# Patient Record
Sex: MALE | Race: WHITE | NOT HISPANIC OR LATINO | Employment: OTHER | URBAN - NONMETROPOLITAN AREA
[De-identification: names, ages, dates, MRNs, and addresses within clinical notes are randomized per-mention and may not be internally consistent; named-entity substitution may affect disease eponyms.]

---

## 2017-05-22 ENCOUNTER — HOSPITAL ENCOUNTER (EMERGENCY)
Facility: HOSPITAL | Age: 47
Discharge: LEFT BEFORE DISCHARGE | End: 2017-05-23

## 2017-05-22 VITALS
TEMPERATURE: 98.5 F | BODY MASS INDEX: 31.39 KG/M2 | WEIGHT: 200 LBS | RESPIRATION RATE: 18 BRPM | DIASTOLIC BLOOD PRESSURE: 115 MMHG | SYSTOLIC BLOOD PRESSURE: 204 MMHG | OXYGEN SATURATION: 97 % | HEART RATE: 91 BPM | HEIGHT: 67 IN

## 2017-05-22 PROCEDURE — 99211 OFF/OP EST MAY X REQ PHY/QHP: CPT

## 2017-05-26 ENCOUNTER — OFFICE VISIT (OUTPATIENT)
Dept: FAMILY MEDICINE CLINIC | Facility: CLINIC | Age: 47
End: 2017-05-26

## 2017-05-26 VITALS
HEIGHT: 67 IN | OXYGEN SATURATION: 98 % | BODY MASS INDEX: 33.74 KG/M2 | DIASTOLIC BLOOD PRESSURE: 90 MMHG | SYSTOLIC BLOOD PRESSURE: 140 MMHG | WEIGHT: 215 LBS

## 2017-05-26 DIAGNOSIS — F19.20 POLYSUBSTANCE (EXCLUDING OPIOIDS) DEPENDENCE (HCC): ICD-10-CM

## 2017-05-26 DIAGNOSIS — G47.30 SLEEP APNEA, UNSPECIFIED TYPE: ICD-10-CM

## 2017-05-26 DIAGNOSIS — I10 ESSENTIAL HYPERTENSION: Primary | ICD-10-CM

## 2017-05-26 DIAGNOSIS — F41.9 ANXIETY: ICD-10-CM

## 2017-05-26 DIAGNOSIS — E66.09 NON MORBID OBESITY DUE TO EXCESS CALORIES: ICD-10-CM

## 2017-05-26 DIAGNOSIS — Z13.9 SCREENING: ICD-10-CM

## 2017-05-26 DIAGNOSIS — F17.200 TOBACCO DEPENDENCE: ICD-10-CM

## 2017-05-26 PROCEDURE — 99214 OFFICE O/P EST MOD 30 MIN: CPT | Performed by: FAMILY MEDICINE

## 2017-05-27 RX ORDER — LISINOPRIL 5 MG/1
5 TABLET ORAL DAILY
Qty: 30 TABLET | Refills: 3 | Status: SHIPPED | OUTPATIENT
Start: 2017-05-27 | End: 2017-06-26 | Stop reason: SDUPTHER

## 2017-05-27 RX ORDER — HYDROXYZINE PAMOATE 25 MG/1
25 CAPSULE ORAL 3 TIMES DAILY PRN
Qty: 90 CAPSULE | Refills: 3 | Status: SHIPPED | OUTPATIENT
Start: 2017-05-27 | End: 2017-10-13 | Stop reason: SDUPTHER

## 2017-05-29 PROBLEM — F17.200 TOBACCO DEPENDENCE: Status: ACTIVE | Noted: 2017-05-29

## 2017-05-29 PROBLEM — F19.20 POLYSUBSTANCE (EXCLUDING OPIOIDS) DEPENDENCE: Status: ACTIVE | Noted: 2017-05-29

## 2017-05-29 PROBLEM — F41.9 ANXIETY: Status: ACTIVE | Noted: 2017-05-29

## 2017-05-29 PROBLEM — E66.09 NON MORBID OBESITY DUE TO EXCESS CALORIES: Status: ACTIVE | Noted: 2017-05-29

## 2017-05-29 RX ORDER — ASPIRIN 81 MG/1
81 TABLET, CHEWABLE ORAL DAILY
COMMUNITY
End: 2017-10-13 | Stop reason: SDUPTHER

## 2017-06-13 ENCOUNTER — APPOINTMENT (OUTPATIENT)
Dept: LAB | Facility: HOSPITAL | Age: 47
End: 2017-06-13

## 2017-06-13 LAB
ALBUMIN SERPL-MCNC: 4.3 G/DL (ref 3.4–4.8)
ALBUMIN/GLOB SERPL: 1.2 G/DL (ref 1.1–1.8)
ALP SERPL-CCNC: 72 U/L (ref 38–126)
ALT SERPL W P-5'-P-CCNC: 30 U/L (ref 21–72)
ANION GAP SERPL CALCULATED.3IONS-SCNC: 13 MMOL/L (ref 5–15)
ARTICHOKE IGE QN: 138 MG/DL (ref 1–129)
AST SERPL-CCNC: 31 U/L (ref 17–59)
BASOPHILS # BLD AUTO: 0.09 10*3/MM3 (ref 0–0.2)
BASOPHILS NFR BLD AUTO: 1.1 % (ref 0–2)
BILIRUB SERPL-MCNC: 0.6 MG/DL (ref 0.2–1.3)
BUN BLD-MCNC: 17 MG/DL (ref 7–21)
BUN/CREAT SERPL: 22.4 (ref 7–25)
CALCIUM SPEC-SCNC: 8.9 MG/DL (ref 8.4–10.2)
CHLORIDE SERPL-SCNC: 99 MMOL/L (ref 95–110)
CHOLEST SERPL-MCNC: 196 MG/DL (ref 0–199)
CO2 SERPL-SCNC: 26 MMOL/L (ref 22–31)
CREAT BLD-MCNC: 0.76 MG/DL (ref 0.7–1.3)
DEPRECATED RDW RBC AUTO: 48.9 FL (ref 35.1–43.9)
EOSINOPHIL # BLD AUTO: 0.66 10*3/MM3 (ref 0–0.7)
EOSINOPHIL NFR BLD AUTO: 8 % (ref 0–7)
ERYTHROCYTE [DISTWIDTH] IN BLOOD BY AUTOMATED COUNT: 14.4 % (ref 11.5–14.5)
GFR SERPL CREATININE-BSD FRML MDRD: 109 ML/MIN/1.73 (ref 63–147)
GLOBULIN UR ELPH-MCNC: 3.5 GM/DL (ref 2.3–3.5)
GLUCOSE BLD-MCNC: 110 MG/DL (ref 60–100)
HBA1C MFR BLD: 5.91 % (ref 4–5.6)
HCT VFR BLD AUTO: 46 % (ref 39–49)
HDLC SERPL-MCNC: 55 MG/DL (ref 60–200)
HGB BLD-MCNC: 15.8 G/DL (ref 13.7–17.3)
IMM GRANULOCYTES # BLD: 0.02 10*3/MM3 (ref 0–0.02)
IMM GRANULOCYTES NFR BLD: 0.2 % (ref 0–0.5)
LDLC/HDLC SERPL: 2.17 {RATIO} (ref 0–3.55)
LYMPHOCYTES # BLD AUTO: 2.89 10*3/MM3 (ref 0.6–4.2)
LYMPHOCYTES NFR BLD AUTO: 35 % (ref 10–50)
MCH RBC QN AUTO: 32.2 PG (ref 26.5–34)
MCHC RBC AUTO-ENTMCNC: 34.3 G/DL (ref 31.5–36.3)
MCV RBC AUTO: 93.7 FL (ref 80–98)
MONOCYTES # BLD AUTO: 0.76 10*3/MM3 (ref 0–0.9)
MONOCYTES NFR BLD AUTO: 9.2 % (ref 0–12)
NEUTROPHILS # BLD AUTO: 3.84 10*3/MM3 (ref 2–8.6)
NEUTROPHILS NFR BLD AUTO: 46.5 % (ref 37–80)
PLATELET # BLD AUTO: 226 10*3/MM3 (ref 150–450)
PMV BLD AUTO: 11.5 FL (ref 8–12)
POTASSIUM BLD-SCNC: 3.7 MMOL/L (ref 3.5–5.1)
PROT SERPL-MCNC: 7.8 G/DL (ref 6.3–8.6)
RBC # BLD AUTO: 4.91 10*6/MM3 (ref 4.37–5.74)
SODIUM BLD-SCNC: 138 MMOL/L (ref 137–145)
TRIGL SERPL-MCNC: 108 MG/DL (ref 20–199)
WBC NRBC COR # BLD: 8.26 10*3/MM3 (ref 3.2–9.8)

## 2017-06-13 PROCEDURE — 80061 LIPID PANEL: CPT | Performed by: FAMILY MEDICINE

## 2017-06-13 PROCEDURE — 36415 COLL VENOUS BLD VENIPUNCTURE: CPT | Performed by: FAMILY MEDICINE

## 2017-06-13 PROCEDURE — 80053 COMPREHEN METABOLIC PANEL: CPT | Performed by: FAMILY MEDICINE

## 2017-06-13 PROCEDURE — 85025 COMPLETE CBC W/AUTO DIFF WBC: CPT | Performed by: FAMILY MEDICINE

## 2017-06-13 PROCEDURE — 83036 HEMOGLOBIN GLYCOSYLATED A1C: CPT | Performed by: FAMILY MEDICINE

## 2017-06-13 PROCEDURE — 83525 ASSAY OF INSULIN: CPT | Performed by: FAMILY MEDICINE

## 2017-06-15 LAB — INSULIN SERPL-ACNC: 20 UIU/ML

## 2017-06-26 ENCOUNTER — OFFICE VISIT (OUTPATIENT)
Dept: FAMILY MEDICINE CLINIC | Facility: CLINIC | Age: 47
End: 2017-06-26

## 2017-06-26 VITALS
WEIGHT: 234 LBS | HEIGHT: 67 IN | SYSTOLIC BLOOD PRESSURE: 140 MMHG | BODY MASS INDEX: 36.73 KG/M2 | DIASTOLIC BLOOD PRESSURE: 88 MMHG | HEART RATE: 106 BPM | OXYGEN SATURATION: 98 %

## 2017-06-26 DIAGNOSIS — R73.03 PRE-DIABETES: ICD-10-CM

## 2017-06-26 DIAGNOSIS — E66.09 NON MORBID OBESITY DUE TO EXCESS CALORIES: ICD-10-CM

## 2017-06-26 DIAGNOSIS — K40.91 UNILATERAL RECURRENT INGUINAL HERNIA WITHOUT OBSTRUCTION OR GANGRENE: ICD-10-CM

## 2017-06-26 DIAGNOSIS — E78.2 MIXED HYPERLIPIDEMIA: ICD-10-CM

## 2017-06-26 DIAGNOSIS — F41.9 ANXIETY: ICD-10-CM

## 2017-06-26 DIAGNOSIS — F17.200 TOBACCO DEPENDENCE: ICD-10-CM

## 2017-06-26 DIAGNOSIS — I10 ESSENTIAL HYPERTENSION: Primary | ICD-10-CM

## 2017-06-26 PROCEDURE — 99213 OFFICE O/P EST LOW 20 MIN: CPT | Performed by: FAMILY MEDICINE

## 2017-06-26 RX ORDER — LISINOPRIL 10 MG/1
10 TABLET ORAL DAILY
Qty: 30 TABLET | Refills: 3 | Status: SHIPPED | OUTPATIENT
Start: 2017-06-26 | End: 2017-10-13 | Stop reason: SDUPTHER

## 2017-06-26 RX ORDER — ROSUVASTATIN CALCIUM 10 MG/1
10 TABLET, COATED ORAL DAILY
Qty: 30 TABLET | Refills: 3 | Status: SHIPPED | OUTPATIENT
Start: 2017-06-26 | End: 2017-10-13 | Stop reason: SDUPTHER

## 2017-06-26 NOTE — PROGRESS NOTES
I discussed the case with the resident and I agree with their assessment which includes Hypertension and Hyperglycemia and plan as outlined .  Barney Oswald MD.

## 2017-06-26 NOTE — PROGRESS NOTES
"  Subjective:     Chief Complaint   Patient presents with   • Hypertension   • Follow-up     Kingsley Littlejohn is a 47 y.o. male who presents for 1 month follow-up of HTN, lab results.      New concerns:  \"Blood pressure medicine doesn't help\", lab results, inguinal hernia which is reducible.    Evaluation:   Blood pressure reading not indicated for medication reasons: No   Blood pressure reading refused by patient: No   Home blood pressure readings: Systolic 150's, diastolic 100's.  States he stopped taking lisinopril after 1 week, \"because it didn't help\"   Blood pressure often elevated in physician office: No. However 204/115 in ER 5/22.  Symptoms:   Headache: yes. Occasional tension-type headache.   Visual disturbance: no   Fatigue: no   Dyspnea: no   Orthopnea: no   Edema: no   Chest pain: no   Palpitations: no   Diaphoresis: no    Risk Factors:   Tobacco use, Obesity    Comorbid Conditions:   None    The following portions of the patient's history were reviewed and updated as appropriate: allergies, current medications, past family history, past medical history, past social history, past surgical history and problem list.    Preventative:  Over the past 2 weeks, have you felt down, depressed, or hopeless?No   Over the past 2 weeks, have you felt little interest or pleasure in doing things?No  Clinical depression screening refused by patient.No          On osteoporosis therapy?No     Past Medical Hx:  Past Medical History:   Diagnosis Date   • Abdominal pain    • Allergic rhinitis    • Diabetes mellitus     Prev HbA1c 14.0 2014, states \"I'm cured!\"   • Essential hypertension    • Fracture closed, fibula, shaft     tib-fib       Past Surgical Hx:  Past Surgical History:   Procedure Laterality Date   • CYST REMOVAL      x 3   • TIBIA FRACTURE SURGERY Bilateral 2011    tib-fib       Health Maintenance:  Health Maintenance   Topic Date Due   • MEDICARE ANNUAL WELLNESS  05/26/2017   • INFLUENZA VACCINE  08/01/2017 "   • LIPID PANEL  06/13/2018   • TDAP/TD VACCINES (2 - Td) 01/01/2021   • PNEUMOCOCCAL VACCINE (19-64 MEDIUM RISK)  Addressed       Current Meds:    Current Outpatient Prescriptions:   •  aspirin 81 MG chewable tablet, Chew 81 mg Daily., Disp: , Rfl:   •  hydrOXYzine (VISTARIL) 25 MG capsule, Take 1 capsule by mouth 3 (Three) Times a Day As Needed for Itching or Anxiety., Disp: 90 capsule, Rfl: 3  •  nicotine polacrilex (NICORETTE) 4 MG gum, Chew 1 each As Needed for Smoking Cessation., Disp: 100 each, Rfl: 3  •  lisinopril (PRINIVIL,ZESTRIL) 10 MG tablet, Take 1 tablet by mouth Daily., Disp: 30 tablet, Rfl: 3  •  rosuvastatin (CRESTOR) 10 MG tablet, Take 1 tablet by mouth Daily., Disp: 30 tablet, Rfl: 3    Allergies:  Codeine and Latex    Family Hx:  Family History   Problem Relation Age of Onset   • Diabetes Other    • Hypertension Other    • Cancer Other    • Lung disease Other    • Thyroid disease Other    • Bleeding Disorder Other         Social History:  Social History     Social History   • Marital status: Single     Spouse name: N/A   • Number of children: N/A   • Years of education: N/A     Occupational History   • Lr      Social History Main Topics   • Smoking status: Current Every Day Smoker     Packs/day: 1.00     Years: 25.00     Types: Cigarettes     Start date: 1992   • Smokeless tobacco: Not on file   • Alcohol use No      Comment: FORMER pint of whiskey daily, states quit 1 year ago   • Drug use: Yes     Special: Marijuana      Comment: Meth, other substances which he declined to discuss further   • Sexual activity: Defer     Other Topics Concern   • Not on file     Social History Narrative       Review of Systems  Review of Systems   Constitutional: Negative for activity change and appetite change.   HENT: Negative for congestion and dental problem.    Eyes: Negative for discharge and itching.   Respiratory: Negative for apnea.    Cardiovascular: Negative for chest pain and leg swelling.  "  Gastrointestinal: Negative for abdominal distention and abdominal pain.   Endocrine: Negative for cold intolerance.   Genitourinary: Negative for difficulty urinating and enuresis.   Musculoskeletal: Negative for arthralgias and back pain.   Allergic/Immunologic: Negative for environmental allergies and food allergies.   Neurological: Negative for dizziness and facial asymmetry.   Hematological: Negative for adenopathy.   Psychiatric/Behavioral: Positive for sleep disturbance. Negative for agitation and behavioral problems. The patient is nervous/anxious.          Objective:     /88  Pulse 106  Ht 67\" (170.2 cm)  Wt 234 lb (106 kg)  SpO2 98%  BMI 36.65 kg/m2    General:  alert, appears stated age and cooperative   Oropharynx: lips, mucosa, and tongue normal; teeth and gums normal    Eyes:  conjunctivae/corneas clear. PERRL, EOM's intact. Fundi benign.    Ears:  normal TM's and external ear canals both ears   Neck: no adenopathy, no carotid bruit, no JVD, supple, symmetrical, trachea midline and thyroid not enlarged, symmetric, no tenderness/mass/nodules. Increased circumference.   Thyroid:  no palpable nodule   Lung: clear to auscultation bilaterally   Heart:  regular rate and rhythm, S1, S2 normal, no murmur, click, rub or gallop   Abdomen: soft, non-tender; bowel sounds normal; no masses,  no organomegaly. No inguinal hernia appreciated.   Extremities: extremities normal, atraumatic, no cyanosis or edema   Skin: warm and dry, no hyperpigmentation, vitiligo, or suspicious lesions   Pulses: 2+ and symmetric   Neuro: normal without focal findings, mental status, speech normal, alert and oriented x3 and reflexes normal and symmetric       Lab Review  No visits with results within 1 Month(s) from this visit.  Latest known visit with results is:    Office Visit on 05/26/2017   Component Date Value Ref Range Status   • WBC 06/13/2017 8.26  3.20 - 9.80 10*3/mm3 Final   • RBC 06/13/2017 4.91  4.37 - 5.74 " 10*6/mm3 Final   • Hemoglobin 06/13/2017 15.8  13.7 - 17.3 g/dL Final   • Hematocrit 06/13/2017 46.0  39.0 - 49.0 % Final   • MCV 06/13/2017 93.7  80.0 - 98.0 fL Final   • MCH 06/13/2017 32.2  26.5 - 34.0 pg Final   • MCHC 06/13/2017 34.3  31.5 - 36.3 g/dL Final   • RDW 06/13/2017 14.4  11.5 - 14.5 % Final   • RDW-SD 06/13/2017 48.9* 35.1 - 43.9 fl Final   • MPV 06/13/2017 11.5  8.0 - 12.0 fL Final   • Platelets 06/13/2017 226  150 - 450 10*3/mm3 Final   • Neutrophil % 06/13/2017 46.5  37.0 - 80.0 % Final   • Lymphocyte % 06/13/2017 35.0  10.0 - 50.0 % Final   • Monocyte % 06/13/2017 9.2  0.0 - 12.0 % Final   • Eosinophil % 06/13/2017 8.0* 0.0 - 7.0 % Final   • Basophil % 06/13/2017 1.1  0.0 - 2.0 % Final   • Immature Grans % 06/13/2017 0.2  0.0 - 0.5 % Final   • Neutrophils, Absolute 06/13/2017 3.84  2.00 - 8.60 10*3/mm3 Final   • Lymphocytes, Absolute 06/13/2017 2.89  0.60 - 4.20 10*3/mm3 Final   • Monocytes, Absolute 06/13/2017 0.76  0.00 - 0.90 10*3/mm3 Final   • Eosinophils, Absolute 06/13/2017 0.66  0.00 - 0.70 10*3/mm3 Final   • Basophils, Absolute 06/13/2017 0.09  0.00 - 0.20 10*3/mm3 Final   • Immature Grans, Absolute 06/13/2017 0.02  0.00 - 0.02 10*3/mm3 Final   • Glucose 06/13/2017 110* 60 - 100 mg/dL Final   • BUN 06/13/2017 17  7 - 21 mg/dL Final   • Creatinine 06/13/2017 0.76  0.70 - 1.30 mg/dL Final   • Sodium 06/13/2017 138  137 - 145 mmol/L Final   • Potassium 06/13/2017 3.7  3.5 - 5.1 mmol/L Final   • Chloride 06/13/2017 99  95 - 110 mmol/L Final   • CO2 06/13/2017 26.0  22.0 - 31.0 mmol/L Final   • Calcium 06/13/2017 8.9  8.4 - 10.2 mg/dL Final   • Total Protein 06/13/2017 7.8  6.3 - 8.6 g/dL Final   • Albumin 06/13/2017 4.30  3.40 - 4.80 g/dL Final   • ALT (SGPT) 06/13/2017 30  21 - 72 U/L Final   • AST (SGOT) 06/13/2017 31  17 - 59 U/L Final   • Alkaline Phosphatase 06/13/2017 72  38 - 126 U/L Final   • Total Bilirubin 06/13/2017 0.6  0.2 - 1.3 mg/dL Final   • eGFR Non African Amer 06/13/2017  "109  63 - 147 mL/min/1.73 Final   • Globulin 06/13/2017 3.5  2.3 - 3.5 gm/dL Final   • A/G Ratio 06/13/2017 1.2  1.1 - 1.8 g/dL Final   • BUN/Creatinine Ratio 06/13/2017 22.4  7.0 - 25.0 Final   • Anion Gap 06/13/2017 13.0  5.0 - 15.0 mmol/L Final   • Total Cholesterol 06/13/2017 196  0 - 199 mg/dL Final   • Triglycerides 06/13/2017 108  20 - 199 mg/dL Final   • HDL Cholesterol 06/13/2017 55* 60 - 200 mg/dL Final   • LDL Cholesterol  06/13/2017 138* 1 - 129 mg/dL Final   • LDL/HDL Ratio 06/13/2017 2.17  0.00 - 3.55 Final   • Hemoglobin A1C 06/13/2017 5.91* 4 - 5.6 % Final   • Insulin 06/13/2017 20  uIU/mL Final    Reference Range:  Pubertal Children and Adults (fasting):  < or = 17       Assessment/Plan:     Hypertension stable.  1. Essential hypertension    2. Mixed hyperlipidemia    3. Non morbid obesity due to excess calories    4. Tobacco dependence    5. Anxiety    6. Pre-diabetes    7. Unilateral recurrent inguinal hernia without obstruction or gangrene      1. Essential hypertension  - lisinopril (PRINIVIL,ZESTRIL) 10 MG tablet; Take 1 tablet by mouth Daily.  Dispense: 30 tablet; Refill: 3    2. Mixed hyperlipidemia  - ASCVD risk score 8.6%.  Patient states his previous on the statin, but does not recall which one, [facility] review showed no statin.  States that it made him \"deathly ill\" but denied myalgias.  However he is agreeable to try statin again.  Therefore I will prescribe Crestor at moderate-intensity dose.  - rosuvastatin (CRESTOR) 10 MG tablet; Take 1 tablet by mouth Daily.  Dispense: 30 tablet; Refill: 3    3. Non morbid obesity due to excess calories  - Patient has gained 19 lb since last seen.  Discussed importance of diet modification, exercise.  Also discussed the patient's weight contributes to difficulty in controlling blood pressure.    4. Tobacco dependence  -  Tobacco dependence:  Counseled, 3-10 minutes spent, asymptomatic. Still pre-contemplative.    5. Anxiety  - Controlled. Continue " Vistaril.    6. Pre-diabetes  - Patient declined metformin.  - Discussed importance of low carb diet, weight loss.  Will re-check HbA1c in 6 months.    7. Possible inguinal hernia  - Currently reducible, will monitor.  Discussed importance of tobacco cessation, high-fiber diet.  Also discussed warning signs for which he should go to E.D.     1.  Rx changes: increase lisinopril  2.  Education:    EKG ordered: no   Presented an overview of HTN, expected course, considerations, risk factors, and exacerbation prevention.   Discussed treatment options for HTN: yes   Recommended restricted dietary Na intake: yes   Recommended increased in dietary K intake: yes   Discussed patient action plan for HTN: yes  3.  Compliance at present is estimated to be fair. Efforts to improve compliance (if necessary) will be directed at dietary modifications: DASH diet (handout given).    Return in about 2 months (around 8/26/2017) for Recheck for HTN/HLD.    GOALS:  Lose 4 lb, control bp  BARRIERS TO GOALS:  Insight    Preventative:  delayed   Recommended:Pneumovax, declined  social binge drinking  decrease soda or juice intake, increase water intake, increase physical activity and have 3 meals a day  Has appt for Wellness Exam in July    RISK SCORE: 4           This document has been electronically signed by Jaret Banks MD on June 26, 2017 10:43 AM

## 2017-07-14 ENCOUNTER — OFFICE VISIT (OUTPATIENT)
Dept: FAMILY MEDICINE CLINIC | Facility: CLINIC | Age: 47
End: 2017-07-14

## 2017-07-14 VITALS
SYSTOLIC BLOOD PRESSURE: 140 MMHG | DIASTOLIC BLOOD PRESSURE: 90 MMHG | HEIGHT: 67 IN | BODY MASS INDEX: 38.45 KG/M2 | HEART RATE: 109 BPM | OXYGEN SATURATION: 98 % | WEIGHT: 245 LBS

## 2017-07-14 DIAGNOSIS — Z00.00 MEDICARE ANNUAL WELLNESS VISIT, INITIAL: Primary | ICD-10-CM

## 2017-07-14 PROCEDURE — G0438 PPPS, INITIAL VISIT: HCPCS | Performed by: FAMILY MEDICINE

## 2017-07-14 NOTE — PROGRESS NOTES
QUICK REFERENCE INFORMATION:  The ABCs of the Annual Wellness Visit    Initial Medicare Wellness Visit    HEALTH RISK ASSESSMENT    1970    Recent Hospitalizations:  No hospitalization(s) within the last year..    Current Medical Providers:  Patient Care Team:  Jaret Banks MD as PCP - General (Family Medicine)  Carley Coffman MD as Resident (Family Medicine)  Matt Wayne MD as Resident (Family Medicine)  Ewa Sood MD as Resident (Family Medicine)  Barbara Telles MD as Resident (Family Medicine)  Wang Lara Jr., MD as Resident (Family Medicine)    Smoking Status:  History   Smoking Status   • Current Every Day Smoker   • Packs/day: 1.00   • Years: 25.00   • Types: Cigarettes   • Start date: 1992   Smokeless Tobacco   • Not on file       Alcohol Consumption:  History   Alcohol Use No     Comment: FORMER pint of whiskey daily, states quit 1 year ago       Depression Screen:   PHQ-9 Depression Screening 7/19/2017   Little interest or pleasure in doing things 0   Feeling down, depressed, or hopeless 0   Trouble falling or staying asleep, or sleeping too much 3   Feeling tired or having little energy 1   Poor appetite or overeating 1   Feeling bad about yourself - or that you are a failure or have let yourself or your family down 0   Trouble concentrating on things, such as reading the newspaper or watching television 1   Moving or speaking so slowly that other people could have noticed. Or the opposite - being so fidgety or restless that you have been moving around a lot more than usual 1   Thoughts that you would be better off dead, or of hurting yourself in some way 0   PHQ-9 Total Score 7   If you checked off any problems, how difficult have these problems made it for you to do your work, take care of things at home, or get along with other people? Somewhat difficult       Health Habits and Functional and Cognitive Screening:  Functional & Cognitive Status 7/19/2017    Do you have difficulty preparing food and eating? No   Do you have difficulty bathing yourself? No   Do you have difficulty getting dressed? No   Do you have difficulty using the toilet? No   Do you have difficulty moving around from place to place? No   In the past year have you fallen or experienced a near fall? No   Do you need help using the phone?  No   Are you deaf or do you have serious difficulty hearing?  No   Do you need help with transportation? No   Do you need help shopping? No   Do you need help preparing meals?  No   Do you need help with housework?  No   Do you need help with laundry? No   Do you need help taking your medications? No   Do you need help managing money? No        Does the patient have evidence of cognitive impairment? No    Asiprin use counseling: Start ASA 81 mg daily       Recent Lab Results:    Visual Acuity:   Visual Acuity Screening    Right eye Left eye Both eyes   Without correction: 20/70 20/70 20/50   With correction:          Age-appropriate Screening Schedule:  Refer to the list below for future screening recommendations based on patient's age, sex and/or medical conditions. Orders for these recommended tests are listed in the plan section. The patient has been provided with a written plan.    Health Maintenance   Topic Date Due   • INFLUENZA VACCINE  08/01/2017   • LIPID PANEL  06/13/2018   • TDAP/TD VACCINES (2 - Td) 01/01/2021   • PNEUMOCOCCAL VACCINE (19-64 MEDIUM RISK)  Addressed     Patient is requesting referral for colonoscopy, however he is not yet 48 years old.  Informed patient that insurance would not pay for it until he turns 48 years of age.       Subjective   History of Present Illness    Kingsley Littlejohn is a 47 y.o. male who presents for an Annual Wellness Visit.    The following portions of the patient's history were reviewed and updated as appropriate: allergies, current medications, past family history, past medical history, past social history, past  "surgical history and problem list.    Outpatient Medications Prior to Visit   Medication Sig Dispense Refill   • aspirin 81 MG chewable tablet Chew 81 mg Daily.     • hydrOXYzine (VISTARIL) 25 MG capsule Take 1 capsule by mouth 3 (Three) Times a Day As Needed for Itching or Anxiety. 90 capsule 3   • lisinopril (PRINIVIL,ZESTRIL) 10 MG tablet Take 1 tablet by mouth Daily. 30 tablet 3   • nicotine polacrilex (NICORETTE) 4 MG gum Chew 1 each As Needed for Smoking Cessation. 100 each 3   • rosuvastatin (CRESTOR) 10 MG tablet Take 1 tablet by mouth Daily. 30 tablet 3     No facility-administered medications prior to visit.        Patient Active Problem List   Diagnosis   • Essential hypertension   • Anxiety   • Tobacco dependence   • Polysubstance (excluding opioids) dependence   • Non morbid obesity due to excess calories   • Pre-diabetes       Advance Care Planning:  has NO advance directive - information provided to the patient today    Identification of Risk Factors:  Risk factors include: weight  and cardiovascular risk.    Review of Systems    Compared to one year ago, the patient feels his physical health is the same.  Compared to one year ago, the patient feels his mental health is the same.    Objective     Physical Exam    Vitals:    07/14/17 1542   BP: 140/90   Pulse: 109   SpO2: 98%   Weight: 245 lb (111 kg)   Height: 67\" (170.2 cm)       Body mass index is 38.37 kg/(m^2).  Discussed the patient's BMI with him. The BMI is above average; BMI management plan is completed.    Assessment/Plan   Patient Self-Management and Personalized Health Advice  The patient has been provided with information about: diet, exercise, weight management, prevention of cardiac or vascular disease, the relationship between weight and GERD, tobacco cessation and designing advance directives and preventive services including:   · Advance directive, Nutrition counseling provided.    Visit Diagnoses:    ICD-10-CM ICD-9-CM   1. Medicare " annual wellness visit, initial Z00.00 V70.0     No orders of the defined types were placed in this encounter.      Outpatient Encounter Prescriptions as of 7/14/2017   Medication Sig Dispense Refill   • aspirin 81 MG chewable tablet Chew 81 mg Daily.     • hydrOXYzine (VISTARIL) 25 MG capsule Take 1 capsule by mouth 3 (Three) Times a Day As Needed for Itching or Anxiety. 90 capsule 3   • lisinopril (PRINIVIL,ZESTRIL) 10 MG tablet Take 1 tablet by mouth Daily. 30 tablet 3   • nicotine polacrilex (NICORETTE) 4 MG gum Chew 1 each As Needed for Smoking Cessation. 100 each 3   • rosuvastatin (CRESTOR) 10 MG tablet Take 1 tablet by mouth Daily. 30 tablet 3     No facility-administered encounter medications on file as of 7/14/2017.        Reviewed use of high risk medication in the elderly: not applicable  Reviewed for potential of harmful drug interactions in the elderly: not applicable    Follow Up:  Return in about 3 months (around 10/14/2017) for Next scheduled follow up.     An After Visit Summary and PPPS with all of these plans were given to the patient.           This document has been electronically signed by Jaret Banks MD on July 19, 2017 6:02 PM

## 2017-07-14 NOTE — PROGRESS NOTES
I have reviewed the notes, assessments, and/or procedures performed by Jaret Banks MD , I concur with her/his documentation and assessment and plan for Kingsley Littlejohn.        This document has been electronically signed by Veronika Mayorga MD on July 14, 2017 4:21 PM

## 2017-07-17 ENCOUNTER — TELEPHONE (OUTPATIENT)
Dept: FAMILY MEDICINE CLINIC | Facility: CLINIC | Age: 47
End: 2017-07-17

## 2017-07-17 DIAGNOSIS — J30.9 ALLERGIC RHINITIS, UNSPECIFIED ALLERGIC RHINITIS TRIGGER, UNSPECIFIED RHINITIS SEASONALITY: Primary | ICD-10-CM

## 2017-07-17 DIAGNOSIS — G47.00 INSOMNIA, UNSPECIFIED TYPE: ICD-10-CM

## 2017-07-17 RX ORDER — LORATADINE 10 MG/1
10 TABLET ORAL DAILY
Qty: 30 TABLET | Refills: 6 | Status: SHIPPED | OUTPATIENT
Start: 2017-07-17 | End: 2017-10-13 | Stop reason: ALTCHOICE

## 2017-07-17 RX ORDER — TRAZODONE HYDROCHLORIDE 50 MG/1
50 TABLET ORAL NIGHTLY
Qty: 30 TABLET | Refills: 3 | Status: SHIPPED | OUTPATIENT
Start: 2017-07-17 | End: 2017-10-13 | Stop reason: SDUPTHER

## 2017-07-17 NOTE — TELEPHONE ENCOUNTER
Called patient back, he thinks he was taking 500 mg previously, but he also weighed more.  Discussed I will send lower dose trazodone to start with, and he can cancel tomorrow appointment.  He also requested Claritin prescription, will send.          This document has been electronically signed by Jaret Banks MD on July 17, 2017 3:58 PM      -----------------  PT is calling for a prescription that will help him sleep at night, TRAZODONE has worked int he past . PT states that if he cannot sleep he will have to stop driving and hallucinating.     Can something be sent to [facility], I made PT appt for 7/18 @ 6801 just in case.     Please advise.

## 2017-08-01 ENCOUNTER — CONSULT (OUTPATIENT)
Dept: SLEEP MEDICINE | Facility: HOSPITAL | Age: 47
End: 2017-08-01

## 2017-08-01 VITALS
SYSTOLIC BLOOD PRESSURE: 144 MMHG | HEART RATE: 105 BPM | WEIGHT: 254 LBS | HEIGHT: 68 IN | OXYGEN SATURATION: 97 % | DIASTOLIC BLOOD PRESSURE: 84 MMHG | BODY MASS INDEX: 38.49 KG/M2

## 2017-08-01 DIAGNOSIS — G47.33 OBSTRUCTIVE SLEEP APNEA, ADULT: Primary | ICD-10-CM

## 2017-08-01 DIAGNOSIS — F17.218 NICOTINE DEPENDENCE, CIGARETTES, WITH OTHER NICOTINE-INDUCED DISORDERS: ICD-10-CM

## 2017-08-01 DIAGNOSIS — J40 BRONCHITIS: ICD-10-CM

## 2017-08-01 PROCEDURE — 99406 BEHAV CHNG SMOKING 3-10 MIN: CPT | Performed by: INTERNAL MEDICINE

## 2017-08-01 PROCEDURE — 99203 OFFICE O/P NEW LOW 30 MIN: CPT | Performed by: INTERNAL MEDICINE

## 2017-08-01 NOTE — PROGRESS NOTES
"New Patient Sleep Medicine Consultation    Encounter Date: 8/1/2017         Patient's PCP: Jaret Banks MD  Referring provider: Jaret Banks MD  Reason for consultation: snoring, awakening gasping for breath, witnessed apneas, excessive daytime sleepiness and unrefreshing sleep    Kingsley Littlejohn is a 47 y.o. male who presents with above complaints and known MAXIME. He had PSG 12/28/2012 that showed AHI 80, on CPAP of 12 cm H2O. He is suffering from bronchitis with nocturnal cough, post nasal drainage, nasal allergies and 50 lb weight gain in the last 4 months. He was prescribed trazodone ~ 1 week ago. He has more hallucinations and sleepwalking.    Sleep history:  Bed time: 0030  Sleep latency: 10 minutes  Number of times awakens during the night: 1-2  Wake time: 0300, falls back asleep and then up to 0430  Estimated total sleep time at night: 4-5 hours  Caffeine intake: > 1 pot of coffee, 1/2 gallon of tea, rare  Alcohol intake: no, no other drugs  Nap time: no naps in the last 4 month  Driving: yes has sleepiness  He admits to abnormal dreams, sleep paralysis, hypnagogic hallucinations, but denies RLS or cataplexy/    [place] - 22    Past Medical History:   Diagnosis Date   • Abdominal pain    • Allergic rhinitis    • Diabetes mellitus     Prev HbA1c 14.0 2014, states \"I'm cured!\"   • Essential hypertension    • Fracture closed, fibula, shaft     tib-fib     Social History     Social History   • Marital status: Single     Spouse name: N/A   • Number of children: N/A   • Years of education: N/A     Occupational History   • Lr      Social History Main Topics   • Smoking status: Current Every Day Smoker     Packs/day: 1.00     Years: 25.00     Types: Cigarettes     Start date: 1992   • Smokeless tobacco: Not on file   • Alcohol use No      Comment: FORMER pint of whiskey daily, states quit 1 year ago   • Drug use: Yes     Special: Marijuana      Comment: Meth, other substances which he declined to discuss " further   • Sexual activity: Defer     Other Topics Concern   • Not on file     Social History Narrative     Family History   Problem Relation Age of Onset   • Diabetes Other    • Hypertension Other    • Cancer Other    • Lung disease Other    • Thyroid disease Other    • Bleeding Disorder Other      Smoking history: smoked 1 ppds from age 16 until current (quit about 6 times)  FH of sleep disorders: whole family with machine    Review of Systems:  Pertinent items are noted in HPI.    Vitals:    08/01/17 1521   BP: 144/84   Pulse: 105   SpO2: 97%     Body mass index is 39.19 kg/(m^2).  Weight Issues:  Weight recorded over past 5 years:   weight gain of 50 lbs in the last 4 months. He was ~ 350 prior    Gen:  No distress, appears stated age, alert, oriented to person, place, and time, very congested voice  Heent:   NC/AT, PERRLA, EOMI, anicteric sclera    External ears normal, OP clear, Mallamati 4, large tonsils with mild erythema   Tonsillectomy: No    Nose: congested  NECK:  Supple, midline trachea, no palpable goiter, neck size 18 inches  LUNGS: Clear breath sounds bilaterally, nonlabored breathing  CV:  Normal S1/S2, without murmur, no peripheral edema  ABD:  Non tender, obese, no enlarged liver or masses, Bowel sounds not appreciated  EXT:  No cyanosis or clubbing      Current Outpatient Prescriptions:   •  aspirin 81 MG chewable tablet, Chew 81 mg Daily., Disp: , Rfl:   •  hydrOXYzine (VISTARIL) 25 MG capsule, Take 1 capsule by mouth 3 (Three) Times a Day As Needed for Itching or Anxiety., Disp: 90 capsule, Rfl: 3  •  lisinopril (PRINIVIL,ZESTRIL) 10 MG tablet, Take 1 tablet by mouth Daily., Disp: 30 tablet, Rfl: 3  •  loratadine (CLARITIN) 10 MG tablet, Take 1 tablet by mouth Daily., Disp: 30 tablet, Rfl: 6  •  nicotine polacrilex (NICORETTE) 4 MG gum, Chew 1 each As Needed for Smoking Cessation., Disp: 100 each, Rfl: 3  •  rosuvastatin (CRESTOR) 10 MG tablet, Take 1 tablet by mouth Daily., Disp: 30 tablet,  "Rfl: 3  •  traZODone (DESYREL) 50 MG tablet, Take 1 tablet by mouth Every Night., Disp: 30 tablet, Rfl: 3    ASSESSMENT:  1. Obstructive sleep apnea (PSG on 12/27/2012 - AHI of 80 on CPAP 12 cm H2O.  2. Methamphetamine use prior  3. Inadequate total sleep time   4. Ingrid with bipolar disease  5. Caffeine excess - recommend reducing caffeine intake over time to no more than (3) caffeine beverages per day, all before 4pm.  6. Nicotine dependence with bronchitis complication- given [employer]\"s \"Thinking of quitting smoking\" flyer and referred patient to call 5-037-QUIT-NOW. Smoking and tobacco use cessation counseling visit was 5 minutes      PLAN:  1. I will order autocpap 5-20 with compliance report in 2-3 months   2. Will address other issues once on CPAP       This document has been electronically signed by Simon Lyle MD on August 1, 2017   Simon Lyle MD        CC: Jaret Banks MD  "

## 2017-10-13 ENCOUNTER — OFFICE VISIT (OUTPATIENT)
Dept: FAMILY MEDICINE CLINIC | Facility: CLINIC | Age: 47
End: 2017-10-13

## 2017-10-13 VITALS
HEART RATE: 90 BPM | OXYGEN SATURATION: 97 % | DIASTOLIC BLOOD PRESSURE: 80 MMHG | HEIGHT: 67 IN | SYSTOLIC BLOOD PRESSURE: 130 MMHG | WEIGHT: 250 LBS | BODY MASS INDEX: 39.24 KG/M2

## 2017-10-13 DIAGNOSIS — J30.9 CHRONIC ALLERGIC RHINITIS, UNSPECIFIED SEASONALITY, UNSPECIFIED TRIGGER: ICD-10-CM

## 2017-10-13 DIAGNOSIS — G47.00 INSOMNIA, UNSPECIFIED TYPE: ICD-10-CM

## 2017-10-13 DIAGNOSIS — F17.200 TOBACCO DEPENDENCE: ICD-10-CM

## 2017-10-13 DIAGNOSIS — E66.09 NON MORBID OBESITY DUE TO EXCESS CALORIES: ICD-10-CM

## 2017-10-13 DIAGNOSIS — I10 ESSENTIAL HYPERTENSION: Primary | ICD-10-CM

## 2017-10-13 DIAGNOSIS — E78.2 MIXED HYPERLIPIDEMIA: ICD-10-CM

## 2017-10-13 DIAGNOSIS — F41.9 ANXIETY: ICD-10-CM

## 2017-10-13 PROBLEM — E78.49 OTHER HYPERLIPIDEMIA: Status: ACTIVE | Noted: 2017-10-13

## 2017-10-13 PROCEDURE — 99213 OFFICE O/P EST LOW 20 MIN: CPT | Performed by: FAMILY MEDICINE

## 2017-10-13 RX ORDER — LISINOPRIL 10 MG/1
10 TABLET ORAL DAILY
Qty: 30 TABLET | Refills: 3 | Status: SHIPPED | OUTPATIENT
Start: 2017-10-13 | End: 2018-03-22 | Stop reason: SDUPTHER

## 2017-10-13 RX ORDER — TRAZODONE HYDROCHLORIDE 50 MG/1
50 TABLET ORAL NIGHTLY
Qty: 30 TABLET | Refills: 3 | Status: SHIPPED | OUTPATIENT
Start: 2017-10-13 | End: 2018-03-22

## 2017-10-13 RX ORDER — CETIRIZINE HYDROCHLORIDE 10 MG/1
10 TABLET ORAL DAILY
Qty: 30 TABLET | Refills: 3 | Status: SHIPPED | OUTPATIENT
Start: 2017-10-13 | End: 2018-04-13

## 2017-10-13 RX ORDER — HYDROXYZINE PAMOATE 25 MG/1
25 CAPSULE ORAL 3 TIMES DAILY PRN
Qty: 90 CAPSULE | Refills: 3 | Status: SHIPPED | OUTPATIENT
Start: 2017-10-13 | End: 2018-03-26

## 2017-10-13 RX ORDER — ASPIRIN 81 MG/1
81 TABLET, CHEWABLE ORAL DAILY
Qty: 30 TABLET | Refills: 11 | Status: SHIPPED | OUTPATIENT
Start: 2017-10-13 | End: 2018-03-26

## 2017-10-13 RX ORDER — ECHINACEA PURPUREA EXTRACT 125 MG
1 TABLET ORAL AS NEEDED
Qty: 60 ML | Refills: 12 | Status: SHIPPED | OUTPATIENT
Start: 2017-10-13 | End: 2022-09-14

## 2017-10-13 RX ORDER — ROSUVASTATIN CALCIUM 10 MG/1
10 TABLET, COATED ORAL DAILY
Qty: 30 TABLET | Refills: 3 | Status: SHIPPED | OUTPATIENT
Start: 2017-10-13 | End: 2018-04-13

## 2017-10-13 NOTE — PROGRESS NOTES
I have reviewed the notes, assessments, and/or procedures performed. I concur with her/his documentation of Kingsley Littlejohn.     Guy Kraus, DO

## 2017-10-13 NOTE — PROGRESS NOTES
"  Subjective:     Chief Complaint   Patient presents with   • Follow-up   • Hypertension   • Sore Throat     Kingsley Littlejohn is a 47 y.o. male who presents for 3 month follow-up of HTN and HLD.      His chief complaint today though, is nasal drainage and a very raw feeling in his throat.  Patient states he's had exposures to chemicals recently, and the only thing that helps to relieve the the rawness is drinking milk.  He was seen in [place] at the ER, and an had a CAT scan done.  Unfortunately, he was not informed results of the CAT scan but states it was \"to rule out cancer.\"  He was also given antibiotics, which he states were ineffective.    Since I last saw him, he saw Dr. Lyle for re-assessment of obstructive sleep apnea.  He was prescribed new CPAP, but states he's been noncompliant due to his illness.    Patient states he lost all of his medications recently, including the trazodone and Vistaril.  He has had difficulty sleeping secondary to anxiety, and has been taking leftover prescriptions of Geodon from when he was at which was at [facility].    Evaluation:   Blood pressure reading not indicated for medication reasons: No   Blood pressure reading refused by patient: No   Home blood pressure readings: states controlled   Blood pressure often elevated in physician office: No. However 204/115 in ER 5/22.  Symptoms:   Headache: yes. Occasional tension-type headache.   Visual disturbance: no   Fatigue: no   Dyspnea: no   Orthopnea: no   Edema: no   Chest pain: no   Palpitations: no   Diaphoresis: no    Risk Factors:   Tobacco use, Obesity    Comorbid Conditions:   None    The following portions of the patient's history were reviewed and updated as appropriate: allergies, current medications, past family history, past medical history, past social history, past surgical history and problem list.    Preventative:  Over the past 2 weeks, have you felt down, depressed, or hopeless?No   Over the past 2 " "weeks, have you felt little interest or pleasure in doing things?No  Clinical depression screening refused by patient.No      On osteoporosis therapy?No     Past Medical Hx:  Past Medical History:   Diagnosis Date   • Abdominal pain    • Allergic rhinitis    • Diabetes mellitus     Prev HbA1c 14.0 2014, states \"I'm cured!\"   • Essential hypertension    • Fracture closed, fibula, shaft     tib-fib       Past Surgical Hx:  Past Surgical History:   Procedure Laterality Date   • CYST REMOVAL      x 3   • TIBIA FRACTURE SURGERY Bilateral 2011    tib-fib       Health Maintenance:  Health Maintenance   Topic Date Due   • LIPID PANEL  06/13/2018   • MEDICARE ANNUAL WELLNESS  07/14/2018   • TDAP/TD VACCINES (2 - Td) 01/01/2021   • PNEUMOCOCCAL VACCINE (19-64 MEDIUM RISK)  Addressed   • INFLUENZA VACCINE  Addressed       Current Meds:    Current Outpatient Prescriptions:   •  aspirin 81 MG chewable tablet, Chew 1 tablet Daily., Disp: 30 tablet, Rfl: 11  •  hydrOXYzine (VISTARIL) 25 MG capsule, Take 1 capsule by mouth 3 (Three) Times a Day As Needed for Itching or Anxiety., Disp: 90 capsule, Rfl: 3  •  lisinopril (PRINIVIL,ZESTRIL) 10 MG tablet, Take 1 tablet by mouth Daily., Disp: 30 tablet, Rfl: 3  •  nicotine polacrilex (NICORETTE) 4 MG gum, Chew 1 each As Needed for Smoking Cessation., Disp: 100 each, Rfl: 3  •  rosuvastatin (CRESTOR) 10 MG tablet, Take 1 tablet by mouth Daily., Disp: 30 tablet, Rfl: 3  •  traZODone (DESYREL) 50 MG tablet, Take 1 tablet by mouth Every Night., Disp: 30 tablet, Rfl: 3  •  cetirizine (zyrTEC) 10 MG tablet, Take 1 tablet by mouth Daily., Disp: 30 tablet, Rfl: 3  •  sodium chloride (OCEAN NASAL SPRAY) 0.65 % nasal spray, 1 spray into each nostril As Needed for Congestion., Disp: 60 mL, Rfl: 12    Allergies:  Codeine and Latex    Family Hx:  Family History   Problem Relation Age of Onset   • Diabetes Other    • Hypertension Other    • Cancer Other    • Lung disease Other    • Thyroid disease " "Other    • Bleeding Disorder Other         Social History:  Social History     Social History   • Marital status: Single     Spouse name: N/A   • Number of children: N/A   • Years of education: N/A     Occupational History   • Lr      Social History Main Topics   • Smoking status: Current Every Day Smoker     Packs/day: 1.00     Years: 25.00     Types: Cigarettes     Start date: 1992   • Smokeless tobacco: Not on file   • Alcohol use No      Comment: FORMER pint of whiskey daily, states quit 1 year ago   • Drug use: Yes     Special: Marijuana      Comment: Meth, other substances which he declined to discuss further   • Sexual activity: Defer     Other Topics Concern   • Not on file     Social History Narrative       Review of Systems  Review of Systems   Constitutional: Negative for activity change and appetite change.   HENT: Positive for postnasal drip and sore throat. Negative for congestion and dental problem.    Eyes: Negative for discharge and itching.   Respiratory: Negative for apnea.    Cardiovascular: Negative for chest pain and leg swelling.   Gastrointestinal: Negative for abdominal distention and abdominal pain.   Endocrine: Negative for cold intolerance.   Genitourinary: Negative for difficulty urinating and enuresis.   Musculoskeletal: Negative for arthralgias and back pain.   Allergic/Immunologic: Negative for environmental allergies and food allergies.   Neurological: Negative for dizziness and facial asymmetry.   Hematological: Negative for adenopathy.   Psychiatric/Behavioral: Positive for sleep disturbance. Negative for agitation and behavioral problems. The patient is nervous/anxious.      Objective:     /80  Pulse 90  Ht 67\" (170.2 cm)  Wt 250 lb (113 kg)  SpO2 97%  BMI 39.16 kg/m2    General:  alert, appears stated age and cooperative   Oropharynx: lips, mucosa, and tongue normal; teeth and gums normal    Eyes:  conjunctivae/corneas clear. PERRL, EOM's intact. Fundi benign.    " Ears:  normal TM's and external ear canals both ears   Neck: no adenopathy, no carotid bruit, no JVD, supple, symmetrical, trachea midline and thyroid not enlarged, symmetric, no tenderness/mass/nodules. Increased circumference.   Thyroid:  no palpable nodule   Lung: clear to auscultation bilaterally   Heart:  regular rate and rhythm, S1, S2 normal, no murmur, click, rub or gallop   Abdomen: soft, non-tender; bowel sounds normal; no masses,  no organomegaly. No inguinal hernia appreciated.   Extremities: extremities normal, atraumatic, no cyanosis or edema   Skin: warm and dry, no hyperpigmentation, vitiligo, or suspicious lesions   Pulses: 2+ and symmetric   Neuro: normal without focal findings, mental status, speech normal, alert and oriented x3 and reflexes normal and symmetric       Lab Review  No visits with results within 1 Month(s) from this visit.  Latest known visit with results is:    Office Visit on 05/26/2017   Component Date Value Ref Range Status   • WBC 06/13/2017 8.26  3.20 - 9.80 10*3/mm3 Final   • RBC 06/13/2017 4.91  4.37 - 5.74 10*6/mm3 Final   • Hemoglobin 06/13/2017 15.8  13.7 - 17.3 g/dL Final   • Hematocrit 06/13/2017 46.0  39.0 - 49.0 % Final   • MCV 06/13/2017 93.7  80.0 - 98.0 fL Final   • MCH 06/13/2017 32.2  26.5 - 34.0 pg Final   • MCHC 06/13/2017 34.3  31.5 - 36.3 g/dL Final   • RDW 06/13/2017 14.4  11.5 - 14.5 % Final   • RDW-SD 06/13/2017 48.9* 35.1 - 43.9 fl Final   • MPV 06/13/2017 11.5  8.0 - 12.0 fL Final   • Platelets 06/13/2017 226  150 - 450 10*3/mm3 Final   • Neutrophil % 06/13/2017 46.5  37.0 - 80.0 % Final   • Lymphocyte % 06/13/2017 35.0  10.0 - 50.0 % Final   • Monocyte % 06/13/2017 9.2  0.0 - 12.0 % Final   • Eosinophil % 06/13/2017 8.0* 0.0 - 7.0 % Final   • Basophil % 06/13/2017 1.1  0.0 - 2.0 % Final   • Immature Grans % 06/13/2017 0.2  0.0 - 0.5 % Final   • Neutrophils, Absolute 06/13/2017 3.84  2.00 - 8.60 10*3/mm3 Final   • Lymphocytes, Absolute 06/13/2017 2.89   0.60 - 4.20 10*3/mm3 Final   • Monocytes, Absolute 06/13/2017 0.76  0.00 - 0.90 10*3/mm3 Final   • Eosinophils, Absolute 06/13/2017 0.66  0.00 - 0.70 10*3/mm3 Final   • Basophils, Absolute 06/13/2017 0.09  0.00 - 0.20 10*3/mm3 Final   • Immature Grans, Absolute 06/13/2017 0.02  0.00 - 0.02 10*3/mm3 Final   • Glucose 06/13/2017 110* 60 - 100 mg/dL Final   • BUN 06/13/2017 17  7 - 21 mg/dL Final   • Creatinine 06/13/2017 0.76  0.70 - 1.30 mg/dL Final   • Sodium 06/13/2017 138  137 - 145 mmol/L Final   • Potassium 06/13/2017 3.7  3.5 - 5.1 mmol/L Final   • Chloride 06/13/2017 99  95 - 110 mmol/L Final   • CO2 06/13/2017 26.0  22.0 - 31.0 mmol/L Final   • Calcium 06/13/2017 8.9  8.4 - 10.2 mg/dL Final   • Total Protein 06/13/2017 7.8  6.3 - 8.6 g/dL Final   • Albumin 06/13/2017 4.30  3.40 - 4.80 g/dL Final   • ALT (SGPT) 06/13/2017 30  21 - 72 U/L Final   • AST (SGOT) 06/13/2017 31  17 - 59 U/L Final   • Alkaline Phosphatase 06/13/2017 72  38 - 126 U/L Final   • Total Bilirubin 06/13/2017 0.6  0.2 - 1.3 mg/dL Final   • eGFR Non African Amer 06/13/2017 109  63 - 147 mL/min/1.73 Final   • Globulin 06/13/2017 3.5  2.3 - 3.5 gm/dL Final   • A/G Ratio 06/13/2017 1.2  1.1 - 1.8 g/dL Final   • BUN/Creatinine Ratio 06/13/2017 22.4  7.0 - 25.0 Final   • Anion Gap 06/13/2017 13.0  5.0 - 15.0 mmol/L Final   • Total Cholesterol 06/13/2017 196  0 - 199 mg/dL Final   • Triglycerides 06/13/2017 108  20 - 199 mg/dL Final   • HDL Cholesterol 06/13/2017 55* 60 - 200 mg/dL Final   • LDL Cholesterol  06/13/2017 138* 1 - 129 mg/dL Final   • LDL/HDL Ratio 06/13/2017 2.17  0.00 - 3.55 Final   • Hemoglobin A1C 06/13/2017 5.91* 4 - 5.6 % Final   • Insulin 06/13/2017 20  uIU/mL Final    Reference Range:  Pubertal Children and Adults (fasting):  < or = 17     Assessment/Plan:     Hypertension stable.  Kingsley was seen today for follow-up, hypertension and sore throat.    Diagnoses and all orders for this visit:    Essential hypertension  -      "lisinopril (PRINIVIL,ZESTRIL) 10 MG tablet; Take 1 tablet by mouth Daily.    Tobacco dependence  -     nicotine polacrilex (NICORETTE) 4 MG gum; Chew 1 each As Needed for Smoking Cessation.    Non morbid obesity due to excess calories        - He is actually gained 5 pounds since last seen.  Discussed importance of diet modification and exercise for numerous reasons including better control his blood pressure.  He is actually considering bariatric surgery.  Explained to patient that I can refer him, however it is a rigorous process leading to bariatric surgery, including numerous weight checks, and patient has freely states \"I don't like to see doctors unless I need to.\"  He says he'll think about it.    Anxiety  -     hydrOXYzine (VISTARIL) 25 MG capsule; Take 1 capsule by mouth 3 (Three) Times a Day As Needed for Itching or Anxiety.  - Refilled this and trazodone, recommended avoidance of Geodon due to possibility of interactions between his medications.    Mixed hyperlipidemia  -     rosuvastatin (CRESTOR) 10 MG tablet; Take 1 tablet by mouth Daily.    Insomnia, unspecified type  -     traZODone (DESYREL) 50 MG tablet; Take 1 tablet by mouth Every Night.    Chronic allergic rhinitis, unspecified seasonality, unspecified trigger  -     cetirizine (zyrTEC) 10 MG tablet; Take 1 tablet by mouth Daily.  -     sodium chloride (OCEAN NASAL SPRAY) 0.65 % nasal spray; 1 spray into each nostril As Needed for Congestion.    Other orders  -     aspirin 81 MG chewable tablet; Chew 1 tablet Daily.    1.  Rx changes: none for bp.  Allergy medications as above.  2.  Education:    EKG ordered: no   Presented an overview of HTN, expected course, considerations, risk factors, and exacerbation prevention.   Discussed treatment options for HTN: yes   Recommended restricted dietary Na intake: yes   Recommended increased in dietary K intake: yes   Discussed patient action plan for HTN: yes  3.  Compliance at present is estimated to be " fair. Efforts to improve compliance (if necessary) will be directed at dietary modifications: DASH diet (handout given).    Return in about 3 months (around 1/13/2018) for Recheck.    GOALS:  Lose 4 lb, control bp  BARRIERS TO GOALS:  Insight    Preventative:  delayed   Recommended:Pneumovax, influenza declined  social binge drinking  decrease soda or juice intake, increase water intake, increase physical activity and have 3 meals a day  Has appt for Wellness Exam in July    RISK SCORE: 4           This document has been electronically signed by Jaret Banks MD on October 13, 2017 10:02 AM

## 2018-01-05 ENCOUNTER — HOSPITAL ENCOUNTER (EMERGENCY)
Facility: HOSPITAL | Age: 48
Discharge: HOME | End: 2018-01-05
Attending: FAMILY MEDICINE | Admitting: FAMILY MEDICINE

## 2018-01-05 VITALS
TEMPERATURE: 97.8 F | DIASTOLIC BLOOD PRESSURE: 68 MMHG | RESPIRATION RATE: 16 BRPM | HEIGHT: 67 IN | BODY MASS INDEX: 37.67 KG/M2 | OXYGEN SATURATION: 98 % | SYSTOLIC BLOOD PRESSURE: 130 MMHG | HEART RATE: 90 BPM | WEIGHT: 240 LBS

## 2018-01-05 DIAGNOSIS — L02.91 ABSCESS: Primary | ICD-10-CM

## 2018-01-05 DIAGNOSIS — L03.111 CELLULITIS OF RIGHT AXILLA: ICD-10-CM

## 2018-01-05 PROCEDURE — 99283 EMERGENCY DEPT VISIT LOW MDM: CPT

## 2018-01-05 RX ORDER — SULFAMETHOXAZOLE AND TRIMETHOPRIM 800; 160 MG/1; MG/1
1 TABLET ORAL 2 TIMES DAILY
Qty: 20 TABLET | Refills: 0 | Status: SHIPPED | OUTPATIENT
Start: 2018-01-05 | End: 2018-03-22

## 2018-01-05 RX ORDER — HYDROCODONE BITARTRATE AND ACETAMINOPHEN 5; 325 MG/1; MG/1
1 TABLET ORAL EVERY 6 HOURS PRN
Qty: 15 TABLET | Refills: 0 | Status: SHIPPED | OUTPATIENT
Start: 2018-01-05 | End: 2018-04-13

## 2018-01-05 NOTE — ED PROVIDER NOTES
"Subjective   HPI Comments: Pt states that he shaved both axilla several months ago.    Patient is a 47 y.o. male presenting with abscess.   Abscess   Location:  Shoulder/arm  Shoulder/arm abscess location:  R axilla  Abscess quality: painful and redness    Red streaking: no    Duration:  2 days  Progression:  Worsening  Pain details:     Quality:  Aching    Severity:  Moderate  Chronicity:  New  Context: not diabetes    Relieved by:  Nothing  Worsened by:  Nothing  Ineffective treatments:  None tried  Associated symptoms: no fatigue, no fever, no headaches, no nausea and no vomiting    Risk factors: prior abscess        Review of Systems   Constitutional: Negative for appetite change, chills, diaphoresis, fatigue and fever.   HENT: Negative for congestion, ear discharge, ear pain, nosebleeds, rhinorrhea, sinus pressure, sore throat and trouble swallowing.    Eyes: Negative for discharge and redness.   Respiratory: Negative for apnea, cough, chest tightness, shortness of breath and wheezing.    Cardiovascular: Negative for chest pain.   Gastrointestinal: Negative for abdominal pain, diarrhea, nausea and vomiting.   Endocrine: Negative for polyuria.   Genitourinary: Negative for dysuria, frequency and urgency.   Musculoskeletal: Negative for myalgias and neck pain.   Skin: Negative for color change and rash.   Allergic/Immunologic: Negative for immunocompromised state.   Neurological: Negative for dizziness, seizures, syncope, weakness, light-headedness and headaches.   Hematological: Negative for adenopathy. Does not bruise/bleed easily.   Psychiatric/Behavioral: Negative for behavioral problems and confusion.   All other systems reviewed and are negative.      Past Medical History:   Diagnosis Date   • Abdominal pain    • Allergic rhinitis    • Diabetes mellitus     Prev HbA1c 14.0 2014, states \"I'm cured!\"   • Essential hypertension    • Fracture closed, fibula, shaft     tib-fib       Allergies   Allergen " Reactions   • Codeine    • Latex        Past Surgical History:   Procedure Laterality Date   • CYST REMOVAL      x 3   • TIBIA FRACTURE SURGERY Bilateral 2011    tib-fib       Family History   Problem Relation Age of Onset   • Diabetes Other    • Hypertension Other    • Cancer Other    • Lung disease Other    • Thyroid disease Other    • Bleeding Disorder Other        Social History     Social History   • Marital status: Single     Spouse name: N/A   • Number of children: N/A   • Years of education: N/A     Occupational History   • Lr      Social History Main Topics   • Smoking status: Current Every Day Smoker     Packs/day: 1.00     Years: 25.00     Types: Cigarettes     Start date: 1992   • Smokeless tobacco: Never Used   • Alcohol use No      Comment: FORMER pint of whiskey daily, states quit 1 year ago   • Drug use: Yes     Special: Marijuana      Comment: Meth, other substances which he declined to discuss further   • Sexual activity: Defer     Other Topics Concern   • None     Social History Narrative   • None           Objective   Physical Exam   Constitutional: He is oriented to person, place, and time. He appears well-developed and well-nourished.   HENT:   Head: Normocephalic and atraumatic.   Nose: Nose normal.   Mouth/Throat: Oropharynx is clear and moist.   Eyes: Conjunctivae and EOM are normal. Pupils are equal, round, and reactive to light. Right eye exhibits no discharge. Left eye exhibits no discharge. No scleral icterus.   Neck: Normal range of motion. Neck supple. No tracheal deviation present.   Cardiovascular: Normal rate, regular rhythm and normal heart sounds.    No murmur heard.  Pulmonary/Chest: Effort normal and breath sounds normal. No stridor. No respiratory distress. He has no wheezes. He has no rales.   Abdominal: Soft. Bowel sounds are normal. He exhibits no distension and no mass. There is no tenderness. There is no rebound and no guarding.   Musculoskeletal: He exhibits no edema.    Neurological: He is alert and oriented to person, place, and time. Coordination normal.   Skin: Skin is warm and dry. No rash noted. No erythema.   2 cm and 1 cm abscess right axilla with mild erytha (cellulitis)   Psychiatric: He has a normal mood and affect. His behavior is normal. Thought content normal.   Nursing note and vitals reviewed.      Procedures         ED Course  ED Course        Labs Reviewed - No data to display    No orders to display                   MDM    Final diagnoses:   Abscess   Cellulitis of right axilla            Andrea Solano MD  01/08/18 0122

## 2018-01-05 NOTE — ED NOTES
Patient presents to the ED with complaints of 3-4 abscessed areas under his right arm. patient states they appeared around a week ago and have been spreading since then. One will go away and 2 more will appear. Patient denies any fever. Patient states he is starting to not fell well all over.       Gabriella Park RN  01/05/18 1192

## 2018-01-08 ENCOUNTER — TELEPHONE (OUTPATIENT)
Dept: FAMILY MEDICINE CLINIC | Facility: CLINIC | Age: 48
End: 2018-01-08

## 2018-03-22 ENCOUNTER — OFFICE VISIT (OUTPATIENT)
Dept: FAMILY MEDICINE CLINIC | Facility: CLINIC | Age: 48
End: 2018-03-22

## 2018-03-22 VITALS
WEIGHT: 254.06 LBS | TEMPERATURE: 97.8 F | OXYGEN SATURATION: 97 % | SYSTOLIC BLOOD PRESSURE: 160 MMHG | DIASTOLIC BLOOD PRESSURE: 90 MMHG | HEIGHT: 67 IN | HEART RATE: 120 BPM | BODY MASS INDEX: 39.88 KG/M2

## 2018-03-22 DIAGNOSIS — R68.89 FLU-LIKE SYMPTOMS: Primary | ICD-10-CM

## 2018-03-22 DIAGNOSIS — I10 ESSENTIAL HYPERTENSION: ICD-10-CM

## 2018-03-22 DIAGNOSIS — J06.9 ACUTE URI: ICD-10-CM

## 2018-03-22 LAB
EXPIRATION DATE: NORMAL
FLUAV AG NPH QL: NEGATIVE
FLUBV AG NPH QL: NEGATIVE
INTERNAL CONTROL: NORMAL
Lab: NORMAL

## 2018-03-22 PROCEDURE — 99213 OFFICE O/P EST LOW 20 MIN: CPT | Performed by: FAMILY MEDICINE

## 2018-03-22 PROCEDURE — 87804 INFLUENZA ASSAY W/OPTIC: CPT | Performed by: FAMILY MEDICINE

## 2018-03-22 PROCEDURE — 96372 THER/PROPH/DIAG INJ SC/IM: CPT | Performed by: FAMILY MEDICINE

## 2018-03-22 RX ORDER — TRAZODONE HYDROCHLORIDE 150 MG/1
TABLET ORAL
Refills: 0 | COMMUNITY
Start: 2018-03-20 | End: 2018-03-26

## 2018-03-22 RX ORDER — LISINOPRIL 10 MG/1
10 TABLET ORAL DAILY
Qty: 30 TABLET | Refills: 3 | Status: SHIPPED | OUTPATIENT
Start: 2018-03-22 | End: 2018-03-30 | Stop reason: SDUPTHER

## 2018-03-22 RX ORDER — TRIAMCINOLONE ACETONIDE 40 MG/ML
40 INJECTION, SUSPENSION INTRA-ARTICULAR; INTRAMUSCULAR ONCE
Status: COMPLETED | OUTPATIENT
Start: 2018-03-22 | End: 2018-03-22

## 2018-03-22 RX ADMIN — TRIAMCINOLONE ACETONIDE 40 MG: 40 INJECTION, SUSPENSION INTRA-ARTICULAR; INTRAMUSCULAR at 10:13

## 2018-03-23 NOTE — PROGRESS NOTES
"Subjective   Kingsley Littlejohn is a 48 y.o. male.     History of Present Illness  Patient is a 48-year-old male who is presenting today with a constellation of symptoms which include nasal congestion and dry cough.  Patient states this is been going on for the past couple of days and it started with just some slight nasal congestion.  Patient states that the nasal congestion has been getting worse and because of his concurrent medical history of obstructive sleep apnea and has made his sleeping very difficult.  Patient states that he has been taking pseudoephedrine over-the-counter with no relief.  Patient denies any fevers, chills, any sick contacts, and headaches.  The following portions of the patient's history were reviewed and updated as appropriate: allergies, current medications, past family history, past medical history, past social history, past surgical history and problem list.    Review of Systems      Constitutional: no weight loss, fever, chills, weakness  HEENT: positive for nasal drainage   Skin: no rash, no discoloration   CVS: no chest pain, palpitations  Chest: no shortness of air, cough, dyspnea  GI: no abdominal pain, blood in stool, no nausea, vomiting, diarrhea  : no burning in urination, change in odor, no change in frequency  Neuro: no headache, dizziness, syncope, paralysis, ataxia, numbness  Musculoskeletal: no muscle, back pain, joint pain, stiffness  Lymphatics: no enlarged nodes  Endo: no reports of sweating, cold or heat intolerance, no polyuria      Objective   Physical Exam  /90 (BP Location: Left arm, Patient Position: Sitting, Cuff Size: Adult)   Pulse 120   Temp 97.8 °F (36.6 °C) (Tympanic)   Ht 170.2 cm (67\")   Wt 115 kg (254 lb 1 oz)   SpO2 97%   BMI 39.79 kg/m²       GENERAL APPEARANCE: Well developed, well nourished, in no acute distress.  SKIN: Inspection of the skin reveals no rashes, ulcerations or petechiae.  HEENT: The sclerae were anicteric and " conjunctivae were pink and moist. Extraocular movements were intact and pupils were equal, round, and reactive to light with normal accommodation. External inspection of the ears and nose showed no scars, lesions, or masses. Lips, teeth, and gums showed normal mucosa.  LUNGS: Auscultation of the lungs revealed normal breath sounds without any other adventitious sounds or rubs.  CARDIOVASCULAR: There was a regular rate and rhythm without any murmurs, gallops, rubs.   EXTREMITIES: No cyanosis, clubbing or edema.  NEUROLOGIC: Alert and oriented x 3. Normal affect. Gait was normal . Sensation to touch was normal.    Assessment/Plan   Kingsley was seen today for flu symptoms.    Diagnoses and all orders for this visit:    Flu-like symptoms  -     POCT Influenza A/B    Acute URI  -     triamcinolone acetonide (KENALOG-40) injection 40 mg; Inject 1 mL into the shoulder, thigh, or buttocks 1 (One) Time.    Essential hypertension  -     lisinopril (PRINIVIL,ZESTRIL) 10 MG tablet; Take 1 tablet by mouth Daily.             This document has been electronically signed by Christine Clinton MD on March 23, 2018 11:05 AM

## 2018-03-26 ENCOUNTER — OFFICE VISIT (OUTPATIENT)
Dept: FAMILY MEDICINE CLINIC | Facility: CLINIC | Age: 48
End: 2018-03-26

## 2018-03-26 VITALS
WEIGHT: 248.38 LBS | BODY MASS INDEX: 38.98 KG/M2 | HEIGHT: 67 IN | OXYGEN SATURATION: 97 % | SYSTOLIC BLOOD PRESSURE: 160 MMHG | DIASTOLIC BLOOD PRESSURE: 90 MMHG | HEART RATE: 120 BPM

## 2018-03-26 DIAGNOSIS — Z71.6 ENCOUNTER FOR SMOKING CESSATION COUNSELING: ICD-10-CM

## 2018-03-26 DIAGNOSIS — J06.9 VIRAL URI WITH COUGH: Primary | ICD-10-CM

## 2018-03-26 PROCEDURE — 99213 OFFICE O/P EST LOW 20 MIN: CPT | Performed by: FAMILY MEDICINE

## 2018-03-26 RX ORDER — BENZONATATE 200 MG/1
200 CAPSULE ORAL 3 TIMES DAILY PRN
Qty: 30 CAPSULE | Refills: 0 | Status: SHIPPED | OUTPATIENT
Start: 2018-03-26 | End: 2018-04-05

## 2018-03-26 RX ORDER — GUAIFENESIN 600 MG/1
1200 TABLET, EXTENDED RELEASE ORAL EVERY 12 HOURS SCHEDULED
Qty: 60 TABLET | Refills: 0 | Status: SHIPPED | OUTPATIENT
Start: 2018-03-26 | End: 2018-04-13

## 2018-03-26 NOTE — PROGRESS NOTES
I have reviewed the notes, assessments, and/or procedures performed by Dr. Clinton, I concur with her/his documentation and assessment and plan for Kingsley Littlejohn.       This document has been electronically signed by Popeye Martinez MD on March 26, 2018 9:50 AM

## 2018-03-26 NOTE — PROGRESS NOTES
"  Subjective:     Kingsley Littlejohn is a 48 y.o. male who presents for initial evaluation for 2 week history of cough, congestion, dyspnea and postnasal drip. Pt denies fevers, chills, diarrhea, vomiting. Pt reports that he has started to feel slightly better over the last 2 days. Pt has been taking nyquil cold and flu which helps with his symptoms. Pt was previously seen on 3/22 and was tested negative for Flu. Pt was given a kenalog shot at that time. Pt counseled that this is likely a viral URI and that we treat supportively. Pt agreeable to starting mucinex and tessalon pearls. Smoking cessation counseling provided 3 minutes.    Preventative:  Over the past 2 weeks, have you felt down, depressed, or hopeless?No   Over the past 2 weeks, have you felt little interest or pleasure in doing things?No  Clinical depression screening refused by patient.No     Past Medical Hx:  Past Medical History:   Diagnosis Date   • Abdominal pain    • Allergic rhinitis    • Diabetes mellitus     Prev HbA1c 14.0 2014, states \"I'm cured!\"   • Essential hypertension    • Fracture closed, fibula, shaft     tib-fib       Past Surgical Hx:  Past Surgical History:   Procedure Laterality Date   • CYST REMOVAL      x 3   • TIBIA FRACTURE SURGERY Bilateral 2011    tib-fib       Health Maintenance:  Health Maintenance   Topic Date Due   • COLONOSCOPY  03/22/2018   • LIPID PANEL  06/13/2018   • MEDICARE ANNUAL WELLNESS  07/14/2018   • TDAP/TD VACCINES (2 - Td) 01/01/2021   • PNEUMOCOCCAL VACCINE (19-64 MEDIUM RISK)  Addressed   • INFLUENZA VACCINE  Addressed       Current Meds:    Current Outpatient Prescriptions:   •  benzonatate (TESSALON) 200 MG capsule, Take 1 capsule by mouth 3 (Three) Times a Day As Needed for Cough for up to 10 days., Disp: 30 capsule, Rfl: 0  •  cetirizine (zyrTEC) 10 MG tablet, Take 1 tablet by mouth Daily., Disp: 30 tablet, Rfl: 3  •  guaiFENesin (MUCINEX) 600 MG 12 hr tablet, Take 2 tablets by mouth Every 12 " (Twelve) Hours., Disp: 60 tablet, Rfl: 0  •  HYDROcodone-acetaminophen (NORCO) 5-325 MG per tablet, Take 1 tablet by mouth Every 6 (Six) Hours As Needed (pain)., Disp: 15 tablet, Rfl: 0  •  lisinopril (PRINIVIL,ZESTRIL) 10 MG tablet, Take 1 tablet by mouth Daily., Disp: 30 tablet, Rfl: 3  •  nicotine polacrilex (NICORETTE) 4 MG gum, Chew 1 each As Needed for Smoking Cessation., Disp: 100 each, Rfl: 3  •  rosuvastatin (CRESTOR) 10 MG tablet, Take 1 tablet by mouth Daily., Disp: 30 tablet, Rfl: 3  •  sodium chloride (OCEAN NASAL SPRAY) 0.65 % nasal spray, 1 spray into each nostril As Needed for Congestion., Disp: 60 mL, Rfl: 12    Allergies:  Codeine and Latex    Family Hx:  Family History   Problem Relation Age of Onset   • Diabetes Other    • Hypertension Other    • Cancer Other    • Lung disease Other    • Thyroid disease Other    • Bleeding Disorder Other         Social History:  Social History     Social History   • Marital status: Single     Spouse name: N/A   • Number of children: N/A   • Years of education: N/A     Occupational History   • Lr      Social History Main Topics   • Smoking status: Current Every Day Smoker     Packs/day: 1.00     Years: 25.00     Types: Cigarettes     Start date: 1992   • Smokeless tobacco: Never Used   • Alcohol use No      Comment: FORMER pint of whiskey daily, states quit 1 year ago   • Drug use:      Types: Marijuana      Comment: Meth, other substances which he declined to discuss further   • Sexual activity: Defer     Other Topics Concern   • Not on file     Social History Narrative   • No narrative on file       Review of Systems  Review of Systems   Constitutional: Negative for chills and fever.   HENT: Positive for congestion and postnasal drip. Negative for ear discharge, ear pain, rhinorrhea, sinus pain, sinus pressure and sore throat.    Respiratory: Positive for cough and shortness of breath. Negative for wheezing.    Cardiovascular: Negative for chest pain and  "palpitations.   Gastrointestinal: Negative for abdominal pain, diarrhea, nausea and vomiting.   Genitourinary: Negative for dysuria and hematuria.   Musculoskeletal: Negative for neck pain and neck stiffness.   Skin: Negative for rash and wound.   Neurological: Negative for seizures and syncope.   Psychiatric/Behavioral: Negative for agitation and confusion.         Objective:     /90 (BP Location: Left arm, Patient Position: Sitting, Cuff Size: Adult)   Pulse 120   Ht 170.2 cm (67\")   Wt 113 kg (248 lb 6 oz)   SpO2 97%   BMI 38.90 kg/m²   Physical Exam   Constitutional: He is oriented to person, place, and time. He appears well-developed and well-nourished. No distress.   HENT:   Head: Normocephalic and atraumatic.   Right Ear: External ear normal.   Left Ear: External ear normal.   Nose: Nose normal.   Eyes: Conjunctivae are normal. Pupils are equal, round, and reactive to light. Right eye exhibits no discharge. Left eye exhibits no discharge. No scleral icterus.   Neck: Normal range of motion. Neck supple.   Cardiovascular: Normal rate, regular rhythm and normal heart sounds.    Pulmonary/Chest: Effort normal and breath sounds normal. No respiratory distress. He has no wheezes. He has no rales.   Abdominal: Soft. Bowel sounds are normal. There is no tenderness.   Musculoskeletal: Normal range of motion. He exhibits no edema.   Lymphadenopathy:     He has no cervical adenopathy.   Neurological: He is alert and oriented to person, place, and time.   Skin: Skin is warm and dry. Capillary refill takes less than 2 seconds. He is not diaphoretic.   Psychiatric: He has a normal mood and affect. His behavior is normal. Judgment and thought content normal.   Nursing note and vitals reviewed.                                                                     Assessment/Plan:     Diagnoses and all orders for this visit:    Viral URI with cough  -     guaiFENesin (MUCINEX) 600 MG 12 hr tablet; Take 2 tablets by " mouth Every 12 (Twelve) Hours.  -     benzonatate (TESSALON) 200 MG capsule; Take 1 capsule by mouth 3 (Three) Times a Day As Needed for Cough for up to 10 days.         Follow-up:     Return if symptoms worsen or fail to improve.        GOALS:  Maintain medication compliance    Preventative:  Male Preventative: Cholesterol screening up to date  Vaccines:   Tetanus vaccine: up to date  Annual influenza vaccine: not up to date - declined     Smoking cessation counseling was provided.  does not drink  eat more fruits and vegetables, decrease soda or juice intake, increase water intake and increase physical activity    RISK SCORE: 3    Yang Guerrero MD PGY2  [place] [address]  Office: [phone]      This document has been electronically signed by Yang Guerrero MD on March 26, 2018 2:12 PM

## 2018-03-26 NOTE — PROGRESS NOTES
I have reviewed the notes, assessments, and/or procedures performed by Dr. Guerrero, I concur with her/his documentation and assessment and plan for Kingsley Littlejohn.       This document has been electronically signed by Popeye Martinez MD on March 26, 2018 4:50 PM

## 2018-03-30 ENCOUNTER — OFFICE VISIT (OUTPATIENT)
Dept: FAMILY MEDICINE CLINIC | Facility: CLINIC | Age: 48
End: 2018-03-30

## 2018-03-30 VITALS
OXYGEN SATURATION: 99 % | SYSTOLIC BLOOD PRESSURE: 150 MMHG | WEIGHT: 248 LBS | HEIGHT: 67 IN | DIASTOLIC BLOOD PRESSURE: 90 MMHG | BODY MASS INDEX: 38.92 KG/M2 | HEART RATE: 100 BPM

## 2018-03-30 DIAGNOSIS — F17.200 TOBACCO DEPENDENCE: ICD-10-CM

## 2018-03-30 DIAGNOSIS — E66.09 NON MORBID OBESITY DUE TO EXCESS CALORIES: ICD-10-CM

## 2018-03-30 DIAGNOSIS — Z12.11 SCREENING FOR COLON CANCER: ICD-10-CM

## 2018-03-30 DIAGNOSIS — F39 PSYCHOTIC MOOD DISORDER (HCC): ICD-10-CM

## 2018-03-30 DIAGNOSIS — I10 ESSENTIAL HYPERTENSION: Primary | ICD-10-CM

## 2018-03-30 PROCEDURE — 99213 OFFICE O/P EST LOW 20 MIN: CPT | Performed by: FAMILY MEDICINE

## 2018-03-30 PROCEDURE — 99406 BEHAV CHNG SMOKING 3-10 MIN: CPT | Performed by: FAMILY MEDICINE

## 2018-03-30 RX ORDER — LISINOPRIL 10 MG/1
40 TABLET ORAL DAILY
Qty: 30 TABLET | Refills: 1 | Status: SHIPPED | OUTPATIENT
Start: 2018-03-30 | End: 2018-03-30 | Stop reason: SDUPTHER

## 2018-03-30 RX ORDER — LISINOPRIL 40 MG/1
40 TABLET ORAL DAILY
Qty: 30 TABLET | Refills: 1 | Status: SHIPPED | OUTPATIENT
Start: 2018-03-30 | End: 2018-07-05 | Stop reason: SDUPTHER

## 2018-03-30 NOTE — PROGRESS NOTES
I have reviewed the notes, assessments, and/or procedures performed by Jaret Banks MD , I concur with her/his documentation and assessment and plan for Kingsley Littlejohn.        This document has been electronically signed by Veronika Mayorga MD on March 30, 2018 2:11 PM

## 2018-03-31 PROBLEM — J06.9 ACUTE URI: Status: RESOLVED | Noted: 2018-03-22 | Resolved: 2018-03-31

## 2018-03-31 PROBLEM — F39: Status: ACTIVE | Noted: 2018-03-02

## 2018-03-31 PROBLEM — F41.9 ANXIETY DISORDER: Status: ACTIVE | Noted: 2018-03-03

## 2018-03-31 PROBLEM — R68.89 FLU-LIKE SYMPTOMS: Status: RESOLVED | Noted: 2018-03-22 | Resolved: 2018-03-31

## 2018-03-31 NOTE — PROGRESS NOTES
"  Subjective:     Chief Complaint   Patient presents with   • Hypertension     Kingsley Littlejohn is a 48 y.o. male who presents for follow-up of HTN and HLD.      I have not seen him since October 2017, however he was seen twice here in the last week for URI-type symptoms.  His flu screen was negative, he was started on Mucinex and Tessalon perles, and states his symptoms are improving.  His chief complaint today is his blood pressure.  He states it has been high for about 3 weeks.  He was admitted to [facility] (inpatient psych) for approximately 3 weeks for \"psychotic mood disorder\".  While there, his blood pressure was consistently high (197/120 e.g.), they increased his lisinopril to 20 mg, and he was on as needed clonidine while admitted.  He was \"very impressed\" with the facilities and level of care there.  He is apparently now on Invega monthly injections, but does not have Psych follow-up anywhere.  He wants to go back on trazodone.      Evaluation:   Blood pressure reading not indicated for medication reasons: No   Blood pressure reading refused by patient: No   Home blood pressure readings: states controlled   Blood pressure often elevated in physician office: Yes, 204/115 in ER 5/22.  Symptoms:   Headache: yes. Occasional tension-type headache.   Visual disturbance: no   Fatigue: no   Dyspnea: no   Orthopnea: no   Edema: no   Chest pain: no   Palpitations: no   Diaphoresis: no    Risk Factors:   Tobacco use, Obesity    Comorbid Conditions:   None    The following portions of the patient's history were reviewed and updated as appropriate: allergies, current medications, past family history, past medical history, past social history, past surgical history and problem list.    Preventative:  Over the past 2 weeks, have you felt down, depressed, or hopeless?No   Over the past 2 weeks, have you felt little interest or pleasure in doing things?No  Clinical depression screening refused by patient.No " "     On osteoporosis therapy?No     Past Medical Hx:  Past Medical History:   Diagnosis Date   • Abdominal pain    • Allergic rhinitis    • Diabetes mellitus     Prev HbA1c 14.0 2014, states \"I'm cured!\"   • Essential hypertension    • Fracture closed, fibula, shaft     tib-fib       Past Surgical Hx:  Past Surgical History:   Procedure Laterality Date   • CYST REMOVAL      x 3   • TIBIA FRACTURE SURGERY Bilateral 2011    tib-fib       Health Maintenance:  Health Maintenance   Topic Date Due   • COLONOSCOPY  03/22/2018   • LIPID PANEL  06/13/2018   • MEDICARE ANNUAL WELLNESS  07/14/2018   • TDAP/TD VACCINES (2 - Td) 01/01/2021   • PNEUMOCOCCAL VACCINE (19-64 MEDIUM RISK)  Addressed   • INFLUENZA VACCINE  Addressed       Current Meds:    Current Outpatient Prescriptions:   •  benzonatate (TESSALON) 200 MG capsule, Take 1 capsule by mouth 3 (Three) Times a Day As Needed for Cough for up to 10 days., Disp: 30 capsule, Rfl: 0  •  cetirizine (zyrTEC) 10 MG tablet, Take 1 tablet by mouth Daily., Disp: 30 tablet, Rfl: 3  •  guaiFENesin (MUCINEX) 600 MG 12 hr tablet, Take 2 tablets by mouth Every 12 (Twelve) Hours., Disp: 60 tablet, Rfl: 0  •  HYDROcodone-acetaminophen (NORCO) 5-325 MG per tablet, Take 1 tablet by mouth Every 6 (Six) Hours As Needed (pain)., Disp: 15 tablet, Rfl: 0  •  lisinopril (PRINIVIL,ZESTRIL) 40 MG tablet, Take 1 tablet by mouth Daily., Disp: 30 tablet, Rfl: 1  •  nicotine polacrilex (NICORETTE) 4 MG gum, Chew 1 each As Needed for Smoking Cessation., Disp: 100 each, Rfl: 3  •  [START ON 4/16/2018] paliperidone palmitate (INVEGA SUSTENNA) 117 MG/0.75ML suspension IM injection, Inject 117 mg into the shoulder, thigh, or buttocks Every 30 (Thirty) Days., Disp: , Rfl:   •  rosuvastatin (CRESTOR) 10 MG tablet, Take 1 tablet by mouth Daily., Disp: 30 tablet, Rfl: 3  •  sodium chloride (OCEAN NASAL SPRAY) 0.65 % nasal spray, 1 spray into each nostril As Needed for Congestion., Disp: 60 mL, Rfl: " "12    Allergies:  Codeine and Latex    Family Hx:  Family History   Problem Relation Age of Onset   • Diabetes Other    • Hypertension Other    • Cancer Other    • Lung disease Other    • Thyroid disease Other    • Bleeding Disorder Other         Social History:  Social History     Social History   • Marital status: Single     Spouse name: N/A   • Number of children: N/A   • Years of education: N/A     Occupational History   • Lr      Social History Main Topics   • Smoking status: Current Every Day Smoker     Packs/day: 1.00     Years: 25.00     Types: Cigarettes     Start date: 1992   • Smokeless tobacco: Never Used   • Alcohol use No      Comment: FORMER pint of whiskey daily, states quit 1 year ago   • Drug use:      Types: Marijuana      Comment: Meth, other substances which he declined to discuss further   • Sexual activity: Defer     Other Topics Concern   • Not on file     Social History Narrative   • No narrative on file       Review of Systems  Review of Systems   Constitutional: Negative for activity change and appetite change.   HENT: Positive for postnasal drip and sore throat. Negative for congestion and dental problem.    Eyes: Negative for discharge and itching.   Respiratory: Negative for apnea.    Cardiovascular: Negative for chest pain and leg swelling.   Gastrointestinal: Negative for abdominal distention and abdominal pain.   Endocrine: Negative for cold intolerance.   Genitourinary: Negative for difficulty urinating and enuresis.   Musculoskeletal: Negative for arthralgias and back pain.   Allergic/Immunologic: Negative for environmental allergies and food allergies.   Neurological: Negative for dizziness and facial asymmetry.   Hematological: Negative for adenopathy.   Psychiatric/Behavioral: Positive for sleep disturbance. Negative for agitation and behavioral problems. The patient is nervous/anxious.      Objective:     /90   Pulse 100   Ht 170.2 cm (67\")   Wt 112 kg (248 lb)   " SpO2 99%   BMI 38.84 kg/m²     General:  alert, appears stated age and cooperative   Oropharynx: lips, mucosa, and tongue normal; teeth and gums normal    Eyes:  conjunctivae/corneas clear. PERRL, EOM's intact. Fundi benign.    Ears:  normal TM's and external ear canals both ears   Neck: no adenopathy, no carotid bruit, no JVD, supple, symmetrical, trachea midline and thyroid not enlarged, symmetric, no tenderness/mass/nodules. Increased circumference.   Thyroid:  no palpable nodule   Lung: clear to auscultation bilaterally   Heart:  regular rate and rhythm, S1, S2 normal, no murmur, click, rub or gallop   Abdomen: soft, non-tender; bowel sounds normal; no masses,  no organomegaly. No inguinal hernia appreciated.   Extremities: extremities normal, atraumatic, no cyanosis or edema   Skin: warm and dry, no hyperpigmentation, vitiligo, or suspicious lesions   Pulses: 2+ and symmetric   Neuro: normal without focal findings, mental status, speech normal, alert and oriented x3 and reflexes normal and symmetric       Lab Review  Office Visit on 03/22/2018   Component Date Value Ref Range Status   • Rapid Influenza A Ag 03/22/2018 negative   Final   • Rapid Influenza B Ag 03/22/2018 negative   Final   • Internal Control 03/22/2018 Passed  Passed Final   • Lot Number 03/22/2018 [id number]   Final   • Expiration Date 03/22/2018 2/2020   Final     Assessment/Plan:     Kingsley was seen today for hypertension.    Diagnoses and all orders for this visit:    Essential hypertension  -     Discontinue: lisinopril (PRINIVIL,ZESTRIL) 10 MG tablet; Take 4 tablets by mouth Daily.  -     lisinopril (PRINIVIL,ZESTRIL) 40 MG tablet; Take 1 tablet by mouth Daily.    Non morbid obesity due to excess calories        - Recommended diet modification and exercise.    Psychotic mood disorder        - I am not able to review records from [facility], only the labs from the ER prior to his admission.  Advised to sign records release form.   Will not re-start trazodone at this time, until I review records.  He needs to make an appointment at [facility], next Invega injection is due 4/16 per chart.    Tobacco dependence       - Tobacco dependence:  Counseled, 3-10 minutes spent, asymptomatic.       1.  Rx changes: explained that decongestants can cause hypertension, but it was high prior to this week as well.  Increase lisinopril to 40 mg.  Bring bp log to follow-up.  2.  Education:    EKG ordered: no   Presented an overview of HTN, expected course, considerations, risk factors, and exacerbation prevention.   Discussed treatment options for HTN: yes   Recommended restricted dietary Na intake: yes   Recommended increased in dietary K intake: yes   Discussed patient action plan for HTN: yes  3.  Compliance at present is estimated to be fair. Efforts to improve compliance (if necessary) will be directed at dietary modifications: DASH diet (handout given).    Return in about 2 weeks (around 4/13/2018) for Recheck for HTN.    Goals     • Blood Pressure < 140/90            Barriers:  Sporadic follow-up            Preventative:  Needs C-scope, refer to Dr Lacy, patient request  social binge drinking  decrease soda or juice intake, increase water intake, increase physical activity and have 3 meals a day    RISK SCORE: 4           This document has been electronically signed by Jaret Banks MD on March 31, 2018 3:56 PM

## 2018-04-10 ENCOUNTER — OFFICE VISIT (OUTPATIENT)
Dept: FAMILY MEDICINE CLINIC | Facility: CLINIC | Age: 48
End: 2018-04-10

## 2018-04-10 VITALS
BODY MASS INDEX: 40.65 KG/M2 | SYSTOLIC BLOOD PRESSURE: 130 MMHG | HEIGHT: 67 IN | OXYGEN SATURATION: 97 % | DIASTOLIC BLOOD PRESSURE: 88 MMHG | HEART RATE: 104 BPM | WEIGHT: 259 LBS

## 2018-04-10 DIAGNOSIS — F41.9 ANXIETY DISORDER, UNSPECIFIED TYPE: ICD-10-CM

## 2018-04-10 DIAGNOSIS — F17.200 TOBACCO DEPENDENCE: ICD-10-CM

## 2018-04-10 DIAGNOSIS — I10 ESSENTIAL HYPERTENSION: Primary | ICD-10-CM

## 2018-04-10 PROCEDURE — 99406 BEHAV CHNG SMOKING 3-10 MIN: CPT | Performed by: FAMILY MEDICINE

## 2018-04-10 PROCEDURE — 99213 OFFICE O/P EST LOW 20 MIN: CPT | Performed by: FAMILY MEDICINE

## 2018-04-10 RX ORDER — HYDROXYZINE PAMOATE 25 MG/1
25 CAPSULE ORAL EVERY 6 HOURS PRN
Qty: 120 CAPSULE | Refills: 2 | Status: SHIPPED | OUTPATIENT
Start: 2018-04-10 | End: 2018-11-16 | Stop reason: SDUPTHER

## 2018-04-10 RX ORDER — NICOTINE 21 MG/24HR
1 PATCH, TRANSDERMAL 24 HOURS TRANSDERMAL EVERY 24 HOURS
Qty: 42 PATCH | Refills: 0 | Status: SHIPPED | OUTPATIENT
Start: 2018-04-10 | End: 2018-04-13

## 2018-04-10 NOTE — PROGRESS NOTES
"  Subjective:     Chief Complaint   Patient presents with   • Hypertension     Kingsley Littlejohn is a 48 y.o. male who presents for follow-up of HTN     I saw him recently and increased his lisinopril to 40 mg.  Since then, he has been checking his blood pressures regularly at home.  He suspects his cuff is getting falsely high values, because the cuff is too tight.  He denies any urgency symptoms other than occasional headache.    Evaluation:   Blood pressure reading not indicated for medication reasons: No   Blood pressure reading refused by patient: No   Home blood pressure readings: see above   Blood pressure often elevated in physician office: Yes, 204/115 in ER 5/22/17  Symptoms:   Headache: yes. Occasional tension-type headache.   Visual disturbance: no   Fatigue: no   Dyspnea: no   Orthopnea: no   Edema: no   Chest pain: no   Palpitations: no   Diaphoresis: no    Risk Factors:   Tobacco use, Obesity    Comorbid Conditions:   None    The following portions of the patient's history were reviewed and updated as appropriate: allergies, current medications, past family history, past medical history, past social history, past surgical history and problem list.    Preventative:  Over the past 2 weeks, have you felt down, depressed, or hopeless?No   Over the past 2 weeks, have you felt little interest or pleasure in doing things?No  Clinical depression screening refused by patient.No      On osteoporosis therapy?No     Past Medical Hx:  Past Medical History:   Diagnosis Date   • Abdominal pain    • Allergic rhinitis    • Diabetes mellitus     Prev HbA1c 14.0 2014, states \"I'm cured!\"   • Essential hypertension    • Fracture closed, fibula, shaft     tib-fib       Past Surgical Hx:  Past Surgical History:   Procedure Laterality Date   • ANKLE SURGERY      right and left   • CYST REMOVAL      x 3   • ENDOSCOPY      20 years ago   • TIBIA FRACTURE SURGERY Bilateral 2011    tib-fib       Health Maintenance:  Health " Maintenance   Topic Date Due   • COLONOSCOPY  03/22/2018   • LIPID PANEL  06/13/2018   • MEDICARE ANNUAL WELLNESS  07/14/2018   • INFLUENZA VACCINE  08/01/2018   • TDAP/TD VACCINES (2 - Td) 01/01/2021   • PNEUMOCOCCAL VACCINE (19-64 MEDIUM RISK)  Addressed       Current Meds:    Current Outpatient Prescriptions:   •  lisinopril (PRINIVIL,ZESTRIL) 40 MG tablet, Take 1 tablet by mouth Daily., Disp: 30 tablet, Rfl: 1  •  [START ON 4/16/2018] paliperidone palmitate (INVEGA SUSTENNA) 117 MG/0.75ML suspension IM injection, Inject 117 mg into the shoulder, thigh, or buttocks Every 30 (Thirty) Days., Disp: , Rfl:   •  sodium chloride (OCEAN NASAL SPRAY) 0.65 % nasal spray, 1 spray into each nostril As Needed for Congestion., Disp: 60 mL, Rfl: 12  •  aspirin 325 MG tablet, Take 325 mg by mouth Daily., Disp: , Rfl:   •  hydrOXYzine (VISTARIL) 25 MG capsule, Take 1 capsule by mouth Every 6 (Six) Hours As Needed for Itching, Allergies or Anxiety., Disp: 120 capsule, Rfl: 2  •  Oxymetazoline HCl (NASAL SPRAY NA), into each nostril., Disp: , Rfl:   •  polyethylene glycol-electrolytes (NULYTELY) 420 g solution, Take 4,000 mL by mouth 1 (One) Time for 1 dose., Disp: 4000 mL, Rfl: 0    Allergies:  Codeine and Latex    Family Hx:  Family History   Problem Relation Age of Onset   • Diabetes Other    • Hypertension Other    • Cancer Other    • Lung disease Other    • Thyroid disease Other    • Bleeding Disorder Other    • Cancer Paternal Grandmother    • Cancer Paternal Grandfather         Social History:  Social History     Social History   • Marital status: Single     Spouse name: N/A   • Number of children: N/A   • Years of education: N/A     Occupational History   • Lr      Social History Main Topics   • Smoking status: Current Every Day Smoker     Packs/day: 2.00     Years: 25.00     Types: Cigarettes     Start date: 1992   • Smokeless tobacco: Never Used   • Alcohol use No      Comment: FORMER pint of whiskey daily, states  "quit 1 year ago   • Drug use: No      Comment: Meth, other substances which he declined to discuss further   • Sexual activity: Defer     Other Topics Concern   • Not on file     Social History Narrative   • No narrative on file       Review of Systems  Review of Systems   Constitutional: Negative for activity change and appetite change.   HENT: Negative for congestion and dental problem.    Eyes: Negative for discharge and itching.   Respiratory: Negative for apnea.    Cardiovascular: Negative for chest pain and leg swelling.   Gastrointestinal: Negative for abdominal distention and abdominal pain.   Endocrine: Negative for cold intolerance.   Genitourinary: Negative for difficulty urinating and enuresis.   Musculoskeletal: Negative for arthralgias and back pain.   Allergic/Immunologic: Negative for environmental allergies and food allergies.   Neurological: Negative for dizziness and facial asymmetry.   Hematological: Negative for adenopathy.   Psychiatric/Behavioral: Positive for sleep disturbance. Negative for agitation and behavioral problems. The patient is nervous/anxious.      Objective:     /88   Pulse 104   Ht 170.2 cm (67\")   Wt 117 kg (259 lb)   SpO2 97%   BMI 40.57 kg/m²     General:  alert, appears stated age and cooperative   Oropharynx: lips, mucosa, and tongue normal; teeth and gums normal    Eyes:  conjunctivae/corneas clear. PERRL, EOM's intact. Fundi benign.    Ears:  normal TM's and external ear canals both ears   Neck: no adenopathy, no carotid bruit, no JVD, supple, symmetrical, trachea midline and thyroid not enlarged, symmetric, no tenderness/mass/nodules. Increased circumference.   Thyroid:  no palpable nodule   Lung: clear to auscultation bilaterally   Heart:  regular rate and rhythm, S1, S2 normal, no murmur, click, rub or gallop   Abdomen: soft, non-tender; bowel sounds normal; no masses,  no organomegaly. No inguinal hernia appreciated.   Extremities: extremities normal, " atraumatic, no cyanosis or edema   Skin: warm and dry, no hyperpigmentation, vitiligo, or suspicious lesions   Pulses: 2+ and symmetric   Neuro: normal without focal findings, mental status, speech normal, alert and oriented x3 and reflexes normal and symmetric       Lab Review  Office Visit on 03/22/2018   Component Date Value Ref Range Status   • Rapid Influenza A Ag 03/22/2018 negative   Final   • Rapid Influenza B Ag 03/22/2018 negative   Final   • Internal Control 03/22/2018 Passed  Passed Final   • Lot Number 03/22/2018 [id number]   Final   • Expiration Date 03/22/2018 2/2020   Final     Assessment/Plan:     Kingsley was seen today for hypertension.    Diagnoses and all orders for this visit:    Essential hypertension    Tobacco dependence  -     nicotine (NICODERM CQ) 21 MG/24HR patch; Place 1 patch on the skin Daily.        - Tobacco dependence:  Counseled, 3-10 minutes spent, asymptomatic.     Anxiety disorder, unspecified type  -     hydrOXYzine (VISTARIL) 25 MG capsule; Take 1 capsule by mouth Every 6 (Six) Hours As Needed for Itching, Allergies or Anxiety.        1.  Rx changes: Normotensive here.  Advised he can bring his blood pressure cuff next time, and compare to ours.  No medication changes at this time.  2.  Education:     EKG ordered: no   Presented an overview of HTN, expected course, considerations, risk factors, and exacerbation prevention.   Discussed treatment options for HTN: yes   Recommended restricted dietary Na intake: yes   Recommended increased in dietary K intake: yes   Discussed patient action plan for HTN: yes  3.  Compliance at present is estimated to be fair. Efforts to improve compliance (if necessary) will be directed at dietary modifications: DASH diet (handout given).    Return in about 1 month (around 5/10/2018) for Recheck for HTN.    Goals     • Blood Pressure < 140/90            Barriers:  Sporadic follow-up            Preventative:  Needs C-scope, referred to Dr Lacy, has  appointment upcoming with Irene Whitfield  social binge drinking  decrease soda or juice intake, increase water intake, increase physical activity and have 3 meals a day    RISK SCORE: 4           This document has been electronically signed by Jaret Banks MD on April 13, 2018 10:54 PM

## 2018-04-11 ENCOUNTER — TELEPHONE (OUTPATIENT)
Dept: FAMILY MEDICINE CLINIC | Facility: CLINIC | Age: 48
End: 2018-04-11

## 2018-04-13 ENCOUNTER — OFFICE VISIT (OUTPATIENT)
Dept: GASTROENTEROLOGY | Facility: CLINIC | Age: 48
End: 2018-04-13

## 2018-04-13 VITALS
HEART RATE: 99 BPM | BODY MASS INDEX: 40.78 KG/M2 | DIASTOLIC BLOOD PRESSURE: 90 MMHG | WEIGHT: 259.8 LBS | HEIGHT: 67 IN | SYSTOLIC BLOOD PRESSURE: 148 MMHG

## 2018-04-13 DIAGNOSIS — Z12.11 ENCOUNTER FOR SCREENING COLONOSCOPY: Primary | ICD-10-CM

## 2018-04-13 PROCEDURE — S0260 H&P FOR SURGERY: HCPCS | Performed by: NURSE PRACTITIONER

## 2018-04-13 RX ORDER — ASPIRIN 325 MG
325 TABLET ORAL DAILY
COMMUNITY
End: 2018-05-01

## 2018-04-13 RX ORDER — DEXTROSE AND SODIUM CHLORIDE 5; .45 G/100ML; G/100ML
30 INJECTION, SOLUTION INTRAVENOUS CONTINUOUS PRN
Status: CANCELLED | OUTPATIENT
Start: 2018-06-04

## 2018-04-13 RX ORDER — POLYETHYLENE GLYCOL 3350, SODIUM CHLORIDE, SODIUM BICARBONATE, POTASSIUM CHLORIDE 420; 11.2; 5.72; 1.48 G/4L; G/4L; G/4L; G/4L
4000 POWDER, FOR SOLUTION ORAL ONCE
Qty: 4000 ML | Refills: 0 | Status: SHIPPED | OUTPATIENT
Start: 2018-04-13 | End: 2018-04-13

## 2018-04-13 NOTE — PATIENT INSTRUCTIONS
MyPlate from [employer]  The general, healthful diet is based on the 2010 Dietary Guidelines for Americans. The amount of food you need to eat from each food group depends on your age, sex, and level of physical activity and can be individualized by a dietitian. Go to ChooseMyPlate.gov for more information.  What do I need to know about the MyPlate plan?  · Enjoy your food, but eat less.  · Avoid oversized portions.  ¨ ½ of your plate should include fruits and vegetables.  ¨ ¼ of your plate should be grains.  ¨ ¼ of your plate should be protein.  Grains   · Make at least half of your grains whole grains.  · For a 2,000 calorie daily food plan, eat 6 oz every day.  · 1 oz is about 1 slice bread, 1 cup cereal, or ½ cup cooked rice, cereal, or pasta.  Vegetables   · Make half your plate fruits and vegetables.  · For a 2,000 calorie daily food plan, eat 2½ cups every day.  · 1 cup is about 1 cup raw or cooked vegetables or vegetable juice or 2 cups raw leafy greens.  Fruits   · Make half your plate fruits and vegetables.  · For a 2,000 calorie daily food plan, eat 2 cups every day.  · 1 cup is about 1 cup fruit or 100% fruit juice or ½ cup dried fruit.  Protein   · For a 2,000 calorie daily food plan, eat 5½ oz every day.  · 1 oz is about 1 oz meat, poultry, or fish, ¼ cup cooked beans, 1 egg, 1 Tbsp peanut butter, or ½ oz nuts or seeds.  Dairy   · Switch to fat-free or low-fat (1%) milk.  · For a 2,000 calorie daily food plan, eat 3 cups every day.  · 1 cup is about 1 cup milk or yogurt or soy milk (soy beverage), 1½ oz natural cheese, or 2 oz processed cheese.  Fats, Oils, and Empty Calories   · Only small amounts of oils are recommended.  · Empty calories are calories from solid fats or added sugars.  · Compare sodium in foods like soup, bread, and frozen meals. Choose the foods with lower numbers.  · Drink water instead of sugary drinks.  What foods can I eat?  Grains   Whole grains such as whole wheat, quinoa, millet,  and bulgur. Bread, rolls, and pasta made from whole grains. Brown or wild rice. Hot or cold cereals made from whole grains and without added sugar.  Vegetables   All fresh vegetables, especially fresh red, dark green, or orange vegetables. Peas and beans. Low-sodium frozen or canned vegetables prepared without added salt. Low-sodium vegetable juices.  Fruits   All fresh, frozen, and dried fruits. Canned fruit packed in water or fruit juice without added sugar. Fruit juices without added sugar.  Meats and Other Protein Sources   Boiled, baked, or grilled lean meat trimmed of fat. Skinless poultry. Fresh seafood and shellfish. Canned seafood packed in water. Unsalted nuts and unsalted nut butters. Tofu. Dried beans and pea. Eggs.  Dairy   Low-fat or fat-free milk, yogurt, and cheeses.  Sweets and Desserts   Frozen desserts made from low-fat milk.  Fats and Oils   Olive, peanut, and canola oils and margarine. Salad dressing and mayonnaise made from these oils.  Other   Soups and casseroles made from allowed ingredients and without added fat or salt.  The items listed above may not be a complete list of recommended foods or beverages. Contact your dietitian for more options.   What foods are not recommended?  Grains   Sweetened, low-fiber cereals. Packaged baked goods. Snack crackers and chips. Cheese crackers, butter crackers, and biscuits. Frozen waffles, sweet breads, doughnuts, pastries, packaged baking mixes, pancakes, cakes, and cookies.  Vegetables   Regular canned or frozen vegetables or vegetables prepared with salt. Canned tomatoes. Canned tomato sauce. Fried vegetables. Vegetables in cream sauce or cheese sauce.  Fruits   Fruits packed in syrup or made with added sugar.  Meats and Other Protein Sources   Marbled or fatty meats such as ribs. Poultry with skin. Fried meats, poultry, eggs, or fish. Sausages, hot dogs, and deli meats such as pastrami, bologna, or salami.  Dairy   Whole milk, cream, cheeses made  from whole milk, sour cream. Ice cream or yogurt made from whole milk or with added sugar.  Beverages   For adults, no more than one alcoholic drink per day. Regular soft drinks or other sugary beverages. Juice drinks.  Sweets and Desserts   Sugary or fatty desserts, candy, and other sweets.  Fats and Oils   Solid shortening or partially hydrogenated oils. Solid margarine. Margarine that contains trans fats. Butter.  The items listed above may not be a complete list of foods and beverages to avoid. Contact your dietitian for more information.   This information is not intended to replace advice given to you by your health care provider. Make sure you discuss any questions you have with your health care provider.  Document Released: 01/06/2009 Document Revised: 05/25/2017 Document Reviewed: 11/26/2014  [employer] Interactive Patient Education © 2017 [employer]  BMI for Adults  Body mass index (BMI) is a number that is calculated from a person's weight and height. In most adults, the number is used to find how much of an adult's weight is made up of fat. BMI is not as accurate as a direct measure of body fat.  How is BMI calculated?  BMI is calculated by dividing weight in kilograms by height in meters squared. It can also be calculated by dividing weight in pounds by height in inches squared, then multiplying the resulting number by 703. Charts are available to help you find your BMI quickly and easily without doing this calculation.  How is BMI interpreted?  Health care professionals use BMI charts to identify whether an adult is underweight, at a normal weight, or overweight based on the following guidelines:  · Underweight: BMI less than 18.5.  · Normal weight: BMI between 18.5 and 24.9.  · Overweight: BMI between 25 and 29.9.  · Obese: BMI of 30 and above.  BMI is usually interpreted the same for males and females.  Weight includes both fat and muscle, so someone with a muscular build, such as an athlete, may  have a BMI that is higher than 24.9. In cases like these, BMI may not accurately depict body fat. To determine if excess body fat is the cause of a BMI of 25 or higher, further assessments may need to be done by a health care provider.  Why is BMI a useful tool?  BMI is used to identify a possible weight problem that may be related to a medical problem or may increase the risk for medical problems. BMI can also be used to promote changes to reach a healthy weight.  This information is not intended to replace advice given to you by your health care provider. Make sure you discuss any questions you have with your health care   Colonoscopy, Adult  A colonoscopy is an exam to look at the large intestine. It is done to check for problems, such as:  · Lumps (tumors).  · Growths (polyps).  · Swelling (inflammation).  · Bleeding.  What happens before the procedure?  Eating and drinking   Follow instructions from your doctor about eating and drinking. These instructions may include:  · A few days before the procedure - follow a low-fiber diet.  ¨ Avoid nuts.  ¨ Avoid seeds.  ¨ Avoid dried fruit.  ¨ Avoid raw fruits.  ¨ Avoid vegetables.  · 1-3 days before the procedure - follow a clear liquid diet. Avoid liquids that have red or purple dye. Drink only clear liquids, such as:  ¨ Clear broth or bouillon.  ¨ Black coffee or tea.  ¨ Clear juice.  ¨ Clear soft drinks or sports drinks.  ¨ Gelatin dessert.  ¨ Popsicles.  · On the day of the procedure - do not eat or drink anything during the 2 hours before the procedure.  Bowel prep   If you were prescribed an oral bowel prep:  · Take it as told by your doctor. Starting the day before your procedure, you will need to drink a lot of liquid. The liquid will cause you to poop (have bowel movements) until your poop is almost clear or light green.  · If your skin or butt gets irritated from diarrhea, you may:  ¨ Wipe the area with wipes that have medicine in them, such as adult wet  wipes with aloe and vitamin E.  ¨ Put something on your skin that soothes the area, such as petroleum jelly.  · If you throw up (vomit) while drinking the bowel prep, take a break for up to 60 minutes. Then begin the bowel prep again. If you keep throwing up and you cannot take the bowel prep without throwing up, call your doctor.  General instructions   · Ask your doctor about changing or stopping your normal medicines. This is important if you take diabetes medicines or blood thinners.  · Plan to have someone take you home from the hospital or clinic.  What happens during the procedure?  · An IV tube may be put into one of your veins.  · You will be given medicine to help you relax (sedative).  · To reduce your risk of infection:  ¨ Your doctors will wash their hands.  ¨ Your anal area will be washed with soap.  · You will be asked to lie on your side with your knees bent.  · Your doctor will get a long, thin, flexible tube ready. The tube will have a camera and a light on the end.  · The tube will be put into your anus.  · The tube will be gently put into your large intestine.  · Air will be delivered into your large intestine to keep it open. You may feel some pressure or cramping.  · The camera will be used to take photos.  · A small tissue sample may be removed from your body to be looked at under a microscope (biopsy). If any possible problems are found, the tissue will be sent to a lab for testing.  · If small growths are found, your doctor may remove them and have them checked for cancer.  · The tube that was put into your anus will be slowly removed.  The procedure may vary among doctors and hospitals.  What happens after the procedure?  · Your doctor will check on you often until the medicines you were given have worn off.  · Do not drive for 24 hours after the procedure.  · You may have a small amount of blood in your poop.  · You may pass gas.  · You may have mild cramps or bloating in your belly  (abdomen).  · It is up to you to get the results of your procedure. Ask your doctor, or the department performing the procedure, when your results will be ready.  This information is not intended to replace advice given to you by your health care provider. Make sure you discuss any questions you have with your health care provider.  Document Released: 01/20/2012 Document Revised: 10/18/2017 Document Reviewed: 02/28/2017  [employer] Interactive Patient Education © 2017 [employer]  provider.  Document Released: 08/29/2005 Document Revised: 04/27/2017 Document Reviewed: 05/15/2015  [employer] Interactive Patient Education © 2017 [employer]

## 2018-04-13 NOTE — PROGRESS NOTES
"Chief Complaint   Patient presents with   • Colonoscopy     screening       Subjective    Kingsley Littlejohn is a 48 y.o. male. he is being seen for consultation today at the request of Jaret Banks MD    History of Present Illness  48-year-old male presents to discuss screening colonoscopy.  He denies any abdominal pain, nausea, vomiting or changes in his bowel habits.  Ports bowel movements are normal about 2 times per day with no melena or hematochezia.  He reports his grandmother has history of colon cancer however not in a first-degree relative.  Reports mother has Crohn's disease.  He has history of drug abuse states he has been clean for the last 10 weeks.  States prior to that he was clean for 5 months however lost his car and begin these drugs again.  States he inserted methamphetamine rectally.I have reiterated with patient importance of abstinence from drugs.  He reports he has a good support system with his family and Faith.  We have discussed smoking cessation however he is going to focus on continual abstinence from methamphetamine at this time and then attempt to decrease smoking more in the future.  Plan; schedule patient for initial screening colonoscopy.       The following portions of the patient's history were reviewed and updated as appropriate:   Past Medical History:   Diagnosis Date   • Abdominal pain    • Allergic rhinitis    • Diabetes mellitus     Prev HbA1c 14.0 2014, states \"I'm cured!\"   • Essential hypertension    • Fracture closed, fibula, shaft     tib-fib     Past Surgical History:   Procedure Laterality Date   • ANKLE SURGERY      right and left   • CYST REMOVAL      x 3   • ENDOSCOPY      20 years ago   • TIBIA FRACTURE SURGERY Bilateral 2011    tib-fib     Family History   Problem Relation Age of Onset   • Diabetes Other    • Hypertension Other    • Cancer Other    • Lung disease Other    • Thyroid disease Other    • Bleeding Disorder Other    • Cancer Paternal Grandmother  "   • Cancer Paternal Grandfather        Current Outpatient Prescriptions   Medication Sig Dispense Refill   • aspirin 325 MG tablet Take 325 mg by mouth Daily.     • hydrOXYzine (VISTARIL) 25 MG capsule Take 1 capsule by mouth Every 6 (Six) Hours As Needed for Itching, Allergies or Anxiety. 120 capsule 2   • lisinopril (PRINIVIL,ZESTRIL) 40 MG tablet Take 1 tablet by mouth Daily. 30 tablet 1   • Oxymetazoline HCl (NASAL SPRAY NA) into each nostril.     • [START ON 4/16/2018] paliperidone palmitate (INVEGA SUSTENNA) 117 MG/0.75ML suspension IM injection Inject 117 mg into the shoulder, thigh, or buttocks Every 30 (Thirty) Days.     • sodium chloride (OCEAN NASAL SPRAY) 0.65 % nasal spray 1 spray into each nostril As Needed for Congestion. 60 mL 12   • polyethylene glycol-electrolytes (NULYTELY) 420 g solution Take 4,000 mL by mouth 1 (One) Time for 1 dose. 4000 mL 0     No current facility-administered medications for this visit.      Allergies   Allergen Reactions   • Codeine Nausea Only   • Latex      Social History     Social History   • Marital status: Single     Occupational History   • Lr      Social History Main Topics   • Smoking status: Current Every Day Smoker     Packs/day: 2.00     Years: 25.00     Types: Cigarettes     Start date: 1992   • Smokeless tobacco: Never Used   • Alcohol use No      Comment: FORMER pint of whiskey daily, states quit 1 year ago   • Drug use: No      Comment: Meth, other substances which he declined to discuss further   • Sexual activity: Defer     Other Topics Concern   • Not on file       Review of Systems  Review of Systems   Constitutional: Negative for activity change, appetite change, chills, diaphoresis, fatigue, fever and unexpected weight change.   HENT: Negative for sore throat and trouble swallowing.    Respiratory: Negative for shortness of breath.    Gastrointestinal: Negative for abdominal distention, abdominal pain, anal bleeding, blood in stool, constipation,  "diarrhea, nausea, rectal pain and vomiting.   Musculoskeletal: Negative for arthralgias.   Skin: Negative for pallor.   Neurological: Negative for light-headedness.        /90   Pulse 99   Ht 170.2 cm (67.01\")   Wt 118 kg (259 lb 12.8 oz)   BMI 40.68 kg/m²     Objective    Physical Exam   Constitutional: He is oriented to person, place, and time. He appears well-developed and well-nourished. He is cooperative. No distress.   HENT:   Head: Normocephalic and atraumatic.   Eyes:   Pin point pupils    Neck: Normal range of motion. Neck supple. No thyromegaly present.   Cardiovascular: Normal rate, regular rhythm and normal heart sounds.    Pulmonary/Chest: Effort normal and breath sounds normal. He has no wheezes. He has no rhonchi. He has no rales.   Abdominal: Soft. Normal appearance and bowel sounds are normal. He exhibits no distension. There is no hepatosplenomegaly. There is no tenderness. There is no rigidity and no guarding. No hernia.   Lymphadenopathy:     He has no cervical adenopathy.   Neurological: He is alert and oriented to person, place, and time.   Skin: Skin is warm, dry and intact. No rash noted. No pallor.   Psychiatric: He has a normal mood and affect. His speech is rapid and/or pressured.     Office Visit on 03/22/2018   Component Date Value Ref Range Status   • Rapid Influenza A Ag 03/22/2018 negative   Final   • Rapid Influenza B Ag 03/22/2018 negative   Final   • Internal Control 03/22/2018 Passed  Passed Final   • Lot Number 03/22/2018 [id number]   Final   • Expiration Date 03/22/2018 2/2020   Final     Assessment/Plan      1. Encounter for screening colonoscopy    .       Orders placed during this encounter include:      COLONOSCOPY (N/A)    Review and/or summary of lab tests, radiology, procedures, medications. Review and summary of old records and obtaining of history. The risks and benefits of my recommendations, as well as other treatment options were discussed with the patient " today. Questions were answered.    New Medications Ordered This Visit   Medications   • polyethylene glycol-electrolytes (NULYTELY) 420 g solution     Sig: Take 4,000 mL by mouth 1 (One) Time for 1 dose.     Dispense:  4000 mL     Refill:  0       Follow-up: Return in about 4 weeks (around 5/11/2018).          This document has been electronically signed by RAJAT Brannon on April 13, 2018 9:37 AM             Results for orders placed or performed in visit on 03/22/18   POCT Influenza A/B   Result Value Ref Range    Rapid Influenza A Ag negative     Rapid Influenza B Ag negative     Internal Control Passed Passed    Lot Number [id number]     Expiration Date 2/2,020    Results for orders placed or performed in visit on 05/26/17   CBC Auto Differential   Result Value Ref Range    WBC 8.26 3.20 - 9.80 10*3/mm3    RBC 4.91 4.37 - 5.74 10*6/mm3    Hemoglobin 15.8 13.7 - 17.3 g/dL    Hematocrit 46.0 39.0 - 49.0 %    MCV 93.7 80.0 - 98.0 fL    MCH 32.2 26.5 - 34.0 pg    MCHC 34.3 31.5 - 36.3 g/dL    RDW 14.4 11.5 - 14.5 %    RDW-SD 48.9 (H) 35.1 - 43.9 fl    MPV 11.5 8.0 - 12.0 fL    Platelets 226 150 - 450 10*3/mm3    Neutrophil % 46.5 37.0 - 80.0 %    Lymphocyte % 35.0 10.0 - 50.0 %    Monocyte % 9.2 0.0 - 12.0 %    Eosinophil % 8.0 (H) 0.0 - 7.0 %    Basophil % 1.1 0.0 - 2.0 %    Immature Grans % 0.2 0.0 - 0.5 %    Neutrophils, Absolute 3.84 2.00 - 8.60 10*3/mm3    Lymphocytes, Absolute 2.89 0.60 - 4.20 10*3/mm3    Monocytes, Absolute 0.76 0.00 - 0.90 10*3/mm3    Eosinophils, Absolute 0.66 0.00 - 0.70 10*3/mm3    Basophils, Absolute 0.09 0.00 - 0.20 10*3/mm3    Immature Grans, Absolute 0.02 0.00 - 0.02 10*3/mm3   Insulin, Random   Result Value Ref Range    Insulin 20 uIU/mL   Hemoglobin A1c   Result Value Ref Range    Hemoglobin A1C 5.91 (H) 4 - 5.6 %   Lipid Panel   Result Value Ref Range    Total Cholesterol 196 0 - 199 mg/dL    Triglycerides 108 20 - 199 mg/dL    HDL Cholesterol 55 (L) 60 - 200 mg/dL    LDL  Cholesterol  138 (H) 1 - 129 mg/dL    LDL/HDL Ratio 2.17 0.00 - 3.55   Comprehensive Metabolic Panel   Result Value Ref Range    Glucose 110 (H) 60 - 100 mg/dL    BUN 17 7 - 21 mg/dL    Creatinine 0.76 0.70 - 1.30 mg/dL    Sodium 138 137 - 145 mmol/L    Potassium 3.7 3.5 - 5.1 mmol/L    Chloride 99 95 - 110 mmol/L    CO2 26.0 22.0 - 31.0 mmol/L    Calcium 8.9 8.4 - 10.2 mg/dL    Total Protein 7.8 6.3 - 8.6 g/dL    Albumin 4.30 3.40 - 4.80 g/dL    ALT (SGPT) 30 21 - 72 U/L    AST (SGOT) 31 17 - 59 U/L    Alkaline Phosphatase 72 38 - 126 U/L    Total Bilirubin 0.6 0.2 - 1.3 mg/dL    eGFR Non  Amer 109 63 - 147 mL/min/1.73    Globulin 3.5 2.3 - 3.5 gm/dL    A/G Ratio 1.2 1.1 - 1.8 g/dL    BUN/Creatinine Ratio 22.4 7.0 - 25.0    Anion Gap 13.0 5.0 - 15.0 mmol/L   Results for orders placed or performed during the hospital encounter of 08/11/14   Urinalysis Without Microscopic   Result Value Ref Range    Color, UA Yellow     Appearance Slightly Cloudy     Specific Gravity, UA 1.010 1.003 - 1.030    pH, UA 5.5 pH Units    Leukocytes, UA NEGATIVE NEGATIVE    Nitrite, UA NEGATIVE NEGATIVE    Protein, UA NEGATIVE NEGATIVE    Glucose, Urine >=1000 (A) NEGATIVE mg/dl    Ketones, UA 3+ (A) NEGATIVE    Urobilinogen, UA 0.2 0.2 EU/dl    Blood, UA NEGATIVE NEGATIVE   CBC and Differential   Result Value Ref Range    WBC 7.5 3.2 - 9.8 x1000/uL    RBC 4.91 4.37 - 5.74 kendra/mm3    Hemoglobin 15.8 13.7 - 17.3 gm/dl    Hematocrit 43.4 39.0 - 49.0 %    MCV 88.4 80.0 - 98.0 fl    MCH 32.2 26.0 - 34.0 pg    MCHC 36.4 (H) 31.5 - 36.3 gm/dl    RDW 12.9 11.5 - 14.5 %    Platelets 174 150 - 450 x1000/mm3    MPV 11.9 8.0 - 12.0 fl    Neutrophil Rel % 61.9 37.0 - 80.0 %    Lymphocyte Rel % 27.1 10.0 - 50.0 %    Monocyte Rel % 8.3 0.0 - 12.0 %    Eosinophil Rel % 1.6 0.0 - 7.0 %    Basophil Rel % 0.7 0.0 - 2.0 %    Immature Granulocyte Rel % 0.40 0.00 - 0.50 %    Neutrophils Absolute 4.64 2.00 - 8.60 x1000/uL    Lymphocytes Absolute 2.03  0.60 - 4.20 x1000/uL    Monocytes Absolute 0.62 0.00 - 0.90 x1000/uL    Eosinophils Absolute 0.12 0.00 - 0.70 x1000/uL    Basophils Absolute 0.05 0.00 - 0.20 x1000/uL    Immature Granulocytes Absolute 0.030 (H) 0.005 - 0.022 x1000/uL    nRBC 0.0 0.0 - 0.2 %    nRBC 0.000 x1000/uL   Hemoglobin A1c   Result Value Ref Range    Hemoglobin A1C >14.0 4.0 - 5.6 %TotHgb   Comprehensive metabolic panel   Result Value Ref Range    Sodium 133 (L) 137 - 145 mmol/L    Potassium 3.6 3.5 - 5.1 mmol/L    Chloride 97 95 - 110 mmol/L    CO2 21 (L) 22 - 31 mmol/L    Anion Gap 15.0 5.0 - 15.0 mmol/L    Glucose 359 (H) 60 - 100 mg/dl    BUN 16 7 - 21 mg/dl    Creatinine 0.7 0.7 - 1.3 mg/dl    GFR MDRD Non  121 63 - 147 mL/min/1.73 sq.M    GFR MDRD  147 63 - 147 mL/min/1.73 sq.M    Calcium 8.6 8.4 - 10.2 mg/dl    Total Protein 7.2 6.3 - 8.6 gm/dl    Albumin 4.1 3.4 - 4.8 gm/dl    Total Bilirubin 0.9 0.2 - 1.3 mg/dl    Alkaline Phosphatase 109 38 - 126 U/L    AST (SGOT) 19 17 - 59 U/L    ALT (SGPT) 31 21 - 72 U/L     *Note: Due to a large number of results and/or encounters for the requested time period, some results have not been displayed. A complete set of results can be found in Results Review.

## 2018-04-17 ENCOUNTER — OFFICE VISIT (OUTPATIENT)
Dept: FAMILY MEDICINE CLINIC | Facility: CLINIC | Age: 48
End: 2018-04-17

## 2018-04-17 VITALS
HEIGHT: 67 IN | BODY MASS INDEX: 41.21 KG/M2 | WEIGHT: 262.56 LBS | TEMPERATURE: 97 F | HEART RATE: 106 BPM | DIASTOLIC BLOOD PRESSURE: 108 MMHG | SYSTOLIC BLOOD PRESSURE: 180 MMHG | OXYGEN SATURATION: 96 %

## 2018-04-17 DIAGNOSIS — I10 ESSENTIAL HYPERTENSION: Primary | ICD-10-CM

## 2018-04-17 DIAGNOSIS — E66.09 NON MORBID OBESITY DUE TO EXCESS CALORIES: ICD-10-CM

## 2018-04-17 DIAGNOSIS — L73.9 FOLLICULITIS: ICD-10-CM

## 2018-04-17 DIAGNOSIS — F17.200 TOBACCO DEPENDENCE: ICD-10-CM

## 2018-04-17 PROCEDURE — 99213 OFFICE O/P EST LOW 20 MIN: CPT | Performed by: FAMILY MEDICINE

## 2018-04-17 RX ORDER — LEVOFLOXACIN 500 MG/1
500 TABLET, FILM COATED ORAL DAILY
Qty: 10 TABLET | Refills: 0 | Status: SHIPPED | OUTPATIENT
Start: 2018-04-17 | End: 2018-05-01

## 2018-04-17 RX ORDER — METRONIDAZOLE 500 MG/1
500 TABLET ORAL 3 TIMES DAILY
Qty: 30 TABLET | Refills: 0 | Status: SHIPPED | OUTPATIENT
Start: 2018-04-17 | End: 2018-05-01

## 2018-04-19 NOTE — PROGRESS NOTES
I have reviewed the notes, assessments, and/or procedures performed by Dr Banks, I concur with her/his documentation of Kingsley Littlejohn.

## 2018-04-23 ENCOUNTER — APPOINTMENT (OUTPATIENT)
Dept: GENERAL RADIOLOGY | Facility: HOSPITAL | Age: 48
End: 2018-04-23

## 2018-04-23 ENCOUNTER — HOSPITAL ENCOUNTER (OUTPATIENT)
Facility: HOSPITAL | Age: 48
Setting detail: OBSERVATION
Discharge: HOME | End: 2018-04-24
Attending: EMERGENCY MEDICINE | Admitting: FAMILY MEDICINE

## 2018-04-23 DIAGNOSIS — R07.9 CHEST PAIN, UNSPECIFIED TYPE: Primary | ICD-10-CM

## 2018-04-23 PROBLEM — L72.0 CYST OF SKIN AND SUBCUTANEOUS TISSUE: Status: ACTIVE | Noted: 2018-04-23

## 2018-04-23 PROBLEM — G47.33 OBSTRUCTIVE SLEEP APNEA SYNDROME: Status: ACTIVE | Noted: 2018-04-23

## 2018-04-23 PROBLEM — E78.00 PURE HYPERCHOLESTEROLEMIA: Status: ACTIVE | Noted: 2018-04-23

## 2018-04-23 LAB
ALBUMIN SERPL-MCNC: 4.1 G/DL (ref 3.4–4.8)
ALBUMIN/GLOB SERPL: 1.2 G/DL (ref 1.1–1.8)
ALP SERPL-CCNC: 58 U/L (ref 38–126)
ALT SERPL W P-5'-P-CCNC: 33 U/L (ref 21–72)
ANION GAP SERPL CALCULATED.3IONS-SCNC: 12 MMOL/L (ref 5–15)
AST SERPL-CCNC: 16 U/L (ref 17–59)
BASOPHILS # BLD AUTO: 0.06 10*3/MM3 (ref 0–0.2)
BASOPHILS NFR BLD AUTO: 0.8 % (ref 0–2)
BILIRUB SERPL-MCNC: 0.3 MG/DL (ref 0.2–1.3)
BUN BLD-MCNC: 16 MG/DL (ref 7–21)
BUN/CREAT SERPL: 24.2 (ref 7–25)
CALCIUM SPEC-SCNC: 8.7 MG/DL (ref 8.4–10.2)
CHLORIDE SERPL-SCNC: 101 MMOL/L (ref 95–110)
CO2 SERPL-SCNC: 23 MMOL/L (ref 22–31)
CREAT BLD-MCNC: 0.66 MG/DL (ref 0.7–1.3)
DEPRECATED RDW RBC AUTO: 45.2 FL (ref 35.1–43.9)
EOSINOPHIL # BLD AUTO: 0.22 10*3/MM3 (ref 0–0.7)
EOSINOPHIL NFR BLD AUTO: 2.8 % (ref 0–7)
ERYTHROCYTE [DISTWIDTH] IN BLOOD BY AUTOMATED COUNT: 13.5 % (ref 11.5–14.5)
GFR SERPL CREATININE-BSD FRML MDRD: 128 ML/MIN/1.73 (ref 56–130)
GLOBULIN UR ELPH-MCNC: 3.4 GM/DL (ref 2.3–3.5)
GLUCOSE BLD-MCNC: 163 MG/DL (ref 60–100)
GLUCOSE BLDC GLUCOMTR-MCNC: 100 MG/DL (ref 70–130)
HCT VFR BLD AUTO: 44.6 % (ref 39–49)
HGB BLD-MCNC: 15.6 G/DL (ref 13.7–17.3)
HOLD SPECIMEN: NORMAL
HOLD SPECIMEN: NORMAL
IMM GRANULOCYTES # BLD: 0.03 10*3/MM3 (ref 0–0.02)
IMM GRANULOCYTES NFR BLD: 0.4 % (ref 0–0.5)
LYMPHOCYTES # BLD AUTO: 2.38 10*3/MM3 (ref 0.6–4.2)
LYMPHOCYTES NFR BLD AUTO: 30.4 % (ref 10–50)
MCH RBC QN AUTO: 32.2 PG (ref 26.5–34)
MCHC RBC AUTO-ENTMCNC: 35 G/DL (ref 31.5–36.3)
MCV RBC AUTO: 92 FL (ref 80–98)
MONOCYTES # BLD AUTO: 0.86 10*3/MM3 (ref 0–0.9)
MONOCYTES NFR BLD AUTO: 11 % (ref 0–12)
NEUTROPHILS # BLD AUTO: 4.28 10*3/MM3 (ref 2–8.6)
NEUTROPHILS NFR BLD AUTO: 54.6 % (ref 37–80)
NT-PROBNP SERPL-MCNC: 79.8 PG/ML (ref 0–900)
PLATELET # BLD AUTO: 190 10*3/MM3 (ref 150–450)
PMV BLD AUTO: 10.4 FL (ref 8–12)
POTASSIUM BLD-SCNC: 4.1 MMOL/L (ref 3.5–5.1)
PROT SERPL-MCNC: 7.5 G/DL (ref 6.3–8.6)
RBC # BLD AUTO: 4.85 10*6/MM3 (ref 4.37–5.74)
SODIUM BLD-SCNC: 136 MMOL/L (ref 137–145)
TROPONIN I SERPL-MCNC: <0.012 NG/ML
TROPONIN I SERPL-MCNC: <0.012 NG/ML
WBC NRBC COR # BLD: 7.83 10*3/MM3 (ref 3.2–9.8)
WHOLE BLOOD HOLD SPECIMEN: NORMAL
WHOLE BLOOD HOLD SPECIMEN: NORMAL

## 2018-04-23 PROCEDURE — 94760 N-INVAS EAR/PLS OXIMETRY 1: CPT

## 2018-04-23 PROCEDURE — G0378 HOSPITAL OBSERVATION PER HR: HCPCS

## 2018-04-23 PROCEDURE — 93010 ELECTROCARDIOGRAM REPORT: CPT | Performed by: INTERNAL MEDICINE

## 2018-04-23 PROCEDURE — 83880 ASSAY OF NATRIURETIC PEPTIDE: CPT | Performed by: EMERGENCY MEDICINE

## 2018-04-23 PROCEDURE — 93005 ELECTROCARDIOGRAM TRACING: CPT

## 2018-04-23 PROCEDURE — 84484 ASSAY OF TROPONIN QUANT: CPT | Performed by: EMERGENCY MEDICINE

## 2018-04-23 PROCEDURE — 85025 COMPLETE CBC W/AUTO DIFF WBC: CPT | Performed by: EMERGENCY MEDICINE

## 2018-04-23 PROCEDURE — 80053 COMPREHEN METABOLIC PANEL: CPT | Performed by: EMERGENCY MEDICINE

## 2018-04-23 PROCEDURE — 93005 ELECTROCARDIOGRAM TRACING: CPT | Performed by: EMERGENCY MEDICINE

## 2018-04-23 PROCEDURE — 82962 GLUCOSE BLOOD TEST: CPT

## 2018-04-23 PROCEDURE — 94799 UNLISTED PULMONARY SVC/PX: CPT

## 2018-04-23 PROCEDURE — 71045 X-RAY EXAM CHEST 1 VIEW: CPT

## 2018-04-23 PROCEDURE — 99285 EMERGENCY DEPT VISIT HI MDM: CPT

## 2018-04-23 RX ORDER — LISINOPRIL 40 MG/1
40 TABLET ORAL DAILY
Status: DISCONTINUED | OUTPATIENT
Start: 2018-04-24 | End: 2018-04-24 | Stop reason: HOSPADM

## 2018-04-23 RX ORDER — ASPIRIN 325 MG
325 TABLET ORAL ONCE
Status: COMPLETED | OUTPATIENT
Start: 2018-04-23 | End: 2018-04-23

## 2018-04-23 RX ORDER — SODIUM CHLORIDE 0.9 % (FLUSH) 0.9 %
1-10 SYRINGE (ML) INJECTION AS NEEDED
Status: DISCONTINUED | OUTPATIENT
Start: 2018-04-23 | End: 2018-04-24 | Stop reason: HOSPADM

## 2018-04-23 RX ORDER — HYDRALAZINE HYDROCHLORIDE 20 MG/ML
10 INJECTION INTRAMUSCULAR; INTRAVENOUS EVERY 6 HOURS PRN
Status: DISCONTINUED | OUTPATIENT
Start: 2018-04-23 | End: 2018-04-24 | Stop reason: HOSPADM

## 2018-04-23 RX ORDER — OXYMETAZOLINE HYDROCHLORIDE 0.05 G/100ML
2 SPRAY NASAL 2 TIMES DAILY
COMMUNITY
End: 2018-10-15

## 2018-04-23 RX ORDER — NICOTINE POLACRILEX 4 MG
15 LOZENGE BUCCAL
Status: DISCONTINUED | OUTPATIENT
Start: 2018-04-23 | End: 2018-04-24 | Stop reason: HOSPADM

## 2018-04-23 RX ORDER — HYDROXYZINE PAMOATE 25 MG/1
25 CAPSULE ORAL EVERY 6 HOURS PRN
Status: DISCONTINUED | OUTPATIENT
Start: 2018-04-23 | End: 2018-04-24 | Stop reason: HOSPADM

## 2018-04-23 RX ORDER — ACETAMINOPHEN 325 MG/1
650 TABLET ORAL EVERY 4 HOURS PRN
Status: DISCONTINUED | OUTPATIENT
Start: 2018-04-23 | End: 2018-04-24 | Stop reason: HOSPADM

## 2018-04-23 RX ORDER — DEXTROSE MONOHYDRATE 25 G/50ML
25 INJECTION, SOLUTION INTRAVENOUS
Status: DISCONTINUED | OUTPATIENT
Start: 2018-04-23 | End: 2018-04-24 | Stop reason: HOSPADM

## 2018-04-23 RX ORDER — NICOTINE 21 MG/24HR
1 PATCH, TRANSDERMAL 24 HOURS TRANSDERMAL EVERY 24 HOURS
Status: DISCONTINUED | OUTPATIENT
Start: 2018-04-23 | End: 2018-04-24 | Stop reason: HOSPADM

## 2018-04-23 RX ORDER — DOCUSATE SODIUM 100 MG/1
100 CAPSULE, LIQUID FILLED ORAL 2 TIMES DAILY PRN
Status: DISCONTINUED | OUTPATIENT
Start: 2018-04-23 | End: 2018-04-24 | Stop reason: HOSPADM

## 2018-04-23 RX ORDER — ASPIRIN 325 MG
325 TABLET ORAL DAILY
Status: DISCONTINUED | OUTPATIENT
Start: 2018-04-24 | End: 2018-04-24 | Stop reason: HOSPADM

## 2018-04-23 RX ORDER — METRONIDAZOLE 500 MG/1
500 TABLET ORAL EVERY 8 HOURS SCHEDULED
Status: DISCONTINUED | OUTPATIENT
Start: 2018-04-23 | End: 2018-04-24 | Stop reason: HOSPADM

## 2018-04-23 RX ORDER — LEVOFLOXACIN 500 MG/1
500 TABLET, FILM COATED ORAL EVERY 24 HOURS
Status: DISCONTINUED | OUTPATIENT
Start: 2018-04-24 | End: 2018-04-24 | Stop reason: HOSPADM

## 2018-04-23 RX ORDER — ECHINACEA PURPUREA EXTRACT 125 MG
1 TABLET ORAL AS NEEDED
Status: DISCONTINUED | OUTPATIENT
Start: 2018-04-23 | End: 2018-04-24 | Stop reason: HOSPADM

## 2018-04-23 RX ORDER — SODIUM CHLORIDE 0.9 % (FLUSH) 0.9 %
10 SYRINGE (ML) INJECTION AS NEEDED
Status: DISCONTINUED | OUTPATIENT
Start: 2018-04-23 | End: 2018-04-24 | Stop reason: HOSPADM

## 2018-04-23 RX ADMIN — HYDROXYZINE PAMOATE 25 MG: 25 CAPSULE ORAL at 22:10

## 2018-04-23 RX ADMIN — ASPIRIN 325 MG: 325 TABLET ORAL at 18:42

## 2018-04-23 RX ADMIN — METRONIDAZOLE 500 MG: 500 TABLET ORAL at 22:02

## 2018-04-23 RX ADMIN — NICOTINE 1 PATCH: 21 PATCH TRANSDERMAL at 22:02

## 2018-04-23 NOTE — ED PROVIDER NOTES
Subjective   Patient presents to emergency department for chest pain starting at lunch today.  States pain was tightness and mid chest.  Endorses associated left arm pain, nausea, and dizziness.  States he has had a previous MI.  No mention of this in his records.   States he is very concerned about his blood pressure.  Endorses smoking.  Cardiac risk factors: DM, HTN, tobacco smoking, hyperlipidemia, obesity.          History provided by:  Patient   used: No    Chest Pain   Pain location:  Substernal area  Pain quality: aching and pressure    Pain radiates to:  L arm  Pain severity:  Moderate  Duration:  5 hours  Timing:  Intermittent  Progression:  Waxing and waning  Chronicity:  New  Context: at rest    Associated symptoms: anxiety and nausea    Associated symptoms: no abdominal pain, no anorexia, no back pain, no claudication, no cough, no diaphoresis, no dizziness, no dysphagia, no fatigue, no fever, no headache, no heartburn, no lower extremity edema, no near-syncope, no numbness, no orthopnea, no palpitations, no PND, no shortness of breath, no syncope, no vomiting and no weakness    Risk factors: diabetes mellitus, high cholesterol, hypertension, male sex, obesity and smoking        Review of Systems   Constitutional: Negative for diaphoresis, fatigue and fever.   HENT: Negative for trouble swallowing.    Eyes: Negative for visual disturbance.   Respiratory: Negative for cough and shortness of breath.    Cardiovascular: Positive for chest pain. Negative for palpitations, orthopnea, claudication, syncope, PND and near-syncope.   Gastrointestinal: Positive for nausea. Negative for abdominal pain, anorexia, heartburn and vomiting.   Genitourinary: Negative for dysuria and flank pain.   Musculoskeletal: Negative for back pain.   Skin: Negative for color change.   Allergic/Immunologic: Negative for immunocompromised state.   Neurological: Negative for dizziness, weakness, numbness and  "headaches.   Psychiatric/Behavioral: Negative for confusion.       Past Medical History:   Diagnosis Date   • Abdominal pain    • Allergic rhinitis    • Diabetes mellitus     Prev HbA1c 14.0 2014, states \"I'm cured!\"   • Essential hypertension    • Fracture closed, fibula, shaft     tib-fib       Allergies   Allergen Reactions   • Codeine Nausea Only   • Latex        Past Surgical History:   Procedure Laterality Date   • ANKLE SURGERY      right and left   • CYST REMOVAL      x 3   • ENDOSCOPY      20 years ago   • TIBIA FRACTURE SURGERY Bilateral 2011    tib-fib       Family History   Problem Relation Age of Onset   • Diabetes Other    • Hypertension Other    • Cancer Other    • Lung disease Other    • Thyroid disease Other    • Bleeding Disorder Other    • Cancer Paternal Grandmother    • Cancer Paternal Grandfather        Social History     Social History   • Marital status: Single     Occupational History   • Lr      Social History Main Topics   • Smoking status: Current Every Day Smoker     Packs/day: 2.00     Years: 25.00     Types: Cigarettes     Start date: 1992   • Smokeless tobacco: Never Used   • Alcohol use No      Comment: FORMER pint of whiskey daily, states quit 1 year ago   • Drug use:      Types: Marijuana      Comment: Meth, other substances which he declined to discuss further   • Sexual activity: Defer     Other Topics Concern   • Not on file           Objective      BP (!) 168/104 (BP Location: Left arm, Patient Position: Sitting)   Pulse 104   Temp 99 °F (37.2 °C) (Oral)   Resp 18   Ht 167.6 cm (66\")   Wt 120 kg (264 lb)   SpO2 97%   BMI 42.61 kg/m²     Physical Exam   Constitutional: He is oriented to person, place, and time. He appears well-developed and well-nourished. No distress.   Morbidly obese     HENT:   Head: Normocephalic and atraumatic.   Eyes: EOM are normal. Pupils are equal, round, and reactive to light.   Cardiovascular: Normal rate, regular rhythm, normal heart " "sounds and intact distal pulses.    Pulmonary/Chest: Effort normal and breath sounds normal. No respiratory distress.   Abdominal: Soft. Bowel sounds are normal. He exhibits no distension. There is no tenderness.   Neurological: He is alert and oriented to person, place, and time.   Skin: Skin is warm. Capillary refill takes less than 2 seconds.   Psychiatric: He has a normal mood and affect. His behavior is normal. Thought content normal.   Racing speech pattern.  Patient will frequently and  randomly says \"blah, blah, blah\"  during sentences.     Nursing note and vitals reviewed.      ECG 12 Lead    Date/Time: 4/23/2018 7:15 PM  Performed by: SUDHAKAR MEDELLIN  Authorized by: DONNIE MCCOLLUM   Interpreted by physician  Comparison: not compared with previous ECG   Rhythm: sinus tachycardia  Rate: tachycardic  BPM: 102  ST Segments: ST segments normal  T Waves: T waves normal  Clinical impression: abnormal ECG  Comments: Nonspecific T wave abnormality               ED Course  ED Course   Comment By Time   Paged FP resident. Sudhakar Medellin PA-C 04/23 1916      Results for orders placed or performed during the hospital encounter of 04/23/18   Troponin   Result Value Ref Range    Troponin I <0.012 <=0.034 ng/mL   Comprehensive Metabolic Panel   Result Value Ref Range    Glucose 163 (H) 60 - 100 mg/dL    BUN 16 7 - 21 mg/dL    Creatinine 0.66 (L) 0.70 - 1.30 mg/dL    Sodium 136 (L) 137 - 145 mmol/L    Potassium 4.1 3.5 - 5.1 mmol/L    Chloride 101 95 - 110 mmol/L    CO2 23.0 22.0 - 31.0 mmol/L    Calcium 8.7 8.4 - 10.2 mg/dL    Total Protein 7.5 6.3 - 8.6 g/dL    Albumin 4.10 3.40 - 4.80 g/dL    ALT (SGPT) 33 21 - 72 U/L    AST (SGOT) 16 (L) 17 - 59 U/L    Alkaline Phosphatase 58 38 - 126 U/L    Total Bilirubin 0.3 0.2 - 1.3 mg/dL    eGFR Non  Amer 128 56 - 130 mL/min/1.73    Globulin 3.4 2.3 - 3.5 gm/dL    A/G Ratio 1.2 1.1 - 1.8 g/dL    BUN/Creatinine Ratio 24.2 7.0 - 25.0    Anion Gap 12.0 5.0 - 15.0 " mmol/L   BNP   Result Value Ref Range    proBNP 79.8 0.0 - 900.0 pg/mL   CBC Auto Differential   Result Value Ref Range    WBC 7.83 3.20 - 9.80 10*3/mm3    RBC 4.85 4.37 - 5.74 10*6/mm3    Hemoglobin 15.6 13.7 - 17.3 g/dL    Hematocrit 44.6 39.0 - 49.0 %    MCV 92.0 80.0 - 98.0 fL    MCH 32.2 26.5 - 34.0 pg    MCHC 35.0 31.5 - 36.3 g/dL    RDW 13.5 11.5 - 14.5 %    RDW-SD 45.2 (H) 35.1 - 43.9 fl    MPV 10.4 8.0 - 12.0 fL    Platelets 190 150 - 450 10*3/mm3    Neutrophil % 54.6 37.0 - 80.0 %    Lymphocyte % 30.4 10.0 - 50.0 %    Monocyte % 11.0 0.0 - 12.0 %    Eosinophil % 2.8 0.0 - 7.0 %    Basophil % 0.8 0.0 - 2.0 %    Immature Grans % 0.4 0.0 - 0.5 %    Neutrophils, Absolute 4.28 2.00 - 8.60 10*3/mm3    Lymphocytes, Absolute 2.38 0.60 - 4.20 10*3/mm3    Monocytes, Absolute 0.86 0.00 - 0.90 10*3/mm3    Eosinophils, Absolute 0.22 0.00 - 0.70 10*3/mm3    Basophils, Absolute 0.06 0.00 - 0.20 10*3/mm3    Immature Grans, Absolute 0.03 (H) 0.00 - 0.02 10*3/mm3   Light Blue Top   Result Value Ref Range    Extra Tube hold for add-on    Green Top (Gel)   Result Value Ref Range    Extra Tube Hold for add-ons.    Lavender Top   Result Value Ref Range    Extra Tube hold for add-on    Gold Top - SST   Result Value Ref Range    Extra Tube Hold for add-ons.      Xr Chest 1 View    Result Date: 4/23/2018  Narrative: PORTABLE CHEST HISTORY: Chest pain Portable AP upright film of the chest was obtained at 5:27 PM. COMPARISON: August 21, 2013 EKG leads. The lungs are clear of an acute process. The heart is not enlarged. The pulmonary vasculature is not increased. No pleural effusion. No pneumothorax. No acute osseous abnormality. Degenerative changes are present in the thoracic spine.     Impression: CONCLUSION: No Acute Disease 25842 Electronically signed by:  Mohsen Doan MD  4/23/2018 5:52 PM CDT Workstation: [id number]              HEART Score (for prediction of 6-week risk of major adverse cardiac event) reviewed and/or  performed as part of the patient evaluation and treatment planning process.  The result associated with this review/performance is: 4       MDM    Final diagnoses:   Chest pain, unspecified type            Albert Nice PA-C  04/23/18 1958

## 2018-04-24 ENCOUNTER — CLINICAL SUPPORT (OUTPATIENT)
Dept: FAMILY MEDICINE CLINIC | Facility: CLINIC | Age: 48
End: 2018-04-24

## 2018-04-24 VITALS
HEIGHT: 66 IN | DIASTOLIC BLOOD PRESSURE: 90 MMHG | BODY MASS INDEX: 42.27 KG/M2 | TEMPERATURE: 96 F | RESPIRATION RATE: 18 BRPM | WEIGHT: 263 LBS | HEART RATE: 78 BPM | SYSTOLIC BLOOD PRESSURE: 150 MMHG | OXYGEN SATURATION: 94 %

## 2018-04-24 DIAGNOSIS — F39 PSYCHOTIC MOOD DISORDER (HCC): ICD-10-CM

## 2018-04-24 PROBLEM — R07.9 CHEST PAIN: Status: RESOLVED | Noted: 2018-04-23 | Resolved: 2018-04-24

## 2018-04-24 PROBLEM — E11.9 DM (DIABETES MELLITUS), TYPE 2: Status: ACTIVE | Noted: 2017-06-26

## 2018-04-24 LAB
ANION GAP SERPL CALCULATED.3IONS-SCNC: 12 MMOL/L (ref 5–15)
BASOPHILS # BLD AUTO: 0.05 10*3/MM3 (ref 0–0.2)
BASOPHILS NFR BLD AUTO: 0.6 % (ref 0–2)
BUN BLD-MCNC: 20 MG/DL (ref 7–21)
BUN/CREAT SERPL: 29 (ref 7–25)
CALCIUM SPEC-SCNC: 8.9 MG/DL (ref 8.4–10.2)
CHLORIDE SERPL-SCNC: 102 MMOL/L (ref 95–110)
CO2 SERPL-SCNC: 23 MMOL/L (ref 22–31)
CREAT BLD-MCNC: 0.69 MG/DL (ref 0.7–1.3)
DEPRECATED RDW RBC AUTO: 46.3 FL (ref 35.1–43.9)
EOSINOPHIL # BLD AUTO: 0.26 10*3/MM3 (ref 0–0.7)
EOSINOPHIL NFR BLD AUTO: 3.2 % (ref 0–7)
ERYTHROCYTE [DISTWIDTH] IN BLOOD BY AUTOMATED COUNT: 13.7 % (ref 11.5–14.5)
GFR SERPL CREATININE-BSD FRML MDRD: 121 ML/MIN/1.73 (ref 56–130)
GLUCOSE BLD-MCNC: 166 MG/DL (ref 60–100)
GLUCOSE BLDC GLUCOMTR-MCNC: 149 MG/DL (ref 70–130)
GLUCOSE BLDC GLUCOMTR-MCNC: 171 MG/DL (ref 70–130)
HBA1C MFR BLD: 7.5 % (ref 4–5.6)
HCT VFR BLD AUTO: 43.5 % (ref 39–49)
HGB BLD-MCNC: 15 G/DL (ref 13.7–17.3)
IMM GRANULOCYTES # BLD: 0.02 10*3/MM3 (ref 0–0.02)
IMM GRANULOCYTES NFR BLD: 0.2 % (ref 0–0.5)
LYMPHOCYTES # BLD AUTO: 2.62 10*3/MM3 (ref 0.6–4.2)
LYMPHOCYTES NFR BLD AUTO: 31.9 % (ref 10–50)
MCH RBC QN AUTO: 31.8 PG (ref 26.5–34)
MCHC RBC AUTO-ENTMCNC: 34.5 G/DL (ref 31.5–36.3)
MCV RBC AUTO: 92.4 FL (ref 80–98)
MONOCYTES # BLD AUTO: 0.88 10*3/MM3 (ref 0–0.9)
MONOCYTES NFR BLD AUTO: 10.7 % (ref 0–12)
NEUTROPHILS # BLD AUTO: 4.38 10*3/MM3 (ref 2–8.6)
NEUTROPHILS NFR BLD AUTO: 53.4 % (ref 37–80)
PLATELET # BLD AUTO: 197 10*3/MM3 (ref 150–450)
PMV BLD AUTO: 10.4 FL (ref 8–12)
POTASSIUM BLD-SCNC: 4.1 MMOL/L (ref 3.5–5.1)
RBC # BLD AUTO: 4.71 10*6/MM3 (ref 4.37–5.74)
SODIUM BLD-SCNC: 137 MMOL/L (ref 137–145)
TROPONIN I SERPL-MCNC: <0.012 NG/ML
WBC NRBC COR # BLD: 8.21 10*3/MM3 (ref 3.2–9.8)

## 2018-04-24 PROCEDURE — 94760 N-INVAS EAR/PLS OXIMETRY 1: CPT

## 2018-04-24 PROCEDURE — 83036 HEMOGLOBIN GLYCOSYLATED A1C: CPT | Performed by: FAMILY MEDICINE

## 2018-04-24 PROCEDURE — 85025 COMPLETE CBC W/AUTO DIFF WBC: CPT | Performed by: FAMILY MEDICINE

## 2018-04-24 PROCEDURE — 80048 BASIC METABOLIC PNL TOTAL CA: CPT | Performed by: FAMILY MEDICINE

## 2018-04-24 PROCEDURE — G0378 HOSPITAL OBSERVATION PER HR: HCPCS

## 2018-04-24 PROCEDURE — 99220 PR INITIAL OBSERVATION CARE/DAY 70 MINUTES: CPT | Performed by: FAMILY MEDICINE

## 2018-04-24 PROCEDURE — 96372 THER/PROPH/DIAG INJ SC/IM: CPT | Performed by: FAMILY MEDICINE

## 2018-04-24 PROCEDURE — 25010000002 HYDRALAZINE PER 20 MG: Performed by: FAMILY MEDICINE

## 2018-04-24 PROCEDURE — 84484 ASSAY OF TROPONIN QUANT: CPT | Performed by: FAMILY MEDICINE

## 2018-04-24 PROCEDURE — 82962 GLUCOSE BLOOD TEST: CPT

## 2018-04-24 PROCEDURE — 94799 UNLISTED PULMONARY SVC/PX: CPT

## 2018-04-24 PROCEDURE — 96374 THER/PROPH/DIAG INJ IV PUSH: CPT

## 2018-04-24 RX ADMIN — METRONIDAZOLE 500 MG: 500 TABLET ORAL at 06:12

## 2018-04-24 RX ADMIN — LEVOFLOXACIN 500 MG: 500 TABLET, FILM COATED ORAL at 08:45

## 2018-04-24 RX ADMIN — ACETAMINOPHEN 650 MG: 325 TABLET ORAL at 08:48

## 2018-04-24 RX ADMIN — ACETAMINOPHEN 650 MG: 325 TABLET ORAL at 03:18

## 2018-04-24 RX ADMIN — LISINOPRIL 40 MG: 40 TABLET ORAL at 08:45

## 2018-04-24 RX ADMIN — ASPIRIN 325 MG: 325 TABLET ORAL at 08:45

## 2018-04-24 RX ADMIN — Medication 10 MG: at 00:27

## 2018-04-24 NOTE — PLAN OF CARE
Problem: Patient Care Overview  Goal: Plan of Care Review  Outcome: Ongoing (interventions implemented as appropriate)   04/24/18 0512   Coping/Psychosocial   Plan of Care Reviewed With patient   Coping/Psychosocial   Patient Agreement with Plan of Care agrees   Plan of Care Review   Progress improving   OTHER   Outcome Summary initial assessment     Goal: Individualization and Mutuality  Outcome: Ongoing (interventions implemented as appropriate)    Goal: Discharge Needs Assessment  Outcome: Ongoing (interventions implemented as appropriate)      Problem: Cardiac: ACS (Acute Coronary Syndrome) (Adult)  Goal: Signs and Symptoms of Listed Potential Problems Will be Absent, Minimized or Managed (Cardiac: ACS)  Outcome: Ongoing (interventions implemented as appropriate)

## 2018-04-24 NOTE — PROGRESS NOTES
FAMILY MEDICINE DAILY PROGRESS NOTE    NAME: Kingsley Littlejohn  : 1970  MRN: [id number]      LOS: 0 days     PROVIDER OF SERVICE: Jaret Banks MD    Chief Complaint: Chest pain    Subjective:     Interval History:  History taken from: patient chart RN  He did well overnight.  Chest pain resolved, no calls from Tele.    Review of Systems:   Review of Systems   Constitutional: Negative for activity change and appetite change.   HENT: Negative for congestion and dental problem.    Eyes: Negative for discharge and itching.   Respiratory: Negative for apnea.    Cardiovascular: Negative for chest pain and leg swelling.   Gastrointestinal: Negative for abdominal distention and abdominal pain.   Endocrine: Negative for cold intolerance.   Genitourinary: Negative for difficulty urinating and enuresis.   Musculoskeletal: Negative for arthralgias and back pain.   Allergic/Immunologic: Negative for environmental allergies and food allergies.   Neurological: Negative for dizziness and facial asymmetry.   Hematological: Negative for adenopathy.   Psychiatric/Behavioral: Negative for agitation and behavioral problems.         Objective:     Vital Signs  Temp:  [96.9 °F (36.1 °C)-99 °F (37.2 °C)] 97.8 °F (36.6 °C)  Heart Rate:  [] 92  Resp:  [18-20] 18  BP: (155-183)/() 155/97  Body mass index is 42.45 kg/m².    Physical Exam  Physical Exam   Constitutional: He is oriented to person, place, and time. He appears well-developed and well-nourished.   HENT:   Head: Normocephalic and atraumatic.   Eyes: Conjunctivae and EOM are normal. Pupils are equal, round, and reactive to light.   Neck: Normal range of motion. Neck supple. No thyromegaly present.   Cardiovascular: Normal rate, regular rhythm, normal heart sounds and intact distal pulses.  Exam reveals no gallop and no friction rub.    No murmur heard.  Pulmonary/Chest: Effort normal and breath sounds normal. No respiratory distress. He has no wheezes. He  has no rales. He exhibits no tenderness.   Abdominal: Soft. He exhibits no distension. There is no guarding.   Musculoskeletal: Normal range of motion. He exhibits no edema.   Neurological: He is alert and oriented to person, place, and time. No cranial nerve deficit.   Skin: Skin is warm and dry.   Psychiatric: He has a normal mood and affect. His behavior is normal. Judgment and thought content normal.   Vitals reviewed.        Medication Review    Current Facility-Administered Medications:   •  acetaminophen (TYLENOL) tablet 650 mg, 650 mg, Oral, Q4H PRN, Matt Wayne MD, 650 mg at 04/24/18 0318  •  aspirin tablet 325 mg, 325 mg, Oral, Daily, Matt Wayne MD  •  dextrose (D50W) solution 25 g, 25 g, Intravenous, Q15 Min PRN, Matt Wayne MD  •  dextrose (GLUTOSE) oral gel 15 g, 15 g, Oral, Q15 Min PRN, Matt Wayne MD  •  docusate sodium (COLACE) capsule 100 mg, 100 mg, Oral, BID PRN, Matt Wayne MD  •  glucagon (human recombinant) (GLUCAGEN DIAGNOSTIC) injection 1 mg, 1 mg, Subcutaneous, PRN, Matt Wayne MD  •  hydrALAZINE (APRESOLINE) injection 10 mg, 10 mg, Intravenous, Q6H PRN, Matt Wayne MD, 10 mg at 04/24/18 0027  •  hydrOXYzine (VISTARIL) capsule 25 mg, 25 mg, Oral, Q6H PRN, Matt Wayne MD, 25 mg at 04/23/18 2210  •  insulin aspart (novoLOG) injection 0-7 Units, 0-7 Units, Subcutaneous, 4x Daily AC & at Bedtime, Matt Wayne MD  •  levoFLOXacin (LEVAQUIN) tablet 500 mg, 500 mg, Oral, Q24H, Matt Wayne MD  •  lisinopril (PRINIVIL,ZESTRIL) tablet 40 mg, 40 mg, Oral, Daily, Matt Wayne MD  •  metroNIDAZOLE (FLAGYL) tablet 500 mg, 500 mg, Oral, Q8H, Matt Wayne MD, 500 mg at 04/24/18 0612  •  nicotine (NICODERM CQ) 21 MG/24HR patch 1 patch, 1 patch, Transdermal, Q24H, Matt Wayne MD, 1 patch at 04/23/18 2202  •  sodium chloride (OCEAN) nasal spray 1 spray, 1 spray, Nasal, PRN, Matt  Sal Wayne MD  •  sodium chloride 0.9 % flush 1-10 mL, 1-10 mL, Intravenous, PRN, Matt Wayne MD  •  sodium chloride 0.9 % flush 10 mL, 10 mL, Intravenous, PRN, Mohsen Francois MD     Diagnostic Data    Lab Results (last 24 hours)     Procedure Component Value Units Date/Time    POC Glucose Once [[id number]]  (Abnormal) Collected:  04/24/18 0616    Specimen:  Blood Updated:  04/24/18 0645     Glucose 171 (H) mg/dL      Comment: RN NotifiedMeter: [id number]: [id number] IVAN ROTHMAN       Hemoglobin A1c [[id number]]  (Abnormal) Collected:  04/24/18 0456    Specimen:  Blood Updated:  04/24/18 0607     Hemoglobin A1C 7.5 (H) %     Troponin [[id number]]  (Normal) Collected:  04/24/18 0456    Specimen:  Blood Updated:  04/24/18 0538     Troponin I <0.012 ng/mL     Basic Metabolic Panel [[id number]]  (Abnormal) Collected:  04/24/18 0456    Specimen:  Blood Updated:  04/24/18 0527     Glucose 166 (H) mg/dL      BUN 20 mg/dL      Creatinine 0.69 (L) mg/dL      Sodium 137 mmol/L      Potassium 4.1 mmol/L      Chloride 102 mmol/L      CO2 23.0 mmol/L      Calcium 8.9 mg/dL      eGFR Non African Amer 121 mL/min/1.73      BUN/Creatinine Ratio 29.0 (H)     Anion Gap 12.0 mmol/L     CBC & Differential [[id number]] Collected:  04/24/18 0456    Specimen:  Blood Updated:  04/24/18 0513    Narrative:       The following orders were created for panel order CBC & Differential.  Procedure                               Abnormality         Status                     ---------                               -----------         ------                     CBC Auto Differential[[id number]]        Abnormal            Final result                 Please view results for these tests on the individual orders.    CBC Auto Differential [[id number]]  (Abnormal) Collected:  04/24/18 0456    Specimen:  Blood Updated:  04/24/18 0513     WBC 8.21 10*3/mm3      RBC 4.71 10*6/mm3      Hemoglobin 15.0 g/dL      Hematocrit 43.5 %      MCV  92.4 fL      MCH 31.8 pg      MCHC 34.5 g/dL      RDW 13.7 %      RDW-SD 46.3 (H) fl      MPV 10.4 fL      Platelets 197 10*3/mm3      Neutrophil % 53.4 %      Lymphocyte % 31.9 %      Monocyte % 10.7 %      Eosinophil % 3.2 %      Basophil % 0.6 %      Immature Grans % 0.2 %      Neutrophils, Absolute 4.38 10*3/mm3      Lymphocytes, Absolute 2.62 10*3/mm3      Monocytes, Absolute 0.88 10*3/mm3      Eosinophils, Absolute 0.26 10*3/mm3      Basophils, Absolute 0.05 10*3/mm3      Immature Grans, Absolute 0.02 10*3/mm3     POC Glucose Once [[id number]]  (Normal) Collected:  04/23/18 2119    Specimen:  Blood Updated:  04/23/18 2225     Glucose 100 mg/dL      Comment: Meter: [id number]: [id number] LIEN JANICE       Troponin [[id number]]  (Normal) Collected:  04/23/18 2043    Specimen:  Blood from Arm, Left Updated:  04/23/18 2147     Troponin I <0.012 ng/mL     [place] Draw [[id number]] Collected:  04/23/18 1729    Specimen:  Blood Updated:  04/23/18 1830    Narrative:       The following orders were created for panel order [place] Draw.  Procedure                               Abnormality         Status                     ---------                               -----------         ------                     Light Blue Top[[id number]]                                   Final result               Green Top (Gel)[[id number]]                                  Final result               Lavender Top[[id number]]                                     Final result               Gold Top - SST[[id number]]                                   Final result                 Please view results for these tests on the individual orders.    Light Blue Top [[id number]] Collected:  04/23/18 1729    Specimen:  Blood Updated:  04/23/18 1830     Extra Tube hold for add-on     Comment: Auto resulted       Green Top (Gel) [[id number]] Collected:  04/23/18 1729    Specimen:  Blood Updated:  04/23/18 1830     Extra Tube Hold for add-ons.      Comment: Auto resulted.       Lavender Top [[id number]] Collected:  04/23/18 1729    Specimen:  Blood Updated:  04/23/18 1830     Extra Tube hold for add-on     Comment: Auto resulted       Gold Top - SST [[id number]] Collected:  04/23/18 1729    Specimen:  Blood Updated:  04/23/18 1830     Extra Tube Hold for add-ons.     Comment: Auto resulted.       Troponin [[id number]]  (Normal) Collected:  04/23/18 1729    Specimen:  Blood Updated:  04/23/18 1805     Troponin I <0.012 ng/mL     BNP [[id number]]  (Normal) Collected:  04/23/18 1729    Specimen:  Blood Updated:  04/23/18 1805     proBNP 79.8 pg/mL     Comprehensive Metabolic Panel [[id number]]  (Abnormal) Collected:  04/23/18 1729    Specimen:  Blood Updated:  04/23/18 1754     Glucose 163 (H) mg/dL      BUN 16 mg/dL      Creatinine 0.66 (L) mg/dL      Sodium 136 (L) mmol/L      Potassium 4.1 mmol/L      Chloride 101 mmol/L      CO2 23.0 mmol/L      Calcium 8.7 mg/dL      Total Protein 7.5 g/dL      Albumin 4.10 g/dL      ALT (SGPT) 33 U/L      AST (SGOT) 16 (L) U/L      Alkaline Phosphatase 58 U/L      Total Bilirubin 0.3 mg/dL      eGFR Non African Amer 128 mL/min/1.73      Globulin 3.4 gm/dL      A/G Ratio 1.2 g/dL      BUN/Creatinine Ratio 24.2     Anion Gap 12.0 mmol/L     CBC & Differential [[id number]] Collected:  04/23/18 1729    Specimen:  Blood Updated:  04/23/18 1739    Narrative:       The following orders were created for panel order CBC & Differential.  Procedure                               Abnormality         Status                     ---------                               -----------         ------                     CBC Auto Differential[[id number]]        Abnormal            Final result                 Please view results for these tests on the individual orders.    CBC Auto Differential [[id number]]  (Abnormal) Collected:  04/23/18 1729    Specimen:  Blood Updated:  04/23/18 1739     WBC 7.83 10*3/mm3      RBC 4.85 10*6/mm3      Hemoglobin 15.6  g/dL      Hematocrit 44.6 %      MCV 92.0 fL      MCH 32.2 pg      MCHC 35.0 g/dL      RDW 13.5 %      RDW-SD 45.2 (H) fl      MPV 10.4 fL      Platelets 190 10*3/mm3      Neutrophil % 54.6 %      Lymphocyte % 30.4 %      Monocyte % 11.0 %      Eosinophil % 2.8 %      Basophil % 0.8 %      Immature Grans % 0.4 %      Neutrophils, Absolute 4.28 10*3/mm3      Lymphocytes, Absolute 2.38 10*3/mm3      Monocytes, Absolute 0.86 10*3/mm3      Eosinophils, Absolute 0.22 10*3/mm3      Basophils, Absolute 0.06 10*3/mm3      Immature Grans, Absolute 0.03 (H) 10*3/mm3             I reviewed the patient's new clinical results.    Assessment/Plan:     Active Hospital Problems (** Indicates Principal Problem)    Diagnosis Date Noted   • **Chest pain [R07.9] 04/23/2018     No EKG changes.Heart score 3.   -Troponin neg x1. I will trend Troponin q3h x2.  - Continue Telemetry.  - Continue NEO therapy prn.      • Pure hypercholesterolemia [E78.00] 04/23/2018     Patient has hyperlipidemia on lipid panel on 6/13/17. He is not currently on a statin. He has a 10 yr ASCVD risk of 24.6%. Patient will be started on Lipitor at discharge.      • Obstructive sleep apnea syndrome [G47.33] 04/23/2018     He uses CPAP at home. I will order this.      • Cyst of skin and subcutaneous tissue [L72.0] 04/23/2018     Patient was treated outpatient for a cyst of skin. He was started on a 10 day course of Levaquin and Flagyl 7 days ago.   - I will continue his Levaquin and Flagyl.      • Anxiety disorder [F41.9] 03/03/2018     I will continue Vistaril TID prn for anxiety.      • Psychotic mood disorder [F39] 03/02/2018     Patient currently on Invega month injections.      • Pre-diabetes [R73.03] 06/26/2017     Patient last HbA1c was 5.9 on 6/13/17, however patient reports he has gained 60 pounds in the last 12 weeks.   - I will recheck his HbA1c n the morning.   - I will order SSI with accuchecks.      • Tobacco dependence [F17.200] 05/29/2017     I  will provide Nicotine patch.      • Essential hypertension [I10] 05/26/2017     /104 in the E.D, now 150/95.   - I will continue home dose of Lisinopril.   - I will order Hydralazine 10 mg IV q6h prn for SBP >160.         Resolved Hospital Problems    Diagnosis Date Noted Date Resolved   No resolved problems to display.       DVT prophylaxis: SCD's/YEIMI's  Code status is Full Code    Plan for disposition: Will have ambulate in [place].  If no chest pain, home later today.          This document has been electronically signed by Jaret Banks MD on April 24, 2018 8:11 AM

## 2018-04-24 NOTE — H&P
"    HISTORY AND PHYSICAL  NAME: Kingsley Littlejohn  : 1970  MRN: [id number]    DATE OF ADMISSION: 18    DATE & TIME SEEN: 18 9:21 PM    PCP: Jaret Banks MD    CODE STATUS: Full code    CHIEF COMPLAINT Chest pain    HPI:  Kingsley Littlejohn is a 48 y.o. 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 This started around 1200 today. He was lying down to take a nap. He began having an aching pain in the center of his chest that radiated to his right chest and right arm. This was rated a 2/10, and lasted 15 minutes. This was accompanied by nausea and dizziness. Upon arrival to the E.D he had a return of his chest pain. This lasted 20 minutes. Nothing made this better. Movent seems to make this slightly worse. He reports that he had an MI 10 years ago while using Marijuana. He was not treated at a hospital for this when this occurred.     CONCURRENT MEDICAL HISTORY:  Past Medical History:   Diagnosis Date   • Abdominal pain    • Allergic rhinitis    • Diabetes mellitus     Prev HbA1c 14.0 , states \"I'm cured!\"   • Essential hypertension    • Fracture closed, fibula, shaft     tib-fib       PAST SURGICAL HISTORY:  Past Surgical History:   Procedure Laterality Date   • ANKLE SURGERY      right and left   • CYST REMOVAL      x 3   • ENDOSCOPY      20 years ago   • TIBIA FRACTURE SURGERY Bilateral     tib-fib       FAMILY HISTORY:  Family History   Problem Relation Age of Onset   • Diabetes Other    • Hypertension Other    • Cancer Other    • Lung disease Other    • Thyroid disease Other    • Bleeding Disorder Other    • Cancer Paternal Grandmother    • Cancer Paternal Grandfather         SOCIAL HISTORY:  Social History     Social History   • Marital status: Single     Spouse name: N/A   • Number of children: N/A   • Years of education: N/A     Occupational History   • Lr      Social History Main Topics   • Smoking status: Current " Every Day Smoker     Packs/day: 2.00     Years: 25.00     Types: Cigarettes     Start date: 1992   • Smokeless tobacco: Never Used   • Alcohol use No      Comment: FORMER pint of whiskey daily, states quit 1 year ago   • Drug use:      Types: Marijuana      Comment: Meth, other substances which he declined to discuss further   • Sexual activity: Defer     Other Topics Concern   • Not on file     Social History Narrative   • No narrative on file       HOME MEDICATIONS:    Current Facility-Administered Medications:   •  acetaminophen (TYLENOL) tablet 650 mg, 650 mg, Oral, Q4H PRN, Matt Wayne MD  •  [START ON 4/24/2018] aspirin tablet 325 mg, 325 mg, Oral, Daily, Matt Wayne MD  •  dextrose (D50W) solution 25 g, 25 g, Intravenous, Q15 Min PRN, Matt Wayne MD  •  dextrose (GLUTOSE) oral gel 15 g, 15 g, Oral, Q15 Min PRN, Matt Wayne MD  •  docusate sodium (COLACE) capsule 100 mg, 100 mg, Oral, BID PRN, Matt Wayne MD  •  glucagon (human recombinant) (GLUCAGEN DIAGNOSTIC) injection 1 mg, 1 mg, Subcutaneous, PRN, Matt Wayne MD  •  hydrALAZINE (APRESOLINE) injection 10 mg, 10 mg, Intravenous, Q6H PRN, Matt Wayne MD  •  hydrOXYzine (VISTARIL) capsule 25 mg, 25 mg, Oral, Q6H PRN, Matt Wayne MD, 25 mg at 04/23/18 2210  •  insulin aspart (novoLOG) injection 0-7 Units, 0-7 Units, Subcutaneous, 4x Daily AC & at Bedtime, Matt Wayne MD  •  [START ON 4/24/2018] levoFLOXacin (LEVAQUIN) tablet 500 mg, 500 mg, Oral, Q24H, Matt Wayne MD  •  [START ON 4/24/2018] lisinopril (PRINIVIL,ZESTRIL) tablet 40 mg, 40 mg, Oral, Daily, Matt Wayne MD  •  metroNIDAZOLE (FLAGYL) tablet 500 mg, 500 mg, Oral, Q8H, Matt Wayne MD, 500 mg at 04/23/18 2202  •  nicotine (NICODERM CQ) 21 MG/24HR patch 1 patch, 1 patch, Transdermal, Q24H, Matt Wayne MD, 1 patch at 04/23/18 2202  •  sodium chloride (OCEAN) nasal spray 1  spray, 1 spray, Nasal, PRN, Matt Wayne MD  •  sodium chloride 0.9 % flush 1-10 mL, 1-10 mL, Intravenous, PRN, Matt Wayne MD  •  sodium chloride 0.9 % flush 10 mL, 10 mL, Intravenous, PRN, Mohsen Francois MD    ALLERGIES:  Codeine and Latex    REVIEW OF SYSTEMS  Review of Systems   Constitutional: Negative for appetite change, chills, fatigue and fever.   HENT: Positive for congestion. Negative for ear pain, hearing loss, rhinorrhea, sore throat and tinnitus.    Eyes: Negative for pain and visual disturbance.   Respiratory: Negative for cough, shortness of breath and wheezing.    Cardiovascular: Positive for chest pain. Negative for palpitations and leg swelling.   Gastrointestinal: Positive for nausea. Negative for abdominal pain, constipation, diarrhea and vomiting.   Genitourinary: Negative for dysuria and hematuria.   Musculoskeletal: Negative for back pain and neck pain.   Skin: Negative for rash and wound.   Neurological: Positive for dizziness. Negative for syncope and weakness.   Psychiatric/Behavioral: Negative for dysphoric mood. The patient is not nervous/anxious.        PHYSICAL EXAM:  Temp:  [98.7 °F (37.1 °C)-99 °F (37.2 °C)] 98.7 °F (37.1 °C)  Heart Rate:  [] 109  Resp:  [18-20] 20  BP: (157-183)/() 157/99  Body mass index is 42.68 kg/m².     Physical Exam   Constitutional: He is oriented to person, place, and time. He appears well-developed and well-nourished.   HENT:   Head: Normocephalic and atraumatic.   Right Ear: External ear normal.   Left Ear: External ear normal.   Mouth/Throat: No oropharyngeal exudate.   Eyes: EOM are normal. Pupils are equal, round, and reactive to light. No scleral icterus.   Neck: Normal range of motion. Neck supple. No tracheal deviation present. No thyromegaly present.   Cardiovascular: Normal rate, regular rhythm and normal heart sounds.  Exam reveals no gallop and no friction rub.    No murmur heard.  Pulmonary/Chest: Effort normal  and breath sounds normal. No respiratory distress. He has no wheezes. He has no rales.   Abdominal: Soft. Bowel sounds are normal. He exhibits no distension. There is no tenderness.   Musculoskeletal: Normal range of motion. He exhibits edema (Trace pitting edema of bilateral lower extremeties. ). He exhibits no tenderness.   Lymphadenopathy:     He has no cervical adenopathy.   Neurological: He is alert and oriented to person, place, and time.   Skin: Skin is warm and dry. No rash noted.   Psychiatric: He has a normal mood and affect. His behavior is normal. Thought content normal.   Vitals reviewed.      DIAGNOSTIC DATA:   Lab Results (last 24 hours)     Procedure Component Value Units Date/Time    POC Glucose Once [[id number]]  (Normal) Collected:  04/23/18 2119    Specimen:  Blood Updated:  04/23/18 2225     Glucose 100 mg/dL      Comment: Meter: [id number]: [id number] LIEN LEANNAH       Troponin [[id number]]  (Normal) Collected:  04/23/18 2043    Specimen:  Blood from Arm, Left Updated:  04/23/18 2147     Troponin I <0.012 ng/mL     [facility] [[id number]] Collected:  04/23/18 1729    Specimen:  Blood Updated:  04/23/18 1830    Narrative:       The following orders were created for panel order [facility].  Procedure                               Abnormality         Status                     ---------                               -----------         ------                     Light Blue Top[[id number]]                                   Final result               Green Top (Gel)[[id number]]                                  Final result               Lavender Top[[id number]]                                     Final result               Gold Top - SST[[id number]]                                   Final result                 Please view results for these tests on the individual orders.    Light Blue Top [[id number]] Collected:  04/23/18 1729    Specimen:  Blood Updated:  04/23/18 1830     Extra Tube  hold for add-on     Comment: Auto resulted       Green Top (Gel) [[id number]] Collected:  04/23/18 1729    Specimen:  Blood Updated:  04/23/18 1830     Extra Tube Hold for add-ons.     Comment: Auto resulted.       Lavender Top [[id number]] Collected:  04/23/18 1729    Specimen:  Blood Updated:  04/23/18 1830     Extra Tube hold for add-on     Comment: Auto resulted       Gold Top - SST [[id number]] Collected:  04/23/18 1729    Specimen:  Blood Updated:  04/23/18 1830     Extra Tube Hold for add-ons.     Comment: Auto resulted.       Troponin [[id number]]  (Normal) Collected:  04/23/18 1729    Specimen:  Blood Updated:  04/23/18 1805     Troponin I <0.012 ng/mL     BNP [[id number]]  (Normal) Collected:  04/23/18 1729    Specimen:  Blood Updated:  04/23/18 1805     proBNP 79.8 pg/mL     Comprehensive Metabolic Panel [[id number]]  (Abnormal) Collected:  04/23/18 1729    Specimen:  Blood Updated:  04/23/18 1754     Glucose 163 (H) mg/dL      BUN 16 mg/dL      Creatinine 0.66 (L) mg/dL      Sodium 136 (L) mmol/L      Potassium 4.1 mmol/L      Chloride 101 mmol/L      CO2 23.0 mmol/L      Calcium 8.7 mg/dL      Total Protein 7.5 g/dL      Albumin 4.10 g/dL      ALT (SGPT) 33 U/L      AST (SGOT) 16 (L) U/L      Alkaline Phosphatase 58 U/L      Total Bilirubin 0.3 mg/dL      eGFR Non African Amer 128 mL/min/1.73      Globulin 3.4 gm/dL      A/G Ratio 1.2 g/dL      BUN/Creatinine Ratio 24.2     Anion Gap 12.0 mmol/L     CBC & Differential [[id number]] Collected:  04/23/18 1729    Specimen:  Blood Updated:  04/23/18 1739    Narrative:       The following orders were created for panel order CBC & Differential.  Procedure                               Abnormality         Status                     ---------                               -----------         ------                     CBC Auto Differential[[id number]]        Abnormal            Final result                 Please view results for these tests on the individual orders.     CBC Auto Differential [[id number]]  (Abnormal) Collected:  04/23/18 1729    Specimen:  Blood Updated:  04/23/18 1739     WBC 7.83 10*3/mm3      RBC 4.85 10*6/mm3      Hemoglobin 15.6 g/dL      Hematocrit 44.6 %      MCV 92.0 fL      MCH 32.2 pg      MCHC 35.0 g/dL      RDW 13.5 %      RDW-SD 45.2 (H) fl      MPV 10.4 fL      Platelets 190 10*3/mm3      Neutrophil % 54.6 %      Lymphocyte % 30.4 %      Monocyte % 11.0 %      Eosinophil % 2.8 %      Basophil % 0.8 %      Immature Grans % 0.4 %      Neutrophils, Absolute 4.28 10*3/mm3      Lymphocytes, Absolute 2.38 10*3/mm3      Monocytes, Absolute 0.86 10*3/mm3      Eosinophils, Absolute 0.22 10*3/mm3      Basophils, Absolute 0.06 10*3/mm3      Immature Grans, Absolute 0.03 (H) 10*3/mm3            Imaging Results (last 24 hours)     Procedure Component Value Units Date/Time    XR Chest 1 View [[id number]] Collected:  04/23/18 1731     Updated:  04/23/18 1753    Narrative:         PORTABLE CHEST    HISTORY: Chest pain    Portable AP upright film of the chest was obtained at 5:27 PM.  COMPARISON: August 21, 2013    EKG leads.  The lungs are clear of an acute process.  The heart is not enlarged.  The pulmonary vasculature is not increased.  No pleural effusion.  No pneumothorax.  No acute osseous abnormality.  Degenerative changes are present in the thoracic spine.      Impression:       CONCLUSION:  No Acute Disease    [id number]    Electronically signed by:  Mohsen Doan MD  4/23/2018 5:52 PM CDT  Workstation: [id number]            I reviewed the patient's new clinical results.    ASSESSMENT AND PLAN: This is a 48 y.o. male with:    Active Hospital Problems (** Indicates Principal Problem)    Diagnosis Date Noted   • **Chest pain [R07.9] 04/23/2018     No EKG changes.Heart score 3.   -Troponin neg x1. I will trend Troponin q3h x2.  - Continue Telemetry.  - Continue NEO therapy prn.      • Pure hypercholesterolemia [E78.00] 04/23/2018     Patient has hyperlipidemia on  lipid panel on 6/13/17. He is not currently on a statin. He has a 10 yr ASCVD risk of 24.6%. Patient will be started on Lipitor at discharge.      • Obstructive sleep apnea syndrome [G47.33] 04/23/2018     He uses CPAP at home. I will order this.      • Cyst of skin and subcutaneous tissue [L72.0] 04/23/2018     Patient was treated outpatient for a cyst of skin. He was started on a 10 day course of Levaquin and Flagyl 7 days ago.   - I will continue his Levaquin and Flagyl.      • Anxiety disorder [F41.9] 03/03/2018     I will continue Vistaril TID prn for anxiety.      • Psychotic mood disorder [F39] 03/02/2018     Patient currently on Invega month injections.      • Pre-diabetes [R73.03] 06/26/2017     Patient last HbA1c was 5.9 on 6/13/17, however patient reports he has gained 60 pounds in the last 12 weeks.   - I will recheck his HbA1c n the morning.   - I will order SSI with accuchecks.      • Tobacco dependence [F17.200] 05/29/2017     I will provide Nicotine patch.      • Essential hypertension [I10] 05/26/2017     /104 in the E.D, now 150/95.   - I will continue home dose of Lisinopril.   - I will order Hydralazine 10 mg IV q6h prn for SBP >160.         Resolved Hospital Problems    Diagnosis Date Noted Date Resolved   No resolved problems to display.       DVT prophylaxis: SCDs/TEDs     [facility] # [id number], reviewed and consistent with patient reported medications.    Expected Length of Stay: Where: home and When:  tomorrow    I discussed the patients findings and my recommendations with patient.     Dr. Reardon is the attending on record at time of admission, She is aware of the patient's status and agrees with the above history and physical.          This document has been electronically signed by Matt Wayne MD on April 23, 2018 11:08 PM

## 2018-04-25 NOTE — H&P
"Chest pain  Subjective:     Kingsley Littlejohn is a 48 y.o. male who presents for chest pain.  He has hypertension, sleep apnea, hyperlipidemia, possible diabetes mellitus 2, and other medical problems.  He states the chest pain was substernal and radiated to his right chest and right arm.  It lasted about 20 minutes.  No shortness of breath or diaphoresis.  He states he had a heart attack about 7 years ago but didn't seek treatment.  He quit using meth about 2 years ago.  He has gained 60 pounds since last summer.  His chest pain is gone now.  He was also congested and a little dizzy.      The following portions of the patient's history were reviewed and updated as appropriate: allergies, current medications, past family history, past medical history, past social history, past surgical history and problem list.    Concurrent Medical Hx:  Past Medical History:   Diagnosis Date   • Abdominal pain    • Allergic rhinitis    • Diabetes mellitus     Prev HbA1c 14.0 2014, states \"I'm cured!\"   • Essential hypertension    • Fracture closed, fibula, shaft     tib-fib       Past Surgical Hx:  Past Surgical History:   Procedure Laterality Date   • ANKLE SURGERY      right and left   • CYST REMOVAL      x 3, from hand, thigh, and buttocks   • ENDOSCOPY      20 years ago   • TIBIA FRACTURE SURGERY Bilateral 2011    tib-fib     Family Hx:  Family History   Problem Relation Age of Onset   • Diabetes Other    • Hypertension Other    • Cancer Other    • Lung disease Other    • Thyroid disease Other    • Bleeding Disorder Other    • Colon cancer Paternal Grandmother    • Cancer Paternal Grandfather    • Crohn's disease Mother    • Diabetes Mother    • Hypertension Father    • Lung disease Father    • Hypertension Brother    • Hypertension Brother    • Hypertension Brother       Social History:  Social History     Social History   • Marital status: Single     Spouse name: N/A   • Number of children: N/A   • Years of education: N/A "     Occupational History   • Lr      Social History Main Topics   • Smoking status: Current Every Day Smoker     Packs/day: 2.00     Years: 25.00     Types: Cigarettes     Start date: 1992   • Smokeless tobacco: Never Used   • Alcohol use No      Comment: FORMER pint of whiskey daily, states quit 1 year ago   • Drug use:      Types: Marijuana      Comment: Meth, other substances which he declined to discuss further   • Sexual activity: Defer     Other Topics Concern   • Not on file     Social History Narrative    Single, lives alone in [place].  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         Home Medications:  No current facility-administered medications on file prior to encounter.      Current Outpatient Prescriptions on File Prior to Encounter   Medication Sig Dispense Refill   • hydrOXYzine (VISTARIL) 25 MG capsule Take 1 capsule by mouth Every 6 (Six) Hours As Needed for Itching, Allergies or Anxiety. (Patient taking differently: Take 25 mg by mouth every night at bedtime.) 120 capsule 2   • levoFLOXacin (LEVAQUIN) 500 MG tablet Take 1 tablet by mouth Daily. 10 tablet 0   • lisinopril (PRINIVIL,ZESTRIL) 40 MG tablet Take 1 tablet by mouth Daily. 30 tablet 1   • metroNIDAZOLE (FLAGYL) 500 MG tablet Take 1 tablet by mouth 3 (Three) Times a Day. 30 tablet 0   • sodium chloride (OCEAN NASAL SPRAY) 0.65 % nasal spray 1 spray into each nostril As Needed for Congestion. 60 mL 12   • aspirin 325 MG tablet Take 325 mg by mouth Daily.         Allergies:  Codeine and Latex  I reviewed the patient's new clinical results.  Review of Systems  Review of Systems   Constitutional: Positive for unexpected weight change. Negative for activity change, appetite change, chills, diaphoresis, fatigue and fever.   HENT: Positive for congestion. Negative for ear pain, hearing loss, rhinorrhea, sore throat and tinnitus.    Eyes: Negative for pain and visual disturbance.   Respiratory: Negative for cough, shortness of  "breath and wheezing.    Cardiovascular: Positive for chest pain. Negative for palpitations and leg swelling.   Gastrointestinal: Positive for nausea. Negative for abdominal pain, constipation, diarrhea and vomiting.   Genitourinary: Negative for dysuria and hematuria.   Musculoskeletal: Negative for back pain and neck pain.   Skin: Negative for rash and wound.   Neurological: Positive for dizziness. Negative for syncope and weakness.   Psychiatric/Behavioral: Negative for dysphoric mood, hallucinations, self-injury, sleep disturbance and suicidal ideas. The patient is not nervous/anxious.        Objective:     /90 (BP Location: Left arm, Patient Position: Sitting)   Pulse 78   Temp 96 °F (35.6 °C) (Temporal Artery )   Resp 18   Ht 167.6 cm (66\")   Wt 119 kg (263 lb)   SpO2 94%   BMI 42.45 kg/m²   Physical Exam   Constitutional: He is oriented to person, place, and time. He appears well-developed and well-nourished. No distress.   HENT:   Head: Normocephalic and atraumatic.   Right Ear: External ear normal.   Left Ear: External ear normal.   Nose: Nose normal.   Mouth/Throat: Oropharynx is clear and moist. No oropharyngeal exudate.   Eyes: Conjunctivae and EOM are normal. Pupils are equal, round, and reactive to light. Right eye exhibits no discharge. Left eye exhibits no discharge. No scleral icterus.   Neck: No JVD present. No tracheal deviation present.   Cardiovascular: Normal rate, regular rhythm, normal heart sounds and intact distal pulses.  Exam reveals no gallop and no friction rub.    No murmur heard.  Pulmonary/Chest: Effort normal and breath sounds normal. No respiratory distress. He has no wheezes. He has no rales. He exhibits no tenderness.   Abdominal: Soft. Bowel sounds are normal. He exhibits no distension. There is no tenderness. There is no rebound and no guarding.   Musculoskeletal: He exhibits no edema.   Lymphadenopathy:     He has no cervical adenopathy.   Neurological: He is alert " and oriented to person, place, and time. He has normal reflexes. No cranial nerve deficit.   No dysmetria noted   Skin: Skin is warm and dry. He is not diaphoretic.   Psychiatric: He has a normal mood and affect. His behavior is normal. Judgment and thought content normal.   Nursing note and vitals reviewed.    I reviewed the patient's new clinical results.  Assessment/Plan:     Active Hospital Problems (** Indicates Principal Problem)    Diagnosis Date Noted   • Pure hypercholesterolemia [E78.00] 04/23/2018     Patient has hyperlipidemia on lipid panel on 6/13/17. He is not currently on a statin. He has a 10 yr ASCVD risk of 24.6%. Patient will be started on Lipitor at discharge.      • Obstructive sleep apnea syndrome [G47.33] 04/23/2018     He uses CPAP at home. I will order this.      • Cyst of skin and subcutaneous tissue [L72.0] 04/23/2018     Patient was treated outpatient for a cyst of skin. He was started on a 10 day course of Levaquin and Flagyl 7 days ago.   - I will continue his Levaquin and Flagyl.      • Anxiety disorder [F41.9] 03/03/2018     I will continue Vistaril TID prn for anxiety.      • Psychotic mood disorder [F39] 03/02/2018     Patient currently on Invega month injections.      • DM (diabetes mellitus), type 2 [E11.9] 06/26/2017     Patient last HbA1c was 5.9 on 6/13/17, however patient reports he has gained 60 pounds in the last 12 weeks.   - I will recheck his HbA1c n the morning.   - I will order SSI with accuchecks.      • Tobacco dependence [F17.200] 05/29/2017     I will provide Nicotine patch.      • Essential hypertension [I10] 05/26/2017     /104 in the E.D, now 150/95.   - I will continue home dose of Lisinopril.   - I will order Hydralazine 10 mg IV q6h prn for SBP >160.         Resolved Hospital Problems    Diagnosis Date Noted Date Resolved   • **Chest pain [R07.9] 04/23/2018 04/24/2018     No EKG changes.Heart score 3.   -Troponin neg x3  - Continue Telemetry.  -  Continue NEO therapy prn.          I have seen and examined the patient.  I have reviewed the notes, assessments, and/or procedures performed by Dr. Wayne, I concur with her/his documentation and assessment and plan for Kingsleywesley Littlejohn.          This document has been electronically signed by Camryn Reardon MD on April 25, 2018 10:45 AM

## 2018-04-25 NOTE — DISCHARGE SUMMARY
[address] [address]  T - [phone]     DISCHARGE SUMMARY         PATIENT  DEMOGRAPHICS   PATIENT NAME: Kingsley Littlejohn                      PHYSICIAN: Jaret Banks MD  : 1970  MRN: [id number]    ADMISSION/DISCHARGE INFO   ADMISSION DATE: 2018   DISCHARGE DATE: 18    ADMISSION DIAGNOSES: Chest pain, unspecified type [R07.9]  Active Problems:    Essential hypertension    Tobacco dependence    Pre-diabetes    Psychotic mood disorder    Anxiety disorder    Pure hypercholesterolemia    Obstructive sleep apnea syndrome    Cyst of skin and subcutaneous tissue    DISCHARGE DIAGNOSES:     Active Problems:    Essential hypertension    Tobacco dependence    DM (diabetes mellitus), type 2    Psychotic mood disorder    Anxiety disorder    Pure hypercholesterolemia    Obstructive sleep apnea syndrome    Cyst of skin and subcutaneous tissue      SERVICE: Family Medicine  ATTENDING PROVIDER: Dr. Reardon  RESIDENT: Jaret Banks MD     CONSULTS   Consult Orders (all)     Start     Ordered    18  Family Practice - Resident (on-call MD unless specified)  Once,   Status:  Canceled     Specialty:  Family Medicine  Provider:  Matt Wayne MD    18 1939          PROCEDURES     Imaging Results (last 24 hours)     Procedure Component Value Units Date/Time    XR Chest 1 View [[id number]] Collected:  18 1731     Updated:  18 1753    Narrative:         PORTABLE CHEST    HISTORY: Chest pain    Portable AP upright film of the chest was obtained at 5:27 PM.  COMPARISON: 2013    EKG leads.  The lungs are clear of an acute process.  The heart is not enlarged.  The pulmonary vasculature is not increased.  No pleural effusion.  No pneumothorax.  No acute osseous abnormality.  Degenerative changes are present in the thoracic spine.      Impression:       CONCLUSION:  No Acute Disease    [id number]    Electronically signed by:   Mohsen Doan MD  4/23/2018 5:52 PM CDT  Workstation: [employer]          Xr Chest 1 View    Result Date: 4/23/2018  Narrative: PORTABLE CHEST HISTORY: Chest pain Portable AP upright film of the chest was obtained at 5:27 PM. COMPARISON: August 21, 2013 EKG leads. The lungs are clear of an acute process. The heart is not enlarged. The pulmonary vasculature is not increased. No pleural effusion. No pneumothorax. No acute osseous abnormality. Degenerative changes are present in the thoracic spine.     Impression: CONCLUSION: No Acute Disease [id number] Electronically signed by:  Mohsen Doan MD  4/23/2018 5:52 PM CDT Workstation: [email or web address]      HISTORY OF PRESENT ILLNESS (copied from Dr Wayne 's H and P)   Kingsley Littlejohn is a 48 y.o. 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 This started around 1200 today. He was lying down to take a nap. He began having an aching pain in the center of his chest that radiated to his right chest and right arm. This was rated a 2/10, and lasted 15 minutes. This was accompanied by nausea and dizziness. Upon arrival to the E.D he had a return of his chest pain. This lasted 20 minutes. Nothing made this better. Movent seems to make this slightly worse. He reports that he had an MI 10 years ago while using Marijuana. He was not treated at a hospital for this when this occurred.     DIAGNOSTIC DATA     Lab Results (last 24 hours)     Procedure Component Value Units Date/Time    POC Glucose Once [[id number]]  (Abnormal) Collected:  04/24/18 1018    Specimen:  Blood Updated:  04/24/18 1048     Glucose 149 (H) mg/dL      Comment: RN NotifiedMeter: [id number]: [id number] HUSK BETI       POC Glucose Once [[id number]]  (Abnormal) Collected:  04/24/18 0616    Specimen:  Blood Updated:  04/24/18 0645     Glucose 171 (H) mg/dL      Comment: RN NotifiedMeter: [id number]: [id number] IVAN ROTHMAN        Hemoglobin A1c [[id number]]  (Abnormal) Collected:  04/24/18 0456    Specimen:  Blood Updated:  04/24/18 0607     Hemoglobin A1C 7.5 (H) %     Troponin [[id number]]  (Normal) Collected:  04/24/18 0456    Specimen:  Blood Updated:  04/24/18 0538     Troponin I <0.012 ng/mL     Basic Metabolic Panel [[id number]]  (Abnormal) Collected:  04/24/18 0456    Specimen:  Blood Updated:  04/24/18 0527     Glucose 166 (H) mg/dL      BUN 20 mg/dL      Creatinine 0.69 (L) mg/dL      Sodium 137 mmol/L      Potassium 4.1 mmol/L      Chloride 102 mmol/L      CO2 23.0 mmol/L      Calcium 8.9 mg/dL      eGFR Non African Amer 121 mL/min/1.73      BUN/Creatinine Ratio 29.0 (H)     Anion Gap 12.0 mmol/L     CBC & Differential [[id number]] Collected:  04/24/18 0456    Specimen:  Blood Updated:  04/24/18 0513    Narrative:       The following orders were created for panel order CBC & Differential.  Procedure                               Abnormality         Status                     ---------                               -----------         ------                     CBC Auto Differential[[id number]]        Abnormal            Final result                 Please view results for these tests on the individual orders.    CBC Auto Differential [[id number]]  (Abnormal) Collected:  04/24/18 0456    Specimen:  Blood Updated:  04/24/18 0513     WBC 8.21 10*3/mm3      RBC 4.71 10*6/mm3      Hemoglobin 15.0 g/dL      Hematocrit 43.5 %      MCV 92.4 fL      MCH 31.8 pg      MCHC 34.5 g/dL      RDW 13.7 %      RDW-SD 46.3 (H) fl      MPV 10.4 fL      Platelets 197 10*3/mm3      Neutrophil % 53.4 %      Lymphocyte % 31.9 %      Monocyte % 10.7 %      Eosinophil % 3.2 %      Basophil % 0.6 %      Immature Grans % 0.2 %      Neutrophils, Absolute 4.38 10*3/mm3      Lymphocytes, Absolute 2.62 10*3/mm3      Monocytes, Absolute 0.88 10*3/mm3      Eosinophils, Absolute 0.26 10*3/mm3      Basophils, Absolute 0.05 10*3/mm3      Immature Grans, Absolute 0.02  10*3/mm3     POC Glucose Once [[id number]]  (Normal) Collected:  04/23/18 2119    Specimen:  Blood Updated:  04/23/18 2225     Glucose 100 mg/dL      Comment: Meter: [id number]: [id number] LIEN CAMACHO       Troponin [[id number]]  (Normal) Collected:  04/23/18 2043    Specimen:  Blood from Arm, Left Updated:  04/23/18 2147     Troponin I <0.012 ng/mL     [place] Draw [[id number]] Collected:  04/23/18 1729    Specimen:  Blood Updated:  04/23/18 1830    Narrative:       The following orders were created for panel order [place] Draw.  Procedure                               Abnormality         Status                     ---------                               -----------         ------                     Light Blue Top[[id number]]                                   Final result               Green Top (Gel)[[id number]]                                  Final result               Lavender Top[[id number]]                                     Final result               Gold Top - SST[[id number]]                                   Final result                 Please view results for these tests on the individual orders.    Light Blue Top [[id number]] Collected:  04/23/18 1729    Specimen:  Blood Updated:  04/23/18 1830     Extra Tube hold for add-on     Comment: Auto resulted       Green Top (Gel) [[id number]] Collected:  04/23/18 1729    Specimen:  Blood Updated:  04/23/18 1830     Extra Tube Hold for add-ons.     Comment: Auto resulted.       Lavender Top [[id number]] Collected:  04/23/18 1729    Specimen:  Blood Updated:  04/23/18 1830     Extra Tube hold for add-on     Comment: Auto resulted       Gold Top - SST [[id number]] Collected:  04/23/18 1729    Specimen:  Blood Updated:  04/23/18 1830     Extra Tube Hold for add-ons.     Comment: Auto resulted.       Troponin [[id number]]  (Normal) Collected:  04/23/18 1729    Specimen:  Blood Updated:  04/23/18 1805     Troponin I <0.012 ng/mL     BNP [[id number]]  (Normal)  Collected:  04/23/18 1729    Specimen:  Blood Updated:  04/23/18 1805     proBNP 79.8 pg/mL     Comprehensive Metabolic Panel [[id number]]  (Abnormal) Collected:  04/23/18 1729    Specimen:  Blood Updated:  04/23/18 1754     Glucose 163 (H) mg/dL      BUN 16 mg/dL      Creatinine 0.66 (L) mg/dL      Sodium 136 (L) mmol/L      Potassium 4.1 mmol/L      Chloride 101 mmol/L      CO2 23.0 mmol/L      Calcium 8.7 mg/dL      Total Protein 7.5 g/dL      Albumin 4.10 g/dL      ALT (SGPT) 33 U/L      AST (SGOT) 16 (L) U/L      Alkaline Phosphatase 58 U/L      Total Bilirubin 0.3 mg/dL      eGFR Non African Amer 128 mL/min/1.73      Globulin 3.4 gm/dL      A/G Ratio 1.2 g/dL      BUN/Creatinine Ratio 24.2     Anion Gap 12.0 mmol/L     CBC & Differential [[id number]] Collected:  04/23/18 1729    Specimen:  Blood Updated:  04/23/18 1739    Narrative:       The following orders were created for panel order CBC & Differential.  Procedure                               Abnormality         Status                     ---------                               -----------         ------                     CBC Auto Differential[[id number]]        Abnormal            Final result                 Please view results for these tests on the individual orders.    CBC Auto Differential [[id number]]  (Abnormal) Collected:  04/23/18 1729    Specimen:  Blood Updated:  04/23/18 1739     WBC 7.83 10*3/mm3      RBC 4.85 10*6/mm3      Hemoglobin 15.6 g/dL      Hematocrit 44.6 %      MCV 92.0 fL      MCH 32.2 pg      MCHC 35.0 g/dL      RDW 13.5 %      RDW-SD 45.2 (H) fl      MPV 10.4 fL      Platelets 190 10*3/mm3      Neutrophil % 54.6 %      Lymphocyte % 30.4 %      Monocyte % 11.0 %      Eosinophil % 2.8 %      Basophil % 0.8 %      Immature Grans % 0.4 %      Neutrophils, Absolute 4.28 10*3/mm3      Lymphocytes, Absolute 2.38 10*3/mm3      Monocytes, Absolute 0.86 10*3/mm3      Eosinophils, Absolute 0.22 10*3/mm3      Basophils, Absolute 0.06  10*3/mm3      Immature Grans, Absolute 0.03 (H) 10*3/mm3           HOSPITAL COURSE   Patient was admitted to [facility].  His troponin's were negative times 3.  By the morning, his chest pain had resolved, and did not recur when he ambulated in the hallway.  His HbA1c resulted as 7.5.  Additionally, his 10 year ASCVD risk score was calculated to be 24.6%; patient has previously refused to take statins.  Both of these will be discussed further at his hospital follow-up in clinic.  He desires to go home at this time.       DISCHARGE CONDITION   Stable    DISPOSITION   To Home      DISCHARGE MEDICATIONS        Your medication list      CHANGE how you take these medications      Instructions Last Dose Given Next Dose Due   hydrOXYzine 25 MG capsule  Commonly known as:  VISTARIL  What changed:  when to take this      Take 1 capsule by mouth Every 6 (Six) Hours As Needed for Itching, Allergies or Anxiety.          CONTINUE taking these medications      Instructions Last Dose Given Next Dose Due   aspirin 325 MG tablet      Take 325 mg by mouth Daily.       INVEGA SUSTENNA 117 MG/0.75ML suspension IM injection  Generic drug:  paliperidone palmitate  Notes to patient:  Previously taken. Take as directed      Inject 117 mg into the shoulder, thigh, or buttocks Every 30 (Thirty) Days.       levoFLOXacin 500 MG tablet  Commonly known as:  LEVAQUIN      Take 1 tablet by mouth Daily.       lisinopril 40 MG tablet  Commonly known as:  PRINIVIL,ZESTRIL      Take 1 tablet by mouth Daily.       metroNIDAZOLE 500 MG tablet  Commonly known as:  FLAGYL      Take 1 tablet by mouth 3 (Three) Times a Day.       oxymetazoline 0.05 % nasal spray  Commonly known as:  AFRIN      2 sprays into each nostril 2 (Two) Times a Day.       sodium chloride 0.65 % nasal spray  Commonly known as:  OCEAN NASAL SPRAY      1 spray into each nostril As Needed for Congestion.              INSTRUCTIONS   Activity:   Activity Instructions     Activity as Tolerated              Diet:   Diet Instructions     Diet: Consistent Carbohydrate, Cardiac       Discharge Diet:   Consistent Carbohydrate  Cardiac             Special Instructions: Patient instructed to call M.D. or return to ED with worsening shortness of breath, chest pain, fever greater than 100.4°F or any other medical concerns.    FOLLOW UP   Follow-up Information     Jaret Banks MD. Go on 5/1/2018.    Specialty:  Family Medicine  Why:  TUESDAY MAY 1ST 9:30 HOSPITAL FOLLOW UP  Contact information:  [address] [place] [address]  [phone]                  PENDING TEST RESULTS AT DISCHARGE      TIME   Time: 30 minutes were spent planning this discharge.          Dr. Reardon  is the attending at time of discharge, She is aware of the patient's status and agrees with the above discharge summary.           This document has been electronically signed by Jaret Banks MD on April 24, 2018 9:36 PM

## 2018-04-28 NOTE — PROGRESS NOTES
Patient was discharged from the hospital earlier this afternoon.  He is here only for injection of Invega.          This document has been electronically signed by Jaret Banks MD on April 27, 2018 9:57 PM

## 2018-05-01 ENCOUNTER — OFFICE VISIT (OUTPATIENT)
Dept: FAMILY MEDICINE CLINIC | Facility: CLINIC | Age: 48
End: 2018-05-01

## 2018-05-01 ENCOUNTER — APPOINTMENT (OUTPATIENT)
Dept: LAB | Facility: HOSPITAL | Age: 48
End: 2018-05-01

## 2018-05-01 VITALS
DIASTOLIC BLOOD PRESSURE: 82 MMHG | WEIGHT: 266.19 LBS | OXYGEN SATURATION: 98 % | SYSTOLIC BLOOD PRESSURE: 130 MMHG | HEIGHT: 66 IN | HEART RATE: 96 BPM | BODY MASS INDEX: 42.78 KG/M2

## 2018-05-01 DIAGNOSIS — G47.33 OBSTRUCTIVE SLEEP APNEA SYNDROME: ICD-10-CM

## 2018-05-01 DIAGNOSIS — E11.8 TYPE 2 DIABETES MELLITUS WITH COMPLICATION, WITHOUT LONG-TERM CURRENT USE OF INSULIN (HCC): Primary | ICD-10-CM

## 2018-05-01 DIAGNOSIS — F17.200 TOBACCO DEPENDENCE: ICD-10-CM

## 2018-05-01 DIAGNOSIS — E78.49 OTHER HYPERLIPIDEMIA: ICD-10-CM

## 2018-05-01 DIAGNOSIS — R07.89 OTHER CHEST PAIN: ICD-10-CM

## 2018-05-01 DIAGNOSIS — F39 PSYCHOTIC MOOD DISORDER (HCC): ICD-10-CM

## 2018-05-01 DIAGNOSIS — E66.09 NON MORBID OBESITY DUE TO EXCESS CALORIES: ICD-10-CM

## 2018-05-01 DIAGNOSIS — I10 ESSENTIAL HYPERTENSION: ICD-10-CM

## 2018-05-01 DIAGNOSIS — F41.9 ANXIETY DISORDER, UNSPECIFIED TYPE: ICD-10-CM

## 2018-05-01 LAB
ALBUMIN UR-MCNC: 0.6 MG/L
CREAT UR-MCNC: 29.4 MG/DL
MICROALBUMIN/CREAT UR: 20.4 MG/G (ref 0–30)

## 2018-05-01 PROCEDURE — 82043 UR ALBUMIN QUANTITATIVE: CPT | Performed by: FAMILY MEDICINE

## 2018-05-01 PROCEDURE — 82570 ASSAY OF URINE CREATININE: CPT | Performed by: FAMILY MEDICINE

## 2018-05-01 PROCEDURE — 99213 OFFICE O/P EST LOW 20 MIN: CPT | Performed by: FAMILY MEDICINE

## 2018-05-01 RX ORDER — NITROGLYCERIN 0.4 MG/1
0.4 TABLET SUBLINGUAL
Qty: 30 TABLET | Refills: 12 | Status: SHIPPED | OUTPATIENT
Start: 2018-05-01 | End: 2020-01-13 | Stop reason: SDUPTHER

## 2018-05-01 RX ORDER — ATORVASTATIN CALCIUM 40 MG/1
40 TABLET, FILM COATED ORAL DAILY
Qty: 30 TABLET | Refills: 3 | Status: SHIPPED | OUTPATIENT
Start: 2018-05-01 | End: 2018-07-23 | Stop reason: SDUPTHER

## 2018-05-01 NOTE — PROGRESS NOTES
"  Subjective:     Chief Complaint   Patient presents with   • Chest Pain     Kingsley Littlejohn is a 48 y.o. male who presents for hospital follow-up for chest pain.  He was ruled out, and has not had any chest pain since being discharged.  However, his HbA1c in the hospital was 7.5.  He had a HbA1c of greater than 14 in 2014, but \"cured himself\" through weight loss and diet modification, and HbA1c was 5.9 in 2017.    The patient was initially diagnosed with Type 2 diabetes mellitus based on the following criteria:  HbA1c > 6.5    Known diabetic complications: none  Cardiovascular risk factors: diabetes mellitus, dyslipidemia, hypertension, male gender, obesity (BMI >= 30 kg/m2), sedentary lifestyle and smoking/ tobacco exposure  Current diabetic medications include none.     Eye exam current (within one year): no, says he will make appointment with Dr Lemos  Weight trend: increasing steadily  Prior visit with dietician: no  Current diet: in general, an \"unhealthy\" diet  Current exercise: housecleaning    Current monitoring regimen: none  Home blood sugar records: N/A  Any episodes of hypoglycemia? no     ACE inhibitor or angiotensin II receptor blocker?     Yes     lisinopril (generic)   Statin?     Yes    ASA?     No     Hemoglobin A1C   Date Value   04/24/2018 7.5 % (H)   06/13/2017 5.91 % (H)   08/11/2014 >14.0 %TotHgb       ASSOCIATED SYMPTOMS:     FatigueNo   MalaiseNo    DiaphoresisNo    Weight lossNo    Weight gainNo    Visual impairmentNo   Chest painNo    PalpitationsNo    TachycardiaNo   ClaudicationNo    PolyphagiaNo    PolydipsiaNo    PolyuriaNo    NumbnessNo    Foot painNo    Skin lesionsNo    Memory lossNo     COMORBID CONDITIONS:     ObesityYes    HypothyroidismNo    HyperlipidemiaYes    CADNo    Cerebrovascular diseaseNo   PVDNo    HTNYes    Sexual dysfunctionNo    DepressionYes      The following portions of the patient's history were reviewed and updated as appropriate: allergies, current " "medications, past family history, past medical history, past social history, past surgical history and problem list.    Preventative:      Past Medical Hx:  Past Medical History:   Diagnosis Date   • Abdominal pain    • Allergic rhinitis    • Diabetes mellitus     Prev HbA1c 14.0 2014, states \"I'm cured!\"   • Essential hypertension    • Fracture closed, fibula, shaft     tib-fib       Past Surgical Hx:  Past Surgical History:   Procedure Laterality Date   • ANKLE SURGERY      right and left   • CYST REMOVAL      x 3, from hand, thigh, and buttocks   • ENDOSCOPY      20 years ago   • TIBIA FRACTURE SURGERY Bilateral 2011    tib-fib       Health Maintenance:  Health Maintenance   Topic Date Due   • DIABETIC FOOT EXAM  1970   • DIABETIC EYE EXAM  1970   • COLONOSCOPY  03/22/2018   • LIPID PANEL  06/13/2018   • MEDICARE ANNUAL WELLNESS  07/14/2018   • INFLUENZA VACCINE  08/01/2018   • HEMOGLOBIN A1C  10/24/2018   • URINE MICROALBUMIN  05/01/2019   • TDAP/TD VACCINES (2 - Td) 01/01/2021   • PNEUMOCOCCAL VACCINE (19-64 MEDIUM RISK)  Addressed       Current Meds:    Current Outpatient Prescriptions:   •  hydrOXYzine (VISTARIL) 25 MG capsule, Take 1 capsule by mouth Every 6 (Six) Hours As Needed for Itching, Allergies or Anxiety. (Patient taking differently: Take 25 mg by mouth every night at bedtime.), Disp: 120 capsule, Rfl: 2  •  lisinopril (PRINIVIL,ZESTRIL) 40 MG tablet, Take 1 tablet by mouth Daily., Disp: 30 tablet, Rfl: 1  •  oxymetazoline (AFRIN) 0.05 % nasal spray, 2 sprays into each nostril 2 (Two) Times a Day., Disp: , Rfl:   •  paliperidone palmitate (INVEGA SUSTENNA) 117 MG/0.75ML suspension IM injection, Inject 117 mg into the shoulder, thigh, or buttocks Every 30 (Thirty) Days., Disp: , Rfl:   •  sodium chloride (OCEAN NASAL SPRAY) 0.65 % nasal spray, 1 spray into each nostril As Needed for Congestion., Disp: 60 mL, Rfl: 12  •  atorvastatin (LIPITOR) 40 MG tablet, Take 1 tablet by mouth Daily., " Disp: 30 tablet, Rfl: 3  •  nitroglycerin (NITROSTAT) 0.4 MG SL tablet, Place 1 tablet under the tongue Every 5 (Five) Minutes As Needed for Chest Pain. Take no more than 3 doses in 15 minutes., Disp: 30 tablet, Rfl: 12    Current outpatient and discharge medications have been reconciled for the patient.  Reviewed by: Jaret Banks MD    Allergies:  Codeine and Latex    Family Hx:  Family History   Problem Relation Age of Onset   • Diabetes Other    • Hypertension Other    • Cancer Other    • Lung disease Other    • Thyroid disease Other    • Bleeding Disorder Other    • Colon cancer Paternal Grandmother    • Cancer Paternal Grandfather    • Crohn's disease Mother    • Diabetes Mother    • Hypertension Father    • Lung disease Father    • Hypertension Brother    • Hypertension Brother    • Hypertension Brother         Social History:  Social History     Social History   • Marital status: Single     Spouse name: N/A   • Number of children: N/A   • Years of education: N/A     Occupational History   • Lr      Social History Main Topics   • Smoking status: Current Every Day Smoker     Packs/day: 2.00     Years: 25.00     Types: Cigarettes     Start date: 1992   • Smokeless tobacco: Never Used   • Alcohol use No      Comment: FORMER pint of whiskey daily, states quit 1 year ago   • Drug use:      Types: Marijuana      Comment: Meth, other substances which he declined to discuss further   • Sexual activity: Defer     Other Topics Concern   • Not on file     Social History Narrative    Single, lives alone in [place].  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         Review of Systems  Review of Systems   Constitutional: Negative for activity change and appetite change.   HENT: Negative for congestion and dental problem.    Eyes: Negative for discharge and itching.   Respiratory: Negative for apnea.    Cardiovascular: Negative for chest pain and leg swelling.   Gastrointestinal: Negative for abdominal  "distention and abdominal pain.   Endocrine: Negative for cold intolerance.   Genitourinary: Negative for difficulty urinating and enuresis.   Musculoskeletal: Negative for arthralgias and back pain.   Allergic/Immunologic: Negative for environmental allergies and food allergies.   Neurological: Negative for dizziness and facial asymmetry.   Hematological: Negative for adenopathy.   Psychiatric/Behavioral: Negative for agitation and behavioral problems.         Objective:     /82 (BP Location: Left arm, Patient Position: Sitting, Cuff Size: Large Adult)   Pulse 96   Ht 167.6 cm (66\")   Wt 121 kg (266 lb 3 oz)   SpO2 98%   BMI 42.96 kg/m²     General:  alert, appears stated age and cooperative   Oropharynx: lips, mucosa, and tongue normal; teeth and gums normal    Eyes:  conjunctivae/corneas clear. PERRL, EOM's intact. Fundi benign.    Ears:  normal TM's and external ear canals both ears   Neck: no adenopathy, no carotid bruit, no JVD, supple, symmetrical, trachea midline and thyroid not enlarged, symmetric, no tenderness/mass/nodules   Thyroid:  no palpable nodule   Lung: clear to auscultation bilaterally   Heart:  regular rate and rhythm, S1, S2 normal, no murmur, click, rub or gallop   Abdomen: soft, non-tender; bowel sounds normal; no masses,  no organomegaly   Extremities: extremities normal, atraumatic, no cyanosis or edema   Skin: warm and dry, no hyperpigmentation, vitiligo, or suspicious lesions   Pulses: 2+ and symmetric   Neuro: normal without focal findings, mental status, speech normal, alert and oriented x3 and reflexes normal and symmetric       Diabetic foot exam:   Left: Filament test present              Chataignier test within normal limits   Pulses Dorsalis Pedis:  present  Posterior Tibial:  present   Reflexes 2+    Vibratory sensation normal   Proprioception normal   Sharp/dull discrimination normal        Right: Filament test present              Chataignier test normal   Pulses Dorsalis " "Pedis:  present  Posterior Tibial:  present   Reflexes 2+    Vibratory sensation normal   Proprioception normal   Sharp/dull discrimination normal    Feet - warm, good capillary refill       Lab Review  Glucose (mg/dL)   Date Value   04/24/2018 166 (H)   04/23/2018 163 (H)   06/13/2017 110 (H)     Sodium (mmol/L)   Date Value   04/24/2018 137   04/23/2018 136 (L)   06/13/2017 138     Potassium (mmol/L)   Date Value   04/24/2018 4.1   04/23/2018 4.1   06/13/2017 3.7     Chloride (mmol/L)   Date Value   04/24/2018 102   04/23/2018 101   06/13/2017 99     CO2 (mmol/L)   Date Value   04/24/2018 23.0   04/23/2018 23.0   06/13/2017 26.0     BUN (mg/dL)   Date Value   04/24/2018 20   04/23/2018 16   06/13/2017 17     Creatinine (mg/dL)   Date Value   04/24/2018 0.69 (L)   04/23/2018 0.66 (L)   06/13/2017 0.76     Hemoglobin A1C   Date Value   04/24/2018 7.5 % (H)   06/13/2017 5.91 % (H)   08/11/2014 >14.0 %TotHgb     Triglycerides (mg/dL)   Date Value   06/13/2017 108     LDL Cholesterol  (mg/dL)   Date Value   06/13/2017 138 (H)        Assessment:     Diabetes Mellitus type II, under fair control.  1. Type 2 diabetes mellitus with complication, without long-term current use of insulin    2. Other chest pain    3. Essential hypertension    4. Other hyperlipidemia    5. Obstructive sleep apnea syndrome    6. Non morbid obesity due to excess calories    7. Tobacco dependence    8. Psychotic mood disorder    9. Anxiety disorder, unspecified type         Plan:     1.  Rx changes: Had lengthy conversation with patient that his HbA1c is not at goal, however he is refusing all diabetes medications including metformin.  He states he had \"poor blood flow\" when he was on metformin previously.  Extensively discussed with patient the need for compliance with diet and medications to prevent diabetes-related complications such as heart attack, stroke, end-stage renal disease, blindness, diabetic foot ulcers.  He understands, but elects " for diet control at this time.  2.  Education: Reviewed ‘ABCs’ of diabetes management (respective goals in parentheses):  A1C (<7), preprandial glucose (70 mg/dl - 130 mg/dl), postprandial glucose (< 180 mg/dl), blood pressure (<130/80), and cholesterol (LDL <100 mg/dl; HDL > 50 mg/dl; Trig < 150 mg/dl).  3.  Issues reviewed with Kingsley Littlejohn: foot care discussed and Podiatry visits discussed.  4.  Compliance at present is estimated to be fair. Efforts to improve compliance (if necessary) will be directed at increased exercise.  5. Follow up: Return in about 1 month (around 6/1/2018) for Recheck for T2DM.    Goals     • Blood Pressure < 140/90            Barriers:  Sporadic follow-up      • Lower Blood Sugar            Barriers:  Questionable insight            Preventative:  Patient sees Dr Lemos, advised he needs diabetic eye exam  Colonoscopy scheduled for June    RISK SCORE: 4           This document has been electronically signed by Jaret Banks MD on May 13, 2018 3:22 PM

## 2018-05-13 PROBLEM — E78.00 PURE HYPERCHOLESTEROLEMIA: Status: RESOLVED | Noted: 2018-04-23 | Resolved: 2018-05-13

## 2018-05-25 NOTE — PROGRESS NOTES
I have reviewed the notes, assessments, and/or procedures performed by Jaret Banks MD , I concur with her/his documentation of Kingsley Littlejohn.

## 2018-06-01 RX ORDER — FLUTICASONE PROPIONATE 50 MCG
2 SPRAY, SUSPENSION (ML) NASAL DAILY
COMMUNITY
End: 2019-02-16

## 2018-06-04 ENCOUNTER — ANESTHESIA EVENT (OUTPATIENT)
Dept: GASTROENTEROLOGY | Facility: HOSPITAL | Age: 48
End: 2018-06-04

## 2018-06-04 ENCOUNTER — HOSPITAL ENCOUNTER (OUTPATIENT)
Facility: HOSPITAL | Age: 48
Setting detail: HOSPITAL OUTPATIENT SURGERY
Discharge: HOME | End: 2018-06-04
Attending: SURGERY | Admitting: SURGERY

## 2018-06-04 ENCOUNTER — ANESTHESIA (OUTPATIENT)
Dept: GASTROENTEROLOGY | Facility: HOSPITAL | Age: 48
End: 2018-06-04

## 2018-06-04 VITALS
HEIGHT: 67 IN | DIASTOLIC BLOOD PRESSURE: 100 MMHG | HEART RATE: 83 BPM | SYSTOLIC BLOOD PRESSURE: 190 MMHG | BODY MASS INDEX: 41.83 KG/M2 | WEIGHT: 266.54 LBS | OXYGEN SATURATION: 95 % | TEMPERATURE: 97.5 F | RESPIRATION RATE: 20 BRPM

## 2018-06-04 DIAGNOSIS — Z12.11 ENCOUNTER FOR SCREENING COLONOSCOPY: ICD-10-CM

## 2018-06-04 PROCEDURE — 88305 TISSUE EXAM BY PATHOLOGIST: CPT | Performed by: PATHOLOGY

## 2018-06-04 PROCEDURE — 25010000002 PROPOFOL 10 MG/ML EMULSION: Performed by: NURSE ANESTHETIST, CERTIFIED REGISTERED

## 2018-06-04 PROCEDURE — 45385 COLONOSCOPY W/LESION REMOVAL: CPT | Performed by: SURGERY

## 2018-06-04 PROCEDURE — 88305 TISSUE EXAM BY PATHOLOGIST: CPT | Performed by: SURGERY

## 2018-06-04 PROCEDURE — 25010000002 FENTANYL CITRATE (PF) 100 MCG/2ML SOLUTION: Performed by: NURSE ANESTHETIST, CERTIFIED REGISTERED

## 2018-06-04 RX ORDER — DEXTROSE AND SODIUM CHLORIDE 5; .45 G/100ML; G/100ML
30 INJECTION, SOLUTION INTRAVENOUS CONTINUOUS PRN
Status: DISCONTINUED | OUTPATIENT
Start: 2018-06-04 | End: 2018-06-04 | Stop reason: HOSPADM

## 2018-06-04 RX ORDER — FENTANYL CITRATE 50 UG/ML
INJECTION, SOLUTION INTRAMUSCULAR; INTRAVENOUS AS NEEDED
Status: DISCONTINUED | OUTPATIENT
Start: 2018-06-04 | End: 2018-06-04 | Stop reason: SURG

## 2018-06-04 RX ORDER — PROPOFOL 10 MG/ML
VIAL (ML) INTRAVENOUS AS NEEDED
Status: DISCONTINUED | OUTPATIENT
Start: 2018-06-04 | End: 2018-06-04 | Stop reason: SURG

## 2018-06-04 RX ADMIN — PROPOFOL 70 MG: 10 INJECTION, EMULSION INTRAVENOUS at 10:00

## 2018-06-04 RX ADMIN — DEXTROSE AND SODIUM CHLORIDE: 5; 450 INJECTION, SOLUTION INTRAVENOUS at 09:57

## 2018-06-04 RX ADMIN — DEXTROSE AND SODIUM CHLORIDE 30 ML/HR: 5; 450 INJECTION, SOLUTION INTRAVENOUS at 09:20

## 2018-06-04 RX ADMIN — PROPOFOL 40 MG: 10 INJECTION, EMULSION INTRAVENOUS at 10:15

## 2018-06-04 RX ADMIN — FENTANYL CITRATE 50 MCG: 50 INJECTION, SOLUTION INTRAMUSCULAR; INTRAVENOUS at 09:59

## 2018-06-04 RX ADMIN — PROPOFOL 30 MG: 10 INJECTION, EMULSION INTRAVENOUS at 10:05

## 2018-06-04 RX ADMIN — PROPOFOL 30 MG: 10 INJECTION, EMULSION INTRAVENOUS at 10:10

## 2018-06-04 NOTE — ANESTHESIA POSTPROCEDURE EVALUATION
Patient: Kingsley Littlejohn    Procedure Summary     Date:  06/04/18 Room / Location:  [facility] ENDOSCOPY 2 / [facility] ENDOSCOPY    Anesthesia Start:  0959 Anesthesia Stop:  1026    Procedure:  COLONOSCOPY (N/A ) Diagnosis:       Encounter for screening colonoscopy      (Encounter for screening colonoscopy [Z12.11])    Surgeon:  Jaylon Castro MD Provider:      Anesthesia Type:  MAC ASA Status:  3          Anesthesia Type: MAC  Last vitals  BP   173/100 (06/04/18 0904)   Temp   97 °F (36.1 °C) (06/04/18 0904)   Pulse   79 (06/04/18 0904)   Resp   18 (06/04/18 0904)     SpO2   96 % (06/04/18 0904)     Post Anesthesia Care and Evaluation    Patient location during evaluation: PACU  Patient participation: complete - patient participated  Level of consciousness: awake and alert  Pain management: adequate  Airway patency: patent  Anesthetic complications: No anesthetic complications    Cardiovascular status: acceptable  Respiratory status: acceptable  Hydration status: acceptable

## 2018-06-04 NOTE — ANESTHESIA PREPROCEDURE EVALUATION
Anesthesia Evaluation                  Airway   Mallampati: III  TM distance: >3 FB  Possible difficult intubation  Dental - normal exam     Pulmonary - normal exam   (+) sleep apnea,   Cardiovascular - normal exam    (+) hypertension, hyperlipidemia,       Neuro/Psych  GI/Hepatic/Renal/Endo    (+) obesity, morbid obesity,  diabetes mellitus,     Musculoskeletal     Abdominal  - normal exam   Substance History      OB/GYN          Other                        Anesthesia Plan    ASA 3     MAC     intravenous induction

## 2018-06-04 NOTE — H&P
"No chief complaint on file.      Kingsley Littlejohn is a 48 y.o. male referred today for evaluation for colonoscopy.  He notes no change in bowel habits, no blood in the stool.     Prior Colonoscopy:no  Prior Polyps:no  Family History of Colon Cancer:no  Mother has crohns disease.  On anticoagulation:no    Past Surgical History:   Procedure Laterality Date   • ANKLE SURGERY      right and left   • CYST REMOVAL      x 3, from hand, thigh, and buttocks   • ENDOSCOPY      20 years ago   • TIBIA FRACTURE SURGERY Bilateral 2011    tib-fib     Past Medical History:   Diagnosis Date   • Abdominal pain    • Allergic rhinitis    • Diabetes mellitus     Prev HbA1c 14.0 2014, states \"I'm cured!\"   • Essential hypertension    • Fracture closed, fibula, shaft     tib-fib     Social History     Social History   • Marital status: Single     Spouse name: N/A   • Number of children: N/A   • Years of education: N/A     Occupational History   • Lr      Social History Main Topics   • Smoking status: Current Every Day Smoker     Packs/day: 2.00     Years: 25.00     Types: Cigarettes     Start date: 1992   • Smokeless tobacco: Never Used   • Alcohol use No      Comment: FORMER pint of whiskey daily, states quit 1 year ago   • Drug use: Yes     Types: Marijuana      Comment: Meth, other substances which he declined to discuss further   • Sexual activity: Defer     Other Topics Concern   • Not on file     Social History Narrative    Single, lives alone in [place].  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       Allergies   Allergen Reactions   • Latex Irritability   • Codeine Nausea Only       Home Medications:  Prior to Admission medications    Medication Sig Start Date End Date Taking? Authorizing Provider   atorvastatin (LIPITOR) 40 MG tablet Take 1 tablet by mouth Daily. 5/1/18  Yes Jaret Banks MD   fluticasone (FLONASE) 50 MCG/ACT nasal spray 2 sprays into each nostril Daily.   Yes Historical Provider, MD "   hydrOXYzine (VISTARIL) 25 MG capsule Take 1 capsule by mouth Every 6 (Six) Hours As Needed for Itching, Allergies or Anxiety.  Patient taking differently: Take 25 mg by mouth every night at bedtime. 4/10/18  Yes Jaret Banks MD   lisinopril (PRINIVIL,ZESTRIL) 40 MG tablet Take 1 tablet by mouth Daily. 3/30/18  Yes Jaret Banks MD   oxymetazoline (AFRIN) 0.05 % nasal spray 2 sprays into each nostril 2 (Two) Times a Day.   Yes Historical Provider, MD   sodium chloride (OCEAN NASAL SPRAY) 0.65 % nasal spray 1 spray into each nostril As Needed for Congestion. 10/13/17  Yes Jaret Banks MD   nitroglycerin (NITROSTAT) 0.4 MG SL tablet Place 1 tablet under the tongue Every 5 (Five) Minutes As Needed for Chest Pain. Take no more than 3 doses in 15 minutes. 5/1/18   Jaret Banks MD   paliperidone palmitate (INVEGA SUSTENNA) 117 MG/0.75ML suspension IM injection Inject 117 mg into the shoulder, thigh, or buttocks Every 30 (Thirty) Days.    Historical Provider, MD       Review of Systems   Constitutional: Negative.    HENT: Negative for hearing loss, nosebleeds and trouble swallowing.    Respiratory: Negative for apnea, chest tightness and shortness of breath.    Cardiovascular: Negative for chest pain and palpitations.   Gastrointestinal: Negative for abdominal distention, abdominal pain, blood in stool, constipation, diarrhea, nausea and vomiting.   Genitourinary: Negative for difficulty urinating, dysuria, frequency and urgency.   Musculoskeletal: Negative for back pain, joint swelling and neck pain.   Skin: Negative for rash.   Neurological: Negative for dizziness, seizures, weakness, light-headedness, numbness and headaches.   Hematological: Negative for adenopathy.   Psychiatric/Behavioral: Negative for agitation. The patient is not nervous/anxious.        Vitals:    06/04/18 0904   BP: 173/100   Pulse: 79   Resp: 18   Temp: 97 °F (36.1 °C)   SpO2: 96%       Physical Exam   Constitutional: He is oriented to  person, place, and time. He appears well-developed and well-nourished. No distress.   HENT:   Head: Normocephalic and atraumatic.   Nose: Nose normal.   Eyes: Conjunctivae are normal.   Neck: Normal range of motion. No tracheal deviation present. No thyromegaly present.   Cardiovascular: Normal rate, regular rhythm and normal heart sounds.    No murmur heard.  Pulmonary/Chest: Effort normal and breath sounds normal. No respiratory distress. He has no wheezes. He has no rales. He exhibits no tenderness.   Abdominal: Soft. He exhibits no distension. There is no tenderness. There is no rebound and no guarding. No hernia.   Musculoskeletal: He exhibits no tenderness or deformity.   Neurological: He is alert and oriented to person, place, and time.   Skin: Skin is warm and dry. No rash noted.   Psychiatric: He has a normal mood and affect. His behavior is normal. Judgment and thought content normal.   Vitals reviewed.      Assessment     In need of screening colonoscopy.    Plan     Risks, benefits, rationale and prep for colonoscopy have been discussed with the patient.  The patient indicates understanding of these issues and agrees with the plan.

## 2018-06-05 LAB
LAB AP CASE REPORT: NORMAL
Lab: NORMAL
PATH REPORT.FINAL DX SPEC: NORMAL
PATH REPORT.GROSS SPEC: NORMAL

## 2018-06-08 ENCOUNTER — APPOINTMENT (OUTPATIENT)
Dept: LAB | Facility: HOSPITAL | Age: 48
End: 2018-06-08

## 2018-06-08 ENCOUNTER — OFFICE VISIT (OUTPATIENT)
Dept: FAMILY MEDICINE CLINIC | Facility: CLINIC | Age: 48
End: 2018-06-08

## 2018-06-08 VITALS
OXYGEN SATURATION: 95 % | SYSTOLIC BLOOD PRESSURE: 128 MMHG | WEIGHT: 268.2 LBS | HEIGHT: 67 IN | DIASTOLIC BLOOD PRESSURE: 84 MMHG | BODY MASS INDEX: 42.09 KG/M2 | HEART RATE: 85 BPM

## 2018-06-08 DIAGNOSIS — E11.8 TYPE 2 DIABETES MELLITUS WITH COMPLICATION, WITHOUT LONG-TERM CURRENT USE OF INSULIN (HCC): Primary | ICD-10-CM

## 2018-06-08 DIAGNOSIS — Z23 IMMUNIZATION DUE: ICD-10-CM

## 2018-06-08 LAB
ANION GAP SERPL CALCULATED.3IONS-SCNC: 11 MMOL/L (ref 5–15)
ARTICHOKE IGE QN: 81 MG/DL (ref 1–129)
BUN BLD-MCNC: 14 MG/DL (ref 7–21)
BUN/CREAT SERPL: 18.7 (ref 7–25)
CALCIUM SPEC-SCNC: 9.2 MG/DL (ref 8.4–10.2)
CHLORIDE SERPL-SCNC: 102 MMOL/L (ref 95–110)
CHOLEST SERPL-MCNC: 144 MG/DL (ref 0–199)
CO2 SERPL-SCNC: 25 MMOL/L (ref 22–31)
CREAT BLD-MCNC: 0.75 MG/DL (ref 0.7–1.3)
GFR SERPL CREATININE-BSD FRML MDRD: 110 ML/MIN/1.73 (ref 56–130)
GLUCOSE BLD-MCNC: 172 MG/DL (ref 60–100)
HDLC SERPL-MCNC: 35 MG/DL (ref 60–200)
LDLC/HDLC SERPL: 2.13 {RATIO} (ref 0–3.55)
POTASSIUM BLD-SCNC: 4.1 MMOL/L (ref 3.5–5.1)
SODIUM BLD-SCNC: 138 MMOL/L (ref 137–145)
TRIGL SERPL-MCNC: 172 MG/DL (ref 20–199)

## 2018-06-08 PROCEDURE — 99213 OFFICE O/P EST LOW 20 MIN: CPT | Performed by: FAMILY MEDICINE

## 2018-06-08 PROCEDURE — 80048 BASIC METABOLIC PNL TOTAL CA: CPT | Performed by: FAMILY MEDICINE

## 2018-06-08 PROCEDURE — 36415 COLL VENOUS BLD VENIPUNCTURE: CPT | Performed by: FAMILY MEDICINE

## 2018-06-08 PROCEDURE — 80061 LIPID PANEL: CPT | Performed by: FAMILY MEDICINE

## 2018-06-08 RX ORDER — METOPROLOL TARTRATE 50 MG/1
50 TABLET, FILM COATED ORAL EVERY 12 HOURS SCHEDULED
Qty: 60 TABLET | Refills: 1 | Status: SHIPPED | OUTPATIENT
Start: 2018-06-08 | End: 2018-08-06 | Stop reason: SDUPTHER

## 2018-06-08 NOTE — PROGRESS NOTES
"  Subjective:     Chief Complaint   Patient presents with   • Diabetes     type 2   • Hypertension     Kingsley Littlejohn is a 48 y.o. male who presents for follow-up for T2DM and hypertension.    He has been having persistently elevated blood pressure despite medication adjustments.  While visiting his brother in [facility]] on 5/18, he was concerned his blood pressure was high so he asked a nurse there to check it.  It was 180/120, he was sent to their ER for further evaluation.  Upon arrival to the ER, it was 183/127, with heart rate of 105.  Asymptomatic, so he was started on Lopressor 25 mg bid and discharged.  Since then, his bp has been somewhat better (mostly 150's/100's) but it was 210/110 earlier this week at home, and 190/100 when he had his colonoscopy last week.  No urgency symptoms today.    The patient was initially diagnosed with Type 2 diabetes mellitus based on the following criteria:  HbA1c > 6.5    Known diabetic complications: none  Cardiovascular risk factors: diabetes mellitus, dyslipidemia, hypertension, male gender, obesity (BMI >= 30 kg/m2), sedentary lifestyle and smoking/ tobacco exposure  Current diabetic medications include none.     Eye exam current (within one year): no, says he will make appointment with Dr Lemos  Weight trend: increasing steadily  Prior visit with dietician: no  Current diet: in general, an \"unhealthy\" diet  Current exercise: housecleaning    Current monitoring regimen: home blood tests - irregular  Home blood sugar records: checks seldomly, he checked it 2 weeks ago after breakfast and it was 120  Any episodes of hypoglycemia? no     ACE inhibitor or angiotensin II receptor blocker?     Yes     lisinopril (generic)   Statin?     Yes    ASA?     No     Hemoglobin A1C   Date Value   04/24/2018 7.5 % (H)   06/13/2017 5.91 % (H)   08/11/2014 >14.0 %TotHgb       ASSOCIATED SYMPTOMS:     FatigueNo   MalaiseNo    DiaphoresisNo    Weight lossNo    Weight gainNo    Visual " "impairmentNo   Chest painNo    PalpitationsNo    TachycardiaNo   ClaudicationNo    PolyphagiaNo    PolydipsiaNo    PolyuriaNo    NumbnessNo    Foot painNo    Skin lesionsNo    Memory lossNo     COMORBID CONDITIONS:     ObesityYes    HypothyroidismNo    HyperlipidemiaYes    CADNo    Cerebrovascular diseaseNo   PVDNo    HTNYes    Sexual dysfunctionNo    DepressionYes      The following portions of the patient's history were reviewed and updated as appropriate: allergies, current medications, past family history, past medical history, past social history, past surgical history and problem list.    Preventative:      Past Medical Hx:  Past Medical History:   Diagnosis Date   • Abdominal pain    • Allergic rhinitis    • Diabetes mellitus     Prev HbA1c 14.0 2014, states \"I'm cured!\"   • Essential hypertension    • Fracture closed, fibula, shaft     tib-fib       Past Surgical Hx:  Past Surgical History:   Procedure Laterality Date   • ANKLE SURGERY      right and left   • COLONOSCOPY N/A 6/4/2018    Procedure: COLONOSCOPY;  Surgeon: Jaylon Castro MD;  Location: [facility] ENDOSCOPY;  Service: Gastroenterology   • CYST REMOVAL      x 3, from hand, thigh, and buttocks   • ENDOSCOPY      20 years ago   • TIBIA FRACTURE SURGERY Bilateral 2011    tib-fib       Health Maintenance:  Health Maintenance   Topic Date Due   • DIABETIC EYE EXAM  05/26/2017   • ZOSTER VACCINE (1 of 2) 02/20/2018   • MEDICARE ANNUAL WELLNESS  07/14/2018   • INFLUENZA VACCINE  08/01/2018   • HEMOGLOBIN A1C  10/24/2018   • DIABETIC FOOT EXAM  05/01/2019   • URINE MICROALBUMIN  05/01/2019   • LIPID PANEL  06/08/2019   • TDAP/TD VACCINES (2 - Td) 01/01/2021   • COLONOSCOPY  06/04/2028   • PNEUMOCOCCAL VACCINE (19-64 MEDIUM RISK)  Addressed       Current Meds:    Current Outpatient Prescriptions:   •  atorvastatin (LIPITOR) 40 MG tablet, Take 1 tablet by mouth Daily., Disp: 30 tablet, Rfl: 3  •  fluticasone (FLONASE) 50 MCG/ACT nasal spray, 2 sprays into " each nostril Daily., Disp: , Rfl:   •  hydrOXYzine (VISTARIL) 25 MG capsule, Take 1 capsule by mouth Every 6 (Six) Hours As Needed for Itching, Allergies or Anxiety. (Patient taking differently: Take 25 mg by mouth every night at bedtime.), Disp: 120 capsule, Rfl: 2  •  lisinopril (PRINIVIL,ZESTRIL) 40 MG tablet, Take 1 tablet by mouth Daily., Disp: 30 tablet, Rfl: 1  •  nitroglycerin (NITROSTAT) 0.4 MG SL tablet, Place 1 tablet under the tongue Every 5 (Five) Minutes As Needed for Chest Pain. Take no more than 3 doses in 15 minutes., Disp: 30 tablet, Rfl: 12  •  oxymetazoline (AFRIN) 0.05 % nasal spray, 2 sprays into each nostril 2 (Two) Times a Day., Disp: , Rfl:   •  paliperidone palmitate (INVEGA SUSTENNA) 117 MG/0.75ML suspension IM injection, Inject 117 mg into the shoulder, thigh, or buttocks Every 30 (Thirty) Days., Disp: , Rfl:   •  sodium chloride (OCEAN NASAL SPRAY) 0.65 % nasal spray, 1 spray into each nostril As Needed for Congestion., Disp: 60 mL, Rfl: 12  •  metoprolol tartrate (LOPRESSOR) 50 MG tablet, Take 1 tablet by mouth Every 12 (Twelve) Hours., Disp: 60 tablet, Rfl: 1    Current outpatient and discharge medications have been reconciled for the patient.  Reviewed by: Jaret Banks MD    Allergies:  Latex and Codeine    Family Hx:  Family History   Problem Relation Age of Onset   • Diabetes Other    • Hypertension Other    • Cancer Other    • Lung disease Other    • Thyroid disease Other    • Bleeding Disorder Other    • Colon cancer Paternal Grandmother    • Cancer Paternal Grandfather    • Crohn's disease Mother    • Diabetes Mother    • Hypertension Father    • Lung disease Father    • Hypertension Brother    • Hypertension Brother    • Hypertension Brother         Social History:  Social History     Social History   • Marital status: Single     Spouse name: N/A   • Number of children: N/A   • Years of education: N/A     Occupational History   • Lr      Social History Main Topics   •  "Smoking status: Current Every Day Smoker     Packs/day: 2.00     Years: 25.00     Types: Cigarettes     Start date: 1992   • Smokeless tobacco: Never Used   • Alcohol use No      Comment: FORMER pint of whiskey daily, states quit 1 year ago   • Drug use: Yes     Types: Marijuana      Comment: Meth, other substances which he declined to discuss further   • Sexual activity: Defer     Other Topics Concern   • Not on file     Social History Narrative    Single, lives alone in [place].  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         Review of Systems  Review of Systems   Constitutional: Negative for activity change and appetite change.   HENT: Negative for congestion and dental problem.    Eyes: Negative for discharge and itching.   Respiratory: Negative for apnea.    Cardiovascular: Negative for chest pain and leg swelling.   Gastrointestinal: Negative for abdominal distention and abdominal pain.   Endocrine: Negative for cold intolerance.   Genitourinary: Negative for difficulty urinating and enuresis.   Musculoskeletal: Negative for arthralgias and back pain.   Allergic/Immunologic: Negative for environmental allergies and food allergies.   Neurological: Negative for dizziness and facial asymmetry.   Hematological: Negative for adenopathy.   Psychiatric/Behavioral: Negative for agitation and behavioral problems.         Objective:     /84 (BP Location: Left arm, Patient Position: Sitting, Cuff Size: Adult)   Pulse 85   Ht 170.2 cm (67\")   Wt 122 kg (268 lb 3.2 oz)   SpO2 95%   BMI 42.01 kg/m²     General:  alert, appears stated age and cooperative   Oropharynx: lips, mucosa, and tongue normal; teeth and gums normal    Eyes:  conjunctivae/corneas clear. PERRL, EOM's intact. Fundi benign.    Ears:  normal TM's and external ear canals both ears   Neck: no adenopathy, no carotid bruit, no JVD, supple, symmetrical, trachea midline and thyroid not enlarged, symmetric, no tenderness/mass/nodules " "  Thyroid:  no palpable nodule   Lung: clear to auscultation bilaterally   Heart:  regular rate and rhythm, S1, S2 normal, no murmur, click, rub or gallop   Abdomen: soft, non-tender; bowel sounds normal; no masses,  no organomegaly   Extremities: extremities normal, atraumatic, no cyanosis or edema   Skin: warm and dry, no hyperpigmentation, vitiligo, or suspicious lesions   Pulses: 2+ and symmetric   Neuro: normal without focal findings, mental status, speech normal, alert and oriented x3 and reflexes normal and symmetric       Diabetic foot exam:   Deferred, performed 5/1/18     Lab Review  Glucose (mg/dL)   Date Value   06/08/2018 172 (H)   04/24/2018 166 (H)   04/23/2018 163 (H)     Sodium (mmol/L)   Date Value   06/08/2018 138   04/24/2018 137   04/23/2018 136 (L)     Potassium (mmol/L)   Date Value   06/08/2018 4.1   04/24/2018 4.1   04/23/2018 4.1     Chloride (mmol/L)   Date Value   06/08/2018 102   04/24/2018 102   04/23/2018 101     CO2 (mmol/L)   Date Value   06/08/2018 25.0   04/24/2018 23.0   04/23/2018 23.0     BUN (mg/dL)   Date Value   06/08/2018 14   04/24/2018 20   04/23/2018 16     Creatinine (mg/dL)   Date Value   06/08/2018 0.75   04/24/2018 0.69 (L)   04/23/2018 0.66 (L)     Hemoglobin A1C   Date Value   04/24/2018 7.5 % (H)   06/13/2017 5.91 % (H)   08/11/2014 >14.0 %TotHgb     Triglycerides (mg/dL)   Date Value   06/08/2018 172   06/13/2017 108     LDL Cholesterol  (mg/dL)   Date Value   06/08/2018 81   06/13/2017 138 (H)        Assessment:     Diabetes Mellitus type II, under fair control.  1. Type 2 diabetes mellitus with complication, without long-term current use of insulin    2. Immunization due         Plan:     1.  Rx changes: Had lengthy conversation with patient once again that his HbA1c is not at goal, however he is refusing all diabetes medications including metformin.  He states he had \"poor blood flow\" when he was on metformin previously.  Extensively discussed with patient the " need for compliance with diet and medications to prevent diabetes-related complications such as heart attack, stroke, end-stage renal disease, blindness, diabetic foot ulcers.  He understands, but elects for diet control at this time.  Increase Lopressor to 50 mg bid.  2.  Education: Reviewed ‘ABCs’ of diabetes management (respective goals in parentheses):  A1C (<7), preprandial glucose (70 mg/dl - 130 mg/dl), postprandial glucose (< 180 mg/dl), blood pressure (<130/80), and cholesterol (LDL <100 mg/dl; HDL > 50 mg/dl; Trig < 150 mg/dl).  3.  Issues reviewed with Kingsley Littlejohn: foot care discussed and Podiatry visits discussed.  4.  Compliance at present is estimated to be fair. Efforts to improve compliance (if necessary) will be directed at increased exercise.  5. Follow up: Return in about 2 weeks (around 6/22/2018) for Recheck for HTN.    Goals     • Blood Pressure < 140/90            Barriers:  Sporadic follow-up      • Lower Blood Sugar            Barriers:  Questionable insight            Preventative:  Patient sees Dr Lemos, advised he needs diabetic eye exam  Colonoscopy up to date (6/4/18), has upcoming follow-up with Dr Gloria Rodriguez Rx sent    RISK SCORE: 4           This document has been electronically signed by Jaret Banks MD on June 9, 2018 9:13 PM

## 2018-06-11 ENCOUNTER — OFFICE VISIT (OUTPATIENT)
Dept: SURGERY | Facility: CLINIC | Age: 48
End: 2018-06-11

## 2018-06-11 VITALS
WEIGHT: 275 LBS | BODY MASS INDEX: 43.16 KG/M2 | DIASTOLIC BLOOD PRESSURE: 90 MMHG | SYSTOLIC BLOOD PRESSURE: 138 MMHG | HEIGHT: 67 IN

## 2018-06-11 DIAGNOSIS — Z86.010 HX OF ADENOMATOUS COLONIC POLYPS: Primary | ICD-10-CM

## 2018-06-11 PROCEDURE — 99212 OFFICE O/P EST SF 10 MIN: CPT | Performed by: SURGERY

## 2018-06-11 NOTE — PROGRESS NOTES
48-year-old male here to follow-up after his first screening colonoscopy.  His prep was adequate to identify polyps.  He has 6 mm adenomatous polyp removed from his descending colon.  Rest of the study was unremarkable.  He did well with the procedure.  Would recommend we repeat the colonoscopy in 3 years or sooner if he has any concerns or questions.

## 2018-06-13 ENCOUNTER — OFFICE VISIT (OUTPATIENT)
Dept: SLEEP MEDICINE | Facility: HOSPITAL | Age: 48
End: 2018-06-13

## 2018-06-13 VITALS
HEART RATE: 82 BPM | SYSTOLIC BLOOD PRESSURE: 180 MMHG | WEIGHT: 273 LBS | HEIGHT: 67 IN | BODY MASS INDEX: 42.85 KG/M2 | DIASTOLIC BLOOD PRESSURE: 100 MMHG

## 2018-06-13 DIAGNOSIS — G47.33 OBSTRUCTIVE SLEEP APNEA, ADULT: Primary | ICD-10-CM

## 2018-06-13 PROCEDURE — 99406 BEHAV CHNG SMOKING 3-10 MIN: CPT | Performed by: INTERNAL MEDICINE

## 2018-06-13 PROCEDURE — 99213 OFFICE O/P EST LOW 20 MIN: CPT | Performed by: INTERNAL MEDICINE

## 2018-06-13 NOTE — PROGRESS NOTES
"Sleep Clinic Follow Up    Date: 2018  Primary Care Physician: Jaret Banks MD      Interim History (1/3):  Since the last visit on 2017, patient has:      1)  MAXIME - He lost his non-compliance. He got sick from Afrin use (had allergy unknown) and was unable to tolerate CPAP. It stayed in his closet for months until his upper airway disease was treated and controlled. He has weaned use, but still has intermittent Afrin use.He found his machine in thecloset and started using it again. Since resuming therapy he feels wonderful. He has received a bill for $500.     Bed time: 1900  Sleep latency: 60-90 minutes  Number of times awakens during the night: 3-4  Wake time: 0830  Estimated total sleep time at night: 10 hours  Caffeine intake: 1 pot of coffee  Alcohol intake: 0  Nap time: none  Sleepiness with Driving: none    2) Patient denies RLS symptoms.     PMHx, FH, SH reviewed and pertinent changes are: unchanged from last office visit on 2017      REVIEW OF SYSTEMS:   Negative for chest pain, fever, chills, SOA, abdominal pain. Smokin ppd      Exam ():    Vitals:    18 1455   BP: 180/100   Pulse: 82           Body mass index is 42.76 kg/m². Patient's Body mass index is 42.76 kg/m². BMI is above normal parameters. Recommendations include: referral to primary care.      Gen:  No distress, conversant, pleasant, appears stated age, alert, oriented  Eyes:   Anicteric sclera, moist conjunctiva, no lid lag     PERRLA, EOMI   Heent:   NC/AT    Oropharynx clear, Mallampati 4    normal hearing  Lungs:  Normal effort, non-labored breathing    Clear to auscultation    CV:  Normal S1/S2, no murmur    no lower extremity edema  ABD:  Soft, normal bowel sounds    Psych:  Appropriate affect  Neuro:  CN 2-12 intact    Past Medical History:   Diagnosis Date   • Abdominal pain    • Allergic rhinitis    • Diabetes mellitus     Prev HbA1c 14.0 , states \"I'm cured!\"   • Essential hypertension    • " "Fracture closed, fibula, shaft     tib-fib       Current Outpatient Prescriptions:   •  atorvastatin (LIPITOR) 40 MG tablet, Take 1 tablet by mouth Daily., Disp: 30 tablet, Rfl: 3  •  fluticasone (FLONASE) 50 MCG/ACT nasal spray, 2 sprays into each nostril Daily., Disp: , Rfl:   •  hydrOXYzine (VISTARIL) 25 MG capsule, Take 1 capsule by mouth Every 6 (Six) Hours As Needed for Itching, Allergies or Anxiety. (Patient taking differently: Take 25 mg by mouth every night at bedtime.), Disp: 120 capsule, Rfl: 2  •  lisinopril (PRINIVIL,ZESTRIL) 40 MG tablet, Take 1 tablet by mouth Daily., Disp: 30 tablet, Rfl: 1  •  metoprolol tartrate (LOPRESSOR) 50 MG tablet, Take 1 tablet by mouth Every 12 (Twelve) Hours., Disp: 60 tablet, Rfl: 1  •  nitroglycerin (NITROSTAT) 0.4 MG SL tablet, Place 1 tablet under the tongue Every 5 (Five) Minutes As Needed for Chest Pain. Take no more than 3 doses in 15 minutes., Disp: 30 tablet, Rfl: 12  •  oxymetazoline (AFRIN) 0.05 % nasal spray, 2 sprays into each nostril 2 (Two) Times a Day., Disp: , Rfl:   •  paliperidone palmitate (INVEGA SUSTENNA) 117 MG/0.75ML suspension IM injection, Inject 117 mg into the shoulder, thigh, or buttocks Every 30 (Thirty) Days., Disp: , Rfl:   •  sodium chloride (OCEAN NASAL SPRAY) 0.65 % nasal spray, 1 spray into each nostril As Needed for Congestion., Disp: 60 mL, Rfl: 12      ASSESSMENT / PLAN:     1. Obstructive sleep apnea  1. PSG on 12/28/2012, AHI of 80  2. Currently on 12 cm H2O  3. Lost secondary to non-compliance  4. Repeat HST with APAP  5. RTC in 2 months  2. Caffeine abuse - counseled  3. Nicotine dependence without complication- given [employer]\"s \"Thinking of quitting smoking\" flyer and referred patient to call 0-800-QUIT-NOW. Smoking and tobacco use cessation counseling visit was 5 minutes.      Total time 15 min, more than half spent in face to face counseling and coordination of care.    RTC in 3 months     This document has been electronically " signed by Simon Lyle MD on June 13, 2018         CC: Jaret Banks MD          No ref. provider found

## 2018-06-18 ENCOUNTER — HOSPITAL ENCOUNTER (OUTPATIENT)
Dept: SLEEP MEDICINE | Facility: HOSPITAL | Age: 48
Discharge: HOME | End: 2018-06-18
Admitting: INTERNAL MEDICINE

## 2018-06-18 DIAGNOSIS — G47.33 OBSTRUCTIVE SLEEP APNEA, ADULT: ICD-10-CM

## 2018-06-18 PROCEDURE — 95806 SLEEP STUDY UNATT&RESP EFFT: CPT | Performed by: INTERNAL MEDICINE

## 2018-06-18 PROCEDURE — 95806 SLEEP STUDY UNATT&RESP EFFT: CPT

## 2018-06-26 DIAGNOSIS — G47.33 OSA (OBSTRUCTIVE SLEEP APNEA): Primary | ICD-10-CM

## 2018-07-05 ENCOUNTER — TELEPHONE (OUTPATIENT)
Dept: FAMILY MEDICINE CLINIC | Facility: CLINIC | Age: 48
End: 2018-07-05

## 2018-07-05 DIAGNOSIS — I10 ESSENTIAL HYPERTENSION: ICD-10-CM

## 2018-07-05 RX ORDER — LISINOPRIL 40 MG/1
40 TABLET ORAL DAILY
Qty: 30 TABLET | Refills: 0 | Status: SHIPPED | OUTPATIENT
Start: 2018-07-05 | End: 2018-07-09 | Stop reason: SDUPTHER

## 2018-07-05 NOTE — TELEPHONE ENCOUNTER
Pt called in regards to needing a refill on his lisinopril   40 mg.    Pt uses [facility] pharmacy on [facility]

## 2018-07-09 RX ORDER — LISINOPRIL 40 MG/1
40 TABLET ORAL DAILY
Qty: 90 TABLET | Refills: 3 | Status: SHIPPED | OUTPATIENT
Start: 2018-07-09 | End: 2020-10-29

## 2018-07-16 ENCOUNTER — HOSPITAL ENCOUNTER (EMERGENCY)
Facility: HOSPITAL | Age: 48
Discharge: HOME | End: 2018-07-16
Attending: EMERGENCY MEDICINE | Admitting: EMERGENCY MEDICINE

## 2018-07-16 VITALS
HEIGHT: 67 IN | SYSTOLIC BLOOD PRESSURE: 168 MMHG | HEART RATE: 83 BPM | OXYGEN SATURATION: 95 % | RESPIRATION RATE: 18 BRPM | BODY MASS INDEX: 43.16 KG/M2 | TEMPERATURE: 97.9 F | WEIGHT: 275 LBS | DIASTOLIC BLOOD PRESSURE: 104 MMHG

## 2018-07-16 DIAGNOSIS — L02.91 ABSCESS: Primary | ICD-10-CM

## 2018-07-16 PROCEDURE — 99283 EMERGENCY DEPT VISIT LOW MDM: CPT

## 2018-07-16 RX ORDER — CLINDAMYCIN HYDROCHLORIDE 150 MG/1
450 CAPSULE ORAL 3 TIMES DAILY
Qty: 90 CAPSULE | Refills: 0 | Status: SHIPPED | OUTPATIENT
Start: 2018-07-16 | End: 2018-10-15

## 2018-07-16 RX ORDER — LIDOCAINE HYDROCHLORIDE AND EPINEPHRINE 10; 10 MG/ML; UG/ML
10 INJECTION, SOLUTION INFILTRATION; PERINEURAL ONCE
Status: COMPLETED | OUTPATIENT
Start: 2018-07-16 | End: 2018-07-16

## 2018-07-16 RX ORDER — IBUPROFEN 600 MG/1
600 TABLET ORAL EVERY 6 HOURS PRN
Qty: 15 TABLET | Refills: 0 | Status: SHIPPED | OUTPATIENT
Start: 2018-07-16 | End: 2022-09-21

## 2018-07-16 RX ADMIN — LIDOCAINE HYDROCHLORIDE,EPINEPHRINE BITARTRATE 10 ML: 10; .01 INJECTION, SOLUTION INFILTRATION; PERINEURAL at 11:01

## 2018-07-18 ENCOUNTER — OFFICE VISIT (OUTPATIENT)
Dept: SURGERY | Facility: CLINIC | Age: 48
End: 2018-07-18

## 2018-07-18 VITALS
DIASTOLIC BLOOD PRESSURE: 72 MMHG | HEIGHT: 67 IN | WEIGHT: 278 LBS | BODY MASS INDEX: 43.63 KG/M2 | SYSTOLIC BLOOD PRESSURE: 126 MMHG

## 2018-07-18 DIAGNOSIS — K61.0 PERIANAL ABSCESS: Primary | ICD-10-CM

## 2018-07-18 DIAGNOSIS — N49.2 SCROTAL ABSCESS: ICD-10-CM

## 2018-07-18 PROCEDURE — 99213 OFFICE O/P EST LOW 20 MIN: CPT | Performed by: SURGERY

## 2018-07-18 RX ORDER — ASPIRIN 325 MG
325 TABLET ORAL DAILY
COMMUNITY

## 2018-07-18 NOTE — PATIENT INSTRUCTIONS

## 2018-07-18 NOTE — PROGRESS NOTES
48-year-old male who we've seen in the past for colonoscopy a few months ago was recent seen in the emergency room 2 days ago for an abscess on his left anterior buttock and also on his left scrotum.  Patient's had abscesses required surgery on the other side of his buttock 9-10 years ago and also had an abscess on his hip required surgery.  These 2 sites of both new.  Patient was seen in the emergency room and had the 1 around his anus indeed the emergency room physician and it feels much better currently.  It does continue to drain.  One on his scrotum was not I&D but it spontaneously opened up and started draining yesterday and it feels much better as well.  Patient was started on clindamycin.  He had a drain in the buttock wound that is, out and he continues to drain.  Allergies:   Allergies   Allergen Reactions   • Latex Irritability   • Codeine Nausea Only         Home Medications:  Prior to Admission medications    Medication Sig Start Date End Date Taking? Authorizing Provider   atorvastatin (LIPITOR) 40 MG tablet Take 1 tablet by mouth Daily. 5/1/18  Yes Jaret Banks MD   clindamycin (CLEOCIN) 150 MG capsule Take 3 capsules by mouth 3 (Three) Times a Day. 7/16/18  Yes Ottoniel Gonsales MD   fluticasone (FLONASE) 50 MCG/ACT nasal spray 2 sprays into each nostril Daily.   Yes Historical Provider, MD   hydrOXYzine (VISTARIL) 25 MG capsule Take 1 capsule by mouth Every 6 (Six) Hours As Needed for Itching, Allergies or Anxiety.  Patient taking differently: Take 25 mg by mouth every night at bedtime. 4/10/18  Yes Jaret Banks MD   ibuprofen (ADVIL,MOTRIN) 600 MG tablet Take 1 tablet by mouth Every 6 (Six) Hours As Needed for Mild Pain . 7/16/18  Yes Ottoniel Gonsales MD   lisinopril (PRINIVIL,ZESTRIL) 40 MG tablet Take 1 tablet by mouth Daily for 90 days. 7/9/18 10/7/18 Yes Veronika Mayorga MD   metoprolol tartrate (LOPRESSOR) 50 MG tablet Take 1 tablet by mouth Every 12 (Twelve) Hours. 6/8/18  Yes Jaret  "MD Jocelyn   paliperidone palmitate (INVEGA SUSTENNA) 117 MG/0.75ML suspension IM injection Inject 117 mg into the shoulder, thigh, or buttocks Every 30 (Thirty) Days.   Yes Historical Provider, MD   aspirin 325 MG tablet Take 325 mg by mouth Daily.    Historical Provider, MD   nitroglycerin (NITROSTAT) 0.4 MG SL tablet Place 1 tablet under the tongue Every 5 (Five) Minutes As Needed for Chest Pain. Take no more than 3 doses in 15 minutes. 5/1/18   Jaret Banks MD   oxymetazoline (AFRIN) 0.05 % nasal spray 2 sprays into each nostril 2 (Two) Times a Day.    Historical Provider, MD   sodium chloride (OCEAN NASAL SPRAY) 0.65 % nasal spray 1 spray into each nostril As Needed for Congestion. 10/13/17   Jaret Banks MD       Social History     Social History   • Marital status: Single     Spouse name: N/A   • Number of children: N/A   • Years of education: N/A     Occupational History   • Lr      Social History Main Topics   • Smoking status: Current Every Day Smoker     Packs/day: 2.00     Years: 25.00     Types: Cigarettes     Start date: 1992   • Smokeless tobacco: Never Used   • Alcohol use No      Comment: FORMER pint of whiskey daily, states quit 1 year ago   • Drug use: Yes     Types: Marijuana      Comment: Meth, other substances which he declined to discuss further   • Sexual activity: Defer     Other Topics Concern   • Not on file     Social History Narrative    Single, lives alone in [place].  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         Past Medical History:   Diagnosis Date   • Abdominal pain    • Allergic rhinitis    • Diabetes mellitus (CMS/HCC)     Prev HbA1c 14.0 2014, states \"I'm cured!\"   • Essential hypertension    • Fracture closed, fibula, shaft     tib-fib       Family History   Problem Relation Age of Onset   • Diabetes Other    • Hypertension Other    • Cancer Other    • Lung disease Other    • Thyroid disease Other    • Bleeding Disorder Other    • Colon cancer Paternal " Grandmother    • Cancer Paternal Grandfather    • Crohn's disease Mother    • Diabetes Mother    • Hypertension Father    • Lung disease Father    • Hypertension Brother    • Hypertension Brother    • Hypertension Brother        Past Surgical History:   Procedure Laterality Date   • ANKLE SURGERY      right and left   • COLONOSCOPY N/A 6/4/2018    Procedure: COLONOSCOPY;  Surgeon: Jaylon Castro MD;  [facility]: [facility] ENDOSCOPY;  Service: Gastroenterology   • CYST REMOVAL      x 3, from hand, thigh, and buttocks   • ENDOSCOPY      20 years ago   • TIBIA FRACTURE SURGERY Bilateral 2011    tib-fib       Alert nontoxic  Abdomen soft  Perianal area has a proximal centimeter incision left anterior area.  Small amount of clear drainage but no fluctuance erythema or induration appreciated.  Base of his scrotum upon the scrotal sac he has a similar type of wound again no erythema and a questionable area of abscess deep to this but minimal tenderness and patient states that his gotten much smaller and feels much better.    Assessment and plan  Perianal abscess.  Appears to be adequately drained at this point.  Would not recommend further surgery at this point unless this recurs or does not resolve and we discussed that.  Abscess on his scrotum appears to be adequately drained at this point but may need to have further surgery if it does not continue to improve as well.  Fully discussed that with the patient as well.  Would recommend going finish the clindamycin is on follow up with us in 2-3 weeks or sooner if he has a further concerns or questions.  Went over his colonoscopy with him and with a redundant the past and he is aware he needs to have another colonoscopy 3 years from the last one due to the fact that he had an adenomatous polyp removed

## 2018-07-19 ENCOUNTER — EPISODE CHANGES (OUTPATIENT)
Dept: CASE MANAGEMENT | Facility: OTHER | Age: 48
End: 2018-07-19

## 2018-07-23 DIAGNOSIS — E11.8 TYPE 2 DIABETES MELLITUS WITH COMPLICATION, WITHOUT LONG-TERM CURRENT USE OF INSULIN (HCC): ICD-10-CM

## 2018-07-25 RX ORDER — ATORVASTATIN CALCIUM 40 MG/1
40 TABLET, FILM COATED ORAL DAILY
Qty: 90 TABLET | Refills: 3 | Status: SHIPPED | OUTPATIENT
Start: 2018-07-25 | End: 2018-09-19 | Stop reason: SDUPTHER

## 2018-08-01 ENCOUNTER — OFFICE VISIT (OUTPATIENT)
Dept: SURGERY | Facility: CLINIC | Age: 48
End: 2018-08-01

## 2018-08-01 VITALS
BODY MASS INDEX: 43.32 KG/M2 | DIASTOLIC BLOOD PRESSURE: 88 MMHG | SYSTOLIC BLOOD PRESSURE: 136 MMHG | HEIGHT: 67 IN | WEIGHT: 276 LBS

## 2018-08-01 DIAGNOSIS — Z12.11 ENCOUNTER FOR SCREENING COLONOSCOPY: Primary | ICD-10-CM

## 2018-08-01 DIAGNOSIS — N49.2 SCROTAL ABSCESS: ICD-10-CM

## 2018-08-01 DIAGNOSIS — K61.0 PERIANAL ABSCESS: ICD-10-CM

## 2018-08-01 PROCEDURE — 99212 OFFICE O/P EST SF 10 MIN: CPT | Performed by: SURGERY

## 2018-08-01 NOTE — PATIENT INSTRUCTIONS

## 2018-08-01 NOTE — PROGRESS NOTES
Patient to follow-up after recent ER visit and an I&D of abscess on his left buttock.  He also had an abscess on his left hemiscrotum which spontaneously drained as well.  Patient has completed his antibiotic in both sites are clean appear to be healing appropriately.  There is no evidence of any further infection or abscess present at either site.  Patient's had other similar abscesses a different sites in the past and will follow up with us if he has any further recurrence.  The one around his anus could be from his anus and may not be primarily from the skin.  Patient will follow up with us she has any further issues.    He did have a small adenomatous polyp removed on colonoscopy any nausea needs to have follow-up colonoscopy in 3 years from his last colonoscopy.

## 2018-08-06 ENCOUNTER — HOSPITAL ENCOUNTER (EMERGENCY)
Facility: HOSPITAL | Age: 48
Discharge: HOME | End: 2018-08-07
Attending: FAMILY MEDICINE | Admitting: FAMILY MEDICINE

## 2018-08-06 VITALS
SYSTOLIC BLOOD PRESSURE: 173 MMHG | HEIGHT: 67 IN | WEIGHT: 276 LBS | DIASTOLIC BLOOD PRESSURE: 99 MMHG | OXYGEN SATURATION: 90 % | TEMPERATURE: 98.8 F | BODY MASS INDEX: 43.32 KG/M2 | RESPIRATION RATE: 20 BRPM | HEART RATE: 74 BPM

## 2018-08-06 DIAGNOSIS — R73.9 HYPERGLYCEMIA: Primary | ICD-10-CM

## 2018-08-06 DIAGNOSIS — E11.8 TYPE 2 DIABETES MELLITUS WITH COMPLICATION, WITHOUT LONG-TERM CURRENT USE OF INSULIN (HCC): ICD-10-CM

## 2018-08-06 LAB
ALBUMIN SERPL-MCNC: 4 G/DL (ref 3.4–4.8)
ALBUMIN/GLOB SERPL: 1.3 G/DL (ref 1.1–1.8)
ALP SERPL-CCNC: 92 U/L (ref 38–126)
ALT SERPL W P-5'-P-CCNC: 38 U/L (ref 21–72)
ANION GAP SERPL CALCULATED.3IONS-SCNC: 10 MMOL/L (ref 5–15)
ARTERIAL PATENCY WRIST A: POSITIVE
AST SERPL-CCNC: 21 U/L (ref 17–59)
ATMOSPHERIC PRESS: 749 MMHG
BASE EXCESS BLDA CALC-SCNC: 0.2 MMOL/L (ref 0–2)
BASOPHILS # BLD AUTO: 0.09 10*3/MM3 (ref 0–0.2)
BASOPHILS NFR BLD AUTO: 1.2 % (ref 0–2)
BDY SITE: ABNORMAL
BILIRUB SERPL-MCNC: 0.7 MG/DL (ref 0.2–1.3)
BUN BLD-MCNC: 14 MG/DL (ref 7–21)
BUN/CREAT SERPL: 19.2 (ref 7–25)
CALCIUM SPEC-SCNC: 8.7 MG/DL (ref 8.4–10.2)
CHLORIDE SERPL-SCNC: 98 MMOL/L (ref 95–110)
CO2 SERPL-SCNC: 25 MMOL/L (ref 22–31)
CREAT BLD-MCNC: 0.73 MG/DL (ref 0.7–1.3)
DEPRECATED RDW RBC AUTO: 43 FL (ref 35.1–43.9)
EOSINOPHIL # BLD AUTO: 0.25 10*3/MM3 (ref 0–0.7)
EOSINOPHIL NFR BLD AUTO: 3.4 % (ref 0–7)
ERYTHROCYTE [DISTWIDTH] IN BLOOD BY AUTOMATED COUNT: 13.1 % (ref 11.5–14.5)
GFR SERPL CREATININE-BSD FRML MDRD: 114 ML/MIN/1.73 (ref 56–130)
GLOBULIN UR ELPH-MCNC: 3 GM/DL (ref 2.3–3.5)
GLUCOSE BLD-MCNC: 520 MG/DL (ref 60–100)
GLUCOSE BLDC GLUCOMTR-MCNC: 287 MG/DL (ref 70–130)
GLUCOSE BLDC GLUCOMTR-MCNC: 378 MG/DL (ref 70–130)
HCO3 BLDA-SCNC: 25.6 MMOL/L (ref 20–26)
HCT VFR BLD AUTO: 45.5 % (ref 39–49)
HGB BLD-MCNC: 16.8 G/DL (ref 13.7–17.3)
HOLD SPECIMEN: NORMAL
HOLD SPECIMEN: NORMAL
IMM GRANULOCYTES # BLD: 0.01 10*3/MM3 (ref 0–0.02)
IMM GRANULOCYTES NFR BLD: 0.1 % (ref 0–0.5)
LYMPHOCYTES # BLD AUTO: 2.4 10*3/MM3 (ref 0.6–4.2)
LYMPHOCYTES NFR BLD AUTO: 32.5 % (ref 10–50)
Lab: ABNORMAL
MCH RBC QN AUTO: 32.8 PG (ref 26.5–34)
MCHC RBC AUTO-ENTMCNC: 36.9 G/DL (ref 31.5–36.3)
MCV RBC AUTO: 88.9 FL (ref 80–98)
MODALITY: ABNORMAL
MONOCYTES # BLD AUTO: 0.69 10*3/MM3 (ref 0–0.9)
MONOCYTES NFR BLD AUTO: 9.3 % (ref 0–12)
NEUTROPHILS # BLD AUTO: 3.95 10*3/MM3 (ref 2–8.6)
NEUTROPHILS NFR BLD AUTO: 53.5 % (ref 37–80)
PCO2 BLDA: 42.7 MM HG (ref 35–45)
PH BLDA: 7.39 PH UNITS (ref 7.35–7.45)
PLATELET # BLD AUTO: 196 10*3/MM3 (ref 150–450)
PMV BLD AUTO: 10.9 FL (ref 8–12)
PO2 BLDA: 78.3 MM HG (ref 83–108)
POTASSIUM BLD-SCNC: 3.9 MMOL/L (ref 3.5–5.1)
PROT SERPL-MCNC: 7 G/DL (ref 6.3–8.6)
RBC # BLD AUTO: 5.12 10*6/MM3 (ref 4.37–5.74)
SAO2 % BLDCOA: 96.1 % (ref 94–99)
SODIUM BLD-SCNC: 133 MMOL/L (ref 137–145)
VENTILATOR MODE: ABNORMAL
WBC NRBC COR # BLD: 7.39 10*3/MM3 (ref 3.2–9.8)
WHOLE BLOOD HOLD SPECIMEN: NORMAL
WHOLE BLOOD HOLD SPECIMEN: NORMAL

## 2018-08-06 PROCEDURE — 96361 HYDRATE IV INFUSION ADD-ON: CPT

## 2018-08-06 PROCEDURE — 80053 COMPREHEN METABOLIC PANEL: CPT | Performed by: FAMILY MEDICINE

## 2018-08-06 PROCEDURE — 99283 EMERGENCY DEPT VISIT LOW MDM: CPT

## 2018-08-06 PROCEDURE — 82962 GLUCOSE BLOOD TEST: CPT

## 2018-08-06 PROCEDURE — 85025 COMPLETE CBC W/AUTO DIFF WBC: CPT | Performed by: FAMILY MEDICINE

## 2018-08-06 PROCEDURE — 63710000001 INSULIN REGULAR HUMAN PER 5 UNITS: Performed by: FAMILY MEDICINE

## 2018-08-06 PROCEDURE — 36600 WITHDRAWAL OF ARTERIAL BLOOD: CPT

## 2018-08-06 PROCEDURE — 82803 BLOOD GASES ANY COMBINATION: CPT

## 2018-08-06 PROCEDURE — 96360 HYDRATION IV INFUSION INIT: CPT

## 2018-08-06 RX ADMIN — HUMAN INSULIN 6 UNITS: 100 INJECTION, SOLUTION SUBCUTANEOUS at 20:24

## 2018-08-06 RX ADMIN — SODIUM CHLORIDE 1000 ML: 900 INJECTION, SOLUTION INTRAVENOUS at 20:24

## 2018-08-06 RX ADMIN — HUMAN INSULIN 4 UNITS: 100 INJECTION, SOLUTION SUBCUTANEOUS at 22:45

## 2018-08-06 RX ADMIN — SODIUM CHLORIDE 1000 ML: 900 INJECTION, SOLUTION INTRAVENOUS at 22:30

## 2018-08-07 ENCOUNTER — TELEPHONE (OUTPATIENT)
Dept: FAMILY MEDICINE CLINIC | Facility: CLINIC | Age: 48
End: 2018-08-07

## 2018-08-07 NOTE — ED PROVIDER NOTES
Subjective   Patient states that 3 years ago he had a diagnosis of diabetes made, but then lost a significant amount of weight, roughly 100 pounds, and did not require any medications for diabetes anymore.  He then in the past year, gained 75 more pounds, and has noted his blood glucose to be in the 200-500 range at home for the past few months.  Patient states that he has had episodes of fatigue, over the last few months that have worsened the last 4 days.        Hyperglycemia   Severity:  Moderate  Onset quality:  Gradual  Duration:  8 months  Timing:  Intermittent  Progression:  Waxing and waning  Chronicity:  Chronic  Diabetes status:  Controlled with oral medications  Current diabetic therapy:  None  Time since last antidiabetic medication:  8 months  Context: noncompliance    Relieved by:  Nothing  Ineffective treatments:  None tried  Associated symptoms: malaise and weight change    Associated symptoms: no abdominal pain, no chest pain, no confusion, no diaphoresis, no dizziness, no dysuria, no fatigue, no fever, no nausea, no polyuria, no shortness of breath, no vomiting and no weakness    Risk factors: obesity        Review of Systems   Constitutional: Negative for appetite change, chills, diaphoresis, fatigue and fever.   HENT: Negative for congestion, ear discharge, ear pain, nosebleeds, rhinorrhea, sinus pressure, sore throat and trouble swallowing.    Eyes: Negative for discharge and redness.   Respiratory: Negative for apnea, cough, chest tightness, shortness of breath and wheezing.    Cardiovascular: Negative for chest pain.   Gastrointestinal: Negative for abdominal pain, diarrhea, nausea and vomiting.   Endocrine: Negative for polyuria.   Genitourinary: Negative for dysuria, frequency and urgency.   Musculoskeletal: Negative for myalgias and neck pain.   Skin: Negative for color change and rash.   Allergic/Immunologic: Negative for immunocompromised state.   Neurological: Negative for dizziness,  "seizures, syncope, weakness, light-headedness and headaches.   Hematological: Negative for adenopathy. Does not bruise/bleed easily.   Psychiatric/Behavioral: Negative for behavioral problems and confusion.   All other systems reviewed and are negative.      Past Medical History:   Diagnosis Date   • Abdominal pain    • Allergic rhinitis    • Diabetes mellitus (CMS/[facility])     Prev HbA1c 14.0 2014, states \"I'm cured!\"   • Essential hypertension    • Fracture closed, fibula, shaft     tib-fib       Allergies   Allergen Reactions   • Latex Irritability   • Codeine Nausea Only       Past Surgical History:   Procedure Laterality Date   • ANKLE SURGERY      right and left   • COLONOSCOPY N/A 6/4/2018    Procedure: COLONOSCOPY;  Surgeon: Jaylon Castro MD;  Location: [facility] ENDOSCOPY;  Service: Gastroenterology   • CYST REMOVAL      x 3, from hand, thigh, and buttocks   • ENDOSCOPY      20 years ago   • TIBIA FRACTURE SURGERY Bilateral 2011    tib-fib       Family History   Problem Relation Age of Onset   • Diabetes Other    • Hypertension Other    • Cancer Other    • Lung disease Other    • Thyroid disease Other    • Bleeding Disorder Other    • Colon cancer Paternal Grandmother    • Cancer Paternal Grandfather    • Crohn's disease Mother    • Diabetes Mother    • Hypertension Father    • Lung disease Father    • Hypertension Brother    • Hypertension Brother    • Hypertension Brother        Social History     Social History   • Marital status: Single     Occupational History   • Lr      Social History Main Topics   • Smoking status: Former Smoker     Packs/day: 2.00     Years: 25.00     Types: Cigarettes     Start date: 1992   • Smokeless tobacco: Never Used   • Alcohol use No      Comment: FORMER pint of whiskey daily, states quit 1 year ago   • Drug use: No      Comment: patient reports prior drug abuse, but sober for 8 months now.   • Sexual activity: Defer     Other Topics Concern   • Not on file     Social " History Narrative    Single, lives alone in [place].  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             Objective   Physical Exam   Constitutional: He is oriented to person, place, and time. He appears well-developed and well-nourished.   HENT:   Head: Normocephalic and atraumatic.   Nose: Nose normal.   Mouth/Throat: Oropharynx is clear and moist.   Eyes: Pupils are equal, round, and reactive to light. Conjunctivae and EOM are normal. Right eye exhibits no discharge. Left eye exhibits no discharge. No scleral icterus.   Neck: Normal range of motion. Neck supple. No tracheal deviation present.   Cardiovascular: Normal rate, regular rhythm and normal heart sounds.    No murmur heard.  Pulmonary/Chest: Effort normal and breath sounds normal. No stridor. No respiratory distress. He has no wheezes. He has no rales.   Abdominal: Soft. Bowel sounds are normal. He exhibits no distension and no mass. There is no tenderness. There is no rebound and no guarding.   Musculoskeletal: He exhibits no edema.   Neurological: He is alert and oriented to person, place, and time. Coordination normal.   Skin: Skin is warm and dry. No rash noted. No erythema.   Psychiatric: He has a normal mood and affect. His behavior is normal. Thought content normal.   Nursing note and vitals reviewed.      Procedures           ED Course          Labs Reviewed   COMPREHENSIVE METABOLIC PANEL - Abnormal; Notable for the following:        Result Value    Glucose 520 (*)     Sodium 133 (*)     All other components within normal limits   CBC WITH AUTO DIFFERENTIAL - Abnormal; Notable for the following:     MCHC 36.9 (*)     All other components within normal limits   BLOOD GAS, ARTERIAL - Abnormal; Notable for the following:     pO2, Arterial 78.3 (*)     All other components within normal limits   POCT GLUCOSE FINGERSTICK - Abnormal; Notable for the following:     Glucose 378 (*)     All other components within normal limits   POCT GLUCOSE  FINGERSTICK - Abnormal; Notable for the following:     Glucose 287 (*)     All other components within normal limits   RAINBOW DRAW    Narrative:     The following orders were created for panel order [facility] Draw.  Procedure                               Abnormality         Status                     ---------                               -----------         ------                     Light Blue Top[[id number]]                                   Final result               Green Top (Gel)[[id number]]                                  Final result               Lavender Top[[id number]]                                     Final result               Gold Top - SST[[id number]]                                   Final result                 Please view results for these tests on the individual orders.   BLOOD GAS, ARTERIAL   LIGHT BLUE TOP   GREEN TOP   LAVENDER TOP   GOLD TOP - SST   CBC AND DIFFERENTIAL    Narrative:     The following orders were created for panel order CBC & Differential.  Procedure                               Abnormality         Status                     ---------                               -----------         ------                     CBC Auto Differential[[id number]]        Abnormal            Final result                 Please view results for these tests on the individual orders.       No orders to display                 MDM      Final diagnoses:   Hyperglycemia            Andrea Solano MD  08/07/18 0000

## 2018-08-08 LAB
GLUCOSE BLDC GLUCOMTR-MCNC: 404 MG/DL (ref 70–130)
GLUCOSE BLDC GLUCOMTR-MCNC: 480 MG/DL (ref 70–130)
GLUCOSE BLDC GLUCOMTR-MCNC: 533 MG/DL (ref 70–130)

## 2018-08-08 RX ORDER — METOPROLOL TARTRATE 50 MG/1
TABLET, FILM COATED ORAL
Qty: 180 TABLET | Refills: 1 | Status: SHIPPED | OUTPATIENT
Start: 2018-08-08 | End: 2018-09-14 | Stop reason: SDUPTHER

## 2018-08-10 ENCOUNTER — OFFICE VISIT (OUTPATIENT)
Dept: FAMILY MEDICINE CLINIC | Facility: CLINIC | Age: 48
End: 2018-08-10

## 2018-08-10 ENCOUNTER — APPOINTMENT (OUTPATIENT)
Dept: LAB | Facility: HOSPITAL | Age: 48
End: 2018-08-10

## 2018-08-10 VITALS
BODY MASS INDEX: 41.91 KG/M2 | DIASTOLIC BLOOD PRESSURE: 82 MMHG | HEART RATE: 84 BPM | HEIGHT: 67 IN | SYSTOLIC BLOOD PRESSURE: 128 MMHG | OXYGEN SATURATION: 96 % | WEIGHT: 267 LBS

## 2018-08-10 DIAGNOSIS — L84 CALLUS: ICD-10-CM

## 2018-08-10 DIAGNOSIS — Z71.6 ENCOUNTER FOR TOBACCO USE CESSATION COUNSELING: ICD-10-CM

## 2018-08-10 DIAGNOSIS — E11.8 TYPE 2 DIABETES MELLITUS WITH COMPLICATION, WITHOUT LONG-TERM CURRENT USE OF INSULIN (HCC): Primary | ICD-10-CM

## 2018-08-10 LAB
ANION GAP SERPL CALCULATED.3IONS-SCNC: 11 MMOL/L (ref 5–15)
BUN BLD-MCNC: 14 MG/DL (ref 7–21)
BUN/CREAT SERPL: 19.4 (ref 7–25)
CALCIUM SPEC-SCNC: 9.2 MG/DL (ref 8.4–10.2)
CHLORIDE SERPL-SCNC: 99 MMOL/L (ref 95–110)
CO2 SERPL-SCNC: 24 MMOL/L (ref 22–31)
CREAT BLD-MCNC: 0.72 MG/DL (ref 0.7–1.3)
GFR SERPL CREATININE-BSD FRML MDRD: 116 ML/MIN/1.73 (ref 56–130)
GLUCOSE BLD-MCNC: 320 MG/DL (ref 60–100)
HBA1C MFR BLD: 12.8 % (ref 4–5.6)
POTASSIUM BLD-SCNC: 4.2 MMOL/L (ref 3.5–5.1)
SODIUM BLD-SCNC: 134 MMOL/L (ref 137–145)

## 2018-08-10 PROCEDURE — 83036 HEMOGLOBIN GLYCOSYLATED A1C: CPT | Performed by: STUDENT IN AN ORGANIZED HEALTH CARE EDUCATION/TRAINING PROGRAM

## 2018-08-10 PROCEDURE — 36415 COLL VENOUS BLD VENIPUNCTURE: CPT | Performed by: STUDENT IN AN ORGANIZED HEALTH CARE EDUCATION/TRAINING PROGRAM

## 2018-08-10 PROCEDURE — 80048 BASIC METABOLIC PNL TOTAL CA: CPT | Performed by: STUDENT IN AN ORGANIZED HEALTH CARE EDUCATION/TRAINING PROGRAM

## 2018-08-10 PROCEDURE — 99213 OFFICE O/P EST LOW 20 MIN: CPT | Performed by: STUDENT IN AN ORGANIZED HEALTH CARE EDUCATION/TRAINING PROGRAM

## 2018-08-10 PROCEDURE — 99406 BEHAV CHNG SMOKING 3-10 MIN: CPT | Performed by: STUDENT IN AN ORGANIZED HEALTH CARE EDUCATION/TRAINING PROGRAM

## 2018-08-10 RX ORDER — BLOOD-GLUCOSE METER
1 KIT MISCELLANEOUS AS NEEDED
Qty: 1 EACH | Refills: 0 | Status: SHIPPED | OUTPATIENT
Start: 2018-08-10 | End: 2019-10-08 | Stop reason: SDUPTHER

## 2018-08-10 NOTE — PATIENT INSTRUCTIONS
For more information:    Quit Now [place]  1-800-QUIT-NOW  https://[place].quitlogix.org/en-[place]/  Steps to Quit Smoking  Smoking tobacco can be harmful to your health and can affect almost every organ in your body. Smoking puts you, and those around you, at risk for developing many serious chronic diseases. Quitting smoking is difficult, but it is one of the best things that you can do for your health. It is never too late to quit.  What are the benefits of quitting smoking?  When you quit smoking, you lower your risk of developing serious diseases and conditions, such as:  · Lung cancer or lung disease, such as COPD.  · Heart disease.  · Stroke.  · Heart attack.  · Infertility.  · Osteoporosis and bone fractures.  Additionally, symptoms such as coughing, wheezing, and shortness of breath may get better when you quit. You may also find that you get sick less often because your body is stronger at fighting off colds and infections. If you are pregnant, quitting smoking can help to reduce your chances of having a baby of low birth weight.  How do I get ready to quit?  When you decide to quit smoking, create a plan to make sure that you are successful. Before you quit:  · Pick a date to quit. Set a date within the next two weeks to give you time to prepare.  · Write down the reasons why you are quitting. Keep this list in places where you will see it often, such as on your bathroom mirror or in your car or wallet.  · Identify the people, places, things, and activities that make you want to smoke (triggers) and avoid them. Make sure to take these actions:  ¨ Throw away all cigarettes at home, at work, and in your car.  ¨ Throw away smoking accessories, such as ashtrays and lighters.  ¨ Clean your car and make sure to empty the ashtray.  ¨ Clean your home, including curtains and carpets.  · Tell your family, friends, and coworkers that you are quitting. Support from your loved ones can make quitting easier.  · Talk with  your health care provider about your options for quitting smoking.  · Find out what treatment options are covered by your health insurance.  What strategies can I use to quit smoking?  Talk with your healthcare provider about different strategies to quit smoking. Some strategies include:  · Quitting smoking altogether instead of gradually lessening how much you smoke over a period of time. Research shows that quitting “cold turkey” is more successful than gradually quitting.  · Attending in-person counseling to help you build problem-solving skills. You are more likely to have success in quitting if you attend several counseling sessions. Even short sessions of 10 minutes can be effective.  · Finding resources and support systems that can help you to quit smoking and remain smoke-free after you quit. These resources are most helpful when you use them often. They can include:  ¨ Online chats with a counselor.  ¨ Telephone quitlines.  ¨ Printed self-help materials.  ¨ Support groups or group counseling.  ¨ Text messaging programs.  ¨ Mobile phone applications.  · Taking medicines to help you quit smoking. (If you are pregnant or breastfeeding, talk with your health care provider first.) Some medicines contain nicotine and some do not. Both types of medicines help with cravings, but the medicines that include nicotine help to relieve withdrawal symptoms. Your health care provider may recommend:  ¨ Nicotine patches, gum, or lozenges.  ¨ Nicotine inhalers or sprays.  ¨ Non-nicotine medicine that is taken by mouth.  Talk with your health care provider about combining strategies, such as taking medicines while you are also receiving in-person counseling. Using these two strategies together makes you more likely to succeed in quitting than if you used either strategy on its own.  If you are pregnant or breastfeeding, talk with your health care provider about finding counseling or other support strategies to quit smoking. Do  not take medicine to help you quit smoking unless told to do so by your health care provider.  What things can I do to make it easier to quit?  Quitting smoking might feel overwhelming at first, but there is a lot that you can do to make it easier. Take these important actions:  · Reach out to your family and friends and ask that they support and encourage you during this time. Call telephone quitlines, reach out to support groups, or work with a counselor for support.  · Ask people who smoke to avoid smoking around you.  · Avoid places that trigger you to smoke, such as bars, parties, or smoke-break areas at work.  · Spend time around people who do not smoke.  · Lessen stress in your life, because stress can be a smoking trigger for some people. To lessen stress, try:  ¨ Exercising regularly.  ¨ Deep-breathing exercises.  ¨ Yoga.  ¨ Meditating.  ¨ Performing a body scan. This involves closing your eyes, scanning your body from head to toe, and noticing which parts of your body are particularly tense. Purposefully relax the muscles in those areas.  · Download or purchase mobile phone or tablet apps (applications) that can help you stick to your quit plan by providing reminders, tips, and encouragement. There are many free apps, such as QuitGuide from the CDC (Centers for Disease Control and Prevention). You can find other support for quitting smoking (smoking cessation) through smokefree.gov and other websites.  How will I feel when I quit smoking?  Within the first 24 hours of quitting smoking, you may start to feel some withdrawal symptoms. These symptoms are usually most noticeable 2-3 days after quitting, but they usually do not last beyond 2-3 weeks. Changes or symptoms that you might experience include:  · Mood swings.  · Restlessness, anxiety, or irritation.  · Difficulty concentrating.  · Dizziness.  · Strong cravings for sugary foods in addition to nicotine.  · Mild weight  gain.  · Constipation.  · Nausea.  · Coughing or a sore throat.  · Changes in how your medicines work in your body.  · A depressed mood.  · Difficulty sleeping (insomnia).  After the first 2-3 weeks of quitting, you may start to notice more positive results, such as:  · Improved sense of smell and taste.  · Decreased coughing and sore throat.  · Slower heart rate.  · Lower blood pressure.  · Clearer skin.  · The ability to breathe more easily.  · Fewer sick days.  Quitting smoking is very challenging for most people. Do not get discouraged if you are not successful the first time. Some people need to make many attempts to quit before they achieve long-term success. Do your best to stick to your quit plan, and talk with your health care provider if you have any questions or concerns.  This information is not intended to replace advice given to you by your health care provider. Make sure you discuss any questions you have with your health care provider.  Document Released: 12/12/2002 Document Revised: 08/15/2017 Document Reviewed: 05/03/2016  Elsevier Interactive Patient Education © 2017 [employer]

## 2018-08-10 NOTE — PROGRESS NOTES
ID: Kingsley Littlejohn    CC:   Chief Complaint   Patient presents with   • Diabetes       Subjective:     Kingsley Littlejohn is a 48 y.o. male who presents for diabetes ER f/u.    Diabetes   He presents for his follow-up diabetic visit. He has type 2 diabetes mellitus. No MedicAlert identification noted. His disease course has been worsening. There are no hypoglycemic associated symptoms. Pertinent negatives for hypoglycemia include no confusion, dizziness, headaches, hunger, mood changes, nervousness/anxiousness, pallor, seizures, sleepiness, speech difficulty or sweats. Associated symptoms include weight loss. Pertinent negatives for diabetes include no blurred vision, no chest pain, no fatigue, no foot paresthesias, no foot ulcerations, no polydipsia, no polyphagia, no polyuria, no visual change and no weakness. There are no hypoglycemic complications. Pertinent negatives for hypoglycemia complications include no blackouts, no hospitalization, no nocturnal hypoglycemia, no required assistance and no required glucagon injection. Symptoms are stable. Pertinent negatives for diabetic complications include no CVA, heart disease, nephropathy or peripheral neuropathy. Risk factors for coronary artery disease include diabetes mellitus, dyslipidemia, family history, male sex, obesity, hypertension, stress, sedentary lifestyle and tobacco exposure. Current diabetic treatment includes oral agent (monotherapy) and diet. He is compliant with treatment all of the time. His weight is decreasing steadily. He is following a diabetic, generally healthy and low fat/cholesterol diet. When asked about meal planning, he reported none. He has not had a previous visit with a dietitian. He rarely participates in exercise. Home blood sugar record trend: unknown. An ACE inhibitor/angiotensin II receptor blocker is being taken. He does not see a podiatrist.Eye exam is not current.        Preventative:  Over the past 2 weeks, have you felt  "down, depressed, or hopeless?No   Over the past 2 weeks, have you felt little interest or pleasure in doing things?No  Clinical depression screening refused by patient.No     On osteoporosis therapy?No     PHQ-9 Depression Screening  Little interest or pleasure in doing things? 0   Feeling down, depressed, or hopeless? 0   Trouble falling or staying asleep, or sleeping too much? 0   Feeling tired or having little energy? 1   Poor appetite or overeating? 0   Feeling bad about yourself - or that you are a failure or have let yourself or your family down? 0   Trouble concentrating on things, such as reading the newspaper or watching television? 0   Moving or speaking so slowly that other people could have noticed? Or the opposite - being so fidgety or restless that you have been moving around a lot more than usual? 0   Thoughts that you would be better off dead, or of hurting yourself in some way? 0   PHQ-9 Total Score 1   If you checked off any problems, how difficult have these problems made it for you to do your work, take care of things at home, or get along with other people? Not difficult at all         Past Medical Hx:  Past Medical History:   Diagnosis Date   • Abdominal pain    • Allergic rhinitis    • Diabetes mellitus (CMS/[facility])     Prev HbA1c 14.0 2014, states \"I'm cured!\"   • Essential hypertension    • Fracture closed, fibula, shaft     tib-fib       Past Surgical Hx:  Past Surgical History:   Procedure Laterality Date   • ANKLE SURGERY      right and left   • COLONOSCOPY N/A 6/4/2018    Procedure: COLONOSCOPY;  Surgeon: Jaylon Castro MD;  Location: [facility] ENDOSCOPY;  Service: Gastroenterology   • CYST REMOVAL      x 3, from hand, thigh, and buttocks   • ENDOSCOPY      20 years ago   • TIBIA FRACTURE SURGERY Bilateral 2011    tib-fib       Health Maintenance:  Health Maintenance   Topic Date Due   • DIABETIC EYE EXAM  05/26/2017   • ZOSTER VACCINE (1 of 2) 02/20/2018   • MEDICARE ANNUAL WELLNESS  " 07/14/2018   • INFLUENZA VACCINE  08/01/2018   • HEMOGLOBIN A1C  10/24/2018   • DIABETIC FOOT EXAM  05/01/2019   • URINE MICROALBUMIN  05/01/2019   • LIPID PANEL  06/08/2019   • TDAP/TD VACCINES (2 - Td) 01/01/2021   • COLONOSCOPY  06/04/2028   • PNEUMOCOCCAL VACCINE (19-64 MEDIUM RISK)  Addressed       Current Meds:    Current Outpatient Prescriptions:   •  aspirin 325 MG tablet, Take 325 mg by mouth Daily., Disp: , Rfl:   •  atorvastatin (LIPITOR) 40 MG tablet, TAKE 1 TABLET BY MOUTH DAILY, Disp: 90 tablet, Rfl: 3  •  clindamycin (CLEOCIN) 150 MG capsule, Take 3 capsules by mouth 3 (Three) Times a Day., Disp: 90 capsule, Rfl: 0  •  fluticasone (FLONASE) 50 MCG/ACT nasal spray, 2 sprays into each nostril Daily., Disp: , Rfl:   •  hydrOXYzine (VISTARIL) 25 MG capsule, Take 1 capsule by mouth Every 6 (Six) Hours As Needed for Itching, Allergies or Anxiety. (Patient taking differently: Take 25 mg by mouth every night at bedtime.), Disp: 120 capsule, Rfl: 2  •  ibuprofen (ADVIL,MOTRIN) 600 MG tablet, Take 1 tablet by mouth Every 6 (Six) Hours As Needed for Mild Pain ., Disp: 15 tablet, Rfl: 0  •  lisinopril (PRINIVIL,ZESTRIL) 40 MG tablet, Take 1 tablet by mouth Daily for 90 days., Disp: 90 tablet, Rfl: 3  •  metFORMIN (GLUCOPHAGE) 500 MG tablet, Take 1 tablet by mouth 2 (Two) Times a Day With Meals., Disp: 60 tablet, Rfl: 0  •  metoprolol tartrate (LOPRESSOR) 50 MG tablet, TAKE 1 TABLET BY MOUTH EVERY 12 HOURS, Disp: 180 tablet, Rfl: 1  •  NICOTINE STEP 1 TD, Place  on the skin as directed by provider., Disp: , Rfl:   •  nitroglycerin (NITROSTAT) 0.4 MG SL tablet, Place 1 tablet under the tongue Every 5 (Five) Minutes As Needed for Chest Pain. Take no more than 3 doses in 15 minutes., Disp: 30 tablet, Rfl: 12  •  oxymetazoline (AFRIN) 0.05 % nasal spray, 2 sprays into each nostril 2 (Two) Times a Day., Disp: , Rfl:   •  paliperidone palmitate (INVEGA SUSTENNA) 117 MG/0.75ML suspension IM injection, Inject 117 mg into  the shoulder, thigh, or buttocks Every 30 (Thirty) Days., Disp: , Rfl:   •  sodium chloride (OCEAN NASAL SPRAY) 0.65 % nasal spray, 1 spray into each nostril As Needed for Congestion., Disp: 60 mL, Rfl: 12  •  glucose monitor monitoring kit, 1 each As Needed (Diabetes)., Disp: 1 each, Rfl: 0    Allergies:  Latex and Codeine    Family Hx:  Family History   Problem Relation Age of Onset   • Diabetes Other    • Hypertension Other    • Cancer Other    • Lung disease Other    • Thyroid disease Other    • Bleeding Disorder Other    • Colon cancer Paternal Grandmother    • Cancer Paternal Grandfather    • Crohn's disease Mother    • Diabetes Mother    • Hypertension Father    • Lung disease Father    • Hypertension Brother    • Hypertension Brother    • Hypertension Brother         Social History:  Social History     Social History   • Marital status: Single     Spouse name: N/A   • Number of children: N/A   • Years of education: N/A     Occupational History   • Lr      Social History Main Topics   • Smoking status: Former Smoker     Packs/day: 2.00     Years: 25.00     Types: Cigarettes     Start date: 1992   • Smokeless tobacco: Never Used   • Alcohol use No      Comment: FORMER pint of whiskey daily, states quit 1 year ago   • Drug use: No      Comment: patient reports prior drug abuse, but sober for 8 months now.   • Sexual activity: Defer     Other Topics Concern   • Not on file     Social History Narrative    Single, lives alone in [place].  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         Review of Systems   Constitutional: Positive for weight loss. Negative for activity change, appetite change, chills, diaphoresis, fatigue and fever.   HENT: Negative for congestion, ear pain, rhinorrhea, sneezing and sore throat.    Eyes: Negative for blurred vision, pain, redness, itching and visual disturbance.   Respiratory: Negative for cough, choking, chest tightness, shortness of breath, wheezing and stridor.   "  Cardiovascular: Negative for chest pain, palpitations and leg swelling.   Gastrointestinal: Negative for abdominal distention, abdominal pain, blood in stool, constipation, diarrhea, nausea, rectal pain and vomiting.   Endocrine: Negative for polydipsia, polyphagia and polyuria.   Genitourinary: Negative for difficulty urinating, dysuria, flank pain and frequency.   Skin: Negative for color change, pallor and rash.   Neurological: Negative for dizziness, seizures, syncope, speech difficulty, weakness, light-headedness, numbness and headaches.   Psychiatric/Behavioral: Negative for agitation, confusion, decreased concentration and sleep disturbance. The patient is not nervous/anxious.    All other systems reviewed and are negative.          Objective:     /82 (BP Location: Left arm, Patient Position: Sitting, Cuff Size: Large Adult)   Pulse 84   Ht 170.2 cm (67\")   Wt 121 kg (267 lb)   SpO2 96%   BMI 41.82 kg/m²     Physical Exam   Constitutional: He is oriented to person, place, and time. He appears well-developed and well-nourished.  Non-toxic appearance. He does not have a sickly appearance. He does not appear ill. No distress.   HENT:   Head: Normocephalic and atraumatic.   Right Ear: External ear normal. No lacerations. No swelling or tenderness.   Left Ear: External ear normal. No lacerations. No swelling or tenderness.   Nose: Nose normal.   Mouth/Throat: Uvula is midline, oropharynx is clear and moist and mucous membranes are normal.   Eyes: Pupils are equal, round, and reactive to light. Conjunctivae, EOM and lids are normal. No scleral icterus.   Neck: No tracheal deviation present. No thyromegaly present.   Cardiovascular: Normal heart sounds and intact distal pulses.  Exam reveals no gallop, no distant heart sounds, no friction rub and no decreased pulses.    No murmur heard.  Pulmonary/Chest: Effort normal and breath sounds normal. No accessory muscle usage or stridor. No respiratory " distress. He has no decreased breath sounds. He has no wheezes. He has no rhonchi. He has no rales. He exhibits no tenderness.   Abdominal: Soft. Bowel sounds are normal. There is no tenderness. There is no rigidity, no rebound and no guarding.   Musculoskeletal:        Right lower leg: He exhibits no swelling.        Left lower leg: He exhibits no swelling.        Right foot: There is no swelling.        Left foot: There is no swelling.     Vascular Status -  His right foot exhibits no edema. His left foot exhibits no edema.  Lymphadenopathy:     He has no cervical adenopathy.   Neurological: He is alert and oriented to person, place, and time. He has normal strength. No cranial nerve deficit or sensory deficit. He exhibits normal muscle tone. GCS eye subscore is 4. GCS verbal subscore is 5. GCS motor subscore is 6.   Skin: Skin is warm and dry. Capillary refill takes less than 2 seconds. No rash noted. He is not diaphoretic. No erythema. No pallor.   Psychiatric: He has a normal mood and affect. His speech is normal.   Nursing note and vitals reviewed.        Assessment/Plan:     Kingsley was seen today for diabetes.    Diagnoses and all orders for this visit:    Type 2 diabetes mellitus with complication, without long-term current use of insulin (CMS/[facility])  -     Basic metabolic panel  -     Hemoglobin A1c  -     Ambulatory Referral for Diabetic Eye Exam-Ophthalmology  -     glucose monitor monitoring kit; 1 each As Needed (Diabetes).    Callus  -     Ambulatory Referral to Podiatry    Encounter for tobacco use cessation counseling          Follow-up:     Return in about 1 week (around 8/17/2018) for Diabetes recheck.      Goals: improve diabetes  Barriers to goals: pt compliance    Health Maintenance   Topic Date Due   • DIABETIC EYE EXAM  05/26/2017   • ZOSTER VACCINE (1 of 2) 02/20/2018   • MEDICARE ANNUAL WELLNESS  07/14/2018   • INFLUENZA VACCINE  08/01/2018   • HEMOGLOBIN A1C  10/24/2018   • DIABETIC FOOT EXAM   05/01/2019   • URINE MICROALBUMIN  05/01/2019   • LIPID PANEL  06/08/2019   • TDAP/TD VACCINES (2 - Td) 01/01/2021   • COLONOSCOPY  06/04/2028   • PNEUMOCOCCAL VACCINE (19-64 MEDIUM RISK)  Addressed     I advised the patient of the risks in continuing to use tobacco, and I provided this patient with smoking cessation educational materials. During this visit, I spent 3-10 minutes counseling the patient regarding smoking cessation    Tobacco: Smoking cessation counseling was provided.  Alcohol: does not drink  Lifestyle: Body mass index is 41.82 kg/m². eat more fruits and vegetables, decrease soda or juice intake, increase water intake, increase physical activity, reduce screen time, reduce portion size, cut out extra servings, reduce fast food intake, family to eat at dinner table more often, keep TV off during meals, plan meals, eat breakfast and have 3 meals a day    RISK SCORE: 4        This document has been electronically signed by Yang Veloz MD on August 10, 2018 1:47 PM

## 2018-08-22 ENCOUNTER — OFFICE VISIT (OUTPATIENT)
Dept: FAMILY MEDICINE CLINIC | Facility: CLINIC | Age: 48
End: 2018-08-22

## 2018-08-22 VITALS
OXYGEN SATURATION: 95 % | BODY MASS INDEX: 41.37 KG/M2 | DIASTOLIC BLOOD PRESSURE: 80 MMHG | WEIGHT: 263.6 LBS | SYSTOLIC BLOOD PRESSURE: 126 MMHG | HEIGHT: 67 IN | HEART RATE: 92 BPM

## 2018-08-22 DIAGNOSIS — Z71.6 ENCOUNTER FOR TOBACCO USE CESSATION COUNSELING: ICD-10-CM

## 2018-08-22 DIAGNOSIS — E11.9 TYPE 2 DIABETES MELLITUS WITHOUT COMPLICATION, WITHOUT LONG-TERM CURRENT USE OF INSULIN (HCC): Primary | ICD-10-CM

## 2018-08-22 PROCEDURE — 99407 BEHAV CHNG SMOKING > 10 MIN: CPT | Performed by: STUDENT IN AN ORGANIZED HEALTH CARE EDUCATION/TRAINING PROGRAM

## 2018-08-22 PROCEDURE — 99213 OFFICE O/P EST LOW 20 MIN: CPT | Performed by: STUDENT IN AN ORGANIZED HEALTH CARE EDUCATION/TRAINING PROGRAM

## 2018-08-22 RX ORDER — LANCETS
1 EACH MISCELLANEOUS
Status: CANCELLED | OUTPATIENT
Start: 2018-08-22

## 2018-08-22 RX ORDER — BLOOD-GLUCOSE METER
EACH MISCELLANEOUS
Refills: 0 | COMMUNITY
Start: 2018-08-10 | End: 2019-09-06 | Stop reason: SDUPTHER

## 2018-08-22 NOTE — PROGRESS NOTES
ID: Kingsley Littlejohn    CC:   Chief Complaint   Patient presents with   • Diabetes       Subjective:     Kingsley Littlejohn is a 48 y.o. male who presents for diabetes ER f/u.    Patient's BMP showed a blood glucose of 380.  Patient's hemoglobin A1c was 12.8.      Diabetes   He presents for his follow-up diabetic visit. He has type 2 diabetes mellitus. No MedicAlert identification noted. His disease course has been worsening. There are no hypoglycemic associated symptoms. Pertinent negatives for hypoglycemia include no confusion, dizziness, headaches, hunger, mood changes, nervousness/anxiousness, pallor, seizures, sleepiness, speech difficulty or sweats. Associated symptoms include weight loss. Pertinent negatives for diabetes include no blurred vision, no chest pain, no fatigue, no foot paresthesias, no foot ulcerations, no polydipsia, no polyphagia, no polyuria, no visual change and no weakness. There are no hypoglycemic complications. Pertinent negatives for hypoglycemia complications include no blackouts, no hospitalization, no nocturnal hypoglycemia, no required assistance and no required glucagon injection. Symptoms are stable. Pertinent negatives for diabetic complications include no CVA, heart disease, nephropathy or peripheral neuropathy. Risk factors for coronary artery disease include diabetes mellitus, dyslipidemia, family history, male sex, obesity, hypertension, stress, sedentary lifestyle and tobacco exposure. Current diabetic treatment includes diet and oral agent (dual therapy). He is compliant with treatment all of the time. His weight is decreasing steadily. He is following a diabetic, generally healthy and low fat/cholesterol diet. When asked about meal planning, he reported none. He has not had a previous visit with a dietitian. He rarely participates in exercise. His home blood glucose trend is decreasing steadily (unknown). An ACE inhibitor/angiotensin II receptor blocker is being taken. He  "does not see a podiatrist.Eye exam is not current.        Preventative:  Over the past 2 weeks, have you felt down, depressed, or hopeless?No   Over the past 2 weeks, have you felt little interest or pleasure in doing things?No  Clinical depression screening refused by patient.No     On osteoporosis therapy?No     Past Medical Hx:  Past Medical History:   Diagnosis Date   • Abdominal pain    • Allergic rhinitis    • Diabetes mellitus (CMS/[facility])     Prev HbA1c 14.0 2014, states \"I'm cured!\"   • Essential hypertension    • Fracture closed, fibula, shaft     tib-fib       Past Surgical Hx:  Past Surgical History:   Procedure Laterality Date   • ANKLE SURGERY      right and left   • COLONOSCOPY N/A 6/4/2018    Procedure: COLONOSCOPY;  Surgeon: Jaylon Castro MD;  Location: [facility] ENDOSCOPY;  Service: Gastroenterology   • CYST REMOVAL      x 3, from hand, thigh, and buttocks   • ENDOSCOPY      20 years ago   • TIBIA FRACTURE SURGERY Bilateral 2011    tib-fib       Health Maintenance:  Health Maintenance   Topic Date Due   • DIABETIC EYE EXAM  05/26/2017   • ZOSTER VACCINE (1 of 2) 02/20/2018   • MEDICARE ANNUAL WELLNESS  07/14/2018   • INFLUENZA VACCINE  08/01/2018   • HEMOGLOBIN A1C  02/10/2019   • DIABETIC FOOT EXAM  05/01/2019   • URINE MICROALBUMIN  05/01/2019   • LIPID PANEL  06/08/2019   • TDAP/TD VACCINES (2 - Td) 01/01/2021   • COLONOSCOPY  06/04/2028   • PNEUMOCOCCAL VACCINE (19-64 MEDIUM RISK)  Addressed       Current Meds:    Current Outpatient Prescriptions:   •  aspirin 325 MG tablet, Take 325 mg by mouth Daily., Disp: , Rfl:   •  atorvastatin (LIPITOR) 40 MG tablet, TAKE 1 TABLET BY MOUTH DAILY, Disp: 90 tablet, Rfl: 3  •  Blood Glucose Monitoring Suppl (ONE TOUCH ULTRA 2) w/Device kit, U UTD, Disp: , Rfl: 0  •  clindamycin (CLEOCIN) 150 MG capsule, Take 3 capsules by mouth 3 (Three) Times a Day., Disp: 90 capsule, Rfl: 0  •  fluticasone (FLONASE) 50 MCG/ACT nasal spray, 2 sprays into each nostril Daily., " Disp: , Rfl:   •  glucose monitor monitoring kit, 1 each As Needed (Diabetes)., Disp: 1 each, Rfl: 0  •  hydrOXYzine (VISTARIL) 25 MG capsule, Take 1 capsule by mouth Every 6 (Six) Hours As Needed for Itching, Allergies or Anxiety. (Patient taking differently: Take 25 mg by mouth every night at bedtime.), Disp: 120 capsule, Rfl: 2  •  ibuprofen (ADVIL,MOTRIN) 600 MG tablet, Take 1 tablet by mouth Every 6 (Six) Hours As Needed for Mild Pain ., Disp: 15 tablet, Rfl: 0  •  lisinopril (PRINIVIL,ZESTRIL) 40 MG tablet, Take 1 tablet by mouth Daily for 90 days., Disp: 90 tablet, Rfl: 3  •  metFORMIN (GLUCOPHAGE) 500 MG tablet, Take 2 tablets by mouth 2 (Two) Times a Day With Meals., Disp: 60 tablet, Rfl: 0  •  metoprolol tartrate (LOPRESSOR) 50 MG tablet, TAKE 1 TABLET BY MOUTH EVERY 12 HOURS, Disp: 180 tablet, Rfl: 1  •  NICOTINE STEP 1 TD, Place  on the skin as directed by provider., Disp: , Rfl:   •  nitroglycerin (NITROSTAT) 0.4 MG SL tablet, Place 1 tablet under the tongue Every 5 (Five) Minutes As Needed for Chest Pain. Take no more than 3 doses in 15 minutes., Disp: 30 tablet, Rfl: 12  •  oxymetazoline (AFRIN) 0.05 % nasal spray, 2 sprays into each nostril 2 (Two) Times a Day., Disp: , Rfl:   •  paliperidone palmitate (INVEGA SUSTENNA) 117 MG/0.75ML suspension IM injection, Inject 117 mg into the shoulder, thigh, or buttocks Every 30 (Thirty) Days., Disp: , Rfl:   •  sodium chloride (OCEAN NASAL SPRAY) 0.65 % nasal spray, 1 spray into each nostril As Needed for Congestion., Disp: 60 mL, Rfl: 12  •  glucose blood test strip, 1 each by Other route 2 (Two) Times a Day for 180 days. Use as instructed, Disp: 180 each, Rfl: 1  •  ONE TOUCH LANCETS misc, 1 each 2 (Two) Times a Day for 90 days., Disp: 200 each, Rfl: 5  •  SITagliptin (JANUVIA) 100 MG tablet, Take 1 tablet by mouth Daily for 30 days., Disp: 30 tablet, Rfl: 0    Allergies:  Latex and Codeine    Family Hx:  Family History   Problem Relation Age of Onset   •  Diabetes Other    • Hypertension Other    • Cancer Other    • Lung disease Other    • Thyroid disease Other    • Bleeding Disorder Other    • Colon cancer Paternal Grandmother    • Cancer Paternal Grandfather    • Crohn's disease Mother    • Diabetes Mother    • Hypertension Father    • Lung disease Father    • Hypertension Brother    • Hypertension Brother    • Hypertension Brother         Social History:  Social History     Social History   • Marital status: Single     Spouse name: N/A   • Number of children: N/A   • Years of education: N/A     Occupational History   • Lr      Social History Main Topics   • Smoking status: Former Smoker     Packs/day: 2.00     Years: 25.00     Types: Cigarettes     Start date: 1992   • Smokeless tobacco: Never Used   • Alcohol use No      Comment: FORMER pint of whiskey daily, states quit 1 year ago   • Drug use: No      Comment: patient reports prior drug abuse, but sober for 8 months now.   • Sexual activity: Defer     Other Topics Concern   • Not on file     Social History Narrative    Single, lives alone in [place].  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         Review of Systems   Constitutional: Positive for weight loss. Negative for activity change, appetite change, chills, diaphoresis, fatigue and fever.   HENT: Negative for congestion, ear pain, rhinorrhea, sneezing and sore throat.    Eyes: Negative for blurred vision, pain, redness, itching and visual disturbance.   Respiratory: Negative for cough, choking, chest tightness, shortness of breath, wheezing and stridor.    Cardiovascular: Negative for chest pain, palpitations and leg swelling.   Gastrointestinal: Negative for abdominal distention, abdominal pain, blood in stool, constipation, diarrhea, nausea, rectal pain and vomiting.   Endocrine: Negative for polydipsia, polyphagia and polyuria.   Genitourinary: Negative for difficulty urinating, dysuria, flank pain and frequency.   Skin: Negative for  "color change, pallor and rash.   Neurological: Negative for dizziness, seizures, syncope, speech difficulty, weakness, light-headedness, numbness and headaches.   Psychiatric/Behavioral: Negative for agitation, confusion, decreased concentration and sleep disturbance. The patient is not nervous/anxious.    All other systems reviewed and are negative.          Objective:     /80 (BP Location: Left arm, Patient Position: Sitting, Cuff Size: Large Adult)   Pulse 92   Ht 170.2 cm (67\")   Wt 120 kg (263 lb 9.6 oz)   SpO2 95%   BMI 41.29 kg/m²     Physical Exam   Constitutional: He is oriented to person, place, and time. He appears well-developed and well-nourished.  Non-toxic appearance. He does not have a sickly appearance. He does not appear ill. No distress.   HENT:   Head: Normocephalic and atraumatic.   Right Ear: External ear normal. No lacerations. No swelling or tenderness.   Left Ear: External ear normal. No lacerations. No swelling or tenderness.   Nose: Nose normal.   Mouth/Throat: Uvula is midline, oropharynx is clear and moist and mucous membranes are normal.   Eyes: Pupils are equal, round, and reactive to light. Conjunctivae, EOM and lids are normal. No scleral icterus.   Neck: No tracheal deviation present. No thyromegaly present.   Cardiovascular: Normal heart sounds and intact distal pulses.  Exam reveals no gallop, no distant heart sounds, no friction rub and no decreased pulses.    No murmur heard.  Pulmonary/Chest: Effort normal and breath sounds normal. No accessory muscle usage or stridor. No respiratory distress. He has no decreased breath sounds. He has no wheezes. He has no rhonchi. He has no rales. He exhibits no tenderness.   Abdominal: Soft. Bowel sounds are normal. There is no tenderness. There is no rigidity, no rebound and no guarding.   Musculoskeletal:        Right lower leg: He exhibits no swelling.        Left lower leg: He exhibits no swelling.        Right foot: There is " no swelling.        Left foot: There is no swelling.     Vascular Status -  His right foot exhibits no edema. His left foot exhibits no edema.  Lymphadenopathy:     He has no cervical adenopathy.   Neurological: He is alert and oriented to person, place, and time. He has normal strength. No cranial nerve deficit or sensory deficit. He exhibits normal muscle tone. GCS eye subscore is 4. GCS verbal subscore is 5. GCS motor subscore is 6.   Skin: Skin is warm and dry. Capillary refill takes less than 2 seconds. No rash noted. He is not diaphoretic. No erythema. No pallor.   Psychiatric: He has a normal mood and affect. His speech is normal.   Nursing note and vitals reviewed.        Assessment/Plan:     Kingsley was seen today for diabetes.    Diagnoses and all orders for this visit:    Type 2 diabetes mellitus without complication, without long-term current use of insulin (CMS/[facility])  -     metFORMIN (GLUCOPHAGE) 500 MG tablet; Take 2 tablets by mouth 2 (Two) Times a Day With Meals.  -     SITagliptin (JANUVIA) 100 MG tablet; Take 1 tablet by mouth Daily for 30 days.  -     Lipid Panel  -     Microalbumin / Creatinine Urine Ratio - Urine, Clean Catch  -     ONE TOUCH LANCETS misc; 1 each 2 (Two) Times a Day for 90 days.  -     glucose blood test strip; 1 each by Other route 2 (Two) Times a Day for 180 days. Use as instructed    Encounter for tobacco use cessation counseling    Other orders  -     Cancel: ONE TOUCH CLUB LANCETS misc; 1 BAU.      Follow-up:     Return in about 4 weeks (around 9/19/2018) for Recheck diabetes.      Goals: improve diabetes  Barriers to goals: pt compliance    Health Maintenance   Topic Date Due   • DIABETIC EYE EXAM  05/26/2017   • ZOSTER VACCINE (1 of 2) 02/20/2018   • MEDICARE ANNUAL WELLNESS  07/14/2018   • INFLUENZA VACCINE  08/01/2018   • HEMOGLOBIN A1C  02/10/2019   • DIABETIC FOOT EXAM  05/01/2019   • URINE MICROALBUMIN  05/01/2019   • LIPID PANEL  06/08/2019   • TDAP/TD VACCINES (2 -  Td) 01/01/2021   • COLONOSCOPY  06/04/2028   • PNEUMOCOCCAL VACCINE (19-64 MEDIUM RISK)  Addressed     I advised the patient of the risks in continuing to use tobacco, and I provided this patient with smoking cessation educational materials. During this visit, I spent >10 minutes counseling the patient regarding smoking cessation    Tobacco: Smoking cessation counseling was provided.  Alcohol: does not drink  Lifestyle: Body mass index is 41.29 kg/m². eat more fruits and vegetables, decrease soda or juice intake, increase water intake, increase physical activity, reduce screen time, reduce portion size, cut out extra servings, reduce fast food intake, family to eat at dinner table more often, keep TV off during meals, plan meals, eat breakfast and have 3 meals a day    RISK SCORE: 4        This document has been electronically signed by Yang Veloz MD on August 22, 2018 4:45 PM

## 2018-09-04 ENCOUNTER — APPOINTMENT (OUTPATIENT)
Dept: LAB | Facility: HOSPITAL | Age: 48
End: 2018-09-04

## 2018-09-04 ENCOUNTER — OFFICE VISIT (OUTPATIENT)
Dept: FAMILY MEDICINE CLINIC | Facility: CLINIC | Age: 48
End: 2018-09-04

## 2018-09-04 VITALS
HEIGHT: 67 IN | OXYGEN SATURATION: 97 % | DIASTOLIC BLOOD PRESSURE: 78 MMHG | WEIGHT: 261.38 LBS | HEART RATE: 88 BPM | BODY MASS INDEX: 41.02 KG/M2 | SYSTOLIC BLOOD PRESSURE: 118 MMHG

## 2018-09-04 DIAGNOSIS — L03.116 CELLULITIS OF LEFT LOWER EXTREMITY: Primary | ICD-10-CM

## 2018-09-04 DIAGNOSIS — E11.9 TYPE 2 DIABETES MELLITUS WITHOUT COMPLICATION, WITHOUT LONG-TERM CURRENT USE OF INSULIN (HCC): ICD-10-CM

## 2018-09-04 LAB
ALBUMIN UR-MCNC: 0.9 MG/L
ARTICHOKE IGE QN: 58 MG/DL (ref 1–129)
CHOLEST SERPL-MCNC: 119 MG/DL (ref 0–199)
CREAT UR-MCNC: 74 MG/DL
HDLC SERPL-MCNC: 30 MG/DL (ref 60–200)
LDLC/HDLC SERPL: 1.09 {RATIO} (ref 0–3.55)
MICROALBUMIN/CREAT UR: 12.2 MG/G (ref 0–30)
TRIGL SERPL-MCNC: 281 MG/DL (ref 20–199)

## 2018-09-04 PROCEDURE — 99213 OFFICE O/P EST LOW 20 MIN: CPT | Performed by: FAMILY MEDICINE

## 2018-09-04 PROCEDURE — 36415 COLL VENOUS BLD VENIPUNCTURE: CPT | Performed by: STUDENT IN AN ORGANIZED HEALTH CARE EDUCATION/TRAINING PROGRAM

## 2018-09-04 PROCEDURE — 82043 UR ALBUMIN QUANTITATIVE: CPT | Performed by: STUDENT IN AN ORGANIZED HEALTH CARE EDUCATION/TRAINING PROGRAM

## 2018-09-04 PROCEDURE — 80061 LIPID PANEL: CPT | Performed by: STUDENT IN AN ORGANIZED HEALTH CARE EDUCATION/TRAINING PROGRAM

## 2018-09-04 PROCEDURE — 82570 ASSAY OF URINE CREATININE: CPT | Performed by: STUDENT IN AN ORGANIZED HEALTH CARE EDUCATION/TRAINING PROGRAM

## 2018-09-04 RX ORDER — CEPHALEXIN 500 MG/1
500 CAPSULE ORAL 2 TIMES DAILY
Qty: 20 CAPSULE | Refills: 0 | Status: SHIPPED | OUTPATIENT
Start: 2018-09-04 | End: 2018-09-14

## 2018-09-04 RX ORDER — HYDROXYZINE HYDROCHLORIDE 25 MG/1
TABLET, FILM COATED ORAL
Refills: 1 | COMMUNITY
Start: 2018-08-22 | End: 2018-12-17 | Stop reason: SDUPTHER

## 2018-09-04 NOTE — PROGRESS NOTES
"  Subjective:     Kingsley Littlejohn is a 48 y.o. male who presents for initial evaluation for 2 day history of swelling, erythema and tenderness of LLE. Pt denies chest pain, dyspnea, nausea, vomiting, diarrhea, fever or chills. Pt has not taken anything for this. Pt is agreeable to starting a course of keflex and obtaining a LLE US.    Preventative:  Over the past 2 weeks, have you felt down, depressed, or hopeless?No   Over the past 2 weeks, have you felt little interest or pleasure in doing things?No  Clinical depression screening refused by patient.No     Past Medical Hx:  Past Medical History:   Diagnosis Date   • Abdominal pain    • Allergic rhinitis    • Diabetes mellitus (CMS/[facility])     Prev HbA1c 14.0 2014, states \"I'm cured!\"   • Essential hypertension    • Fracture closed, fibula, shaft     tib-fib       Past Surgical Hx:  Past Surgical History:   Procedure Laterality Date   • ANKLE SURGERY      right and left   • COLONOSCOPY N/A 6/4/2018    Procedure: COLONOSCOPY;  Surgeon: Jaylon Castro MD;  Location: [facility] ENDOSCOPY;  Service: Gastroenterology   • CYST REMOVAL      x 3, from hand, thigh, and buttocks   • ENDOSCOPY      20 years ago   • TIBIA FRACTURE SURGERY Bilateral 2011    tib-fib       Health Maintenance:  Health Maintenance   Topic Date Due   • DIABETIC EYE EXAM  05/26/2017   • ZOSTER VACCINE (1 of 2) 02/20/2018   • MEDICARE ANNUAL WELLNESS  07/14/2018   • INFLUENZA VACCINE  08/01/2018   • HEMOGLOBIN A1C  02/10/2019   • LIPID PANEL  09/04/2019   • URINE MICROALBUMIN  09/04/2019   • DIABETIC FOOT EXAM  09/05/2019   • TDAP/TD VACCINES (2 - Td) 01/01/2021   • COLONOSCOPY  06/04/2028   • PNEUMOCOCCAL VACCINE (19-64 MEDIUM RISK)  Addressed       Current Meds:    Current Outpatient Prescriptions:   •  aspirin 325 MG tablet, Take 325 mg by mouth Daily., Disp: , Rfl:   •  atorvastatin (LIPITOR) 40 MG tablet, TAKE 1 TABLET BY MOUTH DAILY, Disp: 90 tablet, Rfl: 3  •  Blood Glucose Monitoring Suppl (ONE " TOUCH ULTRA 2) w/Device kit, U UTD, Disp: , Rfl: 0  •  cephalexin (KEFLEX) 500 MG capsule, Take 1 capsule by mouth 2 (Two) Times a Day for 10 days., Disp: 20 capsule, Rfl: 0  •  clindamycin (CLEOCIN) 150 MG capsule, Take 3 capsules by mouth 3 (Three) Times a Day., Disp: 90 capsule, Rfl: 0  •  fluticasone (FLONASE) 50 MCG/ACT nasal spray, 2 sprays into each nostril Daily., Disp: , Rfl:   •  glucose blood test strip, 1 each by Other route 2 (Two) Times a Day for 180 days. Use as instructed, Disp: 180 each, Rfl: 1  •  glucose monitor monitoring kit, 1 each As Needed (Diabetes)., Disp: 1 each, Rfl: 0  •  hydrOXYzine (ATARAX) 25 MG tablet, TK 1 T PO TID PRA, Disp: , Rfl: 1  •  hydrOXYzine (VISTARIL) 25 MG capsule, Take 1 capsule by mouth Every 6 (Six) Hours As Needed for Itching, Allergies or Anxiety. (Patient taking differently: Take 25 mg by mouth every night at bedtime.), Disp: 120 capsule, Rfl: 2  •  ibuprofen (ADVIL,MOTRIN) 600 MG tablet, Take 1 tablet by mouth Every 6 (Six) Hours As Needed for Mild Pain ., Disp: 15 tablet, Rfl: 0  •  lisinopril (PRINIVIL,ZESTRIL) 40 MG tablet, Take 1 tablet by mouth Daily for 90 days., Disp: 90 tablet, Rfl: 3  •  metFORMIN (GLUCOPHAGE) 500 MG tablet, Take 2 tablets by mouth 2 (Two) Times a Day With Meals., Disp: 60 tablet, Rfl: 0  •  metoprolol tartrate (LOPRESSOR) 50 MG tablet, TAKE 1 TABLET BY MOUTH EVERY 12 HOURS, Disp: 180 tablet, Rfl: 1  •  NICOTINE STEP 1 TD, Place  on the skin as directed by provider., Disp: , Rfl:   •  nitroglycerin (NITROSTAT) 0.4 MG SL tablet, Place 1 tablet under the tongue Every 5 (Five) Minutes As Needed for Chest Pain. Take no more than 3 doses in 15 minutes., Disp: 30 tablet, Rfl: 12  •  ONE TOUCH LANCETS misc, 1 each 2 (Two) Times a Day for 90 days., Disp: 200 each, Rfl: 5  •  oxymetazoline (AFRIN) 0.05 % nasal spray, 2 sprays into each nostril 2 (Two) Times a Day., Disp: , Rfl:   •  paliperidone palmitate (INVEGA SUSTENNA) 117 MG/0.75ML suspension  IM injection, Inject 117 mg into the shoulder, thigh, or buttocks Every 30 (Thirty) Days., Disp: , Rfl:   •  SITagliptin (JANUVIA) 100 MG tablet, Take 1 tablet by mouth Daily for 30 days., Disp: 30 tablet, Rfl: 0  •  sodium chloride (OCEAN NASAL SPRAY) 0.65 % nasal spray, 1 spray into each nostril As Needed for Congestion., Disp: 60 mL, Rfl: 12    Allergies:  Latex and Codeine    Family Hx:  Family History   Problem Relation Age of Onset   • Diabetes Other    • Hypertension Other    • Cancer Other    • Lung disease Other    • Thyroid disease Other    • Bleeding Disorder Other    • Colon cancer Paternal Grandmother    • Cancer Paternal Grandfather    • Crohn's disease Mother    • Diabetes Mother    • Hypertension Father    • Lung disease Father    • Hypertension Brother    • Hypertension Brother    • Hypertension Brother         Social History:  Social History     Social History   • Marital status: Single     Spouse name: N/A   • Number of children: N/A   • Years of education: N/A     Occupational History   • Lr      Social History Main Topics   • Smoking status: Former Smoker     Packs/day: 2.00     Years: 25.00     Types: Cigarettes     Start date: 1992   • Smokeless tobacco: Never Used   • Alcohol use No      Comment: FORMER pint of whiskey daily, states quit 1 year ago   • Drug use: No      Comment: patient reports prior drug abuse, but sober for 8 months now.   • Sexual activity: Defer     Other Topics Concern   • Not on file     Social History Narrative    Single, lives alone in [place].  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         Review of Systems  Review of Systems   Constitutional: Negative for chills and fever.   HENT: Negative for congestion and sore throat.    Eyes: Negative for photophobia and visual disturbance.   Respiratory: Negative for cough, shortness of breath and wheezing.    Cardiovascular: Positive for leg swelling. Negative for chest pain and palpitations.  "  Gastrointestinal: Negative for abdominal pain, blood in stool, diarrhea, nausea and vomiting.   Genitourinary: Negative for dysuria and hematuria.   Musculoskeletal: Negative for neck pain and neck stiffness.   Skin: Positive for rash. Negative for wound.   Neurological: Negative for seizures and syncope.   Psychiatric/Behavioral: Negative for agitation and confusion.         Objective:     /78   Pulse 88   Ht 170.2 cm (67\")   Wt 119 kg (261 lb 6 oz)   SpO2 97%   BMI 40.94 kg/m²   Physical Exam   Constitutional: He is oriented to person, place, and time. He appears well-developed and well-nourished. No distress.   HENT:   Head: Normocephalic and atraumatic.   Right Ear: External ear normal.   Left Ear: External ear normal.   Nose: Nose normal.   Eyes: Conjunctivae are normal. Right eye exhibits no discharge. Left eye exhibits no discharge. No scleral icterus.   Neck: Normal range of motion. Neck supple.   Cardiovascular: Normal rate, regular rhythm and normal heart sounds.    Pulmonary/Chest: Effort normal and breath sounds normal. No respiratory distress. He has no wheezes. He has no rales.   Abdominal: Soft. Bowel sounds are normal. He exhibits no distension. There is no tenderness.   Musculoskeletal: Normal range of motion. He exhibits edema and tenderness.   Neurological: He is alert and oriented to person, place, and time.   Skin: Skin is warm and dry. Capillary refill takes less than 2 seconds. Rash noted. He is not diaphoretic.   Small area of rash on pts LLE   Psychiatric: He has a normal mood and affect. His behavior is normal. Judgment and thought content normal.   Nursing note and vitals reviewed.                                                                     Assessment/Plan:     Diagnoses and all orders for this visit:    Cellulitis of left lower extremity  -     cephalexin (KEFLEX) 500 MG capsule; Take 1 capsule by mouth 2 (Two) Times a Day for 10 days.  -     US venous doppler lower " extremity left (duplex); Future    Type 2 diabetes mellitus without complication, without long-term current use of insulin (CMS/HCC)  -     metFORMIN (GLUCOPHAGE) 500 MG tablet; Take 2 tablets by mouth 2 (Two) Times a Day With Meals.    Other orders  -     hydrOXYzine (ATARAX) 25 MG tablet; TK 1 T PO TID PRA         Follow-up:     Return in about 2 weeks (around 9/18/2018) for Next scheduled follow up.        GOALS:  Maintain medication compliance    Preventative:  Male Preventative: Exercises regularly  Vaccines:   Tetanus vaccine: up to date  Annual influenza vaccine: not up to date - declined   Pneumococcal vaccine: not up to date - declined    former smoker  does not drink  eat more fruits and vegetables, decrease soda or juice intake, increase water intake and increase physical activity    RISK SCORE: 3    Yang Guerrero MD PGY2  [place] [address]  Office: [phone]      This document has been electronically signed by Yang Guerrero MD on September 8, 2018 9:21 AM

## 2018-09-05 ENCOUNTER — OFFICE VISIT (OUTPATIENT)
Dept: PODIATRY | Facility: CLINIC | Age: 48
End: 2018-09-05

## 2018-09-05 VITALS
OXYGEN SATURATION: 97 % | SYSTOLIC BLOOD PRESSURE: 149 MMHG | HEART RATE: 80 BPM | DIASTOLIC BLOOD PRESSURE: 99 MMHG | BODY MASS INDEX: 41.91 KG/M2 | HEIGHT: 67 IN | WEIGHT: 267 LBS

## 2018-09-05 DIAGNOSIS — B35.1 ONYCHOMYCOSIS: ICD-10-CM

## 2018-09-05 DIAGNOSIS — L84 CALLUS OF FOOT: ICD-10-CM

## 2018-09-05 DIAGNOSIS — G89.29 CHRONIC PAIN OF TOE OF LEFT FOOT: ICD-10-CM

## 2018-09-05 DIAGNOSIS — M79.675 CHRONIC PAIN OF TOE OF LEFT FOOT: ICD-10-CM

## 2018-09-05 DIAGNOSIS — IMO0002 UNCONTROLLED TYPE 2 DIABETES MELLITUS WITH PERIPHERAL NEUROPATHY: Primary | ICD-10-CM

## 2018-09-05 DIAGNOSIS — M79.674 CHRONIC PAIN OF TOE OF RIGHT FOOT: ICD-10-CM

## 2018-09-05 DIAGNOSIS — G89.29 CHRONIC PAIN OF TOE OF RIGHT FOOT: ICD-10-CM

## 2018-09-05 PROCEDURE — 11721 DEBRIDE NAIL 6 OR MORE: CPT | Performed by: PODIATRIST

## 2018-09-05 PROCEDURE — 99203 OFFICE O/P NEW LOW 30 MIN: CPT | Performed by: PODIATRIST

## 2018-09-05 NOTE — PROGRESS NOTES
"Kingsley Littlejohn  1970  48 y.o. male    [id number]    [id number] per patient    09/05/2018    Chief Complaint   Patient presents with   • Left Foot - diabetic nail care   • Right Foot - daibetic nail care       History of Present Illness    Kingsley Littlejohn is a 48 y.o.male who presents to clinic for diabetic foot exam and footcare.  Patient states that he is a recently diagnosed diabetic patient.  His blood sugars are not well controlled.  His most recent blood sugar was 409 mg/dL.  He admits to burning sensation to both his feet.  He also complains of long painful toenails.  He describes the pain as achy and worse with close toed shoes.  He is unable to trim them himself due to the thickness and the fact that he has gained 75 pounds and cannot reach them.  Pain in the toes is relieved with debridement.  He has no other pedal complaints at this time.      Past Medical History:   Diagnosis Date   • Abdominal pain    • Allergic rhinitis    • Diabetes mellitus (CMS/[facility])     Prev HbA1c 14.0 2014, states \"I'm cured!\"   • Essential hypertension    • Fracture closed, fibula, shaft     tib-fib         Past Surgical History:   Procedure Laterality Date   • ANKLE SURGERY      right and left   • COLONOSCOPY N/A 6/4/2018    Procedure: COLONOSCOPY;  Surgeon: Jaylon Castro MD;  Location: [facility] ENDOSCOPY;  Service: Gastroenterology   • CYST REMOVAL      x 3, from hand, thigh, and buttocks   • ENDOSCOPY      20 years ago   • TIBIA FRACTURE SURGERY Bilateral 2011    tib-fib         Family History   Problem Relation Age of Onset   • Diabetes Other    • Hypertension Other    • Cancer Other    • Lung disease Other    • Thyroid disease Other    • Bleeding Disorder Other    • Colon cancer Paternal Grandmother    • Cancer Paternal Grandfather    • Crohn's disease Mother    • Diabetes Mother    • Hypertension Father    • Lung disease Father    • Hypertension Brother    • Hypertension Brother    • Hypertension Brother  "       Allergies   Allergen Reactions   • Latex Irritability   • Codeine Nausea Only       Social History     Social History   • Marital status: Single     Spouse name: N/A   • Number of children: N/A   • Years of education: N/A     Occupational History   • Lr      Social History Main Topics   • Smoking status: Former Smoker     Packs/day: 2.00     Years: 25.00     Types: Cigarettes     Start date: 1992   • Smokeless tobacco: Never Used   • Alcohol use No      Comment: FORMER pint of whiskey daily, states quit 1 year ago   • Drug use: No      Comment: patient reports prior drug abuse, but sober for 8 months now.   • Sexual activity: Defer     Other Topics Concern   • Not on file     Social History Narrative    Single, lives alone in [place].  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           Current Outpatient Prescriptions   Medication Sig Dispense Refill   • aspirin 325 MG tablet Take 325 mg by mouth Daily.     • atorvastatin (LIPITOR) 40 MG tablet TAKE 1 TABLET BY MOUTH DAILY 90 tablet 3   • Blood Glucose Monitoring Suppl (ONE TOUCH ULTRA 2) w/Device kit U UTD  0   • cephalexin (KEFLEX) 500 MG capsule Take 1 capsule by mouth 2 (Two) Times a Day for 10 days. 20 capsule 0   • clindamycin (CLEOCIN) 150 MG capsule Take 3 capsules by mouth 3 (Three) Times a Day. 90 capsule 0   • fluticasone (FLONASE) 50 MCG/ACT nasal spray 2 sprays into each nostril Daily.     • glucose blood test strip 1 each by Other route 2 (Two) Times a Day for 180 days. Use as instructed 180 each 1   • glucose monitor monitoring kit 1 each As Needed (Diabetes). 1 each 0   • hydrOXYzine (ATARAX) 25 MG tablet TK 1 T PO TID PRA  1   • hydrOXYzine (VISTARIL) 25 MG capsule Take 1 capsule by mouth Every 6 (Six) Hours As Needed for Itching, Allergies or Anxiety. (Patient taking differently: Take 25 mg by mouth every night at bedtime.) 120 capsule 2   • ibuprofen (ADVIL,MOTRIN) 600 MG tablet Take 1 tablet by mouth Every 6 (Six) Hours As  "Needed for Mild Pain . 15 tablet 0   • lisinopril (PRINIVIL,ZESTRIL) 40 MG tablet Take 1 tablet by mouth Daily for 90 days. 90 tablet 3   • metFORMIN (GLUCOPHAGE) 500 MG tablet Take 2 tablets by mouth 2 (Two) Times a Day With Meals. 60 tablet 0   • metoprolol tartrate (LOPRESSOR) 50 MG tablet TAKE 1 TABLET BY MOUTH EVERY 12 HOURS 180 tablet 1   • NICOTINE STEP 1 TD Place  on the skin as directed by provider.     • nitroglycerin (NITROSTAT) 0.4 MG SL tablet Place 1 tablet under the tongue Every 5 (Five) Minutes As Needed for Chest Pain. Take no more than 3 doses in 15 minutes. 30 tablet 12   • ONE TOUCH LANCETS misc 1 each 2 (Two) Times a Day for 90 days. 200 each 5   • oxymetazoline (AFRIN) 0.05 % nasal spray 2 sprays into each nostril 2 (Two) Times a Day.     • paliperidone palmitate (INVEGA SUSTENNA) 117 MG/0.75ML suspension IM injection Inject 117 mg into the shoulder, thigh, or buttocks Every 30 (Thirty) Days.     • SITagliptin (JANUVIA) 100 MG tablet Take 1 tablet by mouth Daily for 30 days. 30 tablet 0   • sodium chloride (OCEAN NASAL SPRAY) 0.65 % nasal spray 1 spray into each nostril As Needed for Congestion. 60 mL 12     No current facility-administered medications for this visit.        Review of Systems   Constitutional: Negative.    HENT: Negative.    Eyes: Negative.    Respiratory: Negative.    Cardiovascular: Positive for leg swelling.   Gastrointestinal: Negative.    Genitourinary: Positive for frequency.   Musculoskeletal:        Toe pain    Skin:        cellulitis   Allergic/Immunologic: Negative.    Neurological: Negative.    Hematological: Negative.    Psychiatric/Behavioral: Negative.          OBJECTIVE    /99   Pulse 80   Ht 170.2 cm (67\")   Wt 121 kg (267 lb)   SpO2 97%   BMI 41.82 kg/m²       Physical Exam   Constitutional: He is oriented to person, place, and time. He appears well-developed and well-nourished.   HENT:   Head: Normocephalic and atraumatic.   Nose: Nose normal. "   Eyes: Pupils are equal, round, and reactive to light. Conjunctivae and EOM are normal.   Pulmonary/Chest: Effort normal. No respiratory distress. He has no wheezes.    Kingsley had a diabetic foot exam performed today.  Neurological: He is alert and oriented to person, place, and time. He displays normal reflexes.   Skin: Skin is warm and dry. Capillary refill takes less than 2 seconds. He is not diaphoretic.   Psychiatric: He has a normal mood and affect. His behavior is normal.   Vitals reviewed.      Gait: Normal     Assistive Device: None    Lower Extremity    Cardiovascular:    DP/PT pulses palpable bilateral  CFT brisk  to all digits  Skin temp is warm to warm from proximal tibia to distal digits bilateral  Pedal hair growth present.   No erythema or edema noted     Musculoskeletal:  Muscle strength is 5/5 for all muscle groups tested   ROM of the 1st MTP is full without pain or crepitus bilateral  ROM of the MTJ is full without pain or crepitus  bilateral  ROM of the STJ is full without pain or crepitus bilateral   ROM of the ankle joint is full without pain or crepitus  bilateral     Dermatological:   Nails 1-5 bilateral are thickened, discolored, elongated.  Pain on palpation to the nail plates.  Skin is warm, dry and intact bilateral  Webspaces 1-4 bilateral are clean, dry and intact.   No subcutaneous nodules or masses noted    No open wounds noted   Hyperkeratotic tissue noted to the medial IPJ of the hallux bilateral    Neurological:   Protective sensation intact   Sensation intact to light touch        Procedures        ASSESSMENT AND PLAN    Kingsley was seen today for diabetic nail care and daibetic nail care.    Diagnoses and all orders for this visit:    Uncontrolled type 2 diabetes mellitus with peripheral neuropathy (CMS/HCC)    Onychomycosis    Chronic pain of toe of left foot    Chronic pain of toe of right foot    Callus of foot      - A diabetic foot screening exam was performed and the patient  was educated on the foot complications related to diabetes,  preventative foot care and what signs and symptoms to watch for.  Instructed to contact our office if any foot problems develop before next visit.  - Nails 1-5 bilateral were debrided in length and thickness with nail nipper and electric  to decrease fungal load and risk of infection.   - Recommended amlactin bid and gentle pumice stone to calluses after bathing  - All his questions were answered  - RTC in 3 months as needed            This document has been electronically signed by Abhijeet Thomason DPM on September 5, 2018 1:03 PM     9/5/2018  1:03 PM

## 2018-09-07 ENCOUNTER — HOSPITAL ENCOUNTER (OUTPATIENT)
Dept: ULTRASOUND IMAGING | Facility: HOSPITAL | Age: 48
Discharge: HOME | End: 2018-09-07
Attending: FAMILY MEDICINE | Admitting: FAMILY MEDICINE

## 2018-09-07 DIAGNOSIS — L03.116 CELLULITIS OF LEFT LOWER EXTREMITY: ICD-10-CM

## 2018-09-07 PROCEDURE — 93971 EXTREMITY STUDY: CPT

## 2018-09-11 NOTE — PROGRESS NOTES
I have reviewed the notes, assessments, and/or procedures performed by Dr. Guerrero, I concur with her/his documentation of Kingsley Littlejohn.

## 2018-09-13 ENCOUNTER — OFFICE VISIT (OUTPATIENT)
Dept: SLEEP MEDICINE | Facility: HOSPITAL | Age: 48
End: 2018-09-13

## 2018-09-13 VITALS
BODY MASS INDEX: 41.91 KG/M2 | HEART RATE: 82 BPM | SYSTOLIC BLOOD PRESSURE: 155 MMHG | HEIGHT: 67 IN | WEIGHT: 267 LBS | DIASTOLIC BLOOD PRESSURE: 92 MMHG

## 2018-09-13 DIAGNOSIS — G47.33 OBSTRUCTIVE SLEEP APNEA, ADULT: Primary | ICD-10-CM

## 2018-09-13 PROCEDURE — 99214 OFFICE O/P EST MOD 30 MIN: CPT | Performed by: INTERNAL MEDICINE

## 2018-09-13 NOTE — PROGRESS NOTES
CHIEF COMPLAINT: follow up home sleep study    HPI:    The patient is a 48 y.o. male.  He returns to sleep clinic to discuss the results of the recent home sleep study performed on 06/18/2018.  The AHI/LORI was 38.    INTERVAL MEDICAL HISTORY: started on autocpap 5-20 cm H2O. his sx of MAXIME are improved with less excessive daytime sleepiness, reduced naps, and better sleep at night. Diagnosed with diabetes.  PAP Data:    Time frame: 08/14/2018 - 09/12/2018   Compliance 86.7 %  Average use on days used: 5hrs 10 min  Percent of days with usage greater than or equal to 4 hours: 66.7%  PAP range : 5-20 cm H2O  Average 90% pressure: 5.7 cmH2O  Leak: < 1 minutes  Average AHI 1.4 events/hr  Mask type: nasal  DME: [facility]    Bed time: 2100  Sleep latency: 10 minutes  Number of times awakens during the night: 3  Wake time: 0715  Estimated total sleep time at night: 10 hours  Caffeine intake: 1 pot of coffee, 0oz of tea, and 24oz of soda  Alcohol intake: 0 drinks per week  Nap time: none  Sleepiness with Driving: none      MEDICATIONS:   Current Outpatient Prescriptions:   •  aspirin 325 MG tablet, Take 325 mg by mouth Daily., Disp: , Rfl:   •  atorvastatin (LIPITOR) 40 MG tablet, TAKE 1 TABLET BY MOUTH DAILY, Disp: 90 tablet, Rfl: 3  •  Blood Glucose Monitoring Suppl (ONE TOUCH ULTRA 2) w/Device kit, U UTD, Disp: , Rfl: 0  •  cephalexin (KEFLEX) 500 MG capsule, Take 1 capsule by mouth 2 (Two) Times a Day for 10 days., Disp: 20 capsule, Rfl: 0  •  clindamycin (CLEOCIN) 150 MG capsule, Take 3 capsules by mouth 3 (Three) Times a Day., Disp: 90 capsule, Rfl: 0  •  fluticasone (FLONASE) 50 MCG/ACT nasal spray, 2 sprays into each nostril Daily., Disp: , Rfl:   •  glucose blood test strip, 1 each by Other route 2 (Two) Times a Day for 180 days. Use as instructed, Disp: 180 each, Rfl: 1  •  glucose monitor monitoring kit, 1 each As Needed (Diabetes)., Disp: 1 each, Rfl: 0  •  hydrOXYzine (ATARAX) 25 MG tablet, TK 1 T PO TID PRA, Disp: ,  Rfl: 1  •  hydrOXYzine (VISTARIL) 25 MG capsule, Take 1 capsule by mouth Every 6 (Six) Hours As Needed for Itching, Allergies or Anxiety. (Patient taking differently: Take 25 mg by mouth every night at bedtime.), Disp: 120 capsule, Rfl: 2  •  ibuprofen (ADVIL,MOTRIN) 600 MG tablet, Take 1 tablet by mouth Every 6 (Six) Hours As Needed for Mild Pain ., Disp: 15 tablet, Rfl: 0  •  lisinopril (PRINIVIL,ZESTRIL) 40 MG tablet, Take 1 tablet by mouth Daily for 90 days., Disp: 90 tablet, Rfl: 3  •  metFORMIN (GLUCOPHAGE) 500 MG tablet, Take 2 tablets by mouth 2 (Two) Times a Day With Meals., Disp: 60 tablet, Rfl: 0  •  metoprolol tartrate (LOPRESSOR) 50 MG tablet, TAKE 1 TABLET BY MOUTH EVERY 12 HOURS, Disp: 180 tablet, Rfl: 1  •  NICOTINE STEP 1 TD, Place  on the skin as directed by provider., Disp: , Rfl:   •  nitroglycerin (NITROSTAT) 0.4 MG SL tablet, Place 1 tablet under the tongue Every 5 (Five) Minutes As Needed for Chest Pain. Take no more than 3 doses in 15 minutes., Disp: 30 tablet, Rfl: 12  •  ONE TOUCH LANCETS misc, 1 each 2 (Two) Times a Day for 90 days., Disp: 200 each, Rfl: 5  •  oxymetazoline (AFRIN) 0.05 % nasal spray, 2 sprays into each nostril 2 (Two) Times a Day., Disp: , Rfl:   •  paliperidone palmitate (INVEGA SUSTENNA) 117 MG/0.75ML suspension IM injection, Inject 117 mg into the shoulder, thigh, or buttocks Every 30 (Thirty) Days., Disp: , Rfl:   •  SITagliptin (JANUVIA) 100 MG tablet, Take 1 tablet by mouth Daily for 30 days., Disp: 30 tablet, Rfl: 0  •  sodium chloride (OCEAN NASAL SPRAY) 0.65 % nasal spray, 1 spray into each nostril As Needed for Congestion., Disp: 60 mL, Rfl: 12    PHYSICAL EXAM  Vital Signs (last 24 hours)       09/12 0700  -  09/13 0659 09/13 0700  -  09/13 1514   Most Recent    Heart Rate     82     82    BP     155/92     155/92          Gen:  No distress, appears stated age, alert, oriented to person, place, and time  Heent:   NC/HILLARY, CHAVO VERDUGO, joseline  sclera    External ears/nose normal, OP clear, Mallamati 4  LUNGS: Clear breath sounds bilaterally, nonlabored breathing  CV:  Normal S1/S2, without murmur, no peripheral edema  ABD:  Non tender, non distended, bowel sounds no appreciated  EXT:  No cyanosis or clubbing      Sleep Testin. HST on 2018, AHI of 38   2. Currently on 5-20 cm H2O    ASSESSMENT / PLAN:     1. Obstructive sleep apnea  1. Continue PAP as prescribed.   2. Script for PAP supplies  3. Return to clinic in 1 year with compliance check unless sx change in the interim period.  2. DM  3. Obesity  4. Caffeine excess - discussed      All of the patient's questions were answered. He states understanding and agreement with my assessment and plan as above.  Total time 15 min, more than half spent in face to face counseling and coordination of care.    RTC in 12 months    He agrees to return sooner on a prn basis.             This document has been electronically signed by Simon Lyle MD on 2018           CC: Yang Veloz MD          No ref. provider found

## 2018-09-14 ENCOUNTER — OFFICE VISIT (OUTPATIENT)
Dept: FAMILY MEDICINE CLINIC | Facility: CLINIC | Age: 48
End: 2018-09-14

## 2018-09-14 VITALS
DIASTOLIC BLOOD PRESSURE: 88 MMHG | SYSTOLIC BLOOD PRESSURE: 132 MMHG | WEIGHT: 266.2 LBS | OXYGEN SATURATION: 96 % | HEART RATE: 103 BPM | BODY MASS INDEX: 41.78 KG/M2 | HEIGHT: 67 IN

## 2018-09-14 DIAGNOSIS — E11.9 TYPE 2 DIABETES MELLITUS WITHOUT COMPLICATION, WITHOUT LONG-TERM CURRENT USE OF INSULIN (HCC): Primary | ICD-10-CM

## 2018-09-14 PROCEDURE — 99406 BEHAV CHNG SMOKING 3-10 MIN: CPT | Performed by: STUDENT IN AN ORGANIZED HEALTH CARE EDUCATION/TRAINING PROGRAM

## 2018-09-14 PROCEDURE — 99213 OFFICE O/P EST LOW 20 MIN: CPT | Performed by: STUDENT IN AN ORGANIZED HEALTH CARE EDUCATION/TRAINING PROGRAM

## 2018-09-14 RX ORDER — METOPROLOL TARTRATE 50 MG/1
50 TABLET, FILM COATED ORAL EVERY 12 HOURS
Qty: 180 TABLET | Refills: 1 | Status: SHIPPED | OUTPATIENT
Start: 2018-09-14 | End: 2019-09-06 | Stop reason: SDUPTHER

## 2018-09-14 RX ORDER — GLIPIZIDE 10 MG/1
10 TABLET ORAL
Qty: 60 TABLET | Refills: 0 | Status: CANCELLED | OUTPATIENT
Start: 2018-09-14 | End: 2018-10-14

## 2018-09-14 NOTE — PROGRESS NOTES
ID: Kingsley Littlejohn    CC:   Chief Complaint   Patient presents with   • Diabetes       Subjective:     Kingsley Littlejohn is a 48 y.o. male who presents for diabetes f/u.    Patient's hemoglobin A1c was 12.8.  Patient does not want to use insulin because of the needles.  He would like to maximize oral medication beforehand.  Patient does not want to see dietitian for nutrition recommendations.      Diabetes   He presents for his follow-up diabetic visit. He has type 2 diabetes mellitus. No MedicAlert identification noted. His disease course has been worsening. There are no hypoglycemic associated symptoms. Pertinent negatives for hypoglycemia include no confusion, dizziness, headaches, hunger, mood changes, nervousness/anxiousness, seizures, sleepiness, speech difficulty or sweats. Pertinent negatives for diabetes include no blurred vision, no chest pain, no fatigue, no foot paresthesias, no foot ulcerations, no polydipsia, no polyphagia, no polyuria, no visual change and no weakness. There are no hypoglycemic complications. Pertinent negatives for hypoglycemia complications include no blackouts, no hospitalization, no nocturnal hypoglycemia, no required assistance and no required glucagon injection. Symptoms are stable. Pertinent negatives for diabetic complications include no CVA, heart disease, nephropathy or peripheral neuropathy. Risk factors for coronary artery disease include diabetes mellitus, dyslipidemia, family history, male sex, obesity, hypertension, stress, sedentary lifestyle and tobacco exposure. Current diabetic treatment includes diet and oral agent (dual therapy). He is compliant with treatment all of the time. His weight is decreasing steadily. He is following a generally unhealthy, high fat/cholesterol and high salt diet. When asked about meal planning, he reported none. He has not had a previous visit with a dietitian. He rarely participates in exercise. His home blood glucose trend is  "decreasing steadily (unknown). His breakfast blood glucose is taken between 8-9 am. His breakfast blood glucose range is generally >200 mg/dl. His dinner blood glucose range is generally >200 mg/dl. His bedtime blood glucose is taken between 9-10 pm. An ACE inhibitor/angiotensin II receptor blocker is being taken. He does not see a podiatrist.Eye exam is not current.        Preventative:  Over the past 2 weeks, have you felt down, depressed, or hopeless?No   Over the past 2 weeks, have you felt little interest or pleasure in doing things?No  Clinical depression screening refused by patient.No     On osteoporosis therapy?No     Past Medical Hx:  Past Medical History:   Diagnosis Date   • Abdominal pain    • Allergic rhinitis    • Diabetes mellitus (CMS/[facility])     Prev HbA1c 14.0 2014, states \"I'm cured!\"   • Essential hypertension    • Fracture closed, fibula, shaft     tib-fib       Past Surgical Hx:  Past Surgical History:   Procedure Laterality Date   • ANKLE SURGERY      right and left   • COLONOSCOPY N/A 6/4/2018    Procedure: COLONOSCOPY;  Surgeon: Jaylon Castro MD;  Location: [facility] ENDOSCOPY;  Service: Gastroenterology   • CYST REMOVAL      x 3, from hand, thigh, and buttocks   • ENDOSCOPY      20 years ago   • TIBIA FRACTURE SURGERY Bilateral 2011    tib-fib       Health Maintenance:  Health Maintenance   Topic Date Due   • DIABETIC EYE EXAM  05/26/2017   • MEDICARE ANNUAL WELLNESS  07/14/2018   • ZOSTER VACCINE (1 of 2) 09/01/2021 (Originally 2/20/2018)   • HEMOGLOBIN A1C  02/10/2019   • LIPID PANEL  09/04/2019   • URINE MICROALBUMIN  09/04/2019   • DIABETIC FOOT EXAM  09/05/2019   • TDAP/TD VACCINES (2 - Td) 01/01/2021   • COLONOSCOPY  06/04/2028   • PNEUMOCOCCAL VACCINE (19-64 MEDIUM RISK)  Addressed   • INFLUENZA VACCINE  Addressed       Current Meds:    Current Outpatient Prescriptions:   •  aspirin 325 MG tablet, Take 81 mg by mouth Daily., Disp: , Rfl:   •  atorvastatin (LIPITOR) 40 MG tablet, TAKE " 1 TABLET BY MOUTH DAILY, Disp: 90 tablet, Rfl: 3  •  Blood Glucose Monitoring Suppl (ONE TOUCH ULTRA 2) w/Device kit, U UTD, Disp: , Rfl: 0  •  cephalexin (KEFLEX) 500 MG capsule, Take 1 capsule by mouth 2 (Two) Times a Day for 10 days., Disp: 20 capsule, Rfl: 0  •  clindamycin (CLEOCIN) 150 MG capsule, Take 3 capsules by mouth 3 (Three) Times a Day., Disp: 90 capsule, Rfl: 0  •  fluticasone (FLONASE) 50 MCG/ACT nasal spray, 2 sprays into each nostril Daily., Disp: , Rfl:   •  glucose blood test strip, 1 each by Other route 2 (Two) Times a Day for 180 days. Use as instructed, Disp: 180 each, Rfl: 1  •  glucose monitor monitoring kit, 1 each As Needed (Diabetes)., Disp: 1 each, Rfl: 0  •  hydrOXYzine (ATARAX) 25 MG tablet, TK 1 T PO TID PRA, Disp: , Rfl: 1  •  hydrOXYzine (VISTARIL) 25 MG capsule, Take 1 capsule by mouth Every 6 (Six) Hours As Needed for Itching, Allergies or Anxiety. (Patient taking differently: Take 25 mg by mouth every night at bedtime.), Disp: 120 capsule, Rfl: 2  •  ibuprofen (ADVIL,MOTRIN) 600 MG tablet, Take 1 tablet by mouth Every 6 (Six) Hours As Needed for Mild Pain ., Disp: 15 tablet, Rfl: 0  •  lisinopril (PRINIVIL,ZESTRIL) 40 MG tablet, Take 1 tablet by mouth Daily for 90 days., Disp: 90 tablet, Rfl: 3  •  metFORMIN (GLUCOPHAGE) 1000 MG tablet, Take 1 tablet by mouth 2 (Two) Times a Day With Meals for 90 days., Disp: 180 tablet, Rfl: 0  •  metoprolol tartrate (LOPRESSOR) 50 MG tablet, Take 1 tablet by mouth Every 12 (Twelve) Hours., Disp: 180 tablet, Rfl: 1  •  NICOTINE STEP 1 TD, Place  on the skin as directed by provider., Disp: , Rfl:   •  nitroglycerin (NITROSTAT) 0.4 MG SL tablet, Place 1 tablet under the tongue Every 5 (Five) Minutes As Needed for Chest Pain. Take no more than 3 doses in 15 minutes., Disp: 30 tablet, Rfl: 12  •  ONE TOUCH LANCETS misc, 1 each 2 (Two) Times a Day for 90 days., Disp: 200 each, Rfl: 5  •  oxymetazoline (AFRIN) 0.05 % nasal spray, 2 sprays into each  nostril 2 (Two) Times a Day., Disp: , Rfl:   •  paliperidone palmitate (INVEGA SUSTENNA) 117 MG/0.75ML suspension IM injection, Inject 117 mg into the shoulder, thigh, or buttocks Every 30 (Thirty) Days., Disp: , Rfl:   •  SITagliptin (JANUVIA) 100 MG tablet, Take 1 tablet by mouth Daily for 30 days., Disp: 30 tablet, Rfl: 2  •  sodium chloride (OCEAN NASAL SPRAY) 0.65 % nasal spray, 1 spray into each nostril As Needed for Congestion., Disp: 60 mL, Rfl: 12  •  Empagliflozin (JARDIANCE) 10 MG tablet, Take 10 mg by mouth Daily., Disp: 30 tablet, Rfl: 0    Allergies:  Latex and Codeine    Family Hx:  Family History   Problem Relation Age of Onset   • Diabetes Other    • Hypertension Other    • Cancer Other    • Lung disease Other    • Thyroid disease Other    • Bleeding Disorder Other    • Colon cancer Paternal Grandmother    • Cancer Paternal Grandfather    • Crohn's disease Mother    • Diabetes Mother    • Hypertension Father    • Lung disease Father    • Hypertension Brother    • Hypertension Brother    • Hypertension Brother         Social History:  Social History     Social History   • Marital status: Single     Spouse name: N/A   • Number of children: N/A   • Years of education: N/A     Occupational History   • Lr      Social History Main Topics   • Smoking status: Former Smoker     Packs/day: 2.00     Years: 25.00     Types: Cigarettes     Start date: 1992   • Smokeless tobacco: Never Used   • Alcohol use No      Comment: FORMER pint of whiskey daily, states quit 1 year ago   • Drug use: No      Comment: patient reports prior drug abuse, but sober for 8 months now.   • Sexual activity: Defer     Other Topics Concern   • Not on file     Social History Narrative    Single, lives alone in [place].  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         Review of Systems   Constitutional: Negative for activity change, appetite change, chills, diaphoresis, fatigue and fever.   HENT: Positive for congestion.  "Negative for ear pain, rhinorrhea, sneezing and sore throat.    Eyes: Negative for blurred vision, pain, redness, itching and visual disturbance.   Respiratory: Negative for cough, choking, chest tightness, shortness of breath, wheezing and stridor.    Cardiovascular: Negative for chest pain, palpitations and leg swelling.   Gastrointestinal: Negative for abdominal distention, abdominal pain, blood in stool, constipation, diarrhea, nausea, rectal pain and vomiting.   Endocrine: Negative for polydipsia, polyphagia and polyuria.   Genitourinary: Negative for difficulty urinating, dysuria, flank pain and frequency.   Skin: Positive for rash. Negative for color change.   Neurological: Negative for dizziness, seizures, syncope, speech difficulty, weakness, light-headedness, numbness and headaches.   Psychiatric/Behavioral: Negative for agitation, confusion, decreased concentration and sleep disturbance. The patient is not nervous/anxious.    All other systems reviewed and are negative.          Objective:     /88 (BP Location: Left arm, Patient Position: Sitting, Cuff Size: Large Adult)   Pulse 103   Ht 170.2 cm (67\")   Wt 121 kg (266 lb 3.2 oz)   SpO2 96%   BMI 41.69 kg/m²     Physical Exam   Constitutional: He is oriented to person, place, and time. He appears well-developed and well-nourished.  Non-toxic appearance. He does not have a sickly appearance. He does not appear ill. No distress.   HENT:   Head: Normocephalic and atraumatic.   Right Ear: External ear normal. No lacerations. No swelling or tenderness.   Left Ear: External ear normal. No lacerations. No swelling or tenderness.   Nose: Nose normal.   Mouth/Throat: Uvula is midline, oropharynx is clear and moist and mucous membranes are normal.   Eyes: Pupils are equal, round, and reactive to light. Conjunctivae, EOM and lids are normal. No scleral icterus.   Neck: No tracheal deviation present. No thyromegaly present.   Cardiovascular: Normal heart " sounds and intact distal pulses.  Exam reveals no gallop, no distant heart sounds, no friction rub and no decreased pulses.    No murmur heard.  Pulmonary/Chest: Effort normal and breath sounds normal. No accessory muscle usage or stridor. No respiratory distress. He has no decreased breath sounds. He has no wheezes. He has no rhonchi. He has no rales. He exhibits no tenderness.   Abdominal: Soft. Bowel sounds are normal. There is no tenderness. There is no rigidity, no rebound and no guarding.   Musculoskeletal:        Right lower leg: He exhibits no swelling.        Left lower leg: He exhibits no swelling.        Right foot: There is no swelling.        Left foot: There is no swelling.     Vascular Status -  His right foot exhibits no edema. His left foot exhibits no edema.  Lymphadenopathy:     He has no cervical adenopathy.   Neurological: He is alert and oriented to person, place, and time. He has normal strength. No cranial nerve deficit or sensory deficit. He exhibits normal muscle tone. GCS eye subscore is 4. GCS verbal subscore is 5. GCS motor subscore is 6.   Skin: Skin is warm and dry. Capillary refill takes less than 2 seconds. Rash noted. He is not diaphoretic. No erythema. No pallor.        Psychiatric: He has a normal mood and affect. His speech is normal.   Nursing note and vitals reviewed.        Assessment/Plan:     Kingsley was seen today for diabetes.    Diagnoses and all orders for this visit:    Type 2 diabetes mellitus without complication, without long-term current use of insulin (CMS/[facility])  -     metoprolol tartrate (LOPRESSOR) 50 MG tablet; Take 1 tablet by mouth Every 12 (Twelve) Hours.  -     metFORMIN (GLUCOPHAGE) 1000 MG tablet; Take 1 tablet by mouth 2 (Two) Times a Day With Meals for 90 days.  -     Ambulatory Referral for Diabetic Eye Exam-Ophthalmology  -     SITagliptin (JANUVIA) 100 MG tablet; Take 1 tablet by mouth Daily for 30 days.  -     Empagliflozin (JARDIANCE) 10 MG tablet;  Take 10 mg by mouth Daily.        Follow-up:     Return in about 3 weeks (around 10/5/2018) for Recheck DM.      Goals: improve diabetes  Barriers to goals: pt compliance    Health Maintenance   Topic Date Due   • DIABETIC EYE EXAM  05/26/2017   • MEDICARE ANNUAL WELLNESS  07/14/2018   • ZOSTER VACCINE (1 of 2) 09/01/2021 (Originally 2/20/2018)   • HEMOGLOBIN A1C  02/10/2019   • LIPID PANEL  09/04/2019   • URINE MICROALBUMIN  09/04/2019   • DIABETIC FOOT EXAM  09/05/2019   • TDAP/TD VACCINES (2 - Td) 01/01/2021   • COLONOSCOPY  06/04/2028   • PNEUMOCOCCAL VACCINE (19-64 MEDIUM RISK)  Addressed   • INFLUENZA VACCINE  Addressed     I advised the patient of the risks in continuing to use tobacco, and I provided this patient with smoking cessation educational materials. During this visit, I spent 3-10 minutes counseling the patient regarding smoking cessation    Tobacco: Smoking cessation counseling was provided.  Alcohol: does not drink  Lifestyle: Body mass index is 41.69 kg/m². eat more fruits and vegetables, decrease soda or juice intake, increase water intake, increase physical activity, reduce screen time, reduce portion size, cut out extra servings, reduce fast food intake, family to eat at dinner table more often, keep TV off during meals, plan meals, eat breakfast and have 3 meals a day    RISK SCORE: 4        This document has been electronically signed by Yang Veloz MD on September 14, 2018 5:51 PM

## 2018-09-14 NOTE — PROGRESS NOTES
"  Subjective:     Kingsley Littlejohn is a 48 y.o. male who presents for follow up on Type 2 diabetes. His glucose remains elevated despite maximum dose of metformin and januvia.  He is not willing to start injection therapy.     The following portions of the patient's history were reviewed and updated as appropriate: allergies, current medications, past family history, past medical history, past social history, past surgical history and problem list.      Past Medical Hx:  Past Medical History:   Diagnosis Date   • Abdominal pain    • Allergic rhinitis    • Diabetes mellitus (CMS/[facility])     Prev HbA1c 14.0 2014, states \"I'm cured!\"   • Essential hypertension    • Fracture closed, fibula, shaft     tib-fib       Past Surgical Hx:  Past Surgical History:   Procedure Laterality Date   • ANKLE SURGERY      right and left   • COLONOSCOPY N/A 6/4/2018    Procedure: COLONOSCOPY;  Surgeon: Jaylon Castro MD;  Location: [facility] ENDOSCOPY;  Service: Gastroenterology   • CYST REMOVAL      x 3, from hand, thigh, and buttocks   • ENDOSCOPY      20 years ago   • TIBIA FRACTURE SURGERY Bilateral 2011    tib-fib       Health Maintenance:  Health Maintenance   Topic Date Due   • DIABETIC EYE EXAM  05/26/2017   • MEDICARE ANNUAL WELLNESS  07/14/2018   • INFLUENZA VACCINE  08/01/2018   • ZOSTER VACCINE (1 of 2) 09/01/2021 (Originally 2/20/2018)   • HEMOGLOBIN A1C  02/10/2019   • LIPID PANEL  09/04/2019   • URINE MICROALBUMIN  09/04/2019   • DIABETIC FOOT EXAM  09/05/2019   • TDAP/TD VACCINES (2 - Td) 01/01/2021   • COLONOSCOPY  06/04/2028   • PNEUMOCOCCAL VACCINE (19-64 MEDIUM RISK)  Addressed       Current Meds:    Current Outpatient Prescriptions:   •  aspirin 325 MG tablet, Take 325 mg by mouth Daily., Disp: , Rfl:   •  atorvastatin (LIPITOR) 40 MG tablet, TAKE 1 TABLET BY MOUTH DAILY, Disp: 90 tablet, Rfl: 3  •  Blood Glucose Monitoring Suppl (ONE TOUCH ULTRA 2) w/Device kit, U UTD, Disp: , Rfl: 0  •  cephalexin (KEFLEX) 500 MG " capsule, Take 1 capsule by mouth 2 (Two) Times a Day for 10 days., Disp: 20 capsule, Rfl: 0  •  clindamycin (CLEOCIN) 150 MG capsule, Take 3 capsules by mouth 3 (Three) Times a Day., Disp: 90 capsule, Rfl: 0  •  fluticasone (FLONASE) 50 MCG/ACT nasal spray, 2 sprays into each nostril Daily., Disp: , Rfl:   •  glucose blood test strip, 1 each by Other route 2 (Two) Times a Day for 180 days. Use as instructed, Disp: 180 each, Rfl: 1  •  glucose monitor monitoring kit, 1 each As Needed (Diabetes)., Disp: 1 each, Rfl: 0  •  hydrOXYzine (ATARAX) 25 MG tablet, TK 1 T PO TID PRA, Disp: , Rfl: 1  •  hydrOXYzine (VISTARIL) 25 MG capsule, Take 1 capsule by mouth Every 6 (Six) Hours As Needed for Itching, Allergies or Anxiety. (Patient taking differently: Take 25 mg by mouth every night at bedtime.), Disp: 120 capsule, Rfl: 2  •  ibuprofen (ADVIL,MOTRIN) 600 MG tablet, Take 1 tablet by mouth Every 6 (Six) Hours As Needed for Mild Pain ., Disp: 15 tablet, Rfl: 0  •  lisinopril (PRINIVIL,ZESTRIL) 40 MG tablet, Take 1 tablet by mouth Daily for 90 days., Disp: 90 tablet, Rfl: 3  •  metFORMIN (GLUCOPHAGE) 500 MG tablet, Take 2 tablets by mouth 2 (Two) Times a Day With Meals., Disp: 60 tablet, Rfl: 0  •  metoprolol tartrate (LOPRESSOR) 50 MG tablet, TAKE 1 TABLET BY MOUTH EVERY 12 HOURS, Disp: 180 tablet, Rfl: 1  •  NICOTINE STEP 1 TD, Place  on the skin as directed by provider., Disp: , Rfl:   •  nitroglycerin (NITROSTAT) 0.4 MG SL tablet, Place 1 tablet under the tongue Every 5 (Five) Minutes As Needed for Chest Pain. Take no more than 3 doses in 15 minutes., Disp: 30 tablet, Rfl: 12  •  ONE TOUCH LANCETS misc, 1 each 2 (Two) Times a Day for 90 days., Disp: 200 each, Rfl: 5  •  oxymetazoline (AFRIN) 0.05 % nasal spray, 2 sprays into each nostril 2 (Two) Times a Day., Disp: , Rfl:   •  paliperidone palmitate (INVEGA SUSTENNA) 117 MG/0.75ML suspension IM injection, Inject 117 mg into the shoulder, thigh, or buttocks Every 30  (Thirty) Days., Disp: , Rfl:   •  sodium chloride (OCEAN NASAL SPRAY) 0.65 % nasal spray, 1 spray into each nostril As Needed for Congestion., Disp: 60 mL, Rfl: 12    Allergies:  Latex and Codeine    Family Hx:  Family History   Problem Relation Age of Onset   • Diabetes Other    • Hypertension Other    • Cancer Other    • Lung disease Other    • Thyroid disease Other    • Bleeding Disorder Other    • Colon cancer Paternal Grandmother    • Cancer Paternal Grandfather    • Crohn's disease Mother    • Diabetes Mother    • Hypertension Father    • Lung disease Father    • Hypertension Brother    • Hypertension Brother    • Hypertension Brother         Social History:  Social History     Social History   • Marital status: Single     Spouse name: N/A   • Number of children: N/A   • Years of education: N/A     Occupational History   • Lr      Social History Main Topics   • Smoking status: Former Smoker     Packs/day: 2.00     Years: 25.00     Types: Cigarettes     Start date: 1992   • Smokeless tobacco: Never Used   • Alcohol use No      Comment: FORMER pint of whiskey daily, states quit 1 year ago   • Drug use: No      Comment: patient reports prior drug abuse, but sober for 8 months now.   • Sexual activity: Defer     Other Topics Concern   • Not on file     Social History Narrative    Single, lives alone in [place].  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         Review of Systems  Review of Systems   Constitutional: Negative for activity change, appetite change, fatigue and fever.   HENT: Negative for ear pain and sore throat.    Eyes: Negative for pain and visual disturbance.   Respiratory: Negative for cough and shortness of breath.    Cardiovascular: Negative for chest pain and palpitations.   Gastrointestinal: Negative for abdominal pain and nausea.   Endocrine: Negative for cold intolerance and heat intolerance.   Genitourinary: Negative for difficulty urinating and dysuria.   Musculoskeletal:  "Negative for arthralgias and gait problem.   Skin: Negative for color change and rash.   Neurological: Negative for dizziness, weakness and headaches.   Hematological: Negative for adenopathy. Does not bruise/bleed easily.   Psychiatric/Behavioral: Negative for agitation, confusion and sleep disturbance.       Objective:     /88 (BP Location: Left arm, Patient Position: Sitting, Cuff Size: Large Adult)   Pulse 103   Ht 170.2 cm (67\")   Wt 121 kg (266 lb 3.2 oz)   SpO2 96%   BMI 41.69 kg/m²   Physical Exam    Lab Review  Results for orders placed or performed in visit on 08/22/18   Lipid Panel   Result Value Ref Range    Total Cholesterol 119 0 - 199 mg/dL    Triglycerides 281 (H) 20 - 199 mg/dL    HDL Cholesterol 30 (L) 60 - 200 mg/dL    LDL Cholesterol  58 1 - 129 mg/dL    LDL/HDL Ratio 1.09 0.00 - 3.55   Microalbumin / Creatinine Urine Ratio - Urine, Clean Catch   Result Value Ref Range    Microalbumin/Creatinine Ratio 12.2 0.0 - 30.0 mg/g    Creatinine, Urine 74.0 mg/dL    Microalbumin, Urine 0.9 mg/L            Assessment:     Kingsley was seen today for diabetes.    Diagnoses and all orders for this visit:    Type 2 diabetes mellitus with complication, without long-term current use of insulin (CMS/[facility])  -     metoprolol tartrate (LOPRESSOR) 50 MG tablet; Take 1 tablet by mouth Every 12 (Twelve) Hours.    Type 2 diabetes mellitus without complication, without long-term current use of insulin (CMS/[facility])  -     metFORMIN (GLUCOPHAGE) 1000 MG tablet; Take 1 tablet by mouth 2 (Two) Times a Day With Meals for 90 days.  -     SITagliptin (JANUVIA) 100 MG tablet; Take 1 tablet by mouth Daily for 30 days.    Other orders  -     Cancel: SITagliptin (JANUVIA) 100 MG tablet; Take 1 tablet by mouth Daily for 30 days.  -     Ambulatory Referral for Diabetic Eye Exam-Ophthalmology  -     glipiZIDE (GLUCOTROL) 10 MG tablet; Take 1 tablet by mouth 2 (Two) Times a Day Before Meals for 30 days.        Plan:     I have seen " and examined the patient.  I have reviewed the notes, assessments, and/or procedures performed by Yang Veloz MD , I concur with her/his documentation and assessment and plan for Kingsley Littlejohn.

## 2018-09-19 ENCOUNTER — TELEPHONE (OUTPATIENT)
Dept: FAMILY MEDICINE CLINIC | Facility: CLINIC | Age: 48
End: 2018-09-19

## 2018-09-19 DIAGNOSIS — E11.8 TYPE 2 DIABETES MELLITUS WITH COMPLICATION, WITHOUT LONG-TERM CURRENT USE OF INSULIN (HCC): ICD-10-CM

## 2018-09-19 NOTE — TELEPHONE ENCOUNTER
PATIENT CALLED FOR REFILLS :    METFORMIN 1000 MG  ATORVASTIN 40 MG    [facility]    THANK YOU

## 2018-09-21 DIAGNOSIS — E11.9 TYPE 2 DIABETES MELLITUS WITHOUT COMPLICATION, WITHOUT LONG-TERM CURRENT USE OF INSULIN (HCC): ICD-10-CM

## 2018-09-21 RX ORDER — ATORVASTATIN CALCIUM 40 MG/1
40 TABLET, FILM COATED ORAL DAILY
Qty: 90 TABLET | Refills: 3 | Status: SHIPPED | OUTPATIENT
Start: 2018-09-21 | End: 2019-10-16 | Stop reason: SDUPTHER

## 2018-09-26 DIAGNOSIS — E11.9 TYPE 2 DIABETES MELLITUS WITHOUT COMPLICATION, WITHOUT LONG-TERM CURRENT USE OF INSULIN (HCC): ICD-10-CM

## 2018-10-01 DIAGNOSIS — E11.9 TYPE 2 DIABETES MELLITUS WITHOUT COMPLICATION, WITHOUT LONG-TERM CURRENT USE OF INSULIN (HCC): ICD-10-CM

## 2018-10-09 DIAGNOSIS — E11.9 TYPE 2 DIABETES MELLITUS WITHOUT COMPLICATION, WITHOUT LONG-TERM CURRENT USE OF INSULIN (HCC): ICD-10-CM

## 2018-10-10 DIAGNOSIS — E11.9 TYPE 2 DIABETES MELLITUS WITHOUT COMPLICATION, WITHOUT LONG-TERM CURRENT USE OF INSULIN (HCC): ICD-10-CM

## 2018-10-15 ENCOUNTER — OFFICE VISIT (OUTPATIENT)
Dept: FAMILY MEDICINE CLINIC | Facility: CLINIC | Age: 48
End: 2018-10-15

## 2018-10-15 VITALS
OXYGEN SATURATION: 98 % | SYSTOLIC BLOOD PRESSURE: 134 MMHG | HEART RATE: 78 BPM | HEIGHT: 67 IN | DIASTOLIC BLOOD PRESSURE: 72 MMHG | WEIGHT: 264.4 LBS | BODY MASS INDEX: 41.5 KG/M2

## 2018-10-15 DIAGNOSIS — I10 ESSENTIAL HYPERTENSION: ICD-10-CM

## 2018-10-15 DIAGNOSIS — E11.9 TYPE 2 DIABETES MELLITUS WITHOUT COMPLICATION, WITHOUT LONG-TERM CURRENT USE OF INSULIN (HCC): Primary | ICD-10-CM

## 2018-10-15 DIAGNOSIS — Z71.6 ENCOUNTER FOR TOBACCO USE CESSATION COUNSELING: ICD-10-CM

## 2018-10-15 PROCEDURE — 99406 BEHAV CHNG SMOKING 3-10 MIN: CPT | Performed by: STUDENT IN AN ORGANIZED HEALTH CARE EDUCATION/TRAINING PROGRAM

## 2018-10-15 PROCEDURE — 99213 OFFICE O/P EST LOW 20 MIN: CPT | Performed by: STUDENT IN AN ORGANIZED HEALTH CARE EDUCATION/TRAINING PROGRAM

## 2018-10-15 RX ORDER — LISINOPRIL 40 MG/1
40 TABLET ORAL DAILY
Qty: 30 TABLET | Refills: 5 | Status: SHIPPED | OUTPATIENT
Start: 2018-10-15 | End: 2019-09-06 | Stop reason: SDUPTHER

## 2018-10-15 NOTE — PROGRESS NOTES
ID: Kingsley Littlejohn    CC:   Chief Complaint   Patient presents with   • Diabetes Mellitus     2       Subjective:     Kingsley Littlejohn is a 48 y.o. male who presents for diabetes f/u.    Patient's hemoglobin A1c was 12.8.  Patient does not want to use insulin because of the needles.  He would like to maximize oral medication beforehand.  Patient does not want to see dietitian for nutrition recommendations.      Diabetes   He presents for his follow-up diabetic visit. He has type 2 diabetes mellitus. No MedicAlert identification noted. His disease course has been stable. There are no hypoglycemic associated symptoms. Pertinent negatives for hypoglycemia include no confusion, dizziness, headaches, hunger, mood changes, nervousness/anxiousness, seizures, sleepiness, speech difficulty or sweats. Pertinent negatives for diabetes include no blurred vision, no chest pain, no fatigue, no foot paresthesias, no foot ulcerations, no polydipsia, no polyphagia, no polyuria and no weakness. There are no hypoglycemic complications. Pertinent negatives for hypoglycemia complications include no blackouts, no hospitalization, no nocturnal hypoglycemia, no required assistance and no required glucagon injection. Symptoms are stable. Pertinent negatives for diabetic complications include no CVA, heart disease, nephropathy or peripheral neuropathy. Risk factors for coronary artery disease include diabetes mellitus, dyslipidemia, family history, male sex, obesity, hypertension, stress, sedentary lifestyle and tobacco exposure. Current diabetic treatment includes diet and oral agent (dual therapy). He is compliant with treatment all of the time. His weight is decreasing steadily. He is following a generally healthy diet. When asked about meal planning, he reported none. He has not had a previous visit with a dietitian. He never participates in exercise. His home blood glucose trend is decreasing steadily (unknown). His breakfast  "blood glucose is taken between 8-9 am. His breakfast blood glucose range is generally 130-140 mg/dl. His dinner blood glucose range is generally 140-180 mg/dl. His bedtime blood glucose is taken between 9-10 pm. His overall blood glucose range is 180-200 mg/dl. An ACE inhibitor/angiotensin II receptor blocker is being taken. He does not see a podiatrist.Eye exam is not current.        Preventative:  Over the past 2 weeks, have you felt down, depressed, or hopeless?No   Over the past 2 weeks, have you felt little interest or pleasure in doing things?No  Clinical depression screening refused by patient.No     On osteoporosis therapy?No     Past Medical Hx:  Past Medical History:   Diagnosis Date   • Abdominal pain    • Allergic rhinitis    • Diabetes mellitus (CMS/[facility])     Prev HbA1c 14.0 2014, states \"I'm cured!\"   • Essential hypertension    • Fracture closed, fibula, shaft     tib-fib       Past Surgical Hx:  Past Surgical History:   Procedure Laterality Date   • ANKLE SURGERY      right and left   • COLONOSCOPY N/A 6/4/2018    Procedure: COLONOSCOPY;  Surgeon: Jaylon Castro MD;  Location: [facility] ENDOSCOPY;  Service: Gastroenterology   • CYST REMOVAL      x 3, from hand, thigh, and buttocks   • ENDOSCOPY      20 years ago   • TIBIA FRACTURE SURGERY Bilateral 2011    tib-fib       Health Maintenance:  Health Maintenance   Topic Date Due   • DIABETIC EYE EXAM  05/26/2017   • MEDICARE ANNUAL WELLNESS  07/14/2018   • ZOSTER VACCINE (1 of 2) 09/01/2021 (Originally 2/20/2018)   • HEMOGLOBIN A1C  02/10/2019   • LIPID PANEL  09/04/2019   • URINE MICROALBUMIN  09/04/2019   • DIABETIC FOOT EXAM  09/05/2019   • TDAP/TD VACCINES (2 - Td) 01/01/2021   • COLONOSCOPY  06/04/2028   • PNEUMOCOCCAL VACCINE (19-64 MEDIUM RISK)  Addressed   • INFLUENZA VACCINE  Addressed       Current Meds:    Current Outpatient Prescriptions:   •  aspirin 325 MG tablet, Take 81 mg by mouth Daily., Disp: , Rfl:   •  atorvastatin (LIPITOR) 40 MG " tablet, Take 1 tablet by mouth Daily., Disp: 90 tablet, Rfl: 3  •  Blood Glucose Monitoring Suppl (ONE TOUCH ULTRA 2) w/Device kit, U UTD, Disp: , Rfl: 0  •  Empagliflozin (JARDIANCE) 10 MG tablet, Take 10 mg by mouth Daily., Disp: 30 tablet, Rfl: 5  •  fluticasone (FLONASE) 50 MCG/ACT nasal spray, 2 sprays into each nostril Daily., Disp: , Rfl:   •  glucose blood test strip, 1 each by Other route 2 (Two) Times a Day for 180 days. Use as instructed, Disp: 180 each, Rfl: 1  •  glucose monitor monitoring kit, 1 each As Needed (Diabetes)., Disp: 1 each, Rfl: 0  •  hydrOXYzine (ATARAX) 25 MG tablet, TK 1 T PO TID PRA, Disp: , Rfl: 1  •  hydrOXYzine (VISTARIL) 25 MG capsule, Take 1 capsule by mouth Every 6 (Six) Hours As Needed for Itching, Allergies or Anxiety. (Patient taking differently: Take 25 mg by mouth every night at bedtime.), Disp: 120 capsule, Rfl: 2  •  ibuprofen (ADVIL,MOTRIN) 600 MG tablet, Take 1 tablet by mouth Every 6 (Six) Hours As Needed for Mild Pain ., Disp: 15 tablet, Rfl: 0  •  metFORMIN (GLUCOPHAGE) 1000 MG tablet, Take 1 tablet by mouth 2 (Two) Times a Day With Meals for 90 days., Disp: 180 tablet, Rfl: 0  •  metoprolol tartrate (LOPRESSOR) 50 MG tablet, Take 1 tablet by mouth Every 12 (Twelve) Hours., Disp: 180 tablet, Rfl: 1  •  NICOTINE STEP 1 TD, Place  on the skin as directed by provider., Disp: , Rfl:   •  nitroglycerin (NITROSTAT) 0.4 MG SL tablet, Place 1 tablet under the tongue Every 5 (Five) Minutes As Needed for Chest Pain. Take no more than 3 doses in 15 minutes., Disp: 30 tablet, Rfl: 12  •  ONE TOUCH LANCETS misc, 1 each 2 (Two) Times a Day for 90 days., Disp: 200 each, Rfl: 5  •  paliperidone palmitate (INVEGA SUSTENNA) 117 MG/0.75ML suspension IM injection, Inject 117 mg into the shoulder, thigh, or buttocks Every 30 (Thirty) Days., Disp: , Rfl:   •  sodium chloride (OCEAN NASAL SPRAY) 0.65 % nasal spray, 1 spray into each nostril As Needed for Congestion., Disp: 60 mL, Rfl: 12  •   lisinopril (PRINIVIL,ZESTRIL) 40 MG tablet, Take 1 tablet by mouth Daily., Disp: 30 tablet, Rfl: 5    Allergies:  Latex and Codeine    Family Hx:  Family History   Problem Relation Age of Onset   • Diabetes Other    • Hypertension Other    • Cancer Other    • Lung disease Other    • Thyroid disease Other    • Bleeding Disorder Other    • Colon cancer Paternal Grandmother    • Cancer Paternal Grandfather    • Crohn's disease Mother    • Diabetes Mother    • Hypertension Father    • Lung disease Father    • Hypertension Brother    • Hypertension Brother    • Hypertension Brother         Social History:  Social History     Social History   • Marital status: Single     Spouse name: N/A   • Number of children: N/A   • Years of education: N/A     Occupational History   • Lr      Social History Main Topics   • Smoking status: Current Every Day Smoker     Packs/day: 1.00     Years: 25.00     Types: Cigarettes     Start date: 1992   • Smokeless tobacco: Never Used   • Alcohol use No      Comment: FORMER pint of whiskey daily, states quit 1 year ago   • Drug use: No      Comment: patient reports prior drug abuse, but sober for 8 months now.   • Sexual activity: Defer     Other Topics Concern   • Not on file     Social History Narrative    Single, lives alone in [place].  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         Review of Systems   Constitutional: Negative for activity change, appetite change, chills, diaphoresis, fatigue and fever.   HENT: Positive for congestion and rhinorrhea. Negative for ear pain, sneezing and sore throat.    Eyes: Negative for blurred vision, pain, redness, itching and visual disturbance.   Respiratory: Negative for cough, choking, chest tightness, shortness of breath, wheezing and stridor.    Cardiovascular: Negative for chest pain, palpitations and leg swelling.   Gastrointestinal: Negative for abdominal distention, abdominal pain, blood in stool, constipation, diarrhea, nausea,  "rectal pain and vomiting.   Endocrine: Negative for polydipsia, polyphagia and polyuria.   Genitourinary: Negative for difficulty urinating, dysuria, flank pain and frequency.   Skin: Negative for color change and rash.   Allergic/Immunologic: Positive for environmental allergies.   Neurological: Negative for dizziness, seizures, syncope, speech difficulty, weakness, light-headedness, numbness and headaches.   Psychiatric/Behavioral: Negative for agitation, confusion, decreased concentration and sleep disturbance. The patient is not nervous/anxious.    All other systems reviewed and are negative.          Objective:     /72   Pulse 78   Ht 170.2 cm (67\")   Wt 120 kg (264 lb 6.4 oz)   SpO2 98%   BMI 41.41 kg/m²     Physical Exam   Constitutional: He is oriented to person, place, and time. He appears well-developed and well-nourished. He is active.  Non-toxic appearance. He does not have a sickly appearance. He does not appear ill. No distress.   HENT:   Head: Normocephalic.   Right Ear: External ear normal. No lacerations. No swelling or tenderness.   Left Ear: External ear normal. No lacerations. No swelling or tenderness.   Nose: Nose normal.   Mouth/Throat: Uvula is midline, oropharynx is clear and moist and mucous membranes are normal.   Eyes: Pupils are equal, round, and reactive to light. Conjunctivae, EOM and lids are normal.   Cardiovascular: Normal heart sounds and intact distal pulses.    Pulmonary/Chest: Effort normal and breath sounds normal. No accessory muscle usage. No respiratory distress. He has no decreased breath sounds. He has no wheezes. He has no rhonchi. He has no rales. He exhibits no tenderness.   Abdominal: Soft. Bowel sounds are normal. There is no tenderness. There is no rigidity, no rebound and no guarding.   Musculoskeletal:        Right lower leg: He exhibits no swelling.        Left lower leg: He exhibits no swelling.        Right foot: There is no swelling.        Left " foot: There is no swelling.     Vascular Status -  His right foot exhibits no edema. His left foot exhibits no edema.  Lymphadenopathy:     He has no cervical adenopathy.   Neurological: He is alert and oriented to person, place, and time. He has normal strength. He is not disoriented. No cranial nerve deficit (grossly intact). He exhibits normal muscle tone. GCS eye subscore is 4. GCS verbal subscore is 5. GCS motor subscore is 6.   Skin: Skin is warm and dry. Capillary refill takes less than 2 seconds. No rash noted. He is not diaphoretic.   Nursing note and vitals reviewed.        Assessment/Plan:      Diagnosis Plan   1. Type 2 diabetes mellitus without complication, without long-term current use of insulin (CMS/[facility])  Empagliflozin (JARDIANCE) 10 MG tablet    lisinopril (PRINIVIL,ZESTRIL) 40 MG tablet   2. Essential hypertension  lisinopril (PRINIVIL,ZESTRIL) 40 MG tablet   3. Encounter for tobacco use cessation counseling  Declined pharmacology assistance       Follow-up:     Return in about 3 weeks (around 11/5/2018) for Recheck Diabetes, hA1C repeat.      Goals: improve diabetes  Barriers to goals: pt compliance    Health Maintenance   Topic Date Due   • DIABETIC EYE EXAM  05/26/2017   • MEDICARE ANNUAL WELLNESS  07/14/2018   • ZOSTER VACCINE (1 of 2) 09/01/2021 (Originally 2/20/2018)   • HEMOGLOBIN A1C  02/10/2019   • LIPID PANEL  09/04/2019   • URINE MICROALBUMIN  09/04/2019   • DIABETIC FOOT EXAM  09/05/2019   • TDAP/TD VACCINES (2 - Td) 01/01/2021   • COLONOSCOPY  06/04/2028   • PNEUMOCOCCAL VACCINE (19-64 MEDIUM RISK)  Addressed   • INFLUENZA VACCINE  Addressed     I advised the patient of the risks in continuing to use tobacco, and I provided this patient with smoking cessation educational materials. During this visit, I spent 3-10 minutes counseling the patient regarding smoking cessation    Tobacco: Smoking cessation counseling was provided.  Alcohol: does not drink  Lifestyle: Body mass index is  41.41 kg/m². eat more fruits and vegetables, decrease soda or juice intake, increase water intake, increase physical activity, reduce screen time, reduce portion size, cut out extra servings, reduce fast food intake, family to eat at dinner table more often, keep TV off during meals, plan meals, eat breakfast and have 3 meals a day    RISK SCORE: 4        This document has been electronically signed by Yang Veloz MD on October 15, 2018 3:20 PM

## 2018-10-19 NOTE — PROGRESS NOTES
I have reviewed the notes, assessments, and/or procedures performed by Dr. Veloz, I concur with her/his documentation and assessment and plan for Kingsley Littlejohn.       This document has been electronically signed by Popeye Martinez MD on October 19, 2018 4:37 PM

## 2018-10-22 ENCOUNTER — EPISODE CHANGES (OUTPATIENT)
Dept: CASE MANAGEMENT | Facility: OTHER | Age: 48
End: 2018-10-22

## 2018-11-16 ENCOUNTER — OFFICE VISIT (OUTPATIENT)
Dept: FAMILY MEDICINE CLINIC | Facility: CLINIC | Age: 48
End: 2018-11-16

## 2018-11-16 ENCOUNTER — APPOINTMENT (OUTPATIENT)
Dept: LAB | Facility: HOSPITAL | Age: 48
End: 2018-11-16

## 2018-11-16 VITALS
BODY MASS INDEX: 42.05 KG/M2 | SYSTOLIC BLOOD PRESSURE: 148 MMHG | HEIGHT: 67 IN | HEART RATE: 81 BPM | OXYGEN SATURATION: 96 % | DIASTOLIC BLOOD PRESSURE: 90 MMHG | WEIGHT: 267.9 LBS

## 2018-11-16 DIAGNOSIS — F41.9 ANXIETY DISORDER, UNSPECIFIED TYPE: ICD-10-CM

## 2018-11-16 DIAGNOSIS — Z71.6 ENCOUNTER FOR TOBACCO USE CESSATION COUNSELING: ICD-10-CM

## 2018-11-16 DIAGNOSIS — E11.9 TYPE 2 DIABETES MELLITUS WITHOUT COMPLICATION, WITHOUT LONG-TERM CURRENT USE OF INSULIN (HCC): ICD-10-CM

## 2018-11-16 DIAGNOSIS — E11.8 TYPE 2 DIABETES MELLITUS WITH COMPLICATION, WITHOUT LONG-TERM CURRENT USE OF INSULIN (HCC): Primary | ICD-10-CM

## 2018-11-16 LAB — HBA1C MFR BLD: 8.7 % (ref 4–5.6)

## 2018-11-16 PROCEDURE — 99406 BEHAV CHNG SMOKING 3-10 MIN: CPT | Performed by: STUDENT IN AN ORGANIZED HEALTH CARE EDUCATION/TRAINING PROGRAM

## 2018-11-16 PROCEDURE — 36415 COLL VENOUS BLD VENIPUNCTURE: CPT | Performed by: STUDENT IN AN ORGANIZED HEALTH CARE EDUCATION/TRAINING PROGRAM

## 2018-11-16 PROCEDURE — 83036 HEMOGLOBIN GLYCOSYLATED A1C: CPT | Performed by: STUDENT IN AN ORGANIZED HEALTH CARE EDUCATION/TRAINING PROGRAM

## 2018-11-16 PROCEDURE — 99213 OFFICE O/P EST LOW 20 MIN: CPT | Performed by: STUDENT IN AN ORGANIZED HEALTH CARE EDUCATION/TRAINING PROGRAM

## 2018-11-16 RX ORDER — TRIAMCINOLONE ACETONIDE 55 UG/1
2 SPRAY, METERED NASAL
COMMUNITY
End: 2018-12-17 | Stop reason: SDUPTHER

## 2018-11-16 RX ORDER — TRIAMCINOLONE ACETONIDE 55 UG/1
2 SPRAY, METERED NASAL
Qty: 16.5 G | Status: CANCELLED | OUTPATIENT
Start: 2018-11-16

## 2018-11-16 RX ORDER — HYDROXYZINE HYDROCHLORIDE 25 MG/1
TABLET, FILM COATED ORAL
Refills: 1 | Status: CANCELLED | OUTPATIENT
Start: 2018-11-16

## 2018-11-16 RX ORDER — SITAGLIPTIN 100 MG/1
100 TABLET, FILM COATED ORAL DAILY
Refills: 2 | COMMUNITY
Start: 2018-10-18 | End: 2018-11-21 | Stop reason: SDUPTHER

## 2018-11-16 NOTE — PROGRESS NOTES
ID: Kingsley Littlejohn    CC:   Chief Complaint   Patient presents with   • Diabetes       Subjective:     Kingsley Littlejohn is a 48 y.o. male who presents for diabetes f/u.    Patient's hemoglobin A1c was 12.8 in August. Repeat today.  He is not compliant with diet.        Diabetes   He presents for his follow-up diabetic visit. He has type 2 diabetes mellitus. No MedicAlert identification noted. His disease course has been stable. There are no hypoglycemic associated symptoms. Pertinent negatives for hypoglycemia include no confusion, dizziness, headaches, hunger, mood changes, nervousness/anxiousness, seizures, sleepiness, speech difficulty or sweats. Pertinent negatives for diabetes include no blurred vision, no chest pain, no fatigue, no foot paresthesias, no foot ulcerations, no polydipsia, no polyphagia, no polyuria and no weakness. There are no hypoglycemic complications. Pertinent negatives for hypoglycemia complications include no blackouts, no hospitalization, no nocturnal hypoglycemia, no required assistance and no required glucagon injection. Symptoms are stable. Pertinent negatives for diabetic complications include no CVA, heart disease, nephropathy or peripheral neuropathy. Risk factors for coronary artery disease include diabetes mellitus, dyslipidemia, family history, male sex, obesity, hypertension, stress, sedentary lifestyle and tobacco exposure. Current diabetic treatment includes diet and oral agent (dual therapy). He is compliant with treatment all of the time. His weight is decreasing steadily. He is following a generally unhealthy and high fat/cholesterol diet. When asked about meal planning, he reported none. He has not had a previous visit with a dietitian. He never participates in exercise. His home blood glucose trend is decreasing steadily (unknown). His breakfast blood glucose is taken between 8-9 am. His breakfast blood glucose range is generally 140-180 mg/dl. His dinner blood  "glucose range is generally 140-180 mg/dl. His bedtime blood glucose is taken between 9-10 pm. His overall blood glucose range is 180-200 mg/dl. An ACE inhibitor/angiotensin II receptor blocker is being taken. He does not see a podiatrist.Eye exam is not current.        Preventative:  Over the past 2 weeks, have you felt down, depressed, or hopeless?No   Over the past 2 weeks, have you felt little interest or pleasure in doing things?No  Clinical depression screening refused by patient.No     On osteoporosis therapy?No     Past Medical Hx:  Past Medical History:   Diagnosis Date   • Abdominal pain    • Allergic rhinitis    • Diabetes mellitus (CMS/[facility])     Prev HbA1c 14.0 2014, states \"I'm cured!\"   • Essential hypertension    • Fracture closed, fibula, shaft     tib-fib       Past Surgical Hx:  Past Surgical History:   Procedure Laterality Date   • ANKLE SURGERY      right and left   • CYST REMOVAL      x 3, from hand, thigh, and buttocks   • ENDOSCOPY      20 years ago   • TIBIA FRACTURE SURGERY Bilateral 2011    tib-fib       Health Maintenance:  Health Maintenance   Topic Date Due   • MEDICARE ANNUAL WELLNESS  07/14/2018   • ZOSTER VACCINE (1 of 2) 09/01/2021 (Originally 2/20/2018)   • HEMOGLOBIN A1C  02/10/2019   • LIPID PANEL  09/04/2019   • URINE MICROALBUMIN  09/04/2019   • DIABETIC FOOT EXAM  09/05/2019   • DIABETIC EYE EXAM  10/29/2019   • TDAP/TD VACCINES (2 - Td) 01/01/2021   • COLONOSCOPY  06/04/2028   • PNEUMOCOCCAL VACCINE (19-64 MEDIUM RISK)  Addressed   • INFLUENZA VACCINE  Addressed       Current Meds:    Current Outpatient Medications:   •  aspirin 325 MG tablet, Take 81 mg by mouth Daily., Disp: , Rfl:   •  atorvastatin (LIPITOR) 40 MG tablet, Take 1 tablet by mouth Daily., Disp: 90 tablet, Rfl: 3  •  Blood Glucose Monitoring Suppl (ONE TOUCH ULTRA 2) w/Device kit, U UTD, Disp: , Rfl: 0  •  Empagliflozin (JARDIANCE) 10 MG tablet, Take 10 mg by mouth Daily., Disp: 30 tablet, Rfl: 5  •  fluticasone " (FLONASE) 50 MCG/ACT nasal spray, 2 sprays into each nostril Daily., Disp: , Rfl:   •  glucose blood test strip, 1 each by Other route 2 (Two) Times a Day for 180 days. Use as instructed, Disp: 180 each, Rfl: 1  •  glucose monitor monitoring kit, 1 each As Needed (Diabetes)., Disp: 1 each, Rfl: 0  •  hydrOXYzine (ATARAX) 25 MG tablet, TK 1 T PO TID PRA, Disp: , Rfl: 1  •  ibuprofen (ADVIL,MOTRIN) 600 MG tablet, Take 1 tablet by mouth Every 6 (Six) Hours As Needed for Mild Pain ., Disp: 15 tablet, Rfl: 0  •  JANUVIA 100 MG tablet, Take 100 mg by mouth Daily., Disp: , Rfl: 2  •  lisinopril (PRINIVIL,ZESTRIL) 40 MG tablet, Take 1 tablet by mouth Daily., Disp: 30 tablet, Rfl: 5  •  metFORMIN (GLUCOPHAGE) 1000 MG tablet, Take 1 tablet by mouth 2 (Two) Times a Day With Meals for 90 days., Disp: 180 tablet, Rfl: 0  •  metoprolol tartrate (LOPRESSOR) 50 MG tablet, Take 1 tablet by mouth Every 12 (Twelve) Hours., Disp: 180 tablet, Rfl: 1  •  NICOTINE STEP 1 TD, Place  on the skin as directed by provider., Disp: , Rfl:   •  nitroglycerin (NITROSTAT) 0.4 MG SL tablet, Place 1 tablet under the tongue Every 5 (Five) Minutes As Needed for Chest Pain. Take no more than 3 doses in 15 minutes., Disp: 30 tablet, Rfl: 12  •  ONE TOUCH LANCETS misc, 1 each 2 (Two) Times a Day for 90 days., Disp: 200 each, Rfl: 5  •  paliperidone palmitate (INVEGA SUSTENNA) 117 MG/0.75ML suspension IM injection, Inject 117 mg into the shoulder, thigh, or buttocks Every 30 (Thirty) Days., Disp: , Rfl:   •  sodium chloride (OCEAN NASAL SPRAY) 0.65 % nasal spray, 1 spray into each nostril As Needed for Congestion., Disp: 60 mL, Rfl: 12  •  Triamcinolone Acetonide (NASACORT) 55 MCG/ACT nasal inhaler, 2 sprays into the nostril(s) as directed by provider., Disp: , Rfl:   •  varenicline (CHANTIX STARTING MONTH MARISOL) 0.5 MG X 11 & 1 MG X 42 tablet, Take 0.5 mg one daily on days 1-3 and and 0.5 mg twice daily on days 4-7.Then 1 mg twice daily for a total of 12  weeks., Disp: 53 tablet, Rfl: 1    Allergies:  Latex and Codeine    Family Hx:  Family History   Problem Relation Age of Onset   • Diabetes Other    • Hypertension Other    • Cancer Other    • Lung disease Other    • Thyroid disease Other    • Bleeding Disorder Other    • Colon cancer Paternal Grandmother    • Cancer Paternal Grandfather    • Crohn's disease Mother    • Diabetes Mother    • Hypertension Father    • Lung disease Father    • Hypertension Brother    • Hypertension Brother    • Hypertension Brother         Social History:  Social History     Socioeconomic History   • Marital status: Single     Spouse name: Not on file   • Number of children: Not on file   • Years of education: Not on file   • Highest education level: Not on file   Social Needs   • Financial resource strain: Not on file   • Food insecurity - worry: Not on file   • Food insecurity - inability: Not on file   • Transportation needs - medical: Not on file   • Transportation needs - non-medical: Not on file   Occupational History   • 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   • Sexual activity: Defer   Other Topics Concern   • Not on file   Social History Narrative    Single, lives alone in [place].  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         Review of Systems   Constitutional: Negative for activity change, appetite change, chills, diaphoresis, fatigue and fever.   HENT: Positive for congestion and rhinorrhea. Negative for ear pain, sneezing and sore throat.    Eyes: Negative for blurred vision, pain, redness, itching and visual disturbance.  "  Respiratory: Negative for cough, choking, chest tightness, shortness of breath, wheezing and stridor.    Cardiovascular: Negative for chest pain, palpitations and leg swelling.   Gastrointestinal: Negative for abdominal distention, abdominal pain, blood in stool, constipation, diarrhea, nausea, rectal pain and vomiting.   Endocrine: Negative for polydipsia, polyphagia and polyuria.   Genitourinary: Negative for difficulty urinating, dysuria, flank pain and frequency.   Skin: Negative for color change and rash.   Allergic/Immunologic: Positive for environmental allergies.   Neurological: Negative for dizziness, seizures, syncope, speech difficulty, weakness, light-headedness, numbness and headaches.   Psychiatric/Behavioral: Negative for agitation, confusion, decreased concentration and sleep disturbance. The patient is not nervous/anxious.    All other systems reviewed and are negative.          Objective:     /90 (BP Location: Right arm, Patient Position: Sitting, Cuff Size: Adult)   Pulse 81   Ht 170.2 cm (67\")   Wt 122 kg (267 lb 14.4 oz)   SpO2 96%   BMI 41.96 kg/m²     Physical Exam   Constitutional: He is oriented to person, place, and time. He appears well-developed and well-nourished. He is active.  Non-toxic appearance. He does not have a sickly appearance. He does not appear ill. No distress.   HENT:   Head: Normocephalic.   Right Ear: External ear normal. No lacerations. No swelling or tenderness.   Left Ear: External ear normal. No lacerations. No swelling or tenderness.   Nose: Nose normal.   Mouth/Throat: Uvula is midline, oropharynx is clear and moist and mucous membranes are normal.   Eyes: Conjunctivae, EOM and lids are normal. Pupils are equal, round, and reactive to light.   Cardiovascular: Normal heart sounds and intact distal pulses.   Pulmonary/Chest: Effort normal and breath sounds normal. No accessory muscle usage. No respiratory distress. He has no decreased breath sounds. He " has no wheezes. He has no rhonchi. He has no rales. He exhibits no tenderness.   Abdominal: Soft. Bowel sounds are normal. There is no tenderness. There is no rigidity, no rebound and no guarding.   Musculoskeletal:        Right lower leg: He exhibits no swelling.        Left lower leg: He exhibits no swelling.        Right foot: There is no swelling.        Left foot: There is no swelling.     Vascular Status -  His right foot exhibits no edema. His left foot exhibits no edema.  Lymphadenopathy:     He has no cervical adenopathy.   Neurological: He is alert and oriented to person, place, and time. He has normal strength. He is not disoriented. No cranial nerve deficit (grossly intact). He exhibits normal muscle tone. GCS eye subscore is 4. GCS verbal subscore is 5. GCS motor subscore is 6.   Skin: Skin is warm and dry. Capillary refill takes less than 2 seconds. No rash noted. He is not diaphoretic.   Nursing note and vitals reviewed.        Assessment/Plan:      Diagnosis Plan   1. Type 2 diabetes mellitus with complication, without long-term current use of insulin (CMS/[facility])  Hemoglobin A1c    JANUVIA 100 MG tablet   2. Encounter for tobacco use cessation counseling  varenicline (CHANTIX STARTING MONTH MARISOL) 0.5 MG X 11 & 1 MG X 42 tablet       Follow-up:     Return in about 4 weeks (around 12/14/2018) for Medicare Wellness.      Goals: improve diabetes  Barriers to goals: pt compliance    Health Maintenance   Topic Date Due   • MEDICARE ANNUAL WELLNESS  07/14/2018   • ZOSTER VACCINE (1 of 2) 09/01/2021 (Originally 2/20/2018)   • HEMOGLOBIN A1C  02/10/2019   • LIPID PANEL  09/04/2019   • URINE MICROALBUMIN  09/04/2019   • DIABETIC FOOT EXAM  09/05/2019   • DIABETIC EYE EXAM  10/29/2019   • TDAP/TD VACCINES (2 - Td) 01/01/2021   • COLONOSCOPY  06/04/2028   • PNEUMOCOCCAL VACCINE (19-64 MEDIUM RISK)  Addressed   • INFLUENZA VACCINE  Addressed     I advised the patient of the risks in continuing to use tobacco, and I  provided this patient with smoking cessation educational materials. During this visit, I spent 3-10 minutes counseling the patient regarding smoking cessation    Tobacco: Smoking cessation counseling was provided.  Alcohol: does not drink  Lifestyle: Body mass index is 41.96 kg/m². eat more fruits and vegetables, decrease soda or juice intake, increase water intake, increase physical activity, reduce screen time, reduce portion size, cut out extra servings, reduce fast food intake, family to eat at dinner table more often, keep TV off during meals, plan meals, eat breakfast and have 3 meals a day    RISK SCORE: 4        This document has been electronically signed by Yang Veloz MD on November 16, 2018 1:48 PM

## 2018-11-19 ENCOUNTER — OFFICE VISIT (OUTPATIENT)
Dept: PODIATRY | Facility: CLINIC | Age: 48
End: 2018-11-19

## 2018-11-19 ENCOUNTER — TELEPHONE (OUTPATIENT)
Dept: FAMILY MEDICINE CLINIC | Facility: CLINIC | Age: 48
End: 2018-11-19

## 2018-11-19 VITALS — HEART RATE: 76 BPM | BODY MASS INDEX: 41.91 KG/M2 | OXYGEN SATURATION: 99 % | WEIGHT: 267 LBS | HEIGHT: 67 IN

## 2018-11-19 DIAGNOSIS — G89.29 CHRONIC PAIN OF TOE OF LEFT FOOT: ICD-10-CM

## 2018-11-19 DIAGNOSIS — B35.1 ONYCHOMYCOSIS: ICD-10-CM

## 2018-11-19 DIAGNOSIS — G89.29 CHRONIC PAIN OF TOE OF RIGHT FOOT: ICD-10-CM

## 2018-11-19 DIAGNOSIS — M79.675 CHRONIC PAIN OF TOE OF LEFT FOOT: ICD-10-CM

## 2018-11-19 DIAGNOSIS — M79.674 CHRONIC PAIN OF TOE OF RIGHT FOOT: ICD-10-CM

## 2018-11-19 DIAGNOSIS — IMO0002 UNCONTROLLED TYPE 2 DIABETES MELLITUS WITH PERIPHERAL NEUROPATHY: Primary | ICD-10-CM

## 2018-11-19 PROCEDURE — 11721 DEBRIDE NAIL 6 OR MORE: CPT | Performed by: PODIATRIST

## 2018-11-19 NOTE — TELEPHONE ENCOUNTER
Pt called and said the Chantix that Dr Veloz wrote for him needs a PA. Pharmacy has sent request.    Thanks  Claritza

## 2018-11-19 NOTE — PROGRESS NOTES
"Kingsley Littlejohn  1970  48 y.o. male    PCP-11/16/18   HgA1C 8.7 11/16/18    Patient presents today for routine diabetic nail care       Chief Complaint   Patient presents with   • Left Foot - diabetic nail care   • Right Foot - diabetic nail care       History of Present Illness    Patient presents to clinic today for routine diabetic footcare.  States that his toenails have become long and painful.  Pain is relieved with debridement.  He denies any other pedal complaints.      Past Medical History:   Diagnosis Date   • Abdominal pain    • Allergic rhinitis    • Diabetes mellitus (CMS/[facility])     Prev HbA1c 14.0 2014, states \"I'm cured!\"   • Essential hypertension    • Fracture closed, fibula, shaft     tib-fib         Past Surgical History:   Procedure Laterality Date   • ANKLE SURGERY      right and left   • CYST REMOVAL      x 3, from hand, thigh, and buttocks   • ENDOSCOPY      20 years ago   • TIBIA FRACTURE SURGERY Bilateral 2011    tib-fib         Family History   Problem Relation Age of Onset   • Diabetes Other    • Hypertension Other    • Cancer Other    • Lung disease Other    • Thyroid disease Other    • Bleeding Disorder Other    • Colon cancer Paternal Grandmother    • Cancer Paternal Grandfather    • Crohn's disease Mother    • Diabetes Mother    • Hypertension Father    • Lung disease Father    • Hypertension Brother    • Hypertension Brother    • Hypertension Brother        Allergies   Allergen Reactions   • Latex Irritability   • Codeine Nausea Only       Social History     Socioeconomic History   • Marital status: Single     Spouse name: Not on file   • Number of children: Not on file   • Years of education: Not on file   • Highest education level: Not on file   Social Needs   • Financial resource strain: Not on file   • Food insecurity - worry: Not on file   • Food insecurity - inability: Not on file   • Transportation needs - medical: Not on file   • Transportation needs - non-medical: Not " on file   Occupational History   • 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           Current Outpatient Medications   Medication Sig Dispense Refill   • aspirin 325 MG tablet Take 81 mg by mouth Daily.     • atorvastatin (LIPITOR) 40 MG tablet Take 1 tablet by mouth Daily. 90 tablet 3   • Blood Glucose Monitoring Suppl (ONE TOUCH ULTRA 2) w/Device kit U UTD  0   • Empagliflozin (JARDIANCE) 10 MG tablet Take 10 mg by mouth Daily. 30 tablet 5   • fluticasone (FLONASE) 50 MCG/ACT nasal spray 2 sprays into each nostril Daily.     • glucose blood test strip 1 each by Other route 2 (Two) Times a Day for 180 days. Use as instructed 180 each 1   • glucose monitor monitoring kit 1 each As Needed (Diabetes). 1 each 0   • hydrOXYzine (ATARAX) 25 MG tablet TK 1 T PO TID PRA  1   • ibuprofen (ADVIL,MOTRIN) 600 MG tablet Take 1 tablet by mouth Every 6 (Six) Hours As Needed for Mild Pain . 15 tablet 0   • JANUVIA 100 MG tablet Take 100 mg by mouth Daily.  2   • lisinopril (PRINIVIL,ZESTRIL) 40 MG tablet Take 1 tablet by mouth Daily. 30 tablet 5   • metFORMIN (GLUCOPHAGE) 1000 MG tablet Take 1 tablet by mouth 2 (Two) Times a Day With Meals for 90 days. 180 tablet 0   • metoprolol tartrate (LOPRESSOR) 50 MG tablet Take 1 tablet by mouth Every 12 (Twelve) Hours. 180 tablet 1   • NICOTINE STEP 1 TD Place  on the skin as directed by provider.     • nitroglycerin (NITROSTAT) 0.4 MG  "SL tablet Place 1 tablet under the tongue Every 5 (Five) Minutes As Needed for Chest Pain. Take no more than 3 doses in 15 minutes. 30 tablet 12   • ONE TOUCH LANCETS misc 1 each 2 (Two) Times a Day for 90 days. 200 each 5   • paliperidone palmitate (INVEGA SUSTENNA) 117 MG/0.75ML suspension IM injection Inject 117 mg into the shoulder, thigh, or buttocks Every 30 (Thirty) Days.     • sodium chloride (OCEAN NASAL SPRAY) 0.65 % nasal spray 1 spray into each nostril As Needed for Congestion. 60 mL 12   • Triamcinolone Acetonide (NASACORT) 55 MCG/ACT nasal inhaler 2 sprays into the nostril(s) as directed by provider.     • varenicline (CHANTIX STARTING MONTH PAK) 0.5 MG X 11 & 1 MG X 42 tablet Take 0.5 mg one daily on days 1-3 and and 0.5 mg twice daily on days 4-7.Then 1 mg twice daily for a total of 12 weeks. 53 tablet 1     No current facility-administered medications for this visit.        Review of Systems   Constitutional: Negative.    HENT: Negative.    Eyes: Negative.    Respiratory: Negative.    Cardiovascular: Positive for leg swelling.   Gastrointestinal: Negative.    Genitourinary: Positive for frequency.   Musculoskeletal:        Toe pain    Allergic/Immunologic: Negative.    Neurological: Negative.    Hematological: Negative.    Psychiatric/Behavioral: Negative.          OBJECTIVE    Pulse 76   Ht 170.2 cm (67.01\")   Wt 121 kg (267 lb)   SpO2 99%   BMI 41.81 kg/m²       Physical Exam   Constitutional: He is oriented to person, place, and time. He appears well-developed and well-nourished.   HENT:   Head: Normocephalic and atraumatic.   Nose: Nose normal.   Eyes: Conjunctivae and EOM are normal. Pupils are equal, round, and reactive to light.   Pulmonary/Chest: Effort normal. No respiratory distress. He has no wheezes.   Neurological: He is alert and oriented to person, place, and time. He displays normal reflexes.   Skin: Skin is warm and dry. Capillary refill takes less than 2 seconds. He is not " diaphoretic.   Psychiatric: He has a normal mood and affect. His behavior is normal.   Vitals reviewed.      Gait: Normal     Assistive Device: None    Lower Extremity    Cardiovascular:    DP/PT pulses palpable bilateral  CFT brisk  to all digits  Skin temp is warm to warm from proximal tibia to distal digits bilateral  Pedal hair growth present.   No erythema or edema noted     Musculoskeletal:  Muscle strength is 5/5 for all muscle groups tested   ROM of the 1st MTP is full without pain or crepitus bilateral  ROM of the MTJ is full without pain or crepitus  bilateral  ROM of the STJ is full without pain or crepitus bilateral   ROM of the ankle joint is full without pain or crepitus  bilateral     Dermatological:   Nails 1-5 bilateral are thickened, discolored, elongated.  Pain on palpation to the nail plates.  Skin is warm, dry and intact bilateral  Webspaces 1-4 bilateral are clean, dry and intact.   Hyperkeratotic tissue noted to the medial IPJ of the hallux bilateral    Neurological:   Protective sensation intact   Sensation intact to light touch        Procedures        ASSESSMENT AND PLAN    Kingsley was seen today for diabetic nail care and diabetic nail care.    Diagnoses and all orders for this visit:    Uncontrolled type 2 diabetes mellitus with peripheral neuropathy (CMS/HCC)    Onychomycosis    Chronic pain of toe of left foot    Chronic pain of toe of right foot      - Nails 1-5 bilateral were debrided in length and thickness with nail nipper and electric  to decrease fungal load and risk of infection.   - All his questions were answered  - RTC in 3 months as needed            This document has been electronically signed by Abhijeet Thomason DPM on November 20, 2018 7:59 AM     11/20/2018  7:59 AM

## 2018-11-21 ENCOUNTER — TELEPHONE (OUTPATIENT)
Dept: FAMILY MEDICINE CLINIC | Facility: CLINIC | Age: 48
End: 2018-11-21

## 2018-11-21 DIAGNOSIS — E11.8 TYPE 2 DIABETES MELLITUS WITH COMPLICATION, WITHOUT LONG-TERM CURRENT USE OF INSULIN (HCC): ICD-10-CM

## 2018-11-21 NOTE — TELEPHONE ENCOUNTER
Pt called and was needing a refill on JANUVIA 100 MG tablet into [facility] on [facility] in [place], [place].    Thank You

## 2018-12-04 RX ORDER — SITAGLIPTIN 100 MG/1
100 TABLET, FILM COATED ORAL DAILY
Qty: 30 TABLET | Refills: 2 | Status: SHIPPED | OUTPATIENT
Start: 2018-12-04 | End: 2019-01-29 | Stop reason: SDUPTHER

## 2018-12-17 DIAGNOSIS — E11.9 TYPE 2 DIABETES MELLITUS WITHOUT COMPLICATION, WITHOUT LONG-TERM CURRENT USE OF INSULIN (HCC): ICD-10-CM

## 2018-12-18 ENCOUNTER — OFFICE VISIT (OUTPATIENT)
Dept: FAMILY MEDICINE CLINIC | Facility: CLINIC | Age: 48
End: 2018-12-18

## 2018-12-18 VITALS
WEIGHT: 274.25 LBS | BODY MASS INDEX: 43.04 KG/M2 | HEIGHT: 67 IN | DIASTOLIC BLOOD PRESSURE: 68 MMHG | OXYGEN SATURATION: 96 % | HEART RATE: 93 BPM | SYSTOLIC BLOOD PRESSURE: 132 MMHG

## 2018-12-18 DIAGNOSIS — Z00.00 MEDICARE ANNUAL WELLNESS VISIT, INITIAL: Primary | ICD-10-CM

## 2018-12-18 PROCEDURE — G0439 PPPS, SUBSEQ VISIT: HCPCS | Performed by: STUDENT IN AN ORGANIZED HEALTH CARE EDUCATION/TRAINING PROGRAM

## 2018-12-18 RX ORDER — HYDROXYZINE HYDROCHLORIDE 25 MG/1
25 TABLET, FILM COATED ORAL EVERY 8 HOURS PRN
Qty: 30 TABLET | Refills: 1 | Status: SHIPPED | OUTPATIENT
Start: 2018-12-18 | End: 2020-02-26

## 2018-12-18 RX ORDER — TRIAMCINOLONE ACETONIDE 55 UG/1
2 SPRAY, METERED NASAL DAILY
Qty: 16.5 G | Refills: 3 | Status: SHIPPED | OUTPATIENT
Start: 2018-12-18 | End: 2020-12-01

## 2018-12-18 NOTE — PROGRESS NOTES
QUICK REFERENCE INFORMATION:  The ABCs of the Annual Wellness Visit    Initial Medicare Wellness Visit    HEALTH RISK ASSESSMENT    1970    Recent Hospitalizations:  Recently treated at the following:  [facility].        Current Medical Providers:  Patient Care Team:  Yang Veloz MD as PCP - General (Family Medicine)  Popeye Martinez MD as PCP - Claims Attributed  Matt Wayne MD as Resident (Family Medicine)  Ewa Sood MD as Resident (Family Medicine)  Barbara Telles MD as Resident (Family Medicine)  Palmira Jacinto MD as Resident (Family Medicine)  Guy Peraza MD as Resident (Family Medicine)  Lori Rivers MD as Resident (Family Medicine)  Ottoniel Rivers MD (Family Medicine)  Gabriella Whyte MD (Family Medicine)  Jair Mcbride MD as Resident (Family Medicine)  Eric Vargas MD as Resident (Family Medicine)        Smoking Status:  Social History     Tobacco Use   Smoking Status Current Every Day Smoker   • Packs/day: 1.00   • Years: 25.00   • Pack years: 25.00   • Types: Cigarettes   • Start date: 1992   Smokeless Tobacco Never Used       Alcohol Consumption:  Social History     Substance and Sexual Activity   Alcohol Use No    Comment: FORMER pint of whiskey daily, states quit 1 year ago       Depression Screen:   PHQ-2/PHQ-9 Depression Screening 12/18/2018   Little interest or pleasure in doing things 0   Feeling down, depressed, or hopeless 0   Trouble falling or staying asleep, or sleeping too much 0   Feeling tired or having little energy 0   Poor appetite or overeating 1   Feeling bad about yourself - or that you are a failure or have let yourself or your family down 0   Trouble concentrating on things, such as reading the newspaper or watching television 0   Moving or speaking so slowly that other people could have noticed. Or the opposite - being so fidgety or restless that you have been moving around a  lot more than usual 0   Thoughts that you would be better off dead, or of hurting yourself in some way 0   Total Score 1   If you checked off any problems, how difficult have these problems made it for you to do your work, take care of things at home, or get along with other people? Not difficult at all       Health Habits and Functional and Cognitive Screening:  Functional & Cognitive Status 12/18/2018   Do you have difficulty preparing food and eating? No   Do you have difficulty bathing yourself, getting dressed or grooming yourself? No   Do you have difficulty using the toilet? No   Do you have difficulty moving around from place to place? No   Do you have trouble with steps or getting out of a bed or a chair? No   In the past year have you fallen or experienced a near fall? No   Current Diet Unhealthy Diet   Dental Exam Up to date   Eye Exam Up to date   Exercise (times per week) 0 times per week   Current Exercise Activities Include None   Do you need help using the phone?  No   Are you deaf or do you have serious difficulty hearing?  No   Do you need help with transportation? No   Do you need help shopping? No   Do you need help preparing meals?  No   Do you need help with housework?  No   Do you need help with laundry? No   Do you need help taking your medications? No   Do you need help managing money? No   Do you ever drive or ride in a car without wearing a seat belt? No   Have you felt unusual stress, anger or loneliness in the last month? No   Who do you live with? Alone   If you need help, do you have trouble finding someone available to you? No   Have you been bothered in the last four weeks by sexual problems? No   Do you have difficulty concentrating, remembering or making decisions? No           Does the patient have evidence of cognitive impairment? No    Asiprin use counseling: Taking ASA appropriately as indicated      Recent Lab Results:    Visual Acuity:  No exam data present    Age-appropriate  Screening Schedule:  Refer to the list below for future screening recommendations based on patient's age, sex and/or medical conditions. Orders for these recommended tests are listed in the plan section. The patient has been provided with a written plan.    Health Maintenance   Topic Date Due   • ZOSTER VACCINE (1 of 2) 09/01/2021 (Originally 2/20/2018)   • HEMOGLOBIN A1C  05/16/2019   • LIPID PANEL  09/04/2019   • URINE MICROALBUMIN  09/04/2019   • DIABETIC EYE EXAM  10/29/2019   • DIABETIC FOOT EXAM  11/19/2019   • TDAP/TD VACCINES (2 - Td) 01/01/2021   • COLONOSCOPY  06/04/2028   • PNEUMOCOCCAL VACCINE (19-64 MEDIUM RISK)  Addressed   • INFLUENZA VACCINE  Addressed        Subjective   History of Present Illness    Kingsley Littlejohn is a 48 y.o. male who presents for an Annual Wellness Visit.    The following portions of the patient's history were reviewed and updated as appropriate: allergies, current medications, past family history, past medical history, past social history, past surgical history and problem list.    Outpatient Medications Prior to Visit   Medication Sig Dispense Refill   • aspirin 325 MG tablet Take 81 mg by mouth Daily.     • atorvastatin (LIPITOR) 40 MG tablet Take 1 tablet by mouth Daily. 90 tablet 3   • Blood Glucose Monitoring Suppl (ONE TOUCH ULTRA 2) w/Device kit U UTD  0   • Empagliflozin (JARDIANCE) 10 MG tablet Take 10 mg by mouth Daily. 30 tablet 5   • fluticasone (FLONASE) 50 MCG/ACT nasal spray 2 sprays into each nostril Daily.     • glucose blood test strip 1 each by Other route 2 (Two) Times a Day for 180 days. Use as instructed 180 each 1   • glucose monitor monitoring kit 1 each As Needed (Diabetes). 1 each 0   • hydrOXYzine (ATARAX) 25 MG tablet Take 1 tablet by mouth Every 8 (Eight) Hours As Needed for Itching. 30 tablet 1   • ibuprofen (ADVIL,MOTRIN) 600 MG tablet Take 1 tablet by mouth Every 6 (Six) Hours As Needed for Mild Pain . 15 tablet 0   • JANUVIA 100 MG tablet  Take 1 tablet by mouth Daily. 30 tablet 2   • lisinopril (PRINIVIL,ZESTRIL) 40 MG tablet Take 1 tablet by mouth Daily. 30 tablet 5   • metFORMIN (GLUCOPHAGE) 1000 MG tablet Take 1 tablet by mouth 2 (Two) Times a Day With Meals for 90 days. 180 tablet 0   • metoprolol tartrate (LOPRESSOR) 50 MG tablet Take 1 tablet by mouth Every 12 (Twelve) Hours. 180 tablet 1   • NICOTINE STEP 1 TD Place  on the skin as directed by provider.     • nitroglycerin (NITROSTAT) 0.4 MG SL tablet Place 1 tablet under the tongue Every 5 (Five) Minutes As Needed for Chest Pain. Take no more than 3 doses in 15 minutes. 30 tablet 12   • paliperidone palmitate (INVEGA SUSTENNA) 117 MG/0.75ML suspension IM injection Inject 117 mg into the shoulder, thigh, or buttocks Every 30 (Thirty) Days.     • sodium chloride (OCEAN NASAL SPRAY) 0.65 % nasal spray 1 spray into each nostril As Needed for Congestion. 60 mL 12   • Triamcinolone Acetonide (NASACORT) 55 MCG/ACT nasal inhaler 2 sprays into the nostril(s) as directed by provider Daily. 16.5 g 3   • varenicline (CHANTIX STARTING MONTH PAK) 0.5 MG X 11 & 1 MG X 42 tablet Take 0.5 mg one daily on days 1-3 and and 0.5 mg twice daily on days 4-7.Then 1 mg twice daily for a total of 12 weeks. 53 tablet 1     No facility-administered medications prior to visit.        Patient Active Problem List   Diagnosis   • Essential hypertension   • Anxiety   • Tobacco dependence   • Polysubstance (excluding opioids) dependence (CMS/HCC)   • Non morbid obesity due to excess calories   • DM (diabetes mellitus), type 2 (CMS/HCC)   • Other hyperlipidemia   • Psychotic mood disorder (CMS/HCC)   • Anxiety disorder   • Encounter for screening colonoscopy   • Other chest pain   • Obstructive sleep apnea syndrome   • Cyst of skin and subcutaneous tissue   • Hx of adenomatous colonic polyps   • Perianal abscess   • Scrotal abscess       Advance Care Planning:  has NO advance directive - add't info requested. Referral to  "ACP.    Identification of Risk Factors:  Risk factors include: weight , unhealthy diet, cardiovascular risk, inactivity and inadequate social support.    Review of Systems    Compared to one year ago, the patient feels his physical health is better.  Compared to one year ago, the patient feels his mental health is better.    Objective     Physical Exam    Vitals:    12/18/18 1527   BP: 132/68   Pulse: 93   SpO2: 96%   Weight: 124 kg (274 lb 4 oz)   Height: 170.2 cm (67.01\")       Patient's Body mass index is 42.94 kg/m². BMI is above normal parameters. Recommendations include: educational material, exercise counseling and nutrition counseling.      Assessment/Plan   Patient Self-Management and Personalized Health Advice  The patient has been provided with information about: exercise and designing advance directives and preventive services including:   · Advance directive.    Visit Diagnoses:  No diagnosis found.    No orders of the defined types were placed in this encounter.      Outpatient Encounter Medications as of 12/18/2018   Medication Sig Dispense Refill   • aspirin 325 MG tablet Take 81 mg by mouth Daily.     • atorvastatin (LIPITOR) 40 MG tablet Take 1 tablet by mouth Daily. 90 tablet 3   • Blood Glucose Monitoring Suppl (ONE TOUCH ULTRA 2) w/Device kit U UTD  0   • Empagliflozin (JARDIANCE) 10 MG tablet Take 10 mg by mouth Daily. 30 tablet 5   • fluticasone (FLONASE) 50 MCG/ACT nasal spray 2 sprays into each nostril Daily.     • glucose blood test strip 1 each by Other route 2 (Two) Times a Day for 180 days. Use as instructed 180 each 1   • glucose monitor monitoring kit 1 each As Needed (Diabetes). 1 each 0   • hydrOXYzine (ATARAX) 25 MG tablet Take 1 tablet by mouth Every 8 (Eight) Hours As Needed for Itching. 30 tablet 1   • ibuprofen (ADVIL,MOTRIN) 600 MG tablet Take 1 tablet by mouth Every 6 (Six) Hours As Needed for Mild Pain . 15 tablet 0   • JANUVIA 100 MG tablet Take 1 tablet by mouth Daily. 30 " tablet 2   • lisinopril (PRINIVIL,ZESTRIL) 40 MG tablet Take 1 tablet by mouth Daily. 30 tablet 5   • metFORMIN (GLUCOPHAGE) 1000 MG tablet Take 1 tablet by mouth 2 (Two) Times a Day With Meals for 90 days. 180 tablet 0   • metoprolol tartrate (LOPRESSOR) 50 MG tablet Take 1 tablet by mouth Every 12 (Twelve) Hours. 180 tablet 1   • NICOTINE STEP 1 TD Place  on the skin as directed by provider.     • nitroglycerin (NITROSTAT) 0.4 MG SL tablet Place 1 tablet under the tongue Every 5 (Five) Minutes As Needed for Chest Pain. Take no more than 3 doses in 15 minutes. 30 tablet 12   • paliperidone palmitate (INVEGA SUSTENNA) 117 MG/0.75ML suspension IM injection Inject 117 mg into the shoulder, thigh, or buttocks Every 30 (Thirty) Days.     • sodium chloride (OCEAN NASAL SPRAY) 0.65 % nasal spray 1 spray into each nostril As Needed for Congestion. 60 mL 12   • Triamcinolone Acetonide (NASACORT) 55 MCG/ACT nasal inhaler 2 sprays into the nostril(s) as directed by provider Daily. 16.5 g 3   • varenicline (CHANTIX STARTING MONTH MARISOL) 0.5 MG X 11 & 1 MG X 42 tablet Take 0.5 mg one daily on days 1-3 and and 0.5 mg twice daily on days 4-7.Then 1 mg twice daily for a total of 12 weeks. 53 tablet 1     No facility-administered encounter medications on file as of 12/18/2018.        Reviewed use of high risk medication in the elderly: yes  Reviewed for potential of harmful drug interactions in the elderly: yes    Follow Up:  Return in about 4 weeks (around 1/15/2019) for Recheck weightloss.     An After Visit Summary and PPPS with all of these plans were given to the patient.             This document has been electronically signed by Yang Veloz MD on December 18, 2018 4:09 PM

## 2018-12-18 NOTE — PATIENT INSTRUCTIONS
Advance Directive  Advance directives are legal documents that let you make choices ahead of time about your health care and medical treatment in case you become unable to communicate for yourself. Advance directives are a way for you to communicate your wishes to family, friends, and health care providers. This can help convey your decisions about end-of-life care if you become unable to communicate.  Discussing and writing advance directives should happen over time rather than all at once. Advance directives can be changed depending on your situation and what you want, even after you have signed the advance directives.  If you do not have an advance directive, some states assign family decision makers to act on your behalf based on how closely you are related to them. Each state has its own laws regarding advance directives. You may want to check with your health care provider, , or state representative about the laws in your state. There are different types of advance directives, such as:  · Medical power of .  · Living will.  · Do not resuscitate (DNR) or do not attempt resuscitation (DNAR) order.    Health care proxy and medical power of   A health care proxy, also called a health care agent, is a person who is appointed to make medical decisions for you in cases in which you are unable to make the decisions yourself. Generally, people choose someone they know well and trust to represent their preferences. Make sure to ask this person for an agreement to act as your proxy. A proxy may have to exercise judgment in the event of a medical decision for which your wishes are not known.  A medical power of  is a legal document that names your health care proxy. Depending on the laws in your state, after the document is written, it may also need to be:  · Signed.  · Notarized.  · Dated.  · Copied.  · Witnessed.  · Incorporated into your medical record.    You may also want to appoint  someone to manage your financial affairs in a situation in which you are unable to do so. This is called a durable power of  for finances. It is a separate legal document from the durable power of  for health care. You may choose the same person or someone different from your health care proxy to act as your agent in financial matters.  If you do not appoint a proxy, or if there is a concern that the proxy is not acting in your best interests, a court-appointed guardian may be designated to act on your behalf.  Living will  A living will is a set of instructions documenting your wishes about medical care when you cannot express them yourself. Health care providers should keep a copy of your living will in your medical record. You may want to give a copy to family members or friends. To alert caregivers in case of an emergency, you can place a card in your wallet to let them know that you have a living will and where they can find it. A living will is used if you become:  · Terminally ill.  · Incapacitated.  · Unable to communicate or make decisions.    Items to consider in your living will include:  · The use or non-use of life-sustaining equipment, such as dialysis machines and breathing machines (ventilators).  · A DNR or DNAR order, which is the instruction not to use cardiopulmonary resuscitation (CPR) if breathing or heartbeat stops.  · The use or non-use of tube feeding.  · Withholding of food and fluids.  · Comfort (palliative) care when the goal becomes comfort rather than a cure.  · Organ and tissue donation.    A living will does not give instructions for distributing your money and property if you should pass away. It is recommended that you seek the advice of a  when writing a will. Decisions about taxes, beneficiaries, and asset distribution will be legally binding. This process can relieve your family and friends of any concerns surrounding disputes or questions that may come up  about the distribution of your assets.  DNR or DNAR  A DNR or DNAR order is a request not to have CPR in the event that your heart stops beating or you stop breathing. If a DNR or DNAR order has not been made and shared, a health care provider will try to help any patient whose heart has stopped or who has stopped breathing. If you plan to have surgery, talk with your health care provider about how your DNR or DNAR order will be followed if problems occur.  Summary  · Advance directives are the legal documents that allow you to make choices ahead of time about your health care and medical treatment in case you become unable to communicate for yourself.  · The process of discussing and writing advance directives should happen over time. You can change the advance directives, even after you have signed them.  · Advance directives include DNR or DNAR orders, living golden, and designating an agent as your medical power of .  This information is not intended to replace advice given to you by your health care provider. Make sure you discuss any questions you have with your health care provider.  Document Released: 03/26/2009 Document Revised: 11/06/2017 Document Reviewed: 11/06/2017  [employer] Patient Education © 2017 [employer]

## 2019-01-18 ENCOUNTER — OFFICE VISIT (OUTPATIENT)
Dept: FAMILY MEDICINE CLINIC | Facility: CLINIC | Age: 49
End: 2019-01-18

## 2019-01-18 VITALS
BODY MASS INDEX: 42.7 KG/M2 | OXYGEN SATURATION: 97 % | HEIGHT: 67 IN | HEART RATE: 87 BPM | SYSTOLIC BLOOD PRESSURE: 122 MMHG | WEIGHT: 272.06 LBS | DIASTOLIC BLOOD PRESSURE: 74 MMHG

## 2019-01-18 DIAGNOSIS — Z71.3 WEIGHT LOSS COUNSELING, ENCOUNTER FOR: Primary | ICD-10-CM

## 2019-01-18 PROCEDURE — 99213 OFFICE O/P EST LOW 20 MIN: CPT | Performed by: STUDENT IN AN ORGANIZED HEALTH CARE EDUCATION/TRAINING PROGRAM

## 2019-01-18 NOTE — PROGRESS NOTES
"ID: Kingsley Littlejohn    CC:   Chief Complaint   Patient presents with   • Weight Loss       Subjective:     Kingsley Littlejohn is a 48 y.o. male who presents for weight loss f/u.    Patient has not lost any weight over the [place] season.  Patient reports that his diet is quite poor.  He is eating a lot of sweets and carbs.  He knows he needs to lose weight and really wants to.  We discussed the importance of weight loss with regards to his diabetes and long-term health.  Patient is willing to go on a diet and to meet with someone to discuss nutrition and dietary meal planning.         Preventative:  Over the past 2 weeks, have you felt down, depressed, or hopeless?No   Over the past 2 weeks, have you felt little interest or pleasure in doing things?No  Clinical depression screening refused by patient.No     On osteoporosis therapy?No     Past Medical Hx:  Past Medical History:   Diagnosis Date   • Abdominal pain    • Allergic rhinitis    • Diabetes mellitus (CMS/[facility])     Prev HbA1c 14.0 2014, states \"I'm cured!\"   • Essential hypertension    • Fracture closed, fibula, shaft     tib-fib       Past Surgical Hx:  Past Surgical History:   Procedure Laterality Date   • ANKLE SURGERY      right and left   • COLONOSCOPY N/A 6/4/2018    Procedure: COLONOSCOPY;  Surgeon: Jaylon Castro MD;  Location: [facility] ENDOSCOPY;  Service: Gastroenterology   • CYST REMOVAL      x 3, from hand, thigh, and buttocks   • ENDOSCOPY      20 years ago   • TIBIA FRACTURE SURGERY Bilateral 2011    tib-fib       Health Maintenance:  Health Maintenance   Topic Date Due   • ZOSTER VACCINE (1 of 2) 09/01/2021 (Originally 2/20/2018)   • HEMOGLOBIN A1C  05/16/2019   • LIPID PANEL  09/04/2019   • URINE MICROALBUMIN  09/04/2019   • DIABETIC EYE EXAM  10/29/2019   • DIABETIC FOOT EXAM  11/19/2019   • MEDICARE ANNUAL WELLNESS  12/18/2019   • TDAP/TD VACCINES (2 - Td) 01/01/2021   • COLONOSCOPY  06/04/2028   • PNEUMOCOCCAL VACCINE (19-64 MEDIUM " RISK)  Addressed   • INFLUENZA VACCINE  Addressed       Current Meds:    Current Outpatient Medications:   •  aspirin 325 MG tablet, Take 81 mg by mouth Daily., Disp: , Rfl:   •  atorvastatin (LIPITOR) 40 MG tablet, Take 1 tablet by mouth Daily., Disp: 90 tablet, Rfl: 3  •  Blood Glucose Monitoring Suppl (ONE TOUCH ULTRA 2) w/Device kit, U UTD, Disp: , Rfl: 0  •  Empagliflozin (JARDIANCE) 10 MG tablet, Take 10 mg by mouth Daily., Disp: 30 tablet, Rfl: 5  •  fluticasone (FLONASE) 50 MCG/ACT nasal spray, 2 sprays into each nostril Daily., Disp: , Rfl:   •  glucose blood test strip, 1 each by Other route 2 (Two) Times a Day for 180 days. Use as instructed, Disp: 180 each, Rfl: 1  •  glucose monitor monitoring kit, 1 each As Needed (Diabetes)., Disp: 1 each, Rfl: 0  •  hydrOXYzine (ATARAX) 25 MG tablet, Take 1 tablet by mouth Every 8 (Eight) Hours As Needed for Itching., Disp: 30 tablet, Rfl: 1  •  ibuprofen (ADVIL,MOTRIN) 600 MG tablet, Take 1 tablet by mouth Every 6 (Six) Hours As Needed for Mild Pain ., Disp: 15 tablet, Rfl: 0  •  JANUVIA 100 MG tablet, Take 1 tablet by mouth Daily., Disp: 30 tablet, Rfl: 2  •  lisinopril (PRINIVIL,ZESTRIL) 40 MG tablet, Take 1 tablet by mouth Daily., Disp: 30 tablet, Rfl: 5  •  metFORMIN (GLUCOPHAGE) 1000 MG tablet, Take 1 tablet by mouth 2 (Two) Times a Day With Meals for 90 days., Disp: 180 tablet, Rfl: 0  •  metoprolol tartrate (LOPRESSOR) 50 MG tablet, Take 1 tablet by mouth Every 12 (Twelve) Hours., Disp: 180 tablet, Rfl: 1  •  NICOTINE STEP 1 TD, Place  on the skin as directed by provider., Disp: , Rfl:   •  nitroglycerin (NITROSTAT) 0.4 MG SL tablet, Place 1 tablet under the tongue Every 5 (Five) Minutes As Needed for Chest Pain. Take no more than 3 doses in 15 minutes., Disp: 30 tablet, Rfl: 12  •  paliperidone palmitate (INVEGA SUSTENNA) 117 MG/0.75ML suspension IM injection, Inject 117 mg into the shoulder, thigh, or buttocks Every 30 (Thirty) Days., Disp: , Rfl:   •   sodium chloride (OCEAN NASAL SPRAY) 0.65 % nasal spray, 1 spray into each nostril As Needed for Congestion., Disp: 60 mL, Rfl: 12  •  Triamcinolone Acetonide (NASACORT) 55 MCG/ACT nasal inhaler, 2 sprays into the nostril(s) as directed by provider Daily., Disp: 16.5 g, Rfl: 3  •  varenicline (CHANTIX STARTING MONTH ) 0.5 MG X 11 & 1 MG X 42 tablet, Take 0.5 mg one daily on days 1-3 and and 0.5 mg twice daily on days 4-7.Then 1 mg twice daily for a total of 12 weeks., Disp: 53 tablet, Rfl: 1    Allergies:  Latex and Codeine    Family Hx:  Family History   Problem Relation Age of Onset   • Diabetes Other    • Hypertension Other    • Cancer Other    • Lung disease Other    • Thyroid disease Other    • Bleeding Disorder Other    • Colon cancer Paternal Grandmother    • Cancer Paternal Grandfather    • Crohn's disease Mother    • Diabetes Mother    • Hypertension Father    • Lung disease Father    • Hypertension Brother    • Hypertension Brother    • Hypertension Brother         Social History:  Social History     Socioeconomic History   • Marital status: Single     Spouse name: Not on file   • Number of children: Not on file   • Years of education: Not on file   • Highest education level: Not on file   Social Needs   • Financial resource strain: Not on file   • Food insecurity - worry: Not on file   • Food insecurity - inability: Not on file   • Transportation needs - medical: Not on file   • Transportation needs - non-medical: Not on file   Occupational History   • 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   • Sexual activity: Defer   Other Topics Concern   • Not on file   Social History Narrative    Single, lives alone in [place].  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         Review of Systems   Constitutional: Negative for activity change and appetite change.   HENT: Negative for ear pain, rhinorrhea and sneezing.    Eyes: Negative for pain, redness, itching and visual disturbance.   Respiratory: Negative for choking, chest tightness, shortness of breath, wheezing and stridor.    Cardiovascular: Negative for palpitations and leg swelling.   Gastrointestinal: Negative for abdominal distention, blood in stool, constipation, diarrhea and rectal pain.   Genitourinary: Negative for difficulty urinating, dysuria, flank pain and frequency.   Skin: Negative for color change.   Neurological: Negative for syncope and light-headedness.   Psychiatric/Behavioral: Negative for agitation, decreased concentration and sleep disturbance.   All other systems reviewed and are negative.          Objective:     /74 (BP Location: Left arm, Patient Position: Sitting, Cuff Size: Large Adult)   Pulse 87   Ht 170.2 cm (67\")   Wt 123 kg (272 lb 1 oz)   SpO2 97%   BMI 42.61 kg/m²     Physical Exam   Constitutional: He is oriented to person, place, and time. He appears well-developed and well-nourished. He is active.  Non-toxic appearance. He does not have a sickly appearance. He does not appear ill. No distress.   HENT:   Head: Normocephalic.   Right Ear: External ear normal. No lacerations. No swelling or tenderness.   Left Ear: External ear normal. No lacerations. No swelling or tenderness.   Nose: Nose normal.   Mouth/Throat: Uvula is midline, oropharynx is clear and moist and mucous membranes are normal.   Eyes: Conjunctivae, EOM and lids are normal. Pupils are equal, round, and reactive to light.   Cardiovascular: Normal heart sounds and intact distal pulses.   Pulmonary/Chest: Effort normal and " breath sounds normal. No accessory muscle usage. No respiratory distress. He has no decreased breath sounds. He has no wheezes. He has no rhonchi. He has no rales. He exhibits no tenderness.   Abdominal: Soft. Bowel sounds are normal. There is no tenderness. There is no rigidity, no rebound and no guarding.   Musculoskeletal:        Right lower leg: He exhibits no swelling.        Left lower leg: He exhibits no swelling.        Right foot: There is no swelling.        Left foot: There is no swelling.     Vascular Status -  His right foot exhibits no edema. His left foot exhibits no edema.  Lymphadenopathy:     He has no cervical adenopathy.   Neurological: He is alert and oriented to person, place, and time. He has normal strength. He is not disoriented. No cranial nerve deficit (grossly intact). He exhibits normal muscle tone. GCS eye subscore is 4. GCS verbal subscore is 5. GCS motor subscore is 6.   Skin: Skin is warm and dry. Capillary refill takes less than 2 seconds. No rash noted. He is not diaphoretic.   Nursing note and vitals reviewed.        Assessment/Plan:      Diagnosis Plan   1. Weight loss counseling, encounter for  Ambulatory Referral to Family Practice  Refer to Dr. Rivers for weight loss counseling and meal planning, as well as General Nutrition discussion.         Follow-up:     Return in about 4 weeks (around 2/15/2019) for Recheck DM.      Goals: improve diet  Barriers to goals: pt compliance    Health Maintenance   Topic Date Due   • ZOSTER VACCINE (1 of 2) 09/01/2021 (Originally 2/20/2018)   • HEMOGLOBIN A1C  05/16/2019   • LIPID PANEL  09/04/2019   • URINE MICROALBUMIN  09/04/2019   • DIABETIC EYE EXAM  10/29/2019   • DIABETIC FOOT EXAM  11/19/2019   • MEDICARE ANNUAL WELLNESS  12/18/2019   • TDAP/TD VACCINES (2 - Td) 01/01/2021   • COLONOSCOPY  06/04/2028   • PNEUMOCOCCAL VACCINE (19-64 MEDIUM RISK)  Addressed   • INFLUENZA VACCINE  Addressed     Tobacco: former smoker  Alcohol: does not  drink  Lifestyle: Body mass index is 42.61 kg/m². eat more fruits and vegetables, decrease soda or juice intake, increase water intake, increase physical activity, reduce screen time, reduce portion size, cut out extra servings, reduce fast food intake, family to eat at dinner table more often, keep TV off during meals, plan meals, eat breakfast and have 3 meals a day    RISK SCORE: 4        This document has been electronically signed by Yang Veloz MD on January 18, 2019 12:28 PM

## 2019-01-18 NOTE — PROGRESS NOTES
QUICK REFERENCE INFORMATION:  The ABCs of the Annual Wellness Visit    Initial Medicare Wellness Visit    HEALTH RISK ASSESSMENT    1970    Recent Hospitalizations:  Recently treated at the following:  [facility].        Current Medical Providers:  Patient Care Team:  Yang Veloz MD as PCP - General (Family Medicine)  Matt Wayne MD as Resident (Family Medicine)  Ewa Sood MD as Resident (Family Medicine)  Barbara Telles MD as Resident (Family Medicine)  Palmira Jacinto MD as Resident (Family Medicine)  Guy Peraza MD as Resident (Family Medicine)  Lori Rivers MD as Resident (Family Medicine)  Ottoniel Rivers MD (Family Medicine)  Gabriella Whyte MD (Family Medicine)  Jair Mcbride MD as Resident (Family Medicine)  Eric Vargas MD as Resident (Family Medicine)        Smoking Status:  Social History     Tobacco Use   Smoking Status Former Smoker   • Packs/day: 1.00   • Years: 25.00   • Pack years: 25.00   • Types: Cigarettes   • Start date:    • Last attempt to quit: 2018   • Years since quittin.1   Smokeless Tobacco Never Used       Alcohol Consumption:  Social History     Substance and Sexual Activity   Alcohol Use No    Comment: FORMER pint of [employer] daily, states quit 1 year ago       Depression Screen:   PHQ-2/PHQ-9 Depression Screening 2018   Little interest or pleasure in doing things 0   Feeling down, depressed, or hopeless 0   Trouble falling or staying asleep, or sleeping too much 0   Feeling tired or having little energy 0   Poor appetite or overeating 1   Feeling bad about yourself - or that you are a failure or have let yourself or your family down 0   Trouble concentrating on things, such as reading the newspaper or watching television 0   Moving or speaking so slowly that other people could have noticed. Or the opposite - being so fidgety or restless that you have been moving around a  lot more than usual 0   Thoughts that you would be better off dead, or of hurting yourself in some way 0   Total Score 1   If you checked off any problems, how difficult have these problems made it for you to do your work, take care of things at home, or get along with other people? Not difficult at all       Health Habits and Functional and Cognitive Screening:  Functional & Cognitive Status 12/18/2018   Do you have difficulty preparing food and eating? No   Do you have difficulty bathing yourself, getting dressed or grooming yourself? No   Do you have difficulty using the toilet? No   Do you have difficulty moving around from place to place? No   Do you have trouble with steps or getting out of a bed or a chair? No   In the past year have you fallen or experienced a near fall? No   Current Diet Unhealthy Diet   Dental Exam Up to date   Eye Exam Up to date   Exercise (times per week) 0 times per week   Current Exercise Activities Include None   Do you need help using the phone?  No   Are you deaf or do you have serious difficulty hearing?  No   Do you need help with transportation? No   Do you need help shopping? No   Do you need help preparing meals?  No   Do you need help with housework?  No   Do you need help with laundry? No   Do you need help taking your medications? No   Do you need help managing money? No   Do you ever drive or ride in a car without wearing a seat belt? No   Have you felt unusual stress, anger or loneliness in the last month? No   Who do you live with? Alone   If you need help, do you have trouble finding someone available to you? No   Have you been bothered in the last four weeks by sexual problems? No   Do you have difficulty concentrating, remembering or making decisions? No           Does the patient have evidence of cognitive impairment? No    Asiprin use counseling: Taking ASA appropriately as indicated      Recent Lab Results:    Visual Acuity:  No exam data present    Age-appropriate  Screening Schedule:  Refer to the list below for future screening recommendations based on patient's age, sex and/or medical conditions. Orders for these recommended tests are listed in the plan section. The patient has been provided with a written plan.    Health Maintenance   Topic Date Due   • ZOSTER VACCINE (1 of 2) 09/01/2021 (Originally 2/20/2018)   • HEMOGLOBIN A1C  05/16/2019   • LIPID PANEL  09/04/2019   • URINE MICROALBUMIN  09/04/2019   • DIABETIC EYE EXAM  10/29/2019   • DIABETIC FOOT EXAM  11/19/2019   • TDAP/TD VACCINES (2 - Td) 01/01/2021   • COLONOSCOPY  06/04/2028   • PNEUMOCOCCAL VACCINE (19-64 MEDIUM RISK)  Addressed   • INFLUENZA VACCINE  Addressed        Subjective   History of Present Illness    Kingsley Littlejohn is a 48 y.o. male who presents for an Annual Wellness Visit.    The following portions of the patient's history were reviewed and updated as appropriate: allergies, current medications, past family history, past medical history, past social history, past surgical history and problem list.    Outpatient Medications Prior to Visit   Medication Sig Dispense Refill   • aspirin 325 MG tablet Take 81 mg by mouth Daily.     • atorvastatin (LIPITOR) 40 MG tablet Take 1 tablet by mouth Daily. 90 tablet 3   • Blood Glucose Monitoring Suppl (ONE TOUCH ULTRA 2) w/Device kit U UTD  0   • Empagliflozin (JARDIANCE) 10 MG tablet Take 10 mg by mouth Daily. 30 tablet 5   • fluticasone (FLONASE) 50 MCG/ACT nasal spray 2 sprays into each nostril Daily.     • glucose blood test strip 1 each by Other route 2 (Two) Times a Day for 180 days. Use as instructed 180 each 1   • glucose monitor monitoring kit 1 each As Needed (Diabetes). 1 each 0   • hydrOXYzine (ATARAX) 25 MG tablet Take 1 tablet by mouth Every 8 (Eight) Hours As Needed for Itching. 30 tablet 1   • ibuprofen (ADVIL,MOTRIN) 600 MG tablet Take 1 tablet by mouth Every 6 (Six) Hours As Needed for Mild Pain . 15 tablet 0   • JANUVIA 100 MG tablet  Take 1 tablet by mouth Daily. 30 tablet 2   • lisinopril (PRINIVIL,ZESTRIL) 40 MG tablet Take 1 tablet by mouth Daily. 30 tablet 5   • metFORMIN (GLUCOPHAGE) 1000 MG tablet Take 1 tablet by mouth 2 (Two) Times a Day With Meals for 90 days. 180 tablet 0   • metoprolol tartrate (LOPRESSOR) 50 MG tablet Take 1 tablet by mouth Every 12 (Twelve) Hours. 180 tablet 1   • NICOTINE STEP 1 TD Place  on the skin as directed by provider.     • nitroglycerin (NITROSTAT) 0.4 MG SL tablet Place 1 tablet under the tongue Every 5 (Five) Minutes As Needed for Chest Pain. Take no more than 3 doses in 15 minutes. 30 tablet 12   • paliperidone palmitate (INVEGA SUSTENNA) 117 MG/0.75ML suspension IM injection Inject 117 mg into the shoulder, thigh, or buttocks Every 30 (Thirty) Days.     • sodium chloride (OCEAN NASAL SPRAY) 0.65 % nasal spray 1 spray into each nostril As Needed for Congestion. 60 mL 12   • Triamcinolone Acetonide (NASACORT) 55 MCG/ACT nasal inhaler 2 sprays into the nostril(s) as directed by provider Daily. 16.5 g 3   • varenicline (CHANTIX STARTING MONTH PAK) 0.5 MG X 11 & 1 MG X 42 tablet Take 0.5 mg one daily on days 1-3 and and 0.5 mg twice daily on days 4-7.Then 1 mg twice daily for a total of 12 weeks. 53 tablet 1     No facility-administered medications prior to visit.        Patient Active Problem List   Diagnosis   • Essential hypertension   • Anxiety   • Tobacco dependence   • Polysubstance (excluding opioids) dependence (CMS/HCC)   • Non morbid obesity due to excess calories   • DM (diabetes mellitus), type 2 (CMS/HCC)   • Other hyperlipidemia   • Psychotic mood disorder (CMS/HCC)   • Anxiety disorder   • Encounter for screening colonoscopy   • Other chest pain   • Obstructive sleep apnea syndrome   • Cyst of skin and subcutaneous tissue   • Hx of adenomatous colonic polyps   • Perianal abscess   • Scrotal abscess       Advance Care Planning:  has NO advance directive - add't info requested. Referral to  "ACP.    Identification of Risk Factors:  Risk factors include: weight , unhealthy diet, cardiovascular risk, inactivity and inadequate social support.    Review of Systems    Compared to one year ago, the patient feels his physical health is better.  Compared to one year ago, the patient feels his mental health is better.    Objective     Physical Exam    Vitals:    01/18/19 1015   BP: 122/74   BP Location: Left arm   Patient Position: Sitting   Cuff Size: Large Adult   Pulse: 87   SpO2: 97%   Weight: 123 kg (272 lb 1 oz)   Height: 170.2 cm (67\")   PainSc: 0-No pain       Patient's Body mass index is 42.61 kg/m². BMI is above normal parameters. Recommendations include: educational material, exercise counseling and nutrition counseling.      Assessment/Plan   Patient Self-Management and Personalized Health Advice  The patient has been provided with information about: exercise and designing advance directives and preventive services including:   · Advance directive.    Visit Diagnoses:  No diagnosis found.    No orders of the defined types were placed in this encounter.      Outpatient Encounter Medications as of 1/18/2019   Medication Sig Dispense Refill   • aspirin 325 MG tablet Take 81 mg by mouth Daily.     • atorvastatin (LIPITOR) 40 MG tablet Take 1 tablet by mouth Daily. 90 tablet 3   • Blood Glucose Monitoring Suppl (ONE TOUCH ULTRA 2) w/Device kit U UTD  0   • Empagliflozin (JARDIANCE) 10 MG tablet Take 10 mg by mouth Daily. 30 tablet 5   • fluticasone (FLONASE) 50 MCG/ACT nasal spray 2 sprays into each nostril Daily.     • glucose blood test strip 1 each by Other route 2 (Two) Times a Day for 180 days. Use as instructed 180 each 1   • glucose monitor monitoring kit 1 each As Needed (Diabetes). 1 each 0   • hydrOXYzine (ATARAX) 25 MG tablet Take 1 tablet by mouth Every 8 (Eight) Hours As Needed for Itching. 30 tablet 1   • ibuprofen (ADVIL,MOTRIN) 600 MG tablet Take 1 tablet by mouth Every 6 (Six) Hours As " Needed for Mild Pain . 15 tablet 0   • JANUVIA 100 MG tablet Take 1 tablet by mouth Daily. 30 tablet 2   • lisinopril (PRINIVIL,ZESTRIL) 40 MG tablet Take 1 tablet by mouth Daily. 30 tablet 5   • metFORMIN (GLUCOPHAGE) 1000 MG tablet Take 1 tablet by mouth 2 (Two) Times a Day With Meals for 90 days. 180 tablet 0   • metoprolol tartrate (LOPRESSOR) 50 MG tablet Take 1 tablet by mouth Every 12 (Twelve) Hours. 180 tablet 1   • NICOTINE STEP 1 TD Place  on the skin as directed by provider.     • nitroglycerin (NITROSTAT) 0.4 MG SL tablet Place 1 tablet under the tongue Every 5 (Five) Minutes As Needed for Chest Pain. Take no more than 3 doses in 15 minutes. 30 tablet 12   • paliperidone palmitate (INVEGA SUSTENNA) 117 MG/0.75ML suspension IM injection Inject 117 mg into the shoulder, thigh, or buttocks Every 30 (Thirty) Days.     • sodium chloride (OCEAN NASAL SPRAY) 0.65 % nasal spray 1 spray into each nostril As Needed for Congestion. 60 mL 12   • Triamcinolone Acetonide (NASACORT) 55 MCG/ACT nasal inhaler 2 sprays into the nostril(s) as directed by provider Daily. 16.5 g 3   • varenicline (CHANTIX STARTING MONTH PAK) 0.5 MG X 11 & 1 MG X 42 tablet Take 0.5 mg one daily on days 1-3 and and 0.5 mg twice daily on days 4-7.Then 1 mg twice daily for a total of 12 weeks. 53 tablet 1     No facility-administered encounter medications on file as of 1/18/2019.        Reviewed use of high risk medication in the elderly: yes  Reviewed for potential of harmful drug interactions in the elderly: yes    Follow Up:  No Follow-up on file.     An After Visit Summary and PPPS with all of these plans were given to the patient.             This document has been electronically signed by Yang Veloz MD on January 18, 2019 10:40 AM

## 2019-01-21 NOTE — PROGRESS NOTES
I have reviewed the notes, assessments, and/or procedures performed by the resident, I concur with her/his documentation and assessment and plan for Kingsley Littlejohn.      This document has been electronically signed by Hi Verdin MD on January 21, 2019 2:08 PM

## 2019-01-22 ENCOUNTER — TRANSCRIBE ORDERS (OUTPATIENT)
Dept: NUTRITION | Facility: HOSPITAL | Age: 49
End: 2019-01-22

## 2019-01-22 DIAGNOSIS — E66.3 OVERWEIGHT: ICD-10-CM

## 2019-01-22 DIAGNOSIS — E11.65 UNCONTROLLED TYPE 2 DIABETES MELLITUS WITH HYPERGLYCEMIA (HCC): Primary | ICD-10-CM

## 2019-01-29 ENCOUNTER — TELEPHONE (OUTPATIENT)
Dept: FAMILY MEDICINE CLINIC | Facility: CLINIC | Age: 49
End: 2019-01-29

## 2019-01-29 DIAGNOSIS — E11.8 TYPE 2 DIABETES MELLITUS WITH COMPLICATION, WITHOUT LONG-TERM CURRENT USE OF INSULIN (HCC): ICD-10-CM

## 2019-01-29 RX ORDER — SITAGLIPTIN 100 MG/1
100 TABLET, FILM COATED ORAL DAILY
Qty: 30 TABLET | Refills: 2 | Status: SHIPPED | OUTPATIENT
Start: 2019-01-29 | End: 2019-04-21 | Stop reason: SDUPTHER

## 2019-02-21 ENCOUNTER — OFFICE VISIT (OUTPATIENT)
Dept: FAMILY MEDICINE CLINIC | Facility: CLINIC | Age: 49
End: 2019-02-21

## 2019-02-21 ENCOUNTER — APPOINTMENT (OUTPATIENT)
Dept: LAB | Facility: HOSPITAL | Age: 49
End: 2019-02-21

## 2019-02-21 VITALS
DIASTOLIC BLOOD PRESSURE: 88 MMHG | SYSTOLIC BLOOD PRESSURE: 142 MMHG | BODY MASS INDEX: 42.62 KG/M2 | OXYGEN SATURATION: 96 % | HEIGHT: 67 IN | WEIGHT: 271.56 LBS | HEART RATE: 91 BPM

## 2019-02-21 DIAGNOSIS — J06.9 UPPER RESPIRATORY TRACT INFECTION, UNSPECIFIED TYPE: ICD-10-CM

## 2019-02-21 DIAGNOSIS — E11.65 UNCONTROLLED TYPE 2 DIABETES MELLITUS WITH HYPERGLYCEMIA (HCC): Primary | ICD-10-CM

## 2019-02-21 LAB — HBA1C MFR BLD: 8.8 % (ref 4–5.6)

## 2019-02-21 PROCEDURE — 99213 OFFICE O/P EST LOW 20 MIN: CPT | Performed by: STUDENT IN AN ORGANIZED HEALTH CARE EDUCATION/TRAINING PROGRAM

## 2019-02-21 PROCEDURE — 83036 HEMOGLOBIN GLYCOSYLATED A1C: CPT | Performed by: STUDENT IN AN ORGANIZED HEALTH CARE EDUCATION/TRAINING PROGRAM

## 2019-02-21 PROCEDURE — 36415 COLL VENOUS BLD VENIPUNCTURE: CPT | Performed by: STUDENT IN AN ORGANIZED HEALTH CARE EDUCATION/TRAINING PROGRAM

## 2019-02-21 RX ORDER — TRAZODONE HYDROCHLORIDE 150 MG/1
TABLET ORAL
Refills: 2 | COMMUNITY
Start: 2019-02-11 | End: 2022-09-14

## 2019-02-21 NOTE — PROGRESS NOTES
ID: Kingsley Littlejohn    CC:   Chief Complaint   Patient presents with   • Diabetes     Pt here for 1 month follow up        Subjective:     Kingsley Littlejohn is a 48 y.o. male who presents for:    Diabetes   He presents for his follow-up diabetic visit. He has type 2 diabetes mellitus. No MedicAlert identification noted. Onset time: months. His disease course has been stable. There are no hypoglycemic associated symptoms. Pertinent negatives for hypoglycemia include no confusion, headaches, pallor, speech difficulty or sweats. There are no diabetic associated symptoms. Pertinent negatives for diabetes include no blurred vision, no fatigue, no foot paresthesias, no polyphagia, no polyuria, no visual change and no weakness. There are no hypoglycemic complications. Pertinent negatives for hypoglycemia complications include no blackouts, no nocturnal hypoglycemia, no required assistance and no required glucagon injection. Symptoms are stable. Pertinent negatives for diabetic complications include no impotence or peripheral neuropathy. Risk factors for coronary artery disease include dyslipidemia, diabetes mellitus, obesity, family history, male sex, hypertension, stress and sedentary lifestyle. Current diabetic treatment includes oral agent (triple therapy). He is compliant with treatment all of the time. His weight is decreasing steadily. He is following a generally unhealthy diet. When asked about meal planning, he reported none. He has not had a previous visit with a dietitian. He never participates in exercise. His home blood glucose trend is increasing steadily (eats a lot of sugar). His overall blood glucose range is >200 mg/dl. An ACE inhibitor/angiotensin II receptor blocker is being taken. He does not see a podiatrist.Eye exam is current.        Past Medical Hx:  Past Medical History:   Diagnosis Date   • Abdominal pain    • Allergic rhinitis    • Diabetes mellitus (CMS/[facility])     Prev HbA1c 14.0 2014, states  "\"I'm cured!\"   • Essential hypertension    • Fracture closed, fibula, shaft     tib-fib       Past Surgical Hx:  Past Surgical History:   Procedure Laterality Date   • ANKLE SURGERY      right and left   • COLONOSCOPY N/A 6/4/2018    Procedure: COLONOSCOPY;  Surgeon: Jaylon Castro MD;  Location: [facility] ENDOSCOPY;  Service: Gastroenterology   • CYST REMOVAL      x 3, from hand, thigh, and buttocks   • ENDOSCOPY      20 years ago   • TIBIA FRACTURE SURGERY Bilateral 2011    tib-fib       Health Maintenance:  Health Maintenance   Topic Date Due   • ZOSTER VACCINE (1 of 2) 09/01/2021 (Originally 2/20/2018)   • HEMOGLOBIN A1C  05/16/2019   • LIPID PANEL  09/04/2019   • URINE MICROALBUMIN  09/04/2019   • DIABETIC EYE EXAM  10/29/2019   • DIABETIC FOOT EXAM  11/19/2019   • MEDICARE ANNUAL WELLNESS  12/18/2019   • TDAP/TD VACCINES (2 - Td) 01/01/2021   • COLONOSCOPY  06/04/2028   • PNEUMOCOCCAL VACCINE (19-64 MEDIUM RISK)  Addressed   • INFLUENZA VACCINE  Addressed       Current Meds:    Current Outpatient Medications:   •  albuterol sulfate  (90 Base) MCG/ACT inhaler, 2 puffs morning, noon,4 o'clock and at bedtime for one week,then 4 times daily as needed., Disp: 18 g, Rfl: 0  •  aspirin 325 MG tablet, Take 81 mg by mouth Daily., Disp: , Rfl:   •  atorvastatin (LIPITOR) 40 MG tablet, Take 1 tablet by mouth Daily., Disp: 90 tablet, Rfl: 3  •  Blood Glucose Monitoring Suppl (ONE TOUCH ULTRA 2) w/Device kit, U UTD, Disp: , Rfl: 0  •  Empagliflozin (JARDIANCE) 10 MG tablet, Take 10 mg by mouth Daily., Disp: 30 tablet, Rfl: 5  •  glucose blood test strip, 1 each by Other route 2 (Two) Times a Day for 180 days. Use as instructed, Disp: 180 each, Rfl: 1  •  glucose monitor monitoring kit, 1 each As Needed (Diabetes)., Disp: 1 each, Rfl: 0  •  hydrOXYzine (ATARAX) 25 MG tablet, Take 1 tablet by mouth Every 8 (Eight) Hours As Needed for Itching., Disp: 30 tablet, Rfl: 1  •  ibuprofen (ADVIL,MOTRIN) 600 MG tablet, Take 1 " tablet by mouth Every 6 (Six) Hours As Needed for Mild Pain ., Disp: 15 tablet, Rfl: 0  •  JANUVIA 100 MG tablet, Take 1 tablet by mouth Daily., Disp: 30 tablet, Rfl: 2  •  lisinopril (PRINIVIL,ZESTRIL) 40 MG tablet, Take 1 tablet by mouth Daily., Disp: 30 tablet, Rfl: 5  •  metFORMIN (GLUCOPHAGE) 1000 MG tablet, Take 1 tablet by mouth 2 (Two) Times a Day With Meals for 90 days., Disp: 180 tablet, Rfl: 0  •  metoprolol tartrate (LOPRESSOR) 50 MG tablet, Take 1 tablet by mouth Every 12 (Twelve) Hours., Disp: 180 tablet, Rfl: 1  •  nitroglycerin (NITROSTAT) 0.4 MG SL tablet, Place 1 tablet under the tongue Every 5 (Five) Minutes As Needed for Chest Pain. Take no more than 3 doses in 15 minutes., Disp: 30 tablet, Rfl: 12  •  paliperidone palmitate (INVEGA SUSTENNA) 117 MG/0.75ML suspension IM injection, Inject 117 mg into the shoulder, thigh, or buttocks Every 30 (Thirty) Days., Disp: , Rfl:   •  sodium chloride (OCEAN NASAL SPRAY) 0.65 % nasal spray, 1 spray into each nostril As Needed for Congestion., Disp: 60 mL, Rfl: 12  •  traZODone (DESYREL) 150 MG tablet, TAKE 1 TABLET BY MOUTH EVERY DAY AT BEDTIME AS NEEDED, Disp: , Rfl: 2  •  Triamcinolone Acetonide (NASACORT) 55 MCG/ACT nasal inhaler, 2 sprays into the nostril(s) as directed by provider Daily., Disp: 16.5 g, Rfl: 3    Allergies:  Latex and Codeine    Family Hx:  Family History   Problem Relation Age of Onset   • Diabetes Other    • Hypertension Other    • Cancer Other    • Lung disease Other    • Thyroid disease Other    • Bleeding Disorder Other    • Colon cancer Paternal Grandmother    • Cancer Paternal Grandfather    • Crohn's disease Mother    • Diabetes Mother    • Hypertension Father    • Lung disease Father    • Hypertension Brother    • Hypertension Brother    • Hypertension Brother         Social History:  Social History     Socioeconomic History   • Marital status: Single     Spouse name: Not on file   • Number of children: Not on file   • Years of  education: Not on file   • Highest education level: Not on file   Social Needs   • Financial resource strain: Not on file   • Food insecurity - worry: Not on file   • Food insecurity - inability: Not on file   • Transportation needs - medical: Not on file   • Transportation needs - non-medical: Not on file   Occupational History   • 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   • Sexual activity: Defer   Other Topics Concern   • Not on file   Social History Narrative    Single, lives alone in [place].  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         Review of Systems   Constitutional: Negative for activity change, appetite change and fatigue.   HENT: Positive for congestion and rhinorrhea. Negative for ear pain and sneezing.    Eyes: Negative for blurred vision and visual disturbance.   Respiratory: Positive for cough and wheezing. Negative for choking, chest tightness, shortness of breath and stridor.    Cardiovascular: Negative for palpitations and leg swelling.   Gastrointestinal: Negative for abdominal distention, blood in stool, constipation, diarrhea and rectal pain.   Endocrine: Negative for polyphagia and polyuria.   Genitourinary: Negative for difficulty urinating, dysuria, flank pain, frequency and impotence.   Skin: Negative for color change and pallor.   Neurological: Negative for syncope, speech difficulty, weakness, light-headedness and headaches.   Psychiatric/Behavioral: Negative for agitation, confusion, decreased concentration and sleep disturbance.   All other  "systems reviewed and are negative.          Objective:     /88   Pulse 91   Ht 170.2 cm (67\")   Wt 123 kg (271 lb 9 oz)   SpO2 96%   BMI 42.53 kg/m²     Physical Exam   Constitutional: He is oriented to person, place, and time. He appears well-developed and well-nourished. He is active.  Non-toxic appearance. He does not have a sickly appearance. He does not appear ill. No distress.   HENT:   Head: Normocephalic.   Right Ear: External ear normal.   Left Ear: External ear normal.   Nose: Mucosal edema and rhinorrhea present.   Mouth/Throat: Uvula is midline, oropharynx is clear and moist and mucous membranes are normal.   Eyes: Conjunctivae are normal. No scleral icterus.   Neck: Normal range of motion.   Cardiovascular: Intact distal pulses.   Pulmonary/Chest: Effort normal. No accessory muscle usage. No respiratory distress. He has no decreased breath sounds. He has wheezes.   Abdominal: Soft. Bowel sounds are normal. There is no tenderness (deep palpation). There is no rigidity, no rebound and no guarding.   Neurological: He is alert and oriented to person, place, and time. He has normal strength. He is not disoriented. No cranial nerve deficit (grossly intact) or sensory deficit. GCS eye subscore is 4. GCS verbal subscore is 5. GCS motor subscore is 6.   Skin: Skin is warm. Capillary refill takes less than 2 seconds. No rash noted. He is not diaphoretic.   Nursing note and vitals reviewed.        Assessment/Plan:      Diagnosis Plan   1. Uncontrolled type 2 diabetes mellitus with hyperglycemia (CMS/[facility])  Hemoglobin A1c   2. Upper respiratory tract infection, unspecified type  Pt recently treated with antibiotics for URI. Pt doing better. Complete abx yesterday.         Follow-up:     Return in about 2 months (around 4/21/2019) for Diet Guidance.      Goals: improve diet  Barriers to goals: pt compliance    Health Maintenance   Topic Date Due   • ZOSTER VACCINE (1 of 2) 09/01/2021 (Originally 2/20/2018) "   • HEMOGLOBIN A1C  05/16/2019   • LIPID PANEL  09/04/2019   • URINE MICROALBUMIN  09/04/2019   • DIABETIC EYE EXAM  10/29/2019   • DIABETIC FOOT EXAM  11/19/2019   • MEDICARE ANNUAL WELLNESS  12/18/2019   • TDAP/TD VACCINES (2 - Td) 01/01/2021   • COLONOSCOPY  06/04/2028   • PNEUMOCOCCAL VACCINE (19-64 MEDIUM RISK)  Addressed   • INFLUENZA VACCINE  Addressed     Tobacco: former smoker  Alcohol: does not drink  Lifestyle: Body mass index is 42.53 kg/m². eat more fruits and vegetables, decrease soda or juice intake, increase water intake, increase physical activity, reduce screen time, reduce portion size, cut out extra servings, reduce fast food intake, family to eat at dinner table more often, keep TV off during meals, plan meals, eat breakfast and have 3 meals a day    RISK SCORE: 4        This document has been electronically signed by Yang Veloz MD on February 21, 2019 3:46 PM

## 2019-02-21 NOTE — PROGRESS NOTES
I have reviewed the notes, assessments, and/or procedures performed by Dr. Yang Veloz, I concur with his  documentation and assessment and plan for Kingsley Littlejohn        This document has been electronically signed by Kg Kc MD on February 21, 2019 4:17 PM

## 2019-03-06 ENCOUNTER — OFFICE VISIT (OUTPATIENT)
Dept: PODIATRY | Facility: CLINIC | Age: 49
End: 2019-03-06

## 2019-03-06 VITALS — BODY MASS INDEX: 42.53 KG/M2 | HEIGHT: 67 IN | WEIGHT: 271 LBS | HEART RATE: 98 BPM | OXYGEN SATURATION: 97 %

## 2019-03-06 DIAGNOSIS — M79.675 CHRONIC TOE PAIN, BILATERAL: ICD-10-CM

## 2019-03-06 DIAGNOSIS — G89.29 CHRONIC TOE PAIN, BILATERAL: ICD-10-CM

## 2019-03-06 DIAGNOSIS — B35.1 ONYCHOMYCOSIS: Primary | ICD-10-CM

## 2019-03-06 DIAGNOSIS — E11.42 TYPE 2 DIABETES MELLITUS WITH PERIPHERAL NEUROPATHY (HCC): ICD-10-CM

## 2019-03-06 DIAGNOSIS — M79.674 CHRONIC TOE PAIN, BILATERAL: ICD-10-CM

## 2019-03-06 PROCEDURE — 11721 DEBRIDE NAIL 6 OR MORE: CPT | Performed by: PODIATRIST

## 2019-03-06 NOTE — PROGRESS NOTES
"Kingsley Littlejohn  1970  49 y.o. male  RC Veloz - 2/21/2019  A1c 8.8 (2/21/2019)    Patient presents today for routine diabetic nail care.       Chief Complaint   Patient presents with   • Left Foot - diabetic nail care   • Right Foot - diabetic nail care       History of Present Illness    Patient presents to clinic today for routine diabetic footcare.  States that his toenails have become long and painful.  Pain is relieved with debridement.      Past Medical History:   Diagnosis Date   • Abdominal pain    • Allergic rhinitis    • Diabetes mellitus (CMS/[facility])     Prev HbA1c 14.0 2014, states \"I'm cured!\"   • Essential hypertension    • Fracture closed, fibula, shaft     tib-fib         Past Surgical History:   Procedure Laterality Date   • ANKLE SURGERY      right and left   • COLONOSCOPY N/A 6/4/2018    Procedure: COLONOSCOPY;  Surgeon: Jaylon Castro MD;  Location: [facility] ENDOSCOPY;  Service: Gastroenterology   • CYST REMOVAL      x 3, from hand, thigh, and buttocks   • ENDOSCOPY      20 years ago   • TIBIA FRACTURE SURGERY Bilateral 2011    tib-fib         Family History   Problem Relation Age of Onset   • Diabetes Other    • Hypertension Other    • Cancer Other    • Lung disease Other    • Thyroid disease Other    • Bleeding Disorder Other    • Colon cancer Paternal Grandmother    • Cancer Paternal Grandfather    • Crohn's disease Mother    • Diabetes Mother    • Hypertension Father    • Lung disease Father    • Hypertension Brother    • Hypertension Brother    • Hypertension Brother        Allergies   Allergen Reactions   • Latex Irritability   • Codeine Nausea Only       Social History     Socioeconomic History   • Marital status: Single     Spouse name: Not on file   • Number of children: Not on file   • Years of education: Not on file   • Highest education level: Not on file   Social Needs   • Financial resource strain: Not on file   • Food insecurity - worry: Not on file   • Food insecurity - " inability: Not on file   • Transportation needs - medical: Not on file   • Transportation needs - non-medical: Not on file   Occupational History   • 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           Current Outpatient Medications   Medication Sig Dispense Refill   • albuterol sulfate  (90 Base) MCG/ACT inhaler 2 puffs morning, noon,4 o'clock and at bedtime for one week,then 4 times daily as needed. 18 g 0   • aspirin 325 MG tablet Take 81 mg by mouth Daily.     • atorvastatin (LIPITOR) 40 MG tablet Take 1 tablet by mouth Daily. 90 tablet 3   • Blood Glucose Monitoring Suppl (ONE TOUCH ULTRA 2) w/Device kit U UTD  0   • Empagliflozin (JARDIANCE) 10 MG tablet Take 10 mg by mouth Daily. 30 tablet 5   • glucose blood test strip 1 each by Other route 2 (Two) Times a Day for 180 days. Use as instructed 180 each 1   • glucose monitor monitoring kit 1 each As Needed (Diabetes). 1 each 0   • hydrOXYzine (ATARAX) 25 MG tablet Take 1 tablet by mouth Every 8 (Eight) Hours As Needed for Itching. 30 tablet 1   • ibuprofen (ADVIL,MOTRIN) 600 MG tablet Take 1 tablet by mouth Every 6 (Six) Hours As Needed for Mild Pain . 15 tablet 0   • JANUVIA 100 MG tablet Take 1 tablet by mouth Daily. 30 tablet 2   • lisinopril (PRINIVIL,ZESTRIL) 40 MG tablet Take 1 tablet by mouth Daily. 30 tablet 5   • metFORMIN (GLUCOPHAGE) 1000  "MG tablet Take 1 tablet by mouth 2 (Two) Times a Day With Meals for 90 days. 180 tablet 0   • metoprolol tartrate (LOPRESSOR) 50 MG tablet Take 1 tablet by mouth Every 12 (Twelve) Hours. 180 tablet 1   • nitroglycerin (NITROSTAT) 0.4 MG SL tablet Place 1 tablet under the tongue Every 5 (Five) Minutes As Needed for Chest Pain. Take no more than 3 doses in 15 minutes. 30 tablet 12   • paliperidone palmitate (INVEGA SUSTENNA) 117 MG/0.75ML suspension IM injection Inject 117 mg into the shoulder, thigh, or buttocks Every 30 (Thirty) Days.     • sodium chloride (OCEAN NASAL SPRAY) 0.65 % nasal spray 1 spray into each nostril As Needed for Congestion. 60 mL 12   • traZODone (DESYREL) 150 MG tablet TAKE 1 TABLET BY MOUTH EVERY DAY AT BEDTIME AS NEEDED  2   • Triamcinolone Acetonide (NASACORT) 55 MCG/ACT nasal inhaler 2 sprays into the nostril(s) as directed by provider Daily. 16.5 g 3     No current facility-administered medications for this visit.        Review of Systems   Constitutional: Negative.    HENT: Negative.    Eyes: Negative.    Respiratory: Negative.    Cardiovascular: Positive for leg swelling.   Gastrointestinal: Negative.    Genitourinary: Positive for frequency.   Musculoskeletal:        Toe pain    Allergic/Immunologic: Negative.    Neurological: Negative.    Hematological: Negative.    Psychiatric/Behavioral: Negative.          OBJECTIVE    Pulse 98   Ht 170.2 cm (67\")   Wt 123 kg (271 lb)   SpO2 97%   BMI 42.44 kg/m²       Physical Exam   Constitutional: He is oriented to person, place, and time. He appears well-developed and well-nourished.   HENT:   Head: Normocephalic and atraumatic.   Nose: Nose normal.   Eyes: Conjunctivae and EOM are normal. Pupils are equal, round, and reactive to light.   Pulmonary/Chest: Effort normal. No respiratory distress. He has no wheezes.   Neurological: He is alert and oriented to person, place, and time. He displays normal reflexes.   Skin: Skin is warm and dry. " Capillary refill takes less than 2 seconds. He is not diaphoretic.   Psychiatric: He has a normal mood and affect. His behavior is normal.   Vitals reviewed.      Gait: Normal     Assistive Device: None    Lower Extremity    Cardiovascular:    DP/PT pulses palpable bilateral  CFT brisk  to all digits  Skin temp is warm to warm from proximal tibia to distal digits bilateral  Pedal hair growth present.   No erythema or edema noted     Musculoskeletal:  Muscle strength is 5/5 for all muscle groups tested   ROM of the 1st MTP is full without pain or crepitus bilateral  ROM of the ankle joint is full without pain or crepitus  bilateral     Dermatological:   Nails 1-5 bilateral are thickened, discolored, elongated.  Pain on palpation to the nail plates.  Skin is warm, dry and intact bilateral  Webspaces 1-4 bilateral are clean, dry and intact.     Neurological:   Protective sensation intact   Sensation intact to light touch        Procedures        ASSESSMENT AND PLAN    Kingsley was seen today for diabetic nail care and diabetic nail care.    Diagnoses and all orders for this visit:    Onychomycosis    Chronic toe pain, bilateral    Type 2 diabetes mellitus with peripheral neuropathy (CMS/HCC)      - Nails 1-5 bilateral were debrided in length and thickness with nail nipper and electric  to decrease fungal load and risk of infection.   - All his questions were answered  - RTC in 3 months as needed            This document has been electronically signed by Abhijeet Thomason DPM on March 6, 2019 1:44 PM     3/6/2019  1:44 PM

## 2019-04-21 DIAGNOSIS — E11.8 TYPE 2 DIABETES MELLITUS WITH COMPLICATION, WITHOUT LONG-TERM CURRENT USE OF INSULIN (HCC): ICD-10-CM

## 2019-04-23 RX ORDER — SITAGLIPTIN 100 MG/1
TABLET, FILM COATED ORAL
Qty: 30 TABLET | Refills: 0 | Status: SHIPPED | OUTPATIENT
Start: 2019-04-23 | End: 2019-05-23 | Stop reason: SDUPTHER

## 2019-05-23 DIAGNOSIS — E11.8 TYPE 2 DIABETES MELLITUS WITH COMPLICATION, WITHOUT LONG-TERM CURRENT USE OF INSULIN (HCC): ICD-10-CM

## 2019-05-23 RX ORDER — SITAGLIPTIN 100 MG/1
TABLET, FILM COATED ORAL
Qty: 30 TABLET | Refills: 0 | Status: SHIPPED | OUTPATIENT
Start: 2019-05-23 | End: 2019-07-23 | Stop reason: SDUPTHER

## 2019-06-06 ENCOUNTER — OFFICE VISIT (OUTPATIENT)
Dept: FAMILY MEDICINE CLINIC | Facility: CLINIC | Age: 49
End: 2019-06-06

## 2019-06-06 ENCOUNTER — APPOINTMENT (OUTPATIENT)
Dept: LAB | Facility: HOSPITAL | Age: 49
End: 2019-06-06

## 2019-06-06 VITALS
WEIGHT: 271.5 LBS | BODY MASS INDEX: 42.61 KG/M2 | HEIGHT: 67 IN | OXYGEN SATURATION: 97 % | DIASTOLIC BLOOD PRESSURE: 82 MMHG | SYSTOLIC BLOOD PRESSURE: 138 MMHG | HEART RATE: 86 BPM

## 2019-06-06 DIAGNOSIS — E11.9 TYPE 2 DIABETES MELLITUS WITHOUT COMPLICATION, WITHOUT LONG-TERM CURRENT USE OF INSULIN (HCC): Primary | ICD-10-CM

## 2019-06-06 DIAGNOSIS — N50.89 SCROTAL SWELLING: ICD-10-CM

## 2019-06-06 PROCEDURE — 83036 HEMOGLOBIN GLYCOSYLATED A1C: CPT | Performed by: STUDENT IN AN ORGANIZED HEALTH CARE EDUCATION/TRAINING PROGRAM

## 2019-06-06 PROCEDURE — 99213 OFFICE O/P EST LOW 20 MIN: CPT | Performed by: STUDENT IN AN ORGANIZED HEALTH CARE EDUCATION/TRAINING PROGRAM

## 2019-06-06 PROCEDURE — 36415 COLL VENOUS BLD VENIPUNCTURE: CPT | Performed by: STUDENT IN AN ORGANIZED HEALTH CARE EDUCATION/TRAINING PROGRAM

## 2019-06-06 RX ORDER — FLUTICASONE PROPIONATE 50 MCG
SPRAY, SUSPENSION (ML) NASAL
Refills: 0 | COMMUNITY
Start: 2019-03-05 | End: 2020-05-20

## 2019-06-06 NOTE — PROGRESS NOTES
ID: Kingsley Littlejohn    CC:   Chief Complaint   Patient presents with   • Nutrition Counseling     DIET GUIDANCE/ TYPE 2 DM         Subjective:     Kingsley Ltitlejohn is a 49 y.o. male who presents for:    Patient also complaining of right scrotal swelling for the past month.  He denies pain, redness.  He denies trauma to the area.  He is never had this before, but states his right scrotum has always been larger than the left.      Diabetes   He presents for his follow-up diabetic visit. He has type 2 diabetes mellitus. No MedicAlert identification noted. Onset time: months. His disease course has been stable. There are no hypoglycemic associated symptoms. Pertinent negatives for hypoglycemia include no confusion, dizziness, headaches, nervousness/anxiousness or sweats. There are no diabetic associated symptoms. Pertinent negatives for diabetes include no chest pain, no fatigue, no foot paresthesias and no visual change. There are no hypoglycemic complications. Pertinent negatives for hypoglycemia complications include no blackouts, no nocturnal hypoglycemia, no required assistance and no required glucagon injection. Symptoms are stable. Pertinent negatives for diabetic complications include no peripheral neuropathy. Risk factors for coronary artery disease include dyslipidemia, diabetes mellitus, obesity, family history, male sex, hypertension, stress and sedentary lifestyle. Current diabetic treatment includes oral agent (triple therapy). He is compliant with treatment all of the time. His weight is stable. He is following a generally unhealthy diet. When asked about meal planning, he reported none. He has not had a previous visit with a dietitian. He never participates in exercise. There is no change (eats a lot of sugar) in his home blood glucose trend. His overall blood glucose range is >200 mg/dl. An ACE inhibitor/angiotensin II receptor blocker is being taken. He does not see a podiatrist.Eye exam is  "current.        Past Medical Hx:  Past Medical History:   Diagnosis Date   • Abdominal pain    • Allergic rhinitis    • Diabetes mellitus (CMS/[facility])     Prev HbA1c 14.0 2014, states \"I'm cured!\"   • Essential hypertension    • Fracture closed, fibula, shaft     tib-fib       Past Surgical Hx:  Past Surgical History:   Procedure Laterality Date   • ANKLE SURGERY      right and left   • COLONOSCOPY N/A 6/4/2018    Procedure: COLONOSCOPY;  Surgeon: Jaylon Castro MD;  Location: [facility] ENDOSCOPY;  Service: Gastroenterology   • CYST REMOVAL      x 3, from hand, thigh, and buttocks   • ENDOSCOPY      20 years ago   • TIBIA FRACTURE SURGERY Bilateral 2011    tib-fib       Health Maintenance:  Health Maintenance   Topic Date Due   • ZOSTER VACCINE (1 of 2) 09/01/2021 (Originally 2/20/2018)   • INFLUENZA VACCINE  08/01/2019   • HEMOGLOBIN A1C  08/21/2019   • LIPID PANEL  09/04/2019   • URINE MICROALBUMIN  09/04/2019   • DIABETIC EYE EXAM  10/29/2019   • DIABETIC FOOT EXAM  11/19/2019   • MEDICARE ANNUAL WELLNESS  12/18/2019   • TDAP/TD VACCINES (2 - Td) 01/01/2021   • COLONOSCOPY  06/04/2028   • PNEUMOCOCCAL VACCINE (19-64 MEDIUM RISK)  Addressed       Current Meds:    Current Outpatient Medications:   •  albuterol sulfate  (90 Base) MCG/ACT inhaler, 2 puffs morning, noon,4 o'clock and at bedtime for one week,then 4 times daily as needed., Disp: 18 g, Rfl: 0  •  aspirin 325 MG tablet, Take 81 mg by mouth Daily., Disp: , Rfl:   •  atorvastatin (LIPITOR) 40 MG tablet, Take 1 tablet by mouth Daily., Disp: 90 tablet, Rfl: 3  •  Blood Glucose Monitoring Suppl (ONE TOUCH ULTRA 2) w/Device kit, U UTD, Disp: , Rfl: 0  •  Empagliflozin (JARDIANCE) 10 MG tablet, Take 10 mg by mouth Daily., Disp: 30 tablet, Rfl: 5  •  glucose blood test strip, 1 each by Other route 2 (Two) Times a Day for 180 days. Use as instructed, Disp: 180 each, Rfl: 1  •  glucose monitor monitoring kit, 1 each As Needed (Diabetes)., Disp: 1 each, Rfl: " 0  •  hydrOXYzine (ATARAX) 25 MG tablet, Take 1 tablet by mouth Every 8 (Eight) Hours As Needed for Itching., Disp: 30 tablet, Rfl: 1  •  ibuprofen (ADVIL,MOTRIN) 600 MG tablet, Take 1 tablet by mouth Every 6 (Six) Hours As Needed for Mild Pain ., Disp: 15 tablet, Rfl: 0  •  JANUVIA 100 MG tablet, TAKE 1 TABLET BY MOUTH EVERY DAY, Disp: 30 tablet, Rfl: 0  •  lisinopril (PRINIVIL,ZESTRIL) 40 MG tablet, Take 1 tablet by mouth Daily., Disp: 30 tablet, Rfl: 5  •  metoprolol tartrate (LOPRESSOR) 50 MG tablet, Take 1 tablet by mouth Every 12 (Twelve) Hours., Disp: 180 tablet, Rfl: 1  •  nitroglycerin (NITROSTAT) 0.4 MG SL tablet, Place 1 tablet under the tongue Every 5 (Five) Minutes As Needed for Chest Pain. Take no more than 3 doses in 15 minutes., Disp: 30 tablet, Rfl: 12  •  paliperidone palmitate (INVEGA SUSTENNA) 117 MG/0.75ML suspension IM injection, Inject 117 mg into the shoulder, thigh, or buttocks Every 30 (Thirty) Days., Disp: , Rfl:   •  sodium chloride (OCEAN NASAL SPRAY) 0.65 % nasal spray, 1 spray into each nostril As Needed for Congestion., Disp: 60 mL, Rfl: 12  •  traZODone (DESYREL) 150 MG tablet, TAKE 1 TABLET BY MOUTH EVERY DAY AT BEDTIME AS NEEDED, Disp: , Rfl: 2  •  Triamcinolone Acetonide (NASACORT) 55 MCG/ACT nasal inhaler, 2 sprays into the nostril(s) as directed by provider Daily., Disp: 16.5 g, Rfl: 3  •  fluticasone (FLONASE) 50 MCG/ACT nasal spray, 2 SPRAY IN EACH NOSTRIL DAILY, Disp: , Rfl: 0  •  metFORMIN (GLUCOPHAGE) 1000 MG tablet, Take 1,000 mg by mouth 2 (Two) Times a Day With Meals., Disp: , Rfl: 0    Allergies:  Latex and Codeine    Family Hx:  Family History   Problem Relation Age of Onset   • Diabetes Other    • Hypertension Other    • Cancer Other    • Lung disease Other    • Thyroid disease Other    • Bleeding Disorder Other    • Colon cancer Paternal Grandmother    • Cancer Paternal Grandfather    • Crohn's disease Mother    • Diabetes Mother    • Hypertension Father    • Lung  "disease Father    • Hypertension Brother    • Hypertension Brother    • Hypertension Brother         Social History:  Social History     Socioeconomic History   • Marital status: Single     Spouse name: Not on file   • Number of children: Not on file   • Years of education: Not on file   • Highest education level: Not on file   Occupational History   • 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   • Sexual activity: Defer   Social History Narrative    Single, lives alone in [place].  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         Review of Systems   Constitutional: Negative for chills, diaphoresis, fatigue and fever.   HENT: Negative for congestion and rhinorrhea.    Eyes: Negative for discharge and redness.   Respiratory: Negative for cough, chest tightness, shortness of breath and wheezing.    Cardiovascular: Negative for chest pain, palpitations and leg swelling.   Gastrointestinal: Negative for abdominal pain, constipation, diarrhea, nausea and vomiting.   Genitourinary: Positive for scrotal swelling. Negative for dysuria and flank pain.   Skin: Negative for color change and rash.   Neurological: Negative for dizziness and headaches.   Psychiatric/Behavioral: Negative for agitation and confusion. The patient is not nervous/anxious.            Objective:     /82   Pulse 86   Ht 170.2 cm (67\")   Wt 123 kg (271 lb 8 oz)   SpO2 97%   BMI 42.52 kg/m²     Physical Exam   Constitutional: He is oriented to person, place, and time. He appears well-developed and " well-nourished. He is active.  Non-toxic appearance. He does not have a sickly appearance. He does not appear ill. No distress.   HENT:   Head: Normocephalic.   Right Ear: External ear normal.   Left Ear: External ear normal.   Nose: Nose normal. No mucosal edema or rhinorrhea.   Mouth/Throat: Uvula is midline, oropharynx is clear and moist and mucous membranes are normal.   Eyes: Conjunctivae are normal. No scleral icterus.   Cardiovascular: Intact distal pulses.   Pulmonary/Chest: Effort normal and breath sounds normal. No accessory muscle usage. No respiratory distress. He has no decreased breath sounds. He has no wheezes. He exhibits no tenderness.   Abdominal: Soft. Bowel sounds are normal. There is no tenderness (deep palpation). There is no rigidity, no rebound and no guarding. Hernia confirmed negative in the right inguinal area and confirmed negative in the left inguinal area.   Genitourinary: Penis normal. Right testis shows swelling. Right testis shows no tenderness. Left testis shows no swelling and no tenderness.   Neurological: He is alert and oriented to person, place, and time. He is not disoriented. No cranial nerve deficit (grossly intact). GCS eye subscore is 4. GCS verbal subscore is 5. GCS motor subscore is 6.   Skin: Skin is warm. Capillary refill takes less than 2 seconds. He is not diaphoretic.   Nursing note and vitals reviewed.        Assessment/Plan:      Diagnosis Plan   1. Type 2 diabetes mellitus without complication, without long-term current use of insulin (CMS/[facility])  Hemoglobin A1c   2. Scrotal swelling  US Testicular or Ovarian Vascular Complete       Follow-up:     Return in about 1 week (around 6/13/2019) for Recheck DM/results.      Goals: improve diet  Barriers to goals: pt compliance    Health Maintenance   Topic Date Due   • ZOSTER VACCINE (1 of 2) 09/01/2021 (Originally 2/20/2018)   • INFLUENZA VACCINE  08/01/2019   • HEMOGLOBIN A1C  08/21/2019   • LIPID PANEL  09/04/2019   •  URINE MICROALBUMIN  09/04/2019   • DIABETIC EYE EXAM  10/29/2019   • DIABETIC FOOT EXAM  11/19/2019   • MEDICARE ANNUAL WELLNESS  12/18/2019   • TDAP/TD VACCINES (2 - Td) 01/01/2021   • COLONOSCOPY  06/04/2028   • PNEUMOCOCCAL VACCINE (19-64 MEDIUM RISK)  Addressed     Tobacco: former smoker  Alcohol: does not drink  Lifestyle: Body mass index is 42.52 kg/m². eat more fruits and vegetables, decrease soda or juice intake, increase water intake, increase physical activity, reduce screen time, reduce portion size, cut out extra servings, reduce fast food intake, family to eat at dinner table more often, keep TV off during meals, plan meals, eat breakfast and have 3 meals a day    RISK SCORE: 4        This document has been electronically signed by Yang Veloz MD on June 6, 2019 2:54 PM

## 2019-06-06 NOTE — PROGRESS NOTES
I have seen the patient.  I have reviewed the notes, assessments, and/or procedures performed by Yang Veloz MD, I concur with her/his documentation and assessment and plan for Kingsley Dumont Eron.               This document has been electronically signed by Favio Muro MD on June 6, 2019 4:21 PM

## 2019-06-07 LAB — HBA1C MFR BLD: 8.9 % (ref 4.8–5.6)

## 2019-06-10 ENCOUNTER — OFFICE VISIT (OUTPATIENT)
Dept: PODIATRY | Facility: CLINIC | Age: 49
End: 2019-06-10

## 2019-06-10 ENCOUNTER — HOSPITAL ENCOUNTER (OUTPATIENT)
Dept: ULTRASOUND IMAGING | Facility: HOSPITAL | Age: 49
Discharge: HOME | End: 2019-06-10

## 2019-06-10 ENCOUNTER — HOSPITAL ENCOUNTER (OUTPATIENT)
Dept: ULTRASOUND IMAGING | Facility: HOSPITAL | Age: 49
Discharge: HOME | End: 2019-06-10
Admitting: FAMILY MEDICINE

## 2019-06-10 VITALS — HEIGHT: 67 IN | OXYGEN SATURATION: 96 % | BODY MASS INDEX: 42.53 KG/M2 | WEIGHT: 271 LBS | HEART RATE: 101 BPM

## 2019-06-10 DIAGNOSIS — N50.89 SCROTAL SWELLING: ICD-10-CM

## 2019-06-10 DIAGNOSIS — M79.675 CHRONIC TOE PAIN, BILATERAL: ICD-10-CM

## 2019-06-10 DIAGNOSIS — M79.674 CHRONIC TOE PAIN, BILATERAL: ICD-10-CM

## 2019-06-10 DIAGNOSIS — B35.1 ONYCHOMYCOSIS: ICD-10-CM

## 2019-06-10 DIAGNOSIS — G89.29 CHRONIC TOE PAIN, BILATERAL: ICD-10-CM

## 2019-06-10 DIAGNOSIS — E11.42 TYPE 2 DIABETES MELLITUS WITH PERIPHERAL NEUROPATHY (HCC): Primary | ICD-10-CM

## 2019-06-10 PROCEDURE — 76870 US EXAM SCROTUM: CPT

## 2019-06-10 PROCEDURE — 11721 DEBRIDE NAIL 6 OR MORE: CPT | Performed by: PODIATRIST

## 2019-06-10 PROCEDURE — 93975 VASCULAR STUDY: CPT

## 2019-06-10 NOTE — PROGRESS NOTES
"Kingsley Dumont Eron  1970  49 y.o. male  RC Veloz - 6/6/2019  A1c 8.9 (6/6/2019)    Patient presents today for routine diabetic nail care.     06/10/2019     Chief Complaint   Patient presents with   • Left Foot - diabetic nail care   • Right Foot - diabetic nail care       History of Present Illness    Patient presents to clinic today for routine diabetic footcare.  States that his toenails have become long and painful.  Pain is relieved with debridement.  He states his blood sugars are fairly well controlled.  He denies numbness or tingling in his feet today.  He has no other pedal complaints.    Past Medical History:   Diagnosis Date   • Abdominal pain    • Allergic rhinitis    • Diabetes mellitus (CMS/[facility])     Prev HbA1c 14.0 2014, states \"I'm cured!\"   • Essential hypertension    • Fracture closed, fibula, shaft     tib-fib         Past Surgical History:   Procedure Laterality Date   • ANKLE SURGERY      right and left   • COLONOSCOPY N/A 6/4/2018    Procedure: COLONOSCOPY;  Surgeon: Jaylon Castro MD;  Location: [facility] ENDOSCOPY;  Service: Gastroenterology   • CYST REMOVAL      x 3, from hand, thigh, and buttocks   • ENDOSCOPY      20 years ago   • TIBIA FRACTURE SURGERY Bilateral 2011    tib-fib         Family History   Problem Relation Age of Onset   • Diabetes Other    • Hypertension Other    • Cancer Other    • Lung disease Other    • Thyroid disease Other    • Bleeding Disorder Other    • Colon cancer Paternal Grandmother    • Cancer Paternal Grandfather    • Crohn's disease Mother    • Diabetes Mother    • Hypertension Father    • Lung disease Father    • Hypertension Brother    • Hypertension Brother    • Hypertension Brother        Allergies   Allergen Reactions   • Latex Irritability   • Codeine Nausea Only       Social History     Socioeconomic History   • Marital status: Single     Spouse name: Not on file   • Number of children: Not on file   • Years of education: Not on file   • Highest " education level: Not on file   Occupational History   • 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           Current Outpatient Medications   Medication Sig Dispense Refill   • albuterol sulfate  (90 Base) MCG/ACT inhaler 2 puffs morning, noon,4 o'clock and at bedtime for one week,then 4 times daily as needed. 18 g 0   • aspirin 325 MG tablet Take 81 mg by mouth Daily.     • atorvastatin (LIPITOR) 40 MG tablet Take 1 tablet by mouth Daily. 90 tablet 3   • Blood Glucose Monitoring Suppl (ONE TOUCH ULTRA 2) w/Device kit U UTD  0   • Empagliflozin (JARDIANCE) 10 MG tablet Take 10 mg by mouth Daily. 30 tablet 5   • fluticasone (FLONASE) 50 MCG/ACT nasal spray 2 SPRAY IN EACH NOSTRIL DAILY  0   • glucose blood test strip 1 each by Other route 2 (Two) Times a Day for 180 days. Use as instructed 180 each 1   • glucose monitor monitoring kit 1 each As Needed (Diabetes). 1 each 0   • hydrOXYzine (ATARAX) 25 MG tablet Take 1 tablet by mouth Every 8 (Eight) Hours As Needed for Itching. 30 tablet 1   • ibuprofen (ADVIL,MOTRIN) 600 MG tablet Take 1 tablet by mouth Every 6 (Six) Hours As Needed for Mild Pain . 15 tablet 0   • JANUVIA 100 MG tablet TAKE 1 TABLET BY MOUTH EVERY DAY 30 tablet 0   • lisinopril (PRINIVIL,ZESTRIL) 40 MG tablet Take 1 tablet by mouth Daily. 30 tablet 5   • metFORMIN (GLUCOPHAGE) 1000 MG tablet Take 1,000 mg by mouth 2 (Two) Times a  "Day With Meals.  0   • metoprolol tartrate (LOPRESSOR) 50 MG tablet Take 1 tablet by mouth Every 12 (Twelve) Hours. 180 tablet 1   • nitroglycerin (NITROSTAT) 0.4 MG SL tablet Place 1 tablet under the tongue Every 5 (Five) Minutes As Needed for Chest Pain. Take no more than 3 doses in 15 minutes. 30 tablet 12   • paliperidone palmitate (INVEGA SUSTENNA) 117 MG/0.75ML suspension IM injection Inject 117 mg into the shoulder, thigh, or buttocks Every 30 (Thirty) Days.     • sodium chloride (OCEAN NASAL SPRAY) 0.65 % nasal spray 1 spray into each nostril As Needed for Congestion. 60 mL 12   • traZODone (DESYREL) 150 MG tablet TAKE 1 TABLET BY MOUTH EVERY DAY AT BEDTIME AS NEEDED  2   • Triamcinolone Acetonide (NASACORT) 55 MCG/ACT nasal inhaler 2 sprays into the nostril(s) as directed by provider Daily. 16.5 g 3     No current facility-administered medications for this visit.        Review of Systems   Constitutional: Negative.    HENT: Negative.    Eyes: Negative.    Respiratory: Negative.    Cardiovascular: Positive for leg swelling.   Gastrointestinal: Negative.    Genitourinary: Positive for frequency.   Musculoskeletal:        Toe pain    Skin: Negative.    Neurological: Negative.    Psychiatric/Behavioral: Negative.          OBJECTIVE    Pulse 101   Ht 170.2 cm (67\")   Wt 123 kg (271 lb)   SpO2 96%   BMI 42.44 kg/m²       Physical Exam   Constitutional: He is oriented to person, place, and time. He appears well-developed and well-nourished.   HENT:   Head: Normocephalic and atraumatic.   Nose: Nose normal.   Eyes: Conjunctivae and EOM are normal. Pupils are equal, round, and reactive to light.   Pulmonary/Chest: Effort normal. No respiratory distress. He has no wheezes.    Kingsley had a diabetic foot exam performed today.  Neurological: He is alert and oriented to person, place, and time. He displays normal reflexes.   Skin: Skin is warm and dry. Capillary refill takes less than 2 seconds. He is not diaphoretic. "   Psychiatric: He has a normal mood and affect. His behavior is normal.   Vitals reviewed.      Gait: Normal     Assistive Device: None    Lower Extremity    Cardiovascular:    DP/PT pulses palpable bilateral  CFT brisk  to all digits  Skin temp is warm to warm from proximal tibia to distal digits bilateral  Pedal hair growth present.   No erythema or edema noted     Musculoskeletal:  Muscle strength is 5/5 for all muscle groups tested   ROM of the 1st MTP is full without pain or crepitus bilateral  ROM of the ankle joint is full without pain or crepitus  bilateral     Dermatological:   Nails 1-5 bilateral are thickened, discolored, elongated.  Pain on palpation to the nail plates.  Skin is warm, dry and intact bilateral  Webspaces 1-4 bilateral are clean, dry and intact.     Neurological:   Protective sensation intact   Sensation intact to light touch        Procedures        ASSESSMENT AND PLAN    Kingsley was seen today for diabetic nail care and diabetic nail care.    Diagnoses and all orders for this visit:    Type 2 diabetes mellitus with peripheral neuropathy (CMS/HCC)    Onychomycosis    Chronic toe pain, bilateral      - A diabetic foot screening exam was performed and the patient was educated on the foot complications related to diabetes,  preventative foot care and what signs and symptoms to watch for.  Instructed to contact our office if any foot problems develop before next visit.  - Nails 1-5 bilateral were debrided in length and thickness with nail nipper and electric  to decrease fungal load and risk of infection.  - All the patients questions were answered.  - RTC 3 months or sooner if needed.              This document has been electronically signed by Abhijeet Thomason DPM on Ashley 10, 2019 1:31 PM     6/10/2019  1:31 PM

## 2019-06-14 ENCOUNTER — OFFICE VISIT (OUTPATIENT)
Dept: FAMILY MEDICINE CLINIC | Facility: CLINIC | Age: 49
End: 2019-06-14

## 2019-06-14 VITALS
BODY MASS INDEX: 42.38 KG/M2 | HEIGHT: 67 IN | SYSTOLIC BLOOD PRESSURE: 122 MMHG | DIASTOLIC BLOOD PRESSURE: 82 MMHG | OXYGEN SATURATION: 97 % | HEART RATE: 92 BPM | WEIGHT: 270 LBS

## 2019-06-14 DIAGNOSIS — N43.3 HYDROCELE IN ADULT: ICD-10-CM

## 2019-06-14 DIAGNOSIS — E11.9 TYPE 2 DIABETES MELLITUS WITHOUT COMPLICATION, WITHOUT LONG-TERM CURRENT USE OF INSULIN (HCC): Primary | ICD-10-CM

## 2019-06-14 PROCEDURE — 99213 OFFICE O/P EST LOW 20 MIN: CPT | Performed by: STUDENT IN AN ORGANIZED HEALTH CARE EDUCATION/TRAINING PROGRAM

## 2019-06-14 NOTE — PROGRESS NOTES
I have reviewed the notes, assessments, and/or procedures performed by Dr. RC Veloz during office visit. I concur with her/his documentation and assessment and plan for Kingsley Littlejohn.          This document has been electronically signed by Hosea Escamilla MD on June 14, 2019 2:06 PM

## 2019-06-14 NOTE — PROGRESS NOTES
ID: Kingsley Littlejohn    CC:   Chief Complaint   Patient presents with   • Diabetes        Subjective:     Kingsley Littlejohn is a 49 y.o. male who presents for:    Patient had an ultrasound of his scrotum which showed a moderate size hydrocele.  It does not bother the patient.      Diabetes   He presents for his follow-up diabetic visit. He has type 2 diabetes mellitus. No MedicAlert identification noted. Onset time: months. His disease course has been worsening. There are no hypoglycemic associated symptoms. Pertinent negatives for hypoglycemia include no confusion, dizziness, headaches, nervousness/anxiousness or sweats. There are no diabetic associated symptoms. Pertinent negatives for diabetes include no chest pain, no fatigue, no foot paresthesias and no visual change. There are no hypoglycemic complications. Pertinent negatives for hypoglycemia complications include no blackouts, no nocturnal hypoglycemia, no required assistance and no required glucagon injection. Symptoms are stable. Pertinent negatives for diabetic complications include no peripheral neuropathy. Risk factors for coronary artery disease include dyslipidemia, diabetes mellitus, obesity, family history, male sex, hypertension, stress and sedentary lifestyle. Current diabetic treatment includes oral agent (triple therapy). He is compliant with treatment all of the time. His weight is stable. He is following a generally unhealthy and high fat/cholesterol diet. When asked about meal planning, he reported none. He has not had a previous visit with a dietitian. He never participates in exercise. There is no change (eats a lot of sugar) in his home blood glucose trend. His overall blood glucose range is >200 mg/dl. An ACE inhibitor/angiotensin II receptor blocker is being taken. He does not see a podiatrist.Eye exam is current.        Past Medical Hx:  Past Medical History:   Diagnosis Date   • Abdominal pain    • Allergic rhinitis    • Diabetes  "mellitus (CMS/[facility])     Prev HbA1c 14.0 2014, states \"I'm cured!\"   • Essential hypertension    • Fracture closed, fibula, shaft     tib-fib       Past Surgical Hx:  Past Surgical History:   Procedure Laterality Date   • ANKLE SURGERY      right and left   • COLONOSCOPY N/A 6/4/2018    Procedure: COLONOSCOPY;  Surgeon: Jaylon Castro MD;  Location: [facility] ENDOSCOPY;  Service: Gastroenterology   • CYST REMOVAL      x 3, from hand, thigh, and buttocks   • ENDOSCOPY      20 years ago   • TIBIA FRACTURE SURGERY Bilateral 2011    tib-fib       Health Maintenance:  Health Maintenance   Topic Date Due   • ZOSTER VACCINE (1 of 2) 09/01/2021 (Originally 2/20/2018)   • INFLUENZA VACCINE  08/01/2019   • LIPID PANEL  09/04/2019   • URINE MICROALBUMIN  09/04/2019   • DIABETIC EYE EXAM  10/29/2019   • HEMOGLOBIN A1C  12/06/2019   • MEDICARE ANNUAL WELLNESS  12/18/2019   • DIABETIC FOOT EXAM  06/10/2020   • TDAP/TD VACCINES (2 - Td) 01/01/2021   • COLONOSCOPY  06/04/2028   • PNEUMOCOCCAL VACCINE (19-64 MEDIUM RISK)  Addressed       Current Meds:    Current Outpatient Medications:   •  albuterol sulfate  (90 Base) MCG/ACT inhaler, 2 puffs morning, noon,4 o'clock and at bedtime for one week,then 4 times daily as needed., Disp: 18 g, Rfl: 0  •  aspirin 325 MG tablet, Take 81 mg by mouth Daily., Disp: , Rfl:   •  atorvastatin (LIPITOR) 40 MG tablet, Take 1 tablet by mouth Daily., Disp: 90 tablet, Rfl: 3  •  Blood Glucose Monitoring Suppl (ONE TOUCH ULTRA 2) w/Device kit, U UTD, Disp: , Rfl: 0  •  Empagliflozin (JARDIANCE) 10 MG tablet, Take 25 mg by mouth Daily., Disp: 30 tablet, Rfl: 5  •  fluticasone (FLONASE) 50 MCG/ACT nasal spray, 2 SPRAY IN EACH NOSTRIL DAILY, Disp: , Rfl: 0  •  glucose blood test strip, 1 each by Other route 2 (Two) Times a Day for 180 days. Use as instructed, Disp: 180 each, Rfl: 1  •  glucose monitor monitoring kit, 1 each As Needed (Diabetes)., Disp: 1 each, Rfl: 0  •  hydrOXYzine (ATARAX) 25 MG " tablet, Take 1 tablet by mouth Every 8 (Eight) Hours As Needed for Itching., Disp: 30 tablet, Rfl: 1  •  ibuprofen (ADVIL,MOTRIN) 600 MG tablet, Take 1 tablet by mouth Every 6 (Six) Hours As Needed for Mild Pain ., Disp: 15 tablet, Rfl: 0  •  JANUVIA 100 MG tablet, TAKE 1 TABLET BY MOUTH EVERY DAY, Disp: 30 tablet, Rfl: 0  •  lisinopril (PRINIVIL,ZESTRIL) 40 MG tablet, Take 1 tablet by mouth Daily., Disp: 30 tablet, Rfl: 5  •  metFORMIN (GLUCOPHAGE) 1000 MG tablet, Take 1,000 mg by mouth 2 (Two) Times a Day With Meals., Disp: , Rfl: 0  •  metoprolol tartrate (LOPRESSOR) 50 MG tablet, Take 1 tablet by mouth Every 12 (Twelve) Hours., Disp: 180 tablet, Rfl: 1  •  nitroglycerin (NITROSTAT) 0.4 MG SL tablet, Place 1 tablet under the tongue Every 5 (Five) Minutes As Needed for Chest Pain. Take no more than 3 doses in 15 minutes., Disp: 30 tablet, Rfl: 12  •  paliperidone palmitate (INVEGA SUSTENNA) 117 MG/0.75ML suspension IM injection, Inject 117 mg into the shoulder, thigh, or buttocks Every 30 (Thirty) Days., Disp: , Rfl:   •  sodium chloride (OCEAN NASAL SPRAY) 0.65 % nasal spray, 1 spray into each nostril As Needed for Congestion., Disp: 60 mL, Rfl: 12  •  traZODone (DESYREL) 150 MG tablet, TAKE 1 TABLET BY MOUTH EVERY DAY AT BEDTIME AS NEEDED, Disp: , Rfl: 2  •  Triamcinolone Acetonide (NASACORT) 55 MCG/ACT nasal inhaler, 2 sprays into the nostril(s) as directed by provider Daily., Disp: 16.5 g, Rfl: 3    Allergies:  Latex and Codeine    Family Hx:  Family History   Problem Relation Age of Onset   • Diabetes Other    • Hypertension Other    • Cancer Other    • Lung disease Other    • Thyroid disease Other    • Bleeding Disorder Other    • Colon cancer Paternal Grandmother    • Cancer Paternal Grandfather    • Crohn's disease Mother    • Diabetes Mother    • Hypertension Father    • Lung disease Father    • Hypertension Brother    • Hypertension Brother    • Hypertension Brother         Social History:  Social  "History     Socioeconomic History   • Marital status: Single     Spouse name: Not on file   • Number of children: Not on file   • Years of education: Not on file   • Highest education level: Not on file   Occupational History   • 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   • Sexual activity: Defer   Social History Narrative    Single, lives alone in [place].  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         Review of Systems   Constitutional: Negative for chills, diaphoresis, fatigue and fever.   HENT: Negative for congestion and rhinorrhea.    Eyes: Negative for discharge and redness.   Respiratory: Negative for cough, chest tightness, shortness of breath and wheezing.    Cardiovascular: Negative for chest pain, palpitations and leg swelling.   Gastrointestinal: Negative for abdominal pain, constipation, diarrhea, nausea and vomiting.   Genitourinary: Positive for scrotal swelling. Negative for dysuria and flank pain.   Skin: Negative for color change and rash.   Neurological: Negative for dizziness and headaches.   Psychiatric/Behavioral: Negative for agitation and confusion. The patient is not nervous/anxious.            Objective:     /82   Pulse 92   Ht 170.2 cm (67\")   Wt 122 kg (270 lb)   SpO2 97%   BMI 42.29 kg/m²     Physical Exam   Constitutional: He is oriented to person, place, and time. He appears well-developed and well-nourished. He is active.  Non-toxic appearance. He does not have a sickly appearance. He does not appear ill. No distress.   HENT: "   Head: Normocephalic.   Right Ear: External ear normal.   Left Ear: External ear normal.   Nose: Nose normal. No mucosal edema or rhinorrhea.   Mouth/Throat: Uvula is midline, oropharynx is clear and moist and mucous membranes are normal.   Eyes: Conjunctivae are normal. No scleral icterus.   Cardiovascular: Intact distal pulses.   Pulmonary/Chest: Effort normal and breath sounds normal. No accessory muscle usage. No respiratory distress. He has no decreased breath sounds. He has no wheezes. He exhibits no tenderness.   Abdominal: Soft. Bowel sounds are normal. There is no tenderness (deep palpation). There is no rigidity, no rebound and no guarding. Hernia confirmed negative in the right inguinal area and confirmed negative in the left inguinal area.   Genitourinary: Penis normal. Right testis shows swelling. Right testis shows no tenderness. Left testis shows no swelling and no tenderness.   Neurological: He is alert and oriented to person, place, and time. He is not disoriented. No cranial nerve deficit (grossly intact). GCS eye subscore is 4. GCS verbal subscore is 5. GCS motor subscore is 6.   Skin: Skin is warm. Capillary refill takes less than 2 seconds. He is not diaphoretic.   Nursing note and vitals reviewed.      Assessment/Plan:      Diagnosis Plan   1. Type 2 diabetes mellitus without complication, without long-term current use of insulin (CMS/[facility])  Empagliflozin (JARDIANCE) 10 MG tablet  Stressed the importance of healthy eating and exercise.   2. Hydrocele in adult   ultrasound showed a moderate size hydrocele on the right and small one on the left.  Says it does not bother the patient at this time we will can monitor for follow-up at 1 month.       Follow-up:     Return in about 4 weeks (around 7/12/2019) for Recheck DM.      Goals: improve diet  Barriers to goals: pt compliance    Health Maintenance   Topic Date Due   • ZOSTER VACCINE (1 of 2) 09/01/2021 (Originally 2/20/2018)   • INFLUENZA VACCINE   08/01/2019   • LIPID PANEL  09/04/2019   • URINE MICROALBUMIN  09/04/2019   • DIABETIC EYE EXAM  10/29/2019   • HEMOGLOBIN A1C  12/06/2019   • MEDICARE ANNUAL WELLNESS  12/18/2019   • DIABETIC FOOT EXAM  06/10/2020   • TDAP/TD VACCINES (2 - Td) 01/01/2021   • COLONOSCOPY  06/04/2028   • PNEUMOCOCCAL VACCINE (19-64 MEDIUM RISK)  Addressed     Tobacco: former smoker  Alcohol: does not drink  Lifestyle: Body mass index is 42.29 kg/m². eat more fruits and vegetables, decrease soda or juice intake, increase water intake, increase physical activity, reduce screen time, reduce portion size, cut out extra servings, reduce fast food intake, family to eat at dinner table more often, keep TV off during meals, plan meals, eat breakfast and have 3 meals a day    RISK SCORE: 4        This document has been electronically signed by Yang Veloz MD on June 14, 2019 1:40 PM

## 2019-06-17 ENCOUNTER — TELEPHONE (OUTPATIENT)
Dept: FAMILY MEDICINE CLINIC | Facility: CLINIC | Age: 49
End: 2019-06-17

## 2019-06-17 NOTE — TELEPHONE ENCOUNTER
[facility] called asking for clarification on patients Empagliflozin (JARDIANCE) 10 MG tablet.    Please give them a call back at [phone].    Thanks,  Veronika

## 2019-06-20 DIAGNOSIS — E11.9 TYPE 2 DIABETES MELLITUS WITHOUT COMPLICATION, WITHOUT LONG-TERM CURRENT USE OF INSULIN (HCC): ICD-10-CM

## 2019-06-20 DIAGNOSIS — E11.8 TYPE 2 DIABETES MELLITUS WITH COMPLICATION, WITHOUT LONG-TERM CURRENT USE OF INSULIN (HCC): ICD-10-CM

## 2019-06-21 RX ORDER — SITAGLIPTIN 100 MG/1
TABLET, FILM COATED ORAL
Qty: 30 TABLET | Refills: 0 | OUTPATIENT
Start: 2019-06-21

## 2019-06-21 RX ORDER — EMPAGLIFLOZIN 10 MG/1
TABLET, FILM COATED ORAL
Qty: 90 TABLET | Refills: 0 | OUTPATIENT
Start: 2019-06-21

## 2019-07-23 DIAGNOSIS — E11.8 TYPE 2 DIABETES MELLITUS WITH COMPLICATION, WITHOUT LONG-TERM CURRENT USE OF INSULIN (HCC): ICD-10-CM

## 2019-07-23 RX ORDER — SITAGLIPTIN 100 MG/1
TABLET, FILM COATED ORAL
Qty: 30 TABLET | Refills: 1 | Status: SHIPPED | OUTPATIENT
Start: 2019-07-23 | End: 2019-08-26 | Stop reason: SDUPTHER

## 2019-08-02 ENCOUNTER — TELEPHONE (OUTPATIENT)
Dept: FAMILY MEDICINE CLINIC | Facility: CLINIC | Age: 49
End: 2019-08-02

## 2019-08-02 NOTE — TELEPHONE ENCOUNTER
Pt called and is needing a refill on his metFORMIN (GLUCOPHAGE) 1000 MG tablet. He would like for it to be sent to [facility] in [place].      Thank you,      Petra

## 2019-08-24 DIAGNOSIS — E11.8 TYPE 2 DIABETES MELLITUS WITH COMPLICATION, WITHOUT LONG-TERM CURRENT USE OF INSULIN (HCC): ICD-10-CM

## 2019-08-24 RX ORDER — SITAGLIPTIN 100 MG/1
TABLET, FILM COATED ORAL
Qty: 30 TABLET | Refills: 1 | Status: CANCELLED | OUTPATIENT
Start: 2019-08-24

## 2019-08-26 DIAGNOSIS — E11.8 TYPE 2 DIABETES MELLITUS WITH COMPLICATION, WITHOUT LONG-TERM CURRENT USE OF INSULIN (HCC): ICD-10-CM

## 2019-08-28 ENCOUNTER — APPOINTMENT (OUTPATIENT)
Dept: GENERAL RADIOLOGY | Facility: HOSPITAL | Age: 49
End: 2019-08-28

## 2019-08-28 ENCOUNTER — APPOINTMENT (OUTPATIENT)
Dept: CT IMAGING | Facility: HOSPITAL | Age: 49
End: 2019-08-28

## 2019-08-28 ENCOUNTER — HOSPITAL ENCOUNTER (OUTPATIENT)
Facility: HOSPITAL | Age: 49
Setting detail: OBSERVATION
Discharge: HOME | End: 2019-08-29
Attending: EMERGENCY MEDICINE | Admitting: HOSPITALIST

## 2019-08-28 DIAGNOSIS — R07.9 CHEST PAIN, UNSPECIFIED TYPE: Primary | ICD-10-CM

## 2019-08-28 PROBLEM — E66.01 OBESITY, CLASS III, BMI 40-49.9 (MORBID OBESITY): Status: ACTIVE | Noted: 2019-08-28

## 2019-08-28 LAB
ALBUMIN SERPL-MCNC: 3.9 G/DL (ref 3.5–5.2)
ALBUMIN/GLOB SERPL: 1.1 G/DL
ALP SERPL-CCNC: 87 U/L (ref 39–117)
ALT SERPL W P-5'-P-CCNC: 34 U/L (ref 1–41)
ANION GAP SERPL CALCULATED.3IONS-SCNC: 15 MMOL/L (ref 5–15)
APTT PPP: 26.6 SECONDS (ref 20–40.3)
AST SERPL-CCNC: 19 U/L (ref 1–40)
BASOPHILS # BLD AUTO: 0.07 10*3/MM3 (ref 0–0.2)
BASOPHILS NFR BLD AUTO: 0.9 % (ref 0–1.5)
BILIRUB SERPL-MCNC: 0.5 MG/DL (ref 0.2–1.2)
BUN BLD-MCNC: 18 MG/DL (ref 6–20)
BUN/CREAT SERPL: 20.5 (ref 7–25)
CALCIUM SPEC-SCNC: 9.2 MG/DL (ref 8.6–10.5)
CHLORIDE SERPL-SCNC: 101 MMOL/L (ref 98–107)
CO2 SERPL-SCNC: 21 MMOL/L (ref 22–29)
CREAT BLD-MCNC: 0.88 MG/DL (ref 0.76–1.27)
D-DIMER, QUANTITATIVE (MAD,POW, STR): 786 NG/ML (FEU) (ref 0–470)
DEPRECATED RDW RBC AUTO: 39.9 FL (ref 37–54)
EOSINOPHIL # BLD AUTO: 0.23 10*3/MM3 (ref 0–0.4)
EOSINOPHIL NFR BLD AUTO: 2.8 % (ref 0.3–6.2)
ERYTHROCYTE [DISTWIDTH] IN BLOOD BY AUTOMATED COUNT: 12.7 % (ref 12.3–15.4)
GFR SERPL CREATININE-BSD FRML MDRD: 91 ML/MIN/1.73
GLOBULIN UR ELPH-MCNC: 3.7 GM/DL
GLUCOSE BLD-MCNC: 196 MG/DL (ref 65–99)
GLUCOSE BLDC GLUCOMTR-MCNC: 155 MG/DL (ref 70–130)
GLUCOSE BLDC GLUCOMTR-MCNC: 239 MG/DL (ref 70–130)
HCT VFR BLD AUTO: 45.1 % (ref 37.5–51)
HGB BLD-MCNC: 14.9 G/DL (ref 13–17.7)
HOLD SPECIMEN: NORMAL
HOLD SPECIMEN: NORMAL
IMM GRANULOCYTES # BLD AUTO: 0.03 10*3/MM3 (ref 0–0.05)
IMM GRANULOCYTES NFR BLD AUTO: 0.4 % (ref 0–0.5)
INR PPP: 0.86 (ref 0.8–1.2)
LYMPHOCYTES # BLD AUTO: 1.86 10*3/MM3 (ref 0.7–3.1)
LYMPHOCYTES NFR BLD AUTO: 22.9 % (ref 19.6–45.3)
MCH RBC QN AUTO: 28.5 PG (ref 26.6–33)
MCHC RBC AUTO-ENTMCNC: 33 G/DL (ref 31.5–35.7)
MCV RBC AUTO: 86.4 FL (ref 79–97)
MONOCYTES # BLD AUTO: 0.79 10*3/MM3 (ref 0.1–0.9)
MONOCYTES NFR BLD AUTO: 9.7 % (ref 5–12)
NEUTROPHILS # BLD AUTO: 5.13 10*3/MM3 (ref 1.7–7)
NEUTROPHILS NFR BLD AUTO: 63.3 % (ref 42.7–76)
NRBC BLD AUTO-RTO: 0 /100 WBC (ref 0–0.2)
NT-PROBNP SERPL-MCNC: 5.7 PG/ML (ref 5–900)
PLATELET # BLD AUTO: 203 10*3/MM3 (ref 140–450)
PMV BLD AUTO: 10.7 FL (ref 6–12)
POTASSIUM BLD-SCNC: 4 MMOL/L (ref 3.5–5.2)
PROT SERPL-MCNC: 7.6 G/DL (ref 6–8.5)
PROTHROMBIN TIME: 11.5 SECONDS (ref 11.1–15.3)
RBC # BLD AUTO: 5.22 10*6/MM3 (ref 4.14–5.8)
SODIUM BLD-SCNC: 137 MMOL/L (ref 136–145)
TROPONIN T SERPL-MCNC: <0.01 NG/ML (ref 0–0.03)
TROPONIN T SERPL-MCNC: <0.01 NG/ML (ref 0–0.03)
WBC NRBC COR # BLD: 8.11 10*3/MM3 (ref 3.4–10.8)
WHOLE BLOOD HOLD SPECIMEN: NORMAL
WHOLE BLOOD HOLD SPECIMEN: NORMAL

## 2019-08-28 PROCEDURE — G0378 HOSPITAL OBSERVATION PER HR: HCPCS

## 2019-08-28 PROCEDURE — 71275 CT ANGIOGRAPHY CHEST: CPT

## 2019-08-28 PROCEDURE — 25010000002 ENOXAPARIN PER 10 MG: Performed by: EMERGENCY MEDICINE

## 2019-08-28 PROCEDURE — 85730 THROMBOPLASTIN TIME PARTIAL: CPT | Performed by: EMERGENCY MEDICINE

## 2019-08-28 PROCEDURE — 85610 PROTHROMBIN TIME: CPT | Performed by: EMERGENCY MEDICINE

## 2019-08-28 PROCEDURE — 84484 ASSAY OF TROPONIN QUANT: CPT | Performed by: EMERGENCY MEDICINE

## 2019-08-28 PROCEDURE — 63710000001 INSULIN ASPART PER 5 UNITS: Performed by: STUDENT IN AN ORGANIZED HEALTH CARE EDUCATION/TRAINING PROGRAM

## 2019-08-28 PROCEDURE — 99220 PR INITIAL OBSERVATION CARE/DAY 70 MINUTES: CPT | Performed by: STUDENT IN AN ORGANIZED HEALTH CARE EDUCATION/TRAINING PROGRAM

## 2019-08-28 PROCEDURE — 93010 ELECTROCARDIOGRAM REPORT: CPT | Performed by: INTERNAL MEDICINE

## 2019-08-28 PROCEDURE — 93005 ELECTROCARDIOGRAM TRACING: CPT

## 2019-08-28 PROCEDURE — 0 IOPAMIDOL PER 1 ML: Performed by: STUDENT IN AN ORGANIZED HEALTH CARE EDUCATION/TRAINING PROGRAM

## 2019-08-28 PROCEDURE — 82962 GLUCOSE BLOOD TEST: CPT

## 2019-08-28 PROCEDURE — 83880 ASSAY OF NATRIURETIC PEPTIDE: CPT | Performed by: EMERGENCY MEDICINE

## 2019-08-28 PROCEDURE — 93005 ELECTROCARDIOGRAM TRACING: CPT | Performed by: EMERGENCY MEDICINE

## 2019-08-28 PROCEDURE — 99285 EMERGENCY DEPT VISIT HI MDM: CPT

## 2019-08-28 PROCEDURE — 84484 ASSAY OF TROPONIN QUANT: CPT | Performed by: STUDENT IN AN ORGANIZED HEALTH CARE EDUCATION/TRAINING PROGRAM

## 2019-08-28 PROCEDURE — 85025 COMPLETE CBC W/AUTO DIFF WBC: CPT | Performed by: EMERGENCY MEDICINE

## 2019-08-28 PROCEDURE — 80053 COMPREHEN METABOLIC PANEL: CPT | Performed by: EMERGENCY MEDICINE

## 2019-08-28 PROCEDURE — 85379 FIBRIN DEGRADATION QUANT: CPT | Performed by: EMERGENCY MEDICINE

## 2019-08-28 PROCEDURE — 71045 X-RAY EXAM CHEST 1 VIEW: CPT

## 2019-08-28 PROCEDURE — 96372 THER/PROPH/DIAG INJ SC/IM: CPT

## 2019-08-28 RX ORDER — ATORVASTATIN CALCIUM 40 MG/1
40 TABLET, FILM COATED ORAL DAILY
Status: DISCONTINUED | OUTPATIENT
Start: 2019-08-29 | End: 2019-08-29 | Stop reason: HOSPADM

## 2019-08-28 RX ORDER — FLUTICASONE PROPIONATE 50 MCG
2 SPRAY, SUSPENSION (ML) NASAL DAILY
Status: DISCONTINUED | OUTPATIENT
Start: 2019-08-29 | End: 2019-08-29 | Stop reason: HOSPADM

## 2019-08-28 RX ORDER — NALOXONE HCL 0.4 MG/ML
0.4 VIAL (ML) INJECTION
Status: DISCONTINUED | OUTPATIENT
Start: 2019-08-28 | End: 2019-08-29 | Stop reason: HOSPADM

## 2019-08-28 RX ORDER — NITROGLYCERIN 0.4 MG/1
0.4 TABLET SUBLINGUAL
Status: DISCONTINUED | OUTPATIENT
Start: 2019-08-28 | End: 2019-08-28 | Stop reason: SDUPTHER

## 2019-08-28 RX ORDER — SODIUM CHLORIDE 9 MG/ML
125 INJECTION, SOLUTION INTRAVENOUS CONTINUOUS
Status: DISCONTINUED | OUTPATIENT
Start: 2019-08-28 | End: 2019-08-28

## 2019-08-28 RX ORDER — ASPIRIN 325 MG
325 TABLET ORAL ONCE
Status: COMPLETED | OUTPATIENT
Start: 2019-08-28 | End: 2019-08-28

## 2019-08-28 RX ORDER — ECHINACEA PURPUREA EXTRACT 125 MG
1 TABLET ORAL 3 TIMES DAILY PRN
Status: DISCONTINUED | OUTPATIENT
Start: 2019-08-28 | End: 2019-08-29 | Stop reason: HOSPADM

## 2019-08-28 RX ORDER — SODIUM CHLORIDE 0.9 % (FLUSH) 0.9 %
10 SYRINGE (ML) INJECTION EVERY 12 HOURS SCHEDULED
Status: DISCONTINUED | OUTPATIENT
Start: 2019-08-28 | End: 2019-08-29 | Stop reason: HOSPADM

## 2019-08-28 RX ORDER — NITROGLYCERIN 0.4 MG/1
0.4 TABLET SUBLINGUAL
Status: DISCONTINUED | OUTPATIENT
Start: 2019-08-28 | End: 2019-08-29 | Stop reason: HOSPADM

## 2019-08-28 RX ORDER — MORPHINE SULFATE 2 MG/ML
1 INJECTION, SOLUTION INTRAMUSCULAR; INTRAVENOUS EVERY 4 HOURS PRN
Status: DISCONTINUED | OUTPATIENT
Start: 2019-08-28 | End: 2019-08-29 | Stop reason: HOSPADM

## 2019-08-28 RX ORDER — ASPIRIN 325 MG
162 TABLET ORAL DAILY
Status: DISCONTINUED | OUTPATIENT
Start: 2019-08-29 | End: 2019-08-29 | Stop reason: HOSPADM

## 2019-08-28 RX ORDER — TRAZODONE HYDROCHLORIDE 150 MG/1
150 TABLET ORAL NIGHTLY PRN
Status: DISCONTINUED | OUTPATIENT
Start: 2019-08-28 | End: 2019-08-29 | Stop reason: HOSPADM

## 2019-08-28 RX ORDER — SODIUM CHLORIDE 0.9 % (FLUSH) 0.9 %
10 SYRINGE (ML) INJECTION AS NEEDED
Status: DISCONTINUED | OUTPATIENT
Start: 2019-08-28 | End: 2019-08-29 | Stop reason: HOSPADM

## 2019-08-28 RX ORDER — ACETAMINOPHEN 160 MG/5ML
650 SOLUTION ORAL EVERY 4 HOURS PRN
Status: DISCONTINUED | OUTPATIENT
Start: 2019-08-28 | End: 2019-08-29 | Stop reason: HOSPADM

## 2019-08-28 RX ORDER — ACETAMINOPHEN 650 MG/1
650 SUPPOSITORY RECTAL EVERY 4 HOURS PRN
Status: DISCONTINUED | OUTPATIENT
Start: 2019-08-28 | End: 2019-08-29 | Stop reason: HOSPADM

## 2019-08-28 RX ORDER — METOPROLOL TARTRATE 50 MG/1
50 TABLET, FILM COATED ORAL EVERY 12 HOURS
Status: DISCONTINUED | OUTPATIENT
Start: 2019-08-28 | End: 2019-08-29 | Stop reason: HOSPADM

## 2019-08-28 RX ORDER — NICOTINE POLACRILEX 4 MG
15 LOZENGE BUCCAL
Status: DISCONTINUED | OUTPATIENT
Start: 2019-08-28 | End: 2019-08-29 | Stop reason: HOSPADM

## 2019-08-28 RX ORDER — ONDANSETRON 4 MG/1
4 TABLET, FILM COATED ORAL EVERY 6 HOURS PRN
Status: DISCONTINUED | OUTPATIENT
Start: 2019-08-28 | End: 2019-08-29 | Stop reason: HOSPADM

## 2019-08-28 RX ORDER — ACETAMINOPHEN 325 MG/1
650 TABLET ORAL EVERY 4 HOURS PRN
Status: DISCONTINUED | OUTPATIENT
Start: 2019-08-28 | End: 2019-08-29 | Stop reason: HOSPADM

## 2019-08-28 RX ORDER — DEXTROSE MONOHYDRATE 25 G/50ML
25 INJECTION, SOLUTION INTRAVENOUS
Status: DISCONTINUED | OUTPATIENT
Start: 2019-08-28 | End: 2019-08-29 | Stop reason: HOSPADM

## 2019-08-28 RX ORDER — LISINOPRIL 40 MG/1
40 TABLET ORAL DAILY
Status: DISCONTINUED | OUTPATIENT
Start: 2019-08-29 | End: 2019-08-29 | Stop reason: HOSPADM

## 2019-08-28 RX ORDER — SODIUM CHLORIDE 9 MG/ML
75 INJECTION, SOLUTION INTRAVENOUS CONTINUOUS
Status: DISCONTINUED | OUTPATIENT
Start: 2019-08-28 | End: 2019-08-29 | Stop reason: HOSPADM

## 2019-08-28 RX ORDER — HYDROXYZINE HYDROCHLORIDE 25 MG/1
25 TABLET, FILM COATED ORAL EVERY 8 HOURS PRN
Status: DISCONTINUED | OUTPATIENT
Start: 2019-08-28 | End: 2019-08-29 | Stop reason: HOSPADM

## 2019-08-28 RX ADMIN — SODIUM CHLORIDE 125 ML/HR: 900 INJECTION, SOLUTION INTRAVENOUS at 13:34

## 2019-08-28 RX ADMIN — INSULIN ASPART 4 UNITS: 100 INJECTION, SOLUTION INTRAVENOUS; SUBCUTANEOUS at 20:32

## 2019-08-28 RX ADMIN — IOPAMIDOL 90 ML: 755 INJECTION, SOLUTION INTRAVENOUS at 15:50

## 2019-08-28 RX ADMIN — ENOXAPARIN SODIUM 120 MG: 120 INJECTION SUBCUTANEOUS at 16:02

## 2019-08-28 RX ADMIN — INSULIN ASPART 2 UNITS: 100 INJECTION, SOLUTION INTRAVENOUS; SUBCUTANEOUS at 17:57

## 2019-08-28 RX ADMIN — SODIUM CHLORIDE, PRESERVATIVE FREE 10 ML: 5 INJECTION INTRAVENOUS at 20:33

## 2019-08-28 RX ADMIN — SODIUM CHLORIDE 75 ML/HR: 9 INJECTION, SOLUTION INTRAVENOUS at 23:36

## 2019-08-28 RX ADMIN — ASPIRIN 325 MG: 325 TABLET ORAL at 13:25

## 2019-08-28 RX ADMIN — METOPROLOL TARTRATE 50 MG: 50 TABLET ORAL at 18:05

## 2019-08-29 ENCOUNTER — APPOINTMENT (OUTPATIENT)
Dept: CT IMAGING | Facility: HOSPITAL | Age: 49
End: 2019-08-29

## 2019-08-29 VITALS
HEIGHT: 67 IN | WEIGHT: 270 LBS | TEMPERATURE: 96.9 F | HEART RATE: 75 BPM | SYSTOLIC BLOOD PRESSURE: 139 MMHG | OXYGEN SATURATION: 93 % | DIASTOLIC BLOOD PRESSURE: 91 MMHG | BODY MASS INDEX: 42.38 KG/M2 | RESPIRATION RATE: 18 BRPM

## 2019-08-29 LAB
ANION GAP SERPL CALCULATED.3IONS-SCNC: 11 MMOL/L (ref 5–15)
BASOPHILS # BLD AUTO: 0.1 10*3/MM3 (ref 0–0.2)
BASOPHILS NFR BLD AUTO: 1.5 % (ref 0–1.5)
BUN BLD-MCNC: 17 MG/DL (ref 6–20)
BUN/CREAT SERPL: 18.9 (ref 7–25)
CALCIUM SPEC-SCNC: 8.9 MG/DL (ref 8.6–10.5)
CHLORIDE SERPL-SCNC: 103 MMOL/L (ref 98–107)
CO2 SERPL-SCNC: 22 MMOL/L (ref 22–29)
CREAT BLD-MCNC: 0.9 MG/DL (ref 0.76–1.27)
DEPRECATED RDW RBC AUTO: 40.4 FL (ref 37–54)
EOSINOPHIL # BLD AUTO: 0.3 10*3/MM3 (ref 0–0.4)
EOSINOPHIL NFR BLD AUTO: 4.5 % (ref 0.3–6.2)
ERYTHROCYTE [DISTWIDTH] IN BLOOD BY AUTOMATED COUNT: 12.6 % (ref 12.3–15.4)
GFR SERPL CREATININE-BSD FRML MDRD: 89 ML/MIN/1.73
GLUCOSE BLD-MCNC: 214 MG/DL (ref 65–99)
GLUCOSE BLDC GLUCOMTR-MCNC: 162 MG/DL (ref 70–130)
GLUCOSE BLDC GLUCOMTR-MCNC: 194 MG/DL (ref 70–130)
HCT VFR BLD AUTO: 42.4 % (ref 37.5–51)
HGB BLD-MCNC: 14.4 G/DL (ref 13–17.7)
IMM GRANULOCYTES # BLD AUTO: 0.03 10*3/MM3 (ref 0–0.05)
IMM GRANULOCYTES NFR BLD AUTO: 0.5 % (ref 0–0.5)
LYMPHOCYTES # BLD AUTO: 2.06 10*3/MM3 (ref 0.7–3.1)
LYMPHOCYTES NFR BLD AUTO: 31 % (ref 19.6–45.3)
MCH RBC QN AUTO: 29.6 PG (ref 26.6–33)
MCHC RBC AUTO-ENTMCNC: 34 G/DL (ref 31.5–35.7)
MCV RBC AUTO: 87.2 FL (ref 79–97)
MONOCYTES # BLD AUTO: 0.75 10*3/MM3 (ref 0.1–0.9)
MONOCYTES NFR BLD AUTO: 11.3 % (ref 5–12)
NEUTROPHILS # BLD AUTO: 3.41 10*3/MM3 (ref 1.7–7)
NEUTROPHILS NFR BLD AUTO: 51.2 % (ref 42.7–76)
NRBC BLD AUTO-RTO: 0 /100 WBC (ref 0–0.2)
PLATELET # BLD AUTO: 170 10*3/MM3 (ref 140–450)
PMV BLD AUTO: 11.1 FL (ref 6–12)
POTASSIUM BLD-SCNC: 4 MMOL/L (ref 3.5–5.2)
RBC # BLD AUTO: 4.86 10*6/MM3 (ref 4.14–5.8)
SODIUM BLD-SCNC: 136 MMOL/L (ref 136–145)
WBC NRBC COR # BLD: 6.65 10*3/MM3 (ref 3.4–10.8)

## 2019-08-29 PROCEDURE — 85025 COMPLETE CBC W/AUTO DIFF WBC: CPT | Performed by: STUDENT IN AN ORGANIZED HEALTH CARE EDUCATION/TRAINING PROGRAM

## 2019-08-29 PROCEDURE — 80048 BASIC METABOLIC PNL TOTAL CA: CPT | Performed by: STUDENT IN AN ORGANIZED HEALTH CARE EDUCATION/TRAINING PROGRAM

## 2019-08-29 PROCEDURE — 96361 HYDRATE IV INFUSION ADD-ON: CPT

## 2019-08-29 PROCEDURE — 63710000001 INSULIN ASPART PER 5 UNITS: Performed by: STUDENT IN AN ORGANIZED HEALTH CARE EDUCATION/TRAINING PROGRAM

## 2019-08-29 PROCEDURE — G0378 HOSPITAL OBSERVATION PER HR: HCPCS

## 2019-08-29 PROCEDURE — 99217 PR OBSERVATION CARE DISCHARGE MANAGEMENT: CPT | Performed by: STUDENT IN AN ORGANIZED HEALTH CARE EDUCATION/TRAINING PROGRAM

## 2019-08-29 PROCEDURE — 96376 TX/PRO/DX INJ SAME DRUG ADON: CPT

## 2019-08-29 PROCEDURE — 75574 CT ANGIO HRT W/3D IMAGE: CPT

## 2019-08-29 PROCEDURE — 0 IOPAMIDOL PER 1 ML: Performed by: STUDENT IN AN ORGANIZED HEALTH CARE EDUCATION/TRAINING PROGRAM

## 2019-08-29 PROCEDURE — 96374 THER/PROPH/DIAG INJ IV PUSH: CPT

## 2019-08-29 PROCEDURE — 82962 GLUCOSE BLOOD TEST: CPT

## 2019-08-29 RX ORDER — NITROGLYCERIN 0.4 MG/1
0.4 TABLET SUBLINGUAL
Status: DISCONTINUED | OUTPATIENT
Start: 2019-08-29 | End: 2019-08-29 | Stop reason: HOSPADM

## 2019-08-29 RX ORDER — SODIUM CHLORIDE 0.9 % (FLUSH) 0.9 %
10 SYRINGE (ML) INJECTION AS NEEDED
Status: CANCELLED | OUTPATIENT
Start: 2019-08-29

## 2019-08-29 RX ORDER — SODIUM CHLORIDE 0.9 % (FLUSH) 0.9 %
10 SYRINGE (ML) INJECTION AS NEEDED
Status: DISCONTINUED | OUTPATIENT
Start: 2019-08-29 | End: 2019-08-29 | Stop reason: HOSPADM

## 2019-08-29 RX ORDER — SODIUM CHLORIDE 0.9 % (FLUSH) 0.9 %
3 SYRINGE (ML) INJECTION EVERY 12 HOURS SCHEDULED
Status: CANCELLED | OUTPATIENT
Start: 2019-08-29

## 2019-08-29 RX ORDER — METOPROLOL TARTRATE 5 MG/5ML
5 INJECTION INTRAVENOUS
Status: COMPLETED | OUTPATIENT
Start: 2019-08-29 | End: 2019-08-29

## 2019-08-29 RX ORDER — METOPROLOL TARTRATE 100 MG/1
100 TABLET ORAL ONCE AS NEEDED
Status: COMPLETED | OUTPATIENT
Start: 2019-08-29 | End: 2019-08-29

## 2019-08-29 RX ORDER — METOPROLOL TARTRATE 50 MG/1
50 TABLET, FILM COATED ORAL ONCE AS NEEDED
Status: CANCELLED | OUTPATIENT
Start: 2019-08-29

## 2019-08-29 RX ORDER — NITROGLYCERIN 0.4 MG/1
0.4 TABLET SUBLINGUAL
Status: CANCELLED | OUTPATIENT
Start: 2019-08-29 | End: 2019-08-29

## 2019-08-29 RX ORDER — METOPROLOL TARTRATE 100 MG/1
100 TABLET ORAL ONCE AS NEEDED
Status: CANCELLED | OUTPATIENT
Start: 2019-08-29

## 2019-08-29 RX ORDER — METOPROLOL TARTRATE 50 MG/1
50 TABLET, FILM COATED ORAL ONCE AS NEEDED
Status: COMPLETED | OUTPATIENT
Start: 2019-08-29 | End: 2019-08-29

## 2019-08-29 RX ORDER — LIDOCAINE HYDROCHLORIDE 10 MG/ML
5 INJECTION, SOLUTION EPIDURAL; INFILTRATION; INTRACAUDAL; PERINEURAL AS NEEDED
Status: DISCONTINUED | OUTPATIENT
Start: 2019-08-29 | End: 2019-08-29 | Stop reason: HOSPADM

## 2019-08-29 RX ORDER — LIDOCAINE HYDROCHLORIDE 10 MG/ML
5 INJECTION, SOLUTION EPIDURAL; INFILTRATION; INTRACAUDAL; PERINEURAL AS NEEDED
Status: CANCELLED | OUTPATIENT
Start: 2019-08-29

## 2019-08-29 RX ORDER — SODIUM CHLORIDE 0.9 % (FLUSH) 0.9 %
3 SYRINGE (ML) INJECTION EVERY 12 HOURS SCHEDULED
Status: DISCONTINUED | OUTPATIENT
Start: 2019-08-29 | End: 2019-08-29 | Stop reason: HOSPADM

## 2019-08-29 RX ADMIN — Medication 3 ML: at 09:00

## 2019-08-29 RX ADMIN — SODIUM CHLORIDE 75 ML/HR: 9 INJECTION, SOLUTION INTRAVENOUS at 14:32

## 2019-08-29 RX ADMIN — IOPAMIDOL 90 ML: 755 INJECTION, SOLUTION INTRAVENOUS at 15:00

## 2019-08-29 RX ADMIN — ASPIRIN 162 MG: 325 TABLET ORAL at 15:57

## 2019-08-29 RX ADMIN — LISINOPRIL 40 MG: 40 TABLET ORAL at 15:57

## 2019-08-29 RX ADMIN — METOPROLOL TARTRATE 5 MG: 5 INJECTION INTRAVENOUS at 11:04

## 2019-08-29 RX ADMIN — METOPROLOL TARTRATE 100 MG: 100 TABLET, FILM COATED ORAL at 09:28

## 2019-08-29 RX ADMIN — INSULIN ASPART 2 UNITS: 100 INJECTION, SOLUTION INTRAVENOUS; SUBCUTANEOUS at 08:47

## 2019-08-29 RX ADMIN — METOPROLOL TARTRATE 50 MG: 50 TABLET ORAL at 05:41

## 2019-08-29 RX ADMIN — METOPROLOL TARTRATE 5 MG: 5 INJECTION INTRAVENOUS at 11:31

## 2019-08-29 RX ADMIN — SODIUM CHLORIDE, PRESERVATIVE FREE 10 ML: 5 INJECTION INTRAVENOUS at 09:00

## 2019-08-29 RX ADMIN — ATORVASTATIN CALCIUM 40 MG: 40 TABLET, FILM COATED ORAL at 15:58

## 2019-08-29 RX ADMIN — METOPROLOL TARTRATE 5 MG: 5 INJECTION INTRAVENOUS at 11:22

## 2019-08-30 ENCOUNTER — EPISODE CHANGES (OUTPATIENT)
Dept: CASE MANAGEMENT | Facility: OTHER | Age: 49
End: 2019-08-30

## 2019-09-06 ENCOUNTER — OFFICE VISIT (OUTPATIENT)
Dept: FAMILY MEDICINE CLINIC | Facility: CLINIC | Age: 49
End: 2019-09-06

## 2019-09-06 VITALS
SYSTOLIC BLOOD PRESSURE: 130 MMHG | WEIGHT: 267.3 LBS | BODY MASS INDEX: 41.96 KG/M2 | HEART RATE: 81 BPM | DIASTOLIC BLOOD PRESSURE: 80 MMHG | HEIGHT: 67 IN | OXYGEN SATURATION: 97 %

## 2019-09-06 DIAGNOSIS — I10 ESSENTIAL HYPERTENSION: ICD-10-CM

## 2019-09-06 DIAGNOSIS — R07.9 CHEST PAIN, UNSPECIFIED TYPE: ICD-10-CM

## 2019-09-06 DIAGNOSIS — E11.9 TYPE 2 DIABETES MELLITUS WITHOUT COMPLICATION, WITHOUT LONG-TERM CURRENT USE OF INSULIN (HCC): ICD-10-CM

## 2019-09-06 DIAGNOSIS — Z09 HOSPITAL DISCHARGE FOLLOW-UP: Primary | ICD-10-CM

## 2019-09-06 PROCEDURE — 99213 OFFICE O/P EST LOW 20 MIN: CPT | Performed by: STUDENT IN AN ORGANIZED HEALTH CARE EDUCATION/TRAINING PROGRAM

## 2019-09-06 RX ORDER — LISINOPRIL 40 MG/1
40 TABLET ORAL DAILY
Qty: 30 TABLET | Refills: 5 | Status: SHIPPED | OUTPATIENT
Start: 2019-09-06 | End: 2020-04-24

## 2019-09-06 RX ORDER — METOPROLOL TARTRATE 50 MG/1
50 TABLET, FILM COATED ORAL EVERY 12 HOURS
Qty: 180 TABLET | Refills: 1 | Status: SHIPPED | OUTPATIENT
Start: 2019-09-06 | End: 2020-03-23 | Stop reason: SDUPTHER

## 2019-09-06 RX ORDER — BLOOD-GLUCOSE METER
EACH MISCELLANEOUS
Qty: 1 EACH | Refills: 5 | Status: SHIPPED | OUTPATIENT
Start: 2019-09-06

## 2019-09-06 NOTE — PROGRESS NOTES
I have reviewed the notes, assessments, and/or procedures performed by Yang Veloz MD  , I concur with her/his documentation of Kingsley Littlejohn.

## 2019-09-06 NOTE — PROGRESS NOTES
"ID: Kingsley Littlejohn    CC:   Chief Complaint   Patient presents with   • Diabetes     rechk         Subjective:     Kingsley Littlejohn is a 49 y.o. male who presents for:    Hospital follow-up.  Patient was admitted on 8/28 and discharged on 8/29 for chest pain. Per discharge summary: \"Troponins were negative x 2. He was given nitroglycerin which helped. CTA of the coronaries was obtained which was normal with a calcium score of 0. Chest pain resolved overnight and did not return prior to discharge. He was able to ambulate and tolerated PO intake prior to discharge. He remained afebrile and hemodynamically stable over the course of his stay. He was discharged in stable condition without change in his home medications.\"  Patient states that his chest pain has resolved.  He feels fine.  Needs refills on several of his diabetic medications.    HPI     Past Medical Hx:  Past Medical History:   Diagnosis Date   • Abdominal pain    • Allergic rhinitis    • Diabetes mellitus (CMS/[facility])     Prev HbA1c 14.0 2014, states \"I'm cured!\"   • Essential hypertension    • Fracture closed, fibula, shaft     tib-fib       Past Surgical Hx:  Past Surgical History:   Procedure Laterality Date   • ANKLE SURGERY      right and left   • COLONOSCOPY N/A 6/4/2018    Procedure: COLONOSCOPY;  Surgeon: Jaylon Castro MD;  Location: [facility] ENDOSCOPY;  Service: Gastroenterology   • CYST REMOVAL      x 3, from hand, thigh, and buttocks   • ENDOSCOPY      20 years ago   • TIBIA FRACTURE SURGERY Bilateral 2011    tib-fib       Health Maintenance:  Health Maintenance   Topic Date Due   • INFLUENZA VACCINE  08/01/2019   • LIPID PANEL  09/04/2019   • URINE MICROALBUMIN  09/04/2019   • ZOSTER VACCINE (1 of 2) 09/01/2021 (Originally 2/20/2018)   • DIABETIC EYE EXAM  10/29/2019   • HEMOGLOBIN A1C  12/06/2019   • MEDICARE ANNUAL WELLNESS  12/18/2019   • DIABETIC FOOT EXAM  06/10/2020   • TDAP/TD VACCINES (2 - Td) 01/01/2021   • COLONOSCOPY  06/04/2021 "   • PNEUMOCOCCAL VACCINE (19-64 MEDIUM RISK)  Addressed       Current Meds:    Current Outpatient Medications:   •  aspirin 325 MG tablet, Take 81 mg by mouth Daily., Disp: , Rfl:   •  atorvastatin (LIPITOR) 40 MG tablet, Take 1 tablet by mouth Daily., Disp: 90 tablet, Rfl: 3  •  Blood Glucose Monitoring Suppl (ONE TOUCH ULTRA 2) w/Device kit, U UTD, Disp: , Rfl: 0  •  Empagliflozin (JARDIANCE) 10 MG tablet, Take 25 mg by mouth Daily., Disp: 30 tablet, Rfl: 5  •  fluticasone (FLONASE) 50 MCG/ACT nasal spray, 2 SPRAY IN EACH NOSTRIL DAILY, Disp: , Rfl: 0  •  glucose monitor monitoring kit, 1 each As Needed (Diabetes)., Disp: 1 each, Rfl: 0  •  hydrOXYzine (ATARAX) 25 MG tablet, Take 1 tablet by mouth Every 8 (Eight) Hours As Needed for Itching., Disp: 30 tablet, Rfl: 1  •  ibuprofen (ADVIL,MOTRIN) 600 MG tablet, Take 1 tablet by mouth Every 6 (Six) Hours As Needed for Mild Pain ., Disp: 15 tablet, Rfl: 0  •  lisinopril (PRINIVIL,ZESTRIL) 40 MG tablet, Take 1 tablet by mouth Daily., Disp: 30 tablet, Rfl: 5  •  metFORMIN (GLUCOPHAGE) 1000 MG tablet, Take 1 tablet by mouth 2 (Two) Times a Day With Meals., Disp: 60 tablet, Rfl: 2  •  metoprolol tartrate (LOPRESSOR) 50 MG tablet, Take 1 tablet by mouth Every 12 (Twelve) Hours., Disp: 180 tablet, Rfl: 1  •  nitroglycerin (NITROSTAT) 0.4 MG SL tablet, Place 1 tablet under the tongue Every 5 (Five) Minutes As Needed for Chest Pain. Take no more than 3 doses in 15 minutes., Disp: 30 tablet, Rfl: 12  •  paliperidone palmitate (INVEGA SUSTENNA) 117 MG/0.75ML suspension IM injection, Inject 117 mg into the shoulder, thigh, or buttocks Every 30 (Thirty) Days., Disp: , Rfl:   •  SITagliptin (JANUVIA) 100 MG tablet, Take 1 tablet by mouth Daily., Disp: 30 tablet, Rfl: 1  •  sodium chloride (OCEAN NASAL SPRAY) 0.65 % nasal spray, 1 spray into each nostril As Needed for Congestion., Disp: 60 mL, Rfl: 12  •  traZODone (DESYREL) 150 MG tablet, TAKE 1 TABLET BY MOUTH EVERY DAY AT  BEDTIME AS NEEDED, Disp: , Rfl: 2  •  Triamcinolone Acetonide (NASACORT) 55 MCG/ACT nasal inhaler, 2 sprays into the nostril(s) as directed by provider Daily., Disp: 16.5 g, Rfl: 3    Allergies:  Latex and Codeine    Family Hx:  Family History   Problem Relation Age of Onset   • Diabetes Other    • Hypertension Other    • Cancer Other    • Lung disease Other    • Thyroid disease Other    • Bleeding Disorder Other    • Colon cancer Paternal Grandmother    • Cancer Paternal Grandfather    • Crohn's disease Mother    • Diabetes Mother    • Hypertension Father    • Lung disease Father    • Hypertension Brother    • Hypertension Brother    • Hypertension Brother         Social History:  Social History     Socioeconomic History   • Marital status: Single     Spouse name: Not on file   • Number of children: Not on file   • Years of education: Not on file   • Highest education level: Not on file   Occupational History   • 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   • Sexual activity: Defer   Social History Narrative    Single, lives alone in [place].  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         Review of Systems   Constitutional: Negative for chills, diaphoresis and fever.   HENT: Negative for congestion and rhinorrhea.    Eyes: Negative for discharge and redness.   Respiratory: Negative for cough, chest tightness, shortness of breath and wheezing.    Cardiovascular: Negative for palpitations and leg swelling.   Gastrointestinal: Negative for abdominal pain, constipation, diarrhea,  "nausea and vomiting.   Genitourinary: Negative for dysuria and flank pain.   Skin: Negative for color change and rash.   Psychiatric/Behavioral: Negative for agitation.           Objective:     /80   Pulse 81   Ht 170.2 cm (67\")   Wt 121 kg (267 lb 4.8 oz)   SpO2 97%   BMI 41.87 kg/m²     Physical Exam   Constitutional: He is oriented to person, place, and time. He appears well-developed and well-nourished. He is active.  Non-toxic appearance. He does not have a sickly appearance. He does not appear ill. No distress.   HENT:   Head: Normocephalic.   Right Ear: External ear normal.   Left Ear: External ear normal.   Nose: No mucosal edema or rhinorrhea.   Mouth/Throat: Uvula is midline and mucous membranes are normal.   Eyes: Conjunctivae are normal. No scleral icterus.   Cardiovascular: Intact distal pulses.   Pulmonary/Chest: Effort normal and breath sounds normal. No accessory muscle usage. No respiratory distress. He has no decreased breath sounds. He has no wheezes. He exhibits no tenderness.   Abdominal: Soft. Bowel sounds are normal. There is no tenderness (deep palpation). There is no rigidity, no rebound and no guarding.   Neurological: He is alert and oriented to person, place, and time. He is not disoriented. No cranial nerve deficit (grossly intact). GCS eye subscore is 4. GCS verbal subscore is 5. GCS motor subscore is 6.   Skin: Skin is warm and dry. Capillary refill takes less than 2 seconds. He is not diaphoretic.   Nursing note and vitals reviewed.      Assessment/Plan:      Diagnosis Plan   1. Hospital discharge follow-up     2. Chest pain, unspecified type     3. Type 2 diabetes mellitus without complication, without long-term current use of insulin (CMS/[facility])  lisinopril (PRINIVIL,ZESTRIL) 40 MG tablet    metFORMIN (GLUCOPHAGE) 1000 MG tablet    metoprolol tartrate (LOPRESSOR) 50 MG tablet   4. Essential hypertension  lisinopril (PRINIVIL,ZESTRIL) 40 MG tablet       Follow-up:     Return " in about 4 weeks (around 10/4/2019) for Recheck DM.      Goals: improve diet  Barriers to goals: pt compliance    Health Maintenance   Topic Date Due   • INFLUENZA VACCINE  08/01/2019   • LIPID PANEL  09/04/2019   • URINE MICROALBUMIN  09/04/2019   • ZOSTER VACCINE (1 of 2) 09/01/2021 (Originally 2/20/2018)   • DIABETIC EYE EXAM  10/29/2019   • HEMOGLOBIN A1C  12/06/2019   • MEDICARE ANNUAL WELLNESS  12/18/2019   • DIABETIC FOOT EXAM  06/10/2020   • TDAP/TD VACCINES (2 - Td) 01/01/2021   • COLONOSCOPY  06/04/2021   • PNEUMOCOCCAL VACCINE (19-64 MEDIUM RISK)  Addressed     Tobacco: former smoker  Alcohol: does not drink  Lifestyle: Body mass index is 41.87 kg/m². eat more fruits and vegetables, decrease soda or juice intake, increase water intake, increase physical activity, reduce screen time, reduce portion size, cut out extra servings, reduce fast food intake, family to eat at dinner table more often, keep TV off during meals, plan meals, eat breakfast and have 3 meals a day    RISK SCORE: 4        This document has been electronically signed by Yang Veloz MD on September 6, 2019 10:51 AM

## 2019-09-16 ENCOUNTER — OFFICE VISIT (OUTPATIENT)
Dept: PODIATRY | Facility: CLINIC | Age: 49
End: 2019-09-16

## 2019-09-16 VITALS — HEIGHT: 67 IN | WEIGHT: 267 LBS | HEART RATE: 81 BPM | BODY MASS INDEX: 41.91 KG/M2 | OXYGEN SATURATION: 98 %

## 2019-09-16 DIAGNOSIS — M79.675 CHRONIC TOE PAIN, BILATERAL: ICD-10-CM

## 2019-09-16 DIAGNOSIS — M79.674 CHRONIC TOE PAIN, BILATERAL: ICD-10-CM

## 2019-09-16 DIAGNOSIS — B35.1 ONYCHOMYCOSIS: ICD-10-CM

## 2019-09-16 DIAGNOSIS — G89.29 CHRONIC TOE PAIN, BILATERAL: ICD-10-CM

## 2019-09-16 DIAGNOSIS — E11.42 TYPE 2 DIABETES MELLITUS WITH PERIPHERAL NEUROPATHY (HCC): Primary | ICD-10-CM

## 2019-09-16 PROCEDURE — 11721 DEBRIDE NAIL 6 OR MORE: CPT | Performed by: PODIATRIST

## 2019-09-16 NOTE — PROGRESS NOTES
"Kingsley Littlejohn  1970  49 y.o. male  RC Veloz - 9/6/2019  BS - 162     Patient presents today for routine diabetic foot care.     09/16/2019     Chief Complaint   Patient presents with   • Left Foot - diabetic foot care   • Right Foot - diabetic foot care       History of Present Illness    Patient presents to clinic today for routine diabetic footcare.  States that his toenails have become long and painful.  Pain is relieved with debridement.      Past Medical History:   Diagnosis Date   • Abdominal pain    • Allergic rhinitis    • Diabetes mellitus (CMS/[facility])     Prev HbA1c 14.0 2014, states \"I'm cured!\"   • Essential hypertension    • Fracture closed, fibula, shaft     tib-fib   • Onychomycosis          Past Surgical History:   Procedure Laterality Date   • ANKLE SURGERY      right and left   • COLONOSCOPY N/A 6/4/2018    Procedure: COLONOSCOPY;  Surgeon: Jaylon Castro MD;  Location: [facility] ENDOSCOPY;  Service: Gastroenterology   • CYST REMOVAL      x 3, from hand, thigh, and buttocks   • ENDOSCOPY      20 years ago   • TIBIA FRACTURE SURGERY Bilateral 2011    tib-fib         Family History   Problem Relation Age of Onset   • Diabetes Other    • Hypertension Other    • Cancer Other    • Lung disease Other    • Thyroid disease Other    • Bleeding Disorder Other    • Colon cancer Paternal Grandmother    • Cancer Paternal Grandfather    • Crohn's disease Mother    • Diabetes Mother    • Hypertension Father    • Lung disease Father    • Hypertension Brother    • Hypertension Brother    • Hypertension Brother        Allergies   Allergen Reactions   • Latex Irritability   • Codeine Nausea Only       Social History     Socioeconomic History   • Marital status: Single     Spouse name: Not on file   • Number of children: Not on file   • Years of education: Not on file   • Highest education level: Not on file   Occupational History   • 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   • Sexual activity: Defer   Social History Narrative    Single, lives alone in [place].  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           Current Outpatient Medications   Medication Sig Dispense Refill   • aspirin 325 MG tablet Take 81 mg by mouth Daily.     • atorvastatin (LIPITOR) 40 MG tablet Take 1 tablet by mouth Daily. 90 tablet 3   • Blood Glucose Monitoring Suppl (ONE TOUCH ULTRA 2) w/Device kit Use 1 daily to test blood glucose level. 1 each 5   • Empagliflozin (JARDIANCE) 10 MG tablet Take 25 mg by mouth Daily. 30 tablet 5   • fluticasone (FLONASE) 50 MCG/ACT nasal spray 2 SPRAY IN EACH NOSTRIL DAILY  0   • glucose monitor monitoring kit 1 each As Needed (Diabetes). 1 each 0   • hydrOXYzine (ATARAX) 25 MG tablet Take 1 tablet by mouth Every 8 (Eight) Hours As Needed for Itching. 30 tablet 1   • ibuprofen (ADVIL,MOTRIN) 600 MG tablet Take 1 tablet by mouth Every 6 (Six) Hours As Needed for Mild Pain . 15 tablet 0   • lisinopril (PRINIVIL,ZESTRIL) 40 MG tablet Take 1 tablet by mouth Daily. 30 tablet 5   • metFORMIN (GLUCOPHAGE) 1000 MG tablet Take 1 tablet by mouth 2 (Two) Times a Day With Meals. 60 tablet 2   • metoprolol tartrate (LOPRESSOR) 50 MG tablet Take 1 tablet by mouth Every 12 (Twelve) Hours. 180 tablet 1   • nitroglycerin (NITROSTAT) 0.4 MG SL tablet Place 1 tablet under the tongue Every 5 (Five) Minutes As Needed for Chest Pain. Take no more than 3 doses in 15 minutes. 30 tablet 12   • paliperidone palmitate (INVEGA SUSTENNA) 117 MG/0.75ML suspension IM injection Inject 117 mg into  "the shoulder, thigh, or buttocks Every 30 (Thirty) Days.     • SITagliptin (JANUVIA) 100 MG tablet Take 1 tablet by mouth Daily. 30 tablet 1   • sodium chloride (OCEAN NASAL SPRAY) 0.65 % nasal spray 1 spray into each nostril As Needed for Congestion. 60 mL 12   • traZODone (DESYREL) 150 MG tablet TAKE 1 TABLET BY MOUTH EVERY DAY AT BEDTIME AS NEEDED  2   • Triamcinolone Acetonide (NASACORT) 55 MCG/ACT nasal inhaler 2 sprays into the nostril(s) as directed by provider Daily. 16.5 g 3     No current facility-administered medications for this visit.        Review of Systems   Constitutional: Negative.    HENT: Negative.    Respiratory: Negative.    Cardiovascular: Positive for leg swelling.   Gastrointestinal: Negative.    Musculoskeletal:        Toe pain    Skin: Negative.          OBJECTIVE    Pulse 81   Ht 170.2 cm (67\")   Wt 121 kg (267 lb)   SpO2 98%   BMI 41.82 kg/m²       Physical Exam   Constitutional: He is oriented to person, place, and time. He appears well-developed and well-nourished.   HENT:   Head: Normocephalic and atraumatic.   Pulmonary/Chest: Effort normal. No respiratory distress.   Neurological: He is alert and oriented to person, place, and time.   Skin: Skin is warm and dry. Capillary refill takes less than 2 seconds. He is not diaphoretic.   Psychiatric: He has a normal mood and affect. His behavior is normal.   Vitals reviewed.      Gait: Normal     Assistive Device: None    Lower Extremity    Cardiovascular:    DP/PT pulses palpable bilateral  CFT brisk  to all digits  Skin temp is warm to warm from proximal tibia to distal digits bilateral  Pedal hair growth present.   Musculoskeletal:  Muscle strength is 5/5 for all muscle groups tested   ROM of the 1st MTP is full without pain or crepitus bilateral  ROM of the ankle joint is full without pain or crepitus  bilateral  Dermatological:   Nails 1-5 bilateral are thickened, discolored, elongated.  Pain on palpation to the nail plates.  Skin " is warm, dry and intact bilateral  Webspaces 1-4 bilateral are clean, dry and intact.   Neurological:   Protective sensation intact   Sensation intact to light touch        Procedures        ASSESSMENT AND PLAN    Kingsley was seen today for diabetic foot care and diabetic foot care.    Diagnoses and all orders for this visit:    Type 2 diabetes mellitus with peripheral neuropathy (CMS/HCC)    Onychomycosis    Chronic toe pain, bilateral      - Nails 1-5 bilateral were debrided in length and thickness with nail nipper and electric  to decrease fungal load and risk of infection.  - All the patients questions were answered.  - RTC 3 months or sooner if needed.              This document has been electronically signed by Abhijeet Thomason DPM on September 16, 2019 12:12 PM     9/16/2019  12:12 PM

## 2019-09-18 DIAGNOSIS — E11.8 TYPE 2 DIABETES MELLITUS WITH COMPLICATION, WITHOUT LONG-TERM CURRENT USE OF INSULIN (HCC): ICD-10-CM

## 2019-09-19 ENCOUNTER — PATIENT OUTREACH (OUTPATIENT)
Dept: CASE MANAGEMENT | Facility: OTHER | Age: 49
End: 2019-09-19

## 2019-09-19 RX ORDER — SITAGLIPTIN 100 MG/1
TABLET, FILM COATED ORAL
Qty: 30 TABLET | Refills: 1 | Status: SHIPPED | OUTPATIENT
Start: 2019-09-19 | End: 2019-10-22 | Stop reason: SDUPTHER

## 2019-09-19 NOTE — OUTREACH NOTE
Care Plan Note      Responses   Lifestyle Goals  Routine follow-up with doctor(s), Self monitor blood sugar   Barriers  Disease education [no diabetic test strips]   Self Management  Get flu/pneumonia shot, Home Glucose Monitoring, Medication Adherence   Annual Wellness Visit:   Patient Will Schedule   Care Gaps Addressed  Colon Cancer Screening, Diabetic A1C, Diabetic Eye Exam, Flu Shot   Colon Cancer Screening Type  Colonoscopy   Colonoscopy Status  Up to Date (< 10 yrs)   Colon Cancer Screening Completion at [facility] [place] or Other  [facility] [place]   HbA1c Status  Up to Date-outside defined limits   HbA1c Completion at [facility] [place] or Other  [facility] [place]   HbA1c Comments  was 8.90 on 6-6-19   Diabetic Eye Exam Status  Up to Date   Diabetic Eye Exam Completion at [facility] [place] or Other  Other   Other Location  Per Dr. Coronado in 2018   Diabetic Eye Exam Comments  aware should be performed yearly   Flu Shot Status  Refused   Specific Disease Process Teaching  Diabetes   Other Patient Education/Resources   24/7 [facility] [place] Nurse Call Line [my contact information]   24/7 Nurse Call Line Education Method  Verbal   Does patient have depression diagnosis?  No   Advanced Directives:  Not Interested At This Time   Ed Visits past 12 months:  None   Hospitalizations past 12 months  1   Medication Adherence  Medications understood [cant afford glucometer test strips]        The main concerns and/or symptoms the patient would like to address are: Discharged from [facility] 8-29-19 with chest pain, diabetes mellitus type 2, and hypertension.     Education/instruction provided by Care Coordinator: RN-CC clinical session with the patient. He reporst that he had an episode of chest pain about 1 week after discharge and it subsided after drinking water. He has seen his PCP post hospital follow up. He reports he has his medications in the home and no issues filling due to cost. He does need glucometer and strips. He stated he has not had test strips for about 4  months due to cost. RN-CC educated that [employer] has an inexpensive meter and strips;he will check into. He does not monitor his BP at home. He reports no other issues. Provided my contact information for needs.     Follow Up Outreach Due: 2 weeks for f/u on meter and strips    Bea Figueredo RN  Community Care Coordinator    9/19/2019, 2:55 PM

## 2019-09-19 NOTE — OUTREACH NOTE
Care Coordination Assessment    Documented/Reviewed By:  Bea Figueredo RN Date/time:  9/19/2019  2:46 PM   Assessment completed with:  patient  Enrolled in care management program:  No  Living arrangement:  family members  Support system:  family  Type of residence:  apartment  Home care services:  No  Equipment used at home:  none  Communication device:  Yes  Bed or wheelchair confined:  No  Inadequate nutrition:  No  Medication adherence problem:  No  Experiencing side effects from current medications:  No  History of fall(s) in last 6 months:  No  Difficulty keeping appointments:  No  Family aware of the patient's advance care planning wishes:   (Comment: refused advance direcitve information)

## 2019-10-07 ENCOUNTER — OFFICE VISIT (OUTPATIENT)
Dept: FAMILY MEDICINE CLINIC | Facility: CLINIC | Age: 49
End: 2019-10-07

## 2019-10-07 VITALS
SYSTOLIC BLOOD PRESSURE: 130 MMHG | OXYGEN SATURATION: 97 % | HEIGHT: 67 IN | WEIGHT: 260.7 LBS | DIASTOLIC BLOOD PRESSURE: 80 MMHG | HEART RATE: 74 BPM | BODY MASS INDEX: 40.92 KG/M2

## 2019-10-07 DIAGNOSIS — E11.9 TYPE 2 DIABETES MELLITUS WITHOUT COMPLICATION, WITHOUT LONG-TERM CURRENT USE OF INSULIN (HCC): Primary | ICD-10-CM

## 2019-10-07 PROCEDURE — 99213 OFFICE O/P EST LOW 20 MIN: CPT | Performed by: STUDENT IN AN ORGANIZED HEALTH CARE EDUCATION/TRAINING PROGRAM

## 2019-10-07 NOTE — PROGRESS NOTES
ID: Kingsley Littlejohn    CC:   Chief Complaint   Patient presents with   • Chest Pain     FOLLOW UP         Subjective:     Kingsley Littlejohn is a 49 y.o. male who presents for:    Patient is doing well.  He is excited to have lost approximately 20 pounds since his last visit.  He reports being more active than normal which he attributes to his weight loss.  He continues to eat, but states he is more conscious of the volume and quality of foods he eats.  No polyuria or polydipsia.  Patient did have an issue with yeast, but it has since resolved.  Patient needs a new Leukos meter and supplies due to insurance not covering his old one.    Lab Results   Component Value Date    HGBA1C 8.90 (H) 06/06/2019    HGBA1C 8.8 (H) 02/21/2019    HGBA1C 8.7 (H) 11/16/2018    HGBA1C 12.8 (H) 08/10/2018    HGBA1C 7.5 (H) 04/24/2018    HGBA1C 5.91 (H) 06/13/2017    HGBA1C >14.0 08/11/2014     HPI     PHQ-9 Depression Screening  Little interest or pleasure in doing things? 0   Feeling down, depressed, or hopeless? 0   Trouble falling or staying asleep, or sleeping too much? 0   Feeling tired or having little energy? 2   Poor appetite or overeating? 2   Feeling bad about yourself - or that you are a failure or have let yourself or your family down? 0   Trouble concentrating on things, such as reading the newspaper or watching television? 0   Moving or speaking so slowly that other people could have noticed? Or the opposite - being so fidgety or restless that you have been moving around a lot more than usual? 0   Thoughts that you would be better off dead, or of hurting yourself in some way? 0   PHQ-9 Total Score 4   If you checked off any problems, how difficult have these problems made it for you to do your work, take care of things at home, or get along with other people? Not difficult at all       Past Medical Hx:  Past Medical History:   Diagnosis Date   • Abdominal pain    • Allergic rhinitis    • Diabetes mellitus (CMS/HCC)      "Prev HbA1c 14.0 2014, states \"I'm cured!\"   • Essential hypertension    • Fracture closed, fibula, shaft     tib-fib   • Onychomycosis        Past Surgical Hx:  Past Surgical History:   Procedure Laterality Date   • ANKLE SURGERY      right and left   • COLONOSCOPY N/A 6/4/2018    Procedure: COLONOSCOPY;  Surgeon: Jaylon Castro MD;  Location: [facility] ENDOSCOPY;  Service: Gastroenterology   • CYST REMOVAL      x 3, from hand, thigh, and buttocks   • ENDOSCOPY      20 years ago   • TIBIA FRACTURE SURGERY Bilateral 2011    tib-fib       Health Maintenance:  Health Maintenance   Topic Date Due   • LIPID PANEL  09/04/2019   • ZOSTER VACCINE (1 of 2) 09/01/2021 (Originally 2/20/2018)   • DIABETIC EYE EXAM  10/29/2019   • HEMOGLOBIN A1C  12/06/2019   • MEDICARE ANNUAL WELLNESS  12/18/2019   • DIABETIC FOOT EXAM  09/16/2020   • TDAP/TD VACCINES (2 - Td) 01/01/2021   • COLONOSCOPY  06/04/2021   • PNEUMOCOCCAL VACCINE (19-64 MEDIUM RISK)  Addressed   • INFLUENZA VACCINE  Discontinued   • URINE MICROALBUMIN  Discontinued       Current Meds:    Current Outpatient Medications:   •  aspirin 325 MG tablet, Take 81 mg by mouth Daily., Disp: , Rfl:   •  atorvastatin (LIPITOR) 40 MG tablet, Take 1 tablet by mouth Daily., Disp: 90 tablet, Rfl: 3  •  Blood Glucose Monitoring Suppl (ONE TOUCH ULTRA 2) w/Device kit, Use 1 daily to test blood glucose level., Disp: 1 each, Rfl: 5  •  Empagliflozin (JARDIANCE) 10 MG tablet, Take 25 mg by mouth Daily., Disp: 30 tablet, Rfl: 5  •  fluticasone (FLONASE) 50 MCG/ACT nasal spray, 2 SPRAY IN EACH NOSTRIL DAILY, Disp: , Rfl: 0  •  glucose monitor monitoring kit, 1 each As Needed (Diabetes)., Disp: 1 each, Rfl: 0  •  hydrOXYzine (ATARAX) 25 MG tablet, Take 1 tablet by mouth Every 8 (Eight) Hours As Needed for Itching., Disp: 30 tablet, Rfl: 1  •  ibuprofen (ADVIL,MOTRIN) 600 MG tablet, Take 1 tablet by mouth Every 6 (Six) Hours As Needed for Mild Pain ., Disp: 15 tablet, Rfl: 0  •  JANUVIA 100 MG " tablet, TAKE 1 TABLET BY MOUTH EVERY DAY, Disp: 30 tablet, Rfl: 1  •  lisinopril (PRINIVIL,ZESTRIL) 40 MG tablet, Take 1 tablet by mouth Daily., Disp: 30 tablet, Rfl: 5  •  metFORMIN (GLUCOPHAGE) 1000 MG tablet, Take 1 tablet by mouth 2 (Two) Times a Day With Meals., Disp: 60 tablet, Rfl: 2  •  metoprolol tartrate (LOPRESSOR) 50 MG tablet, Take 1 tablet by mouth Every 12 (Twelve) Hours., Disp: 180 tablet, Rfl: 1  •  nitroglycerin (NITROSTAT) 0.4 MG SL tablet, Place 1 tablet under the tongue Every 5 (Five) Minutes As Needed for Chest Pain. Take no more than 3 doses in 15 minutes., Disp: 30 tablet, Rfl: 12  •  paliperidone palmitate (INVEGA SUSTENNA) 117 MG/0.75ML suspension IM injection, Inject 117 mg into the shoulder, thigh, or buttocks Every 30 (Thirty) Days., Disp: , Rfl:   •  sodium chloride (OCEAN NASAL SPRAY) 0.65 % nasal spray, 1 spray into each nostril As Needed for Congestion., Disp: 60 mL, Rfl: 12  •  traZODone (DESYREL) 150 MG tablet, TAKE 1 TABLET BY MOUTH EVERY DAY AT BEDTIME AS NEEDED, Disp: , Rfl: 2  •  Triamcinolone Acetonide (NASACORT) 55 MCG/ACT nasal inhaler, 2 sprays into the nostril(s) as directed by provider Daily., Disp: 16.5 g, Rfl: 3  •  glucose blood test strip, Use as instructed, Disp: 30 each, Rfl: 12  •  Lancets (ACCU-CHEK SOFT TOUCH) lancets, Use 1 daily for blood sugar testing, Disp: 30 each, Rfl: 12    Allergies:  Latex and Codeine    Family Hx:  Family History   Problem Relation Age of Onset   • Diabetes Other    • Hypertension Other    • Cancer Other    • Lung disease Other    • Thyroid disease Other    • Bleeding Disorder Other    • Colon cancer Paternal Grandmother    • Cancer Paternal Grandfather    • Crohn's disease Mother    • Diabetes Mother    • Hypertension Father    • Lung disease Father    • Hypertension Brother    • Hypertension Brother    • Hypertension Brother         Social History:  Social History     Socioeconomic History   • Marital status: Single     Spouse name:  "Not on file   • Number of children: Not on file   • Years of education: Not on file   • Highest education level: Not on file   Occupational History   • 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   • Sexual activity: Defer   Social History Narrative    Single, lives alone in [place].  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         Review of Systems   Constitutional: Negative for chills, diaphoresis, fatigue and fever.   HENT: Negative for congestion and rhinorrhea.    Eyes: Negative for discharge and redness.   Respiratory: Negative for chest tightness and wheezing.    Cardiovascular: Negative for leg swelling.   Gastrointestinal: Negative for abdominal pain, constipation and diarrhea.   Endocrine: Negative for polydipsia and polyuria.   Genitourinary: Negative for dysuria and flank pain.   Skin: Negative for color change and rash.   Psychiatric/Behavioral: Negative for agitation and sleep disturbance.           Objective:     /80   Pulse 74   Ht 170.2 cm (67\")   Wt 118 kg (260 lb 11.2 oz)   SpO2 97%   BMI 40.83 kg/m²     Physical Exam   Constitutional: He is oriented to person, place, and time. He appears well-developed and well-nourished. No distress.   HENT:   Head: Normocephalic and atraumatic.   Eyes: Conjunctivae are normal.   Neck: Normal range of motion. Neck supple.   Cardiovascular: Normal rate and intact distal pulses.   No murmur heard.  Pulmonary/Chest: Effort normal. No respiratory distress.   Abdominal: Soft. There is no tenderness.   Neurological: He " is alert and oriented to person, place, and time.   Skin: Skin is warm and dry. Capillary refill takes less than 2 seconds. He is not diaphoretic.   Psychiatric: He has a normal mood and affect. His behavior is normal.   Nursing note and vitals reviewed.      Assessment/Plan:      Diagnosis Plan   1. Type 2 diabetes mellitus without complication, without long-term current use of insulin (CMS/[facility])  Lipid Panel    Lancets (ACCU-CHEK SOFT TOUCH) lancets    glucose blood test strip  Patient is doing well.  We will plan for him to get his lipid panel and A1c drawn in December at his next appointment.  I called in an Accu-Chek meter to his pharmacy at [facility].  Supplies were sent in electronically.  Patient overall doing well.  Continue current treatment plan.  Reassess in 2 months after A1c is obtained.       Follow-up:     Return in about 2 months (around 12/7/2019) for Recheck DM.      Goals: improve diet  Barriers to goals: pt compliance    Health Maintenance   Topic Date Due   • LIPID PANEL  09/04/2019   • ZOSTER VACCINE (1 of 2) 09/01/2021 (Originally 2/20/2018)   • DIABETIC EYE EXAM  10/29/2019   • HEMOGLOBIN A1C  12/06/2019   • MEDICARE ANNUAL WELLNESS  12/18/2019   • DIABETIC FOOT EXAM  09/16/2020   • TDAP/TD VACCINES (2 - Td) 01/01/2021   • COLONOSCOPY  06/04/2021   • PNEUMOCOCCAL VACCINE (19-64 MEDIUM RISK)  Addressed   • INFLUENZA VACCINE  Discontinued   • URINE MICROALBUMIN  Discontinued     Tobacco: former smoker  Alcohol: does not drink  Lifestyle: Body mass index is 40.83 kg/m². eat more fruits and vegetables, decrease soda or juice intake, increase water intake, increase physical activity, reduce screen time, reduce portion size, cut out extra servings, reduce fast food intake, family to eat at dinner table more often, keep TV off during meals, plan meals, eat breakfast and have 3 meals a day    RISK SCORE: 4        This document has been electronically signed by Yang Veloz MD on October 7, 2019 9:26  AM

## 2019-10-08 DIAGNOSIS — E11.9 TYPE 2 DIABETES MELLITUS WITHOUT COMPLICATION, WITHOUT LONG-TERM CURRENT USE OF INSULIN (HCC): ICD-10-CM

## 2019-10-08 DIAGNOSIS — E11.8 TYPE 2 DIABETES MELLITUS WITH COMPLICATION, WITHOUT LONG-TERM CURRENT USE OF INSULIN (HCC): ICD-10-CM

## 2019-10-08 RX ORDER — BLOOD-GLUCOSE METER
1 KIT MISCELLANEOUS AS NEEDED
Qty: 1 EACH | Refills: 0 | Status: SHIPPED | OUTPATIENT
Start: 2019-10-08 | End: 2019-10-08 | Stop reason: SDUPTHER

## 2019-10-08 RX ORDER — BLOOD-GLUCOSE METER
1 KIT MISCELLANEOUS AS NEEDED
Qty: 1 EACH | Refills: 0 | Status: SHIPPED | OUTPATIENT
Start: 2019-10-08

## 2019-10-08 NOTE — PROGRESS NOTES
I have reviewed the notes, assessments, and/or procedures performed by Yang Veloz MD, I concur with her/his documentation and assessment and plan for Kingsleywesley Littlejohn.                This document has been electronically signed by Favio Muro MD on October 8, 2019 11:53 AM

## 2019-10-14 RX ORDER — EMPAGLIFLOZIN 25 MG/1
TABLET, FILM COATED ORAL
Qty: 30 TABLET | Refills: 5 | Status: SHIPPED | OUTPATIENT
Start: 2019-10-14 | End: 2020-04-20

## 2019-10-16 DIAGNOSIS — E11.8 TYPE 2 DIABETES MELLITUS WITH COMPLICATION, WITHOUT LONG-TERM CURRENT USE OF INSULIN (HCC): ICD-10-CM

## 2019-10-16 RX ORDER — ATORVASTATIN CALCIUM 40 MG/1
40 TABLET, FILM COATED ORAL DAILY
Qty: 90 TABLET | Refills: 3 | Status: SHIPPED | OUTPATIENT
Start: 2019-10-16 | End: 2020-10-22 | Stop reason: SDUPTHER

## 2019-10-18 ENCOUNTER — PATIENT OUTREACH (OUTPATIENT)
Dept: CASE MANAGEMENT | Facility: OTHER | Age: 49
End: 2019-10-18

## 2019-10-18 NOTE — OUTREACH NOTE
Patient Outreach Note    RN-CC outreach with patient. He was able to obtain a new glucometer prescription from provider(Acu-check soft touch) with strips and has been monitoring his blood sugar. Has lost 20 pounds, increasing exercise, and watching quality/quanity of foods. He reports no other issues and he is doing well. Provided my contact information for needs.     Bea Figueredo RN  Community Care Coordinator    10/18/2019, 9:52 AM

## 2019-10-22 ENCOUNTER — EPISODE CHANGES (OUTPATIENT)
Dept: CASE MANAGEMENT | Facility: OTHER | Age: 49
End: 2019-10-22

## 2019-10-22 DIAGNOSIS — E11.8 TYPE 2 DIABETES MELLITUS WITH COMPLICATION, WITHOUT LONG-TERM CURRENT USE OF INSULIN (HCC): ICD-10-CM

## 2019-10-22 RX ORDER — SITAGLIPTIN 100 MG/1
TABLET, FILM COATED ORAL
Qty: 30 TABLET | Refills: 1 | Status: SHIPPED | OUTPATIENT
Start: 2019-10-22 | End: 2019-11-18

## 2019-11-16 DIAGNOSIS — E11.8 TYPE 2 DIABETES MELLITUS WITH COMPLICATION, WITHOUT LONG-TERM CURRENT USE OF INSULIN (HCC): ICD-10-CM

## 2019-11-18 DIAGNOSIS — E11.8 TYPE 2 DIABETES MELLITUS WITH COMPLICATION, WITHOUT LONG-TERM CURRENT USE OF INSULIN (HCC): ICD-10-CM

## 2019-11-18 RX ORDER — SITAGLIPTIN 100 MG/1
TABLET, FILM COATED ORAL
Qty: 30 TABLET | Refills: 1 | Status: SHIPPED | OUTPATIENT
Start: 2019-11-18 | End: 2019-12-12 | Stop reason: SDUPTHER

## 2019-12-10 ENCOUNTER — APPOINTMENT (OUTPATIENT)
Dept: LAB | Facility: HOSPITAL | Age: 49
End: 2019-12-10

## 2019-12-10 ENCOUNTER — OFFICE VISIT (OUTPATIENT)
Dept: FAMILY MEDICINE CLINIC | Facility: CLINIC | Age: 49
End: 2019-12-10

## 2019-12-10 VITALS
HEART RATE: 84 BPM | HEIGHT: 67 IN | OXYGEN SATURATION: 94 % | SYSTOLIC BLOOD PRESSURE: 128 MMHG | TEMPERATURE: 98.3 F | DIASTOLIC BLOOD PRESSURE: 80 MMHG | BODY MASS INDEX: 40.74 KG/M2 | WEIGHT: 259.6 LBS

## 2019-12-10 DIAGNOSIS — E11.9 TYPE 2 DIABETES MELLITUS WITHOUT COMPLICATION, WITHOUT LONG-TERM CURRENT USE OF INSULIN (HCC): Primary | ICD-10-CM

## 2019-12-10 LAB
ARTICHOKE IGE QN: 64 MG/DL (ref 0–100)
CHOLEST SERPL-MCNC: 157 MG/DL (ref 0–200)
HBA1C MFR BLD: 11.46 % (ref 4.8–5.6)
HDLC SERPL-MCNC: 37 MG/DL (ref 40–60)
LDLC SERPL CALC-MCNC: ABNORMAL MG/DL
LDLC/HDLC SERPL: ABNORMAL {RATIO}
TRIGL SERPL-MCNC: 546 MG/DL (ref 0–150)
VLDLC SERPL-MCNC: ABNORMAL MG/DL

## 2019-12-10 PROCEDURE — 36415 COLL VENOUS BLD VENIPUNCTURE: CPT | Performed by: STUDENT IN AN ORGANIZED HEALTH CARE EDUCATION/TRAINING PROGRAM

## 2019-12-10 PROCEDURE — 80061 LIPID PANEL: CPT | Performed by: STUDENT IN AN ORGANIZED HEALTH CARE EDUCATION/TRAINING PROGRAM

## 2019-12-10 PROCEDURE — 99213 OFFICE O/P EST LOW 20 MIN: CPT | Performed by: STUDENT IN AN ORGANIZED HEALTH CARE EDUCATION/TRAINING PROGRAM

## 2019-12-10 PROCEDURE — 83036 HEMOGLOBIN GLYCOSYLATED A1C: CPT | Performed by: STUDENT IN AN ORGANIZED HEALTH CARE EDUCATION/TRAINING PROGRAM

## 2019-12-10 PROCEDURE — 83721 ASSAY OF BLOOD LIPOPROTEIN: CPT | Performed by: STUDENT IN AN ORGANIZED HEALTH CARE EDUCATION/TRAINING PROGRAM

## 2019-12-10 NOTE — PROGRESS NOTES
"ID: Kingsley Littlejohn    CC:   Chief Complaint   Patient presents with   • Diabetes     2 MONTH RECHK         Subjective:     Kingsley Littlejohn is a 49 y.o. male who presents for:    Patient is doing well.  He reports his daily sugars ranging from 200-350.  Patient is unable to take Trulicity due to price.  He admits to eating very poorly.  He is very noncompliant with behavioral and dietary changes.  Patient is due for his hemoglobin A1c and annual eye exam which we will order today.  He has no other concerns worries or complaints that he would like to discuss today.      Lab Results   Component Value Date    HGBA1C 8.90 (H) 06/06/2019    HGBA1C 8.8 (H) 02/21/2019    HGBA1C 8.7 (H) 11/16/2018    HGBA1C 12.8 (H) 08/10/2018    HGBA1C 7.5 (H) 04/24/2018    HGBA1C 6.4 (H) 03/09/2018    HGBA1C 5.91 (H) 06/13/2017    HGBA1C >14.0 08/11/2014     HPI     Past Medical Hx:  Past Medical History:   Diagnosis Date   • Abdominal pain    • Allergic rhinitis    • Diabetes mellitus (CMS/[facility])     Prev HbA1c 14.0 2014, states \"I'm cured!\"   • Essential hypertension    • Fracture closed, fibula, shaft     tib-fib   • Onychomycosis        Past Surgical Hx:  Past Surgical History:   Procedure Laterality Date   • ANKLE SURGERY      right and left   • COLONOSCOPY N/A 6/4/2018    Procedure: COLONOSCOPY;  Surgeon: Jaylon Castro MD;  Location: [facility] ENDOSCOPY;  Service: Gastroenterology   • CYST REMOVAL      x 3, from hand, thigh, and buttocks   • ENDOSCOPY      20 years ago   • TIBIA FRACTURE SURGERY Bilateral 2011    tib-fib       Health Maintenance:  Health Maintenance   Topic Date Due   • PNEUMOCOCCAL VACCINE (19-64 HIGHEST RISK) (1 of 3 - PCV13) 02/20/1987   • LIPID PANEL  09/04/2019   • DIABETIC EYE EXAM  10/29/2019   • HEMOGLOBIN A1C  12/06/2019   • ZOSTER VACCINE (1 of 2) 09/01/2021 (Originally 2/20/2018)   • MEDICARE ANNUAL WELLNESS  12/18/2019   • DIABETIC FOOT EXAM  09/16/2020   • TDAP/TD VACCINES (2 - Td) 01/01/2021   • " COLONOSCOPY  06/04/2021   • INFLUENZA VACCINE  Discontinued   • URINE MICROALBUMIN  Discontinued       Current Meds:    Current Outpatient Medications:   •  aspirin 325 MG tablet, Take 81 mg by mouth Daily., Disp: , Rfl:   •  atorvastatin (LIPITOR) 40 MG tablet, Take 1 tablet by mouth Daily., Disp: 90 tablet, Rfl: 3  •  Blood Glucose Monitoring Suppl (ONE TOUCH ULTRA 2) w/Device kit, Use 1 daily to test blood glucose level., Disp: 1 each, Rfl: 5  •  cefprozil (CEFZIL) 500 MG tablet, Take 1 tablet by mouth 2 (Two) Times a Day for 10 days., Disp: 20 tablet, Rfl: 0  •  fluticasone (FLONASE) 50 MCG/ACT nasal spray, 2 SPRAY IN EACH NOSTRIL DAILY, Disp: , Rfl: 0  •  glucose blood test strip, USE TO TEST BLOOD SUGAR 1 TIME DAILY E11.9, Disp: 100 each, Rfl: 12  •  glucose monitor monitoring kit, 1 each As Needed (Diabetes). USE TO TEST 1 TIME DAILY, Disp: 1 each, Rfl: 0  •  hydrOXYzine (ATARAX) 25 MG tablet, Take 1 tablet by mouth Every 8 (Eight) Hours As Needed for Itching., Disp: 30 tablet, Rfl: 1  •  ibuprofen (ADVIL,MOTRIN) 600 MG tablet, Take 1 tablet by mouth Every 6 (Six) Hours As Needed for Mild Pain ., Disp: 15 tablet, Rfl: 0  •  JANUVIA 100 MG tablet, TAKE 1 TABLET BY MOUTH EVERY DAY, Disp: 30 tablet, Rfl: 1  •  JARDIANCE 25 MG tablet, TAKE 25 MG BY MOUTH DAILY., Disp: 30 tablet, Rfl: 5  •  Lancets (ACCU-CHEK SOFT TOUCH) lancets, Use TO TEST BLOOD SUGAR 1 TIME DAILY., Disp: 100 each, Rfl: 12  •  lisinopril (PRINIVIL,ZESTRIL) 40 MG tablet, Take 1 tablet by mouth Daily., Disp: 30 tablet, Rfl: 5  •  metFORMIN (GLUCOPHAGE) 1000 MG tablet, Take 1 tablet by mouth 2 (Two) Times a Day With Meals., Disp: 60 tablet, Rfl: 2  •  metoprolol tartrate (LOPRESSOR) 50 MG tablet, Take 1 tablet by mouth Every 12 (Twelve) Hours., Disp: 180 tablet, Rfl: 1  •  nitroglycerin (NITROSTAT) 0.4 MG SL tablet, Place 1 tablet under the tongue Every 5 (Five) Minutes As Needed for Chest Pain. Take no more than 3 doses in 15 minutes., Disp: 30  tablet, Rfl: 12  •  paliperidone palmitate (INVEGA SUSTENNA) 117 MG/0.75ML suspension IM injection, Inject 117 mg into the shoulder, thigh, or buttocks Every 30 (Thirty) Days., Disp: , Rfl:   •  promethazine-dextromethorphan (PROMETHAZINE-DM) 6.25-15 MG/5ML syrup, Take one teaspoon qhs prn cough, Disp: 120 mL, Rfl: 0  •  sodium chloride (OCEAN NASAL SPRAY) 0.65 % nasal spray, 1 spray into each nostril As Needed for Congestion., Disp: 60 mL, Rfl: 12  •  traZODone (DESYREL) 150 MG tablet, TAKE 1 TABLET BY MOUTH EVERY DAY AT BEDTIME AS NEEDED, Disp: , Rfl: 2  •  Triamcinolone Acetonide (NASACORT) 55 MCG/ACT nasal inhaler, 2 sprays into the nostril(s) as directed by provider Daily., Disp: 16.5 g, Rfl: 3    Allergies:  Latex and Codeine    Family Hx:  Family History   Problem Relation Age of Onset   • Diabetes Other    • Hypertension Other    • Cancer Other    • Lung disease Other    • Thyroid disease Other    • Bleeding Disorder Other    • Colon cancer Paternal Grandmother    • Cancer Paternal Grandfather    • Crohn's disease Mother    • Diabetes Mother    • Hypertension Father    • Lung disease Father    • Hypertension Brother    • Hypertension Brother    • Hypertension Brother         Social History:  Social History     Socioeconomic History   • Marital status: Single     Spouse name: Not on file   • Number of children: Not on file   • Years of education: Not on file   • Highest education level: Not on file   Occupational History   • 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   • Sexual  "activity: Defer   Social History Narrative    Single, lives alone in [place].  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         Review of Systems   Constitutional: Negative for chills, diaphoresis and fever.   HENT: Negative for congestion and rhinorrhea.    Eyes: Negative for discharge and redness.   Respiratory: Negative for chest tightness and wheezing.    Cardiovascular: Negative for leg swelling.   Gastrointestinal: Negative for abdominal pain, constipation and diarrhea.   Endocrine: Negative for polydipsia and polyuria.   Genitourinary: Negative for dysuria and flank pain.   Skin: Negative for color change and rash.   Psychiatric/Behavioral: Negative for agitation and sleep disturbance.           Objective:     /80   Pulse 84   Temp 98.3 °F (36.8 °C)   Ht 170.2 cm (67\")   Wt 118 kg (259 lb 9.6 oz)   SpO2 94%   BMI 40.66 kg/m²     Physical Exam   Constitutional: He is oriented to person, place, and time. He appears well-developed and well-nourished. No distress.   HENT:   Head: Normocephalic and atraumatic.   Eyes: Conjunctivae are normal.   Neck: Normal range of motion. Neck supple.   Cardiovascular: Normal rate and intact distal pulses.   No murmur heard.  Pulmonary/Chest: Effort normal. No respiratory distress.   Abdominal: Soft. There is no tenderness.   Neurological: He is alert and oriented to person, place, and time.   Skin: Skin is warm and dry. Capillary refill takes less than 2 seconds. He is not diaphoretic.   Psychiatric: He has a normal mood and affect. His behavior is normal.   Nursing note and vitals reviewed.      Assessment/Plan:      Diagnosis Plan   1. Type 2 diabetes mellitus without complication, without long-term current use of insulin (CMS/[facility])  Hemoglobin A1c    Ambulatory Referral for Diabetic Eye Exam-Ophthalmology     Will get a repeat A1c today and place a referral for ophthalmology.  See back in 1 week to discuss A1c changes and what next steps are regarding " treatment.  If the A1c is not improved, I told him that the next step would be insulin or possibly an insulin pump.  Patient states that he absolutely does not want to use insulin.     Follow-up:     Return in about 1 week (around 12/17/2019) for Recheck DM.      Goals: improve diet  Barriers to goals: pt compliance    Health Maintenance   Topic Date Due   • PNEUMOCOCCAL VACCINE (19-64 HIGHEST RISK) (1 of 3 - PCV13) 02/20/1987   • LIPID PANEL  09/04/2019   • DIABETIC EYE EXAM  10/29/2019   • HEMOGLOBIN A1C  12/06/2019   • ZOSTER VACCINE (1 of 2) 09/01/2021 (Originally 2/20/2018)   • MEDICARE ANNUAL WELLNESS  12/18/2019   • DIABETIC FOOT EXAM  09/16/2020   • TDAP/TD VACCINES (2 - Td) 01/01/2021   • COLONOSCOPY  06/04/2021   • INFLUENZA VACCINE  Discontinued   • URINE MICROALBUMIN  Discontinued     Tobacco: former smoker  Alcohol: does not drink  Lifestyle: Body mass index is 40.66 kg/m². eat more fruits and vegetables, decrease soda or juice intake, increase water intake, increase physical activity, reduce screen time, reduce portion size, cut out extra servings, reduce fast food intake, family to eat at dinner table more often, keep TV off during meals, plan meals, eat breakfast and have 3 meals a day    RISK SCORE: 4        This document has been electronically signed by Yang Veloz MD on December 10, 2019 1:39 PM

## 2019-12-11 NOTE — PROGRESS NOTES
I have seen the patient.  I have reviewed the notes, assessments, and/or procedures performed by Dr. Veloz, I concur with her/his documentation and assessment and plan for Kingsley Littlejohn.       This document has been electronically signed by Popeye Martinez MD on December 11, 2019 5:45 PM

## 2019-12-12 DIAGNOSIS — E11.8 TYPE 2 DIABETES MELLITUS WITH COMPLICATION, WITHOUT LONG-TERM CURRENT USE OF INSULIN (HCC): ICD-10-CM

## 2019-12-12 RX ORDER — SITAGLIPTIN 100 MG/1
TABLET, FILM COATED ORAL
Qty: 30 TABLET | Refills: 1 | Status: SHIPPED | OUTPATIENT
Start: 2019-12-12 | End: 2019-12-26 | Stop reason: SDUPTHER

## 2019-12-17 ENCOUNTER — OFFICE VISIT (OUTPATIENT)
Dept: FAMILY MEDICINE CLINIC | Facility: CLINIC | Age: 49
End: 2019-12-17

## 2019-12-17 VITALS
OXYGEN SATURATION: 98 % | WEIGHT: 256.6 LBS | DIASTOLIC BLOOD PRESSURE: 80 MMHG | BODY MASS INDEX: 40.27 KG/M2 | SYSTOLIC BLOOD PRESSURE: 120 MMHG | HEIGHT: 67 IN | HEART RATE: 91 BPM

## 2019-12-17 DIAGNOSIS — E11.65 UNCONTROLLED TYPE 2 DIABETES MELLITUS WITH HYPERGLYCEMIA (HCC): Primary | ICD-10-CM

## 2019-12-17 PROCEDURE — 99212 OFFICE O/P EST SF 10 MIN: CPT | Performed by: STUDENT IN AN ORGANIZED HEALTH CARE EDUCATION/TRAINING PROGRAM

## 2019-12-17 NOTE — PROGRESS NOTES
"ID: Kingsley Littlejohn    CC:   Chief Complaint   Patient presents with   • Diabetes        Subjective:     Kingsley Littlejohn is a 49 y.o. male who presents for:    Diabetes follow-up discussion.  Patient used to be well controlled, but for the last multiple months he has not watching what he eats.  He admits to eating whatever he wants, as much as he wants.  He is maxed out on Januvia, Jardiance, metformin.  Patient refuses to be started on insulin shots.      Lab Results   Component Value Date    HGBA1C 11.46 (H) 12/10/2019    HGBA1C 8.90 (H) 06/06/2019    HGBA1C 8.8 (H) 02/21/2019    HGBA1C 8.7 (H) 11/16/2018    HGBA1C 12.8 (H) 08/10/2018    HGBA1C 7.5 (H) 04/24/2018    HGBA1C 6.4 (H) 03/09/2018    HGBA1C 5.91 (H) 06/13/2017    HGBA1C >14.0 08/11/2014     HPI     Past Medical Hx:  Past Medical History:   Diagnosis Date   • Abdominal pain    • Allergic rhinitis    • Diabetes mellitus (CMS/[facility])     Prev HbA1c 14.0 2014, states \"I'm cured!\"   • Essential hypertension    • Fracture closed, fibula, shaft     tib-fib   • Onychomycosis        Past Surgical Hx:  Past Surgical History:   Procedure Laterality Date   • ANKLE SURGERY      right and left   • COLONOSCOPY N/A 6/4/2018    Procedure: COLONOSCOPY;  Surgeon: Jaylon Castro MD;  Location: [facility] ENDOSCOPY;  Service: Gastroenterology   • CYST REMOVAL      x 3, from hand, thigh, and buttocks   • ENDOSCOPY      20 years ago   • TIBIA FRACTURE SURGERY Bilateral 2011    tib-fib       Health Maintenance:  Health Maintenance   Topic Date Due   • PNEUMOCOCCAL VACCINE (19-64 HIGHEST RISK) (1 of 3 - PCV13) 02/20/1987   • DIABETIC EYE EXAM  10/29/2019   • ZOSTER VACCINE (1 of 2) 09/01/2021 (Originally 2/20/2018)   • MEDICARE ANNUAL WELLNESS  12/18/2019   • HEMOGLOBIN A1C  06/10/2020   • DIABETIC FOOT EXAM  09/16/2020   • LIPID PANEL  12/10/2020   • TDAP/TD VACCINES (2 - Td) 01/01/2021   • COLONOSCOPY  06/04/2021   • INFLUENZA VACCINE  Discontinued   • URINE MICROALBUMIN  " Discontinued       Current Meds:    Current Outpatient Medications:   •  aspirin 325 MG tablet, Take 81 mg by mouth Daily., Disp: , Rfl:   •  atorvastatin (LIPITOR) 40 MG tablet, Take 1 tablet by mouth Daily., Disp: 90 tablet, Rfl: 3  •  Blood Glucose Monitoring Suppl (ONE TOUCH ULTRA 2) w/Device kit, Use 1 daily to test blood glucose level., Disp: 1 each, Rfl: 5  •  fluticasone (FLONASE) 50 MCG/ACT nasal spray, 2 SPRAY IN EACH NOSTRIL DAILY, Disp: , Rfl: 0  •  glucose blood test strip, USE TO TEST BLOOD SUGAR 1 TIME DAILY E11.9, Disp: 100 each, Rfl: 12  •  glucose monitor monitoring kit, 1 each As Needed (Diabetes). USE TO TEST 1 TIME DAILY, Disp: 1 each, Rfl: 0  •  hydrOXYzine (ATARAX) 25 MG tablet, Take 1 tablet by mouth Every 8 (Eight) Hours As Needed for Itching., Disp: 30 tablet, Rfl: 1  •  ibuprofen (ADVIL,MOTRIN) 600 MG tablet, Take 1 tablet by mouth Every 6 (Six) Hours As Needed for Mild Pain ., Disp: 15 tablet, Rfl: 0  •  JANUVIA 100 MG tablet, TAKE 1 TABLET BY MOUTH EVERY DAY, Disp: 30 tablet, Rfl: 1  •  JARDIANCE 25 MG tablet, TAKE 25 MG BY MOUTH DAILY., Disp: 30 tablet, Rfl: 5  •  Lancets (ACCU-CHEK SOFT TOUCH) lancets, Use TO TEST BLOOD SUGAR 1 TIME DAILY., Disp: 100 each, Rfl: 12  •  lisinopril (PRINIVIL,ZESTRIL) 40 MG tablet, Take 1 tablet by mouth Daily., Disp: 30 tablet, Rfl: 5  •  metFORMIN (GLUCOPHAGE) 1000 MG tablet, Take 1 tablet by mouth 2 (Two) Times a Day With Meals., Disp: 60 tablet, Rfl: 2  •  metoprolol tartrate (LOPRESSOR) 50 MG tablet, Take 1 tablet by mouth Every 12 (Twelve) Hours., Disp: 180 tablet, Rfl: 1  •  nitroglycerin (NITROSTAT) 0.4 MG SL tablet, Place 1 tablet under the tongue Every 5 (Five) Minutes As Needed for Chest Pain. Take no more than 3 doses in 15 minutes., Disp: 30 tablet, Rfl: 12  •  paliperidone palmitate (INVEGA SUSTENNA) 117 MG/0.75ML suspension IM injection, Inject 117 mg into the shoulder, thigh, or buttocks Every 30 (Thirty) Days., Disp: , Rfl:   •   promethazine-dextromethorphan (PROMETHAZINE-DM) 6.25-15 MG/5ML syrup, Take one teaspoon qhs prn cough, Disp: 120 mL, Rfl: 0  •  sodium chloride (OCEAN NASAL SPRAY) 0.65 % nasal spray, 1 spray into each nostril As Needed for Congestion., Disp: 60 mL, Rfl: 12  •  traZODone (DESYREL) 150 MG tablet, TAKE 1 TABLET BY MOUTH EVERY DAY AT BEDTIME AS NEEDED, Disp: , Rfl: 2  •  Triamcinolone Acetonide (NASACORT) 55 MCG/ACT nasal inhaler, 2 sprays into the nostril(s) as directed by provider Daily., Disp: 16.5 g, Rfl: 3    Allergies:  Latex and Codeine    Family Hx:  Family History   Problem Relation Age of Onset   • Diabetes Other    • Hypertension Other    • Cancer Other    • Lung disease Other    • Thyroid disease Other    • Bleeding Disorder Other    • Colon cancer Paternal Grandmother    • Cancer Paternal Grandfather    • Crohn's disease Mother    • Diabetes Mother    • Hypertension Father    • Lung disease Father    • Hypertension Brother    • Hypertension Brother    • Hypertension Brother         Social History:  Social History     Socioeconomic History   • Marital status: Single     Spouse name: Not on file   • Number of children: Not on file   • Years of education: Not on file   • Highest education level: Not on file   Occupational History   • 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   • Sexual activity: Defer   Social History Narrative    Single, lives alone in [place].  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         Review of Systems   Constitutional: Negative for chills,  "diaphoresis, fever and unexpected weight change.   HENT: Negative for congestion and rhinorrhea.    Eyes: Negative for discharge and redness.   Respiratory: Negative for chest tightness and wheezing.    Cardiovascular: Negative for leg swelling.   Gastrointestinal: Negative for abdominal pain, constipation, diarrhea and nausea.   Endocrine: Negative for polydipsia and polyuria.   Genitourinary: Negative for dysuria and flank pain.   Skin: Negative for color change and rash.   Psychiatric/Behavioral: Negative for agitation and sleep disturbance.           Objective:     /80   Pulse 91   Ht 170.2 cm (67\")   Wt 116 kg (256 lb 9.6 oz)   SpO2 98%   BMI 40.19 kg/m²     Physical Exam   Constitutional: He is oriented to person, place, and time. He appears well-developed and well-nourished. No distress.   HENT:   Head: Normocephalic and atraumatic.   Eyes: Conjunctivae are normal.   Neck: Neck supple.   Cardiovascular: Normal rate and intact distal pulses.   No murmur heard.  Pulmonary/Chest: Effort normal. No respiratory distress.   Abdominal: Soft. There is no tenderness.   Neurological: He is alert and oriented to person, place, and time.   Skin: Skin is warm and dry. Capillary refill takes less than 2 seconds. He is not diaphoretic.   Psychiatric: He has a normal mood and affect. His behavior is normal.       Assessment/Plan:      Diagnosis Plan   1. Uncontrolled type 2 diabetes mellitus with hyperglycemia (CMS/HCC)  Ambulatory Referral to Endocrinology     Patient will be referred to endocrinology.  He is agreeable to this.    Follow-up:     Return in about 3 weeks (around 1/7/2020) for Recheck DM.      Goals: improve diet  Barriers to goals: pt compliance    Health Maintenance   Topic Date Due   • PNEUMOCOCCAL VACCINE (19-64 HIGHEST RISK) (1 of 3 - PCV13) 02/20/1987   • DIABETIC EYE EXAM  10/29/2019   • ZOSTER VACCINE (1 of 2) 09/01/2021 (Originally 2/20/2018)   • MEDICARE ANNUAL WELLNESS  12/18/2019   • " HEMOGLOBIN A1C  06/10/2020   • DIABETIC FOOT EXAM  09/16/2020   • LIPID PANEL  12/10/2020   • TDAP/TD VACCINES (2 - Td) 01/01/2021   • COLONOSCOPY  06/04/2021   • INFLUENZA VACCINE  Discontinued   • URINE MICROALBUMIN  Discontinued     Tobacco: former smoker  Alcohol: does not drink  Lifestyle: Body mass index is 40.19 kg/m². eat more fruits and vegetables, decrease soda or juice intake, increase water intake, increase physical activity, reduce screen time, reduce portion size, cut out extra servings, reduce fast food intake, family to eat at dinner table more often, keep TV off during meals, plan meals, eat breakfast and have 3 meals a day    RISK SCORE: 4        This document has been electronically signed by Yang Veloz MD on December 17, 2019 3:09 PM

## 2019-12-18 NOTE — PROGRESS NOTES
I have reviewed the notes, assessments, and/or procedures performed by Yang Veloz MD, I concur with her/his documentation and assessment and plan for Kingsleywesley Littlejohn.                This document has been electronically signed by Favio Muro MD on December 18, 2019 8:36 AM

## 2019-12-26 DIAGNOSIS — E11.8 TYPE 2 DIABETES MELLITUS WITH COMPLICATION, WITHOUT LONG-TERM CURRENT USE OF INSULIN (HCC): ICD-10-CM

## 2020-01-13 ENCOUNTER — OFFICE VISIT (OUTPATIENT)
Dept: FAMILY MEDICINE CLINIC | Facility: CLINIC | Age: 50
End: 2020-01-13

## 2020-01-13 ENCOUNTER — OFFICE VISIT (OUTPATIENT)
Dept: PODIATRY | Facility: CLINIC | Age: 50
End: 2020-01-13

## 2020-01-13 VITALS — HEART RATE: 107 BPM | BODY MASS INDEX: 40.02 KG/M2 | WEIGHT: 255 LBS | OXYGEN SATURATION: 96 % | HEIGHT: 67 IN

## 2020-01-13 VITALS
WEIGHT: 255.4 LBS | DIASTOLIC BLOOD PRESSURE: 80 MMHG | TEMPERATURE: 96.9 F | OXYGEN SATURATION: 98 % | HEART RATE: 98 BPM | SYSTOLIC BLOOD PRESSURE: 138 MMHG | BODY MASS INDEX: 40.09 KG/M2 | HEIGHT: 67 IN

## 2020-01-13 DIAGNOSIS — E11.42 TYPE 2 DIABETES MELLITUS WITH PERIPHERAL NEUROPATHY (HCC): ICD-10-CM

## 2020-01-13 DIAGNOSIS — E11.9 ENCOUNTER FOR DIABETIC FOOT EXAM (HCC): Primary | ICD-10-CM

## 2020-01-13 DIAGNOSIS — B35.1 ONYCHOMYCOSIS: ICD-10-CM

## 2020-01-13 DIAGNOSIS — M79.675 CHRONIC TOE PAIN, BILATERAL: ICD-10-CM

## 2020-01-13 DIAGNOSIS — R07.89 OTHER CHEST PAIN: ICD-10-CM

## 2020-01-13 DIAGNOSIS — E11.9 TYPE 2 DIABETES MELLITUS WITHOUT COMPLICATION, WITHOUT LONG-TERM CURRENT USE OF INSULIN (HCC): Primary | ICD-10-CM

## 2020-01-13 DIAGNOSIS — G89.29 CHRONIC TOE PAIN, BILATERAL: ICD-10-CM

## 2020-01-13 DIAGNOSIS — M79.674 CHRONIC TOE PAIN, BILATERAL: ICD-10-CM

## 2020-01-13 PROCEDURE — 99213 OFFICE O/P EST LOW 20 MIN: CPT | Performed by: PODIATRIST

## 2020-01-13 PROCEDURE — 11721 DEBRIDE NAIL 6 OR MORE: CPT | Performed by: PODIATRIST

## 2020-01-13 PROCEDURE — 99213 OFFICE O/P EST LOW 20 MIN: CPT | Performed by: STUDENT IN AN ORGANIZED HEALTH CARE EDUCATION/TRAINING PROGRAM

## 2020-01-13 RX ORDER — NITROGLYCERIN 0.4 MG/1
0.4 TABLET SUBLINGUAL
Qty: 30 TABLET | Refills: 12 | Status: SHIPPED | OUTPATIENT
Start: 2020-01-13 | End: 2022-09-21

## 2020-01-13 NOTE — PROGRESS NOTES
"                    Subjective   Kingsley Littlejohn is a 49 y.o. male who presents for follow up for his DMT2. Pt was previously well controlled but in the last few months has stopped watching what he is eating so DM has worsened. He is currently treated with Jardiance,Januvia, and Metformin. Pt refuses to start insulin. He is currently scheduled to follow up with endocrinology in 1 month. Pt endorses intermittent chest pain that began 3 days ago. He describes the pain as dull 6/10 without radiation. This pain is accompanied by diaphoresis, nausea, and vomiting. He had nitroglycerin available but did not use this medication. He did not go to the ED because he believed they would send him home. Pain is alleviated with rest. No aggravating factors.      Past Medical Hx:  Past Medical History:   Diagnosis Date   • Abdominal pain    • Allergic rhinitis    • Diabetes mellitus (CMS/[facility])     Prev HbA1c 14.0 2014, states \"I'm cured!\"   • Essential hypertension    • Fracture closed, fibula, shaft     tib-fib   • Onychomycosis        Past Surgical Hx:  Past Surgical History:   Procedure Laterality Date   • ANKLE SURGERY      right and left   • COLONOSCOPY N/A 6/4/2018    Procedure: COLONOSCOPY;  Surgeon: Jaylon Castro MD;  Location: [facility] ENDOSCOPY;  Service: Gastroenterology   • CYST REMOVAL      x 3, from hand, thigh, and buttocks   • ENDOSCOPY      20 years ago   • TIBIA FRACTURE SURGERY Bilateral 2011    tib-fib       Health Maintenance:  Health Maintenance   Topic Date Due   • PNEUMOCOCCAL VACCINE (19-64 HIGHEST RISK) (1 of 3 - PCV13) 02/20/1987   • MEDICARE ANNUAL WELLNESS  12/18/2019   • ZOSTER VACCINE (1 of 2) 09/01/2021 (Originally 2/20/2018)   • HEMOGLOBIN A1C  06/10/2020   • DIABETIC FOOT EXAM  09/16/2020   • LIPID PANEL  12/10/2020   • TDAP/TD VACCINES (2 - Td) 01/01/2021   • DIABETIC EYE EXAM  01/07/2021   • COLONOSCOPY  06/04/2021   • INFLUENZA VACCINE  Discontinued   • URINE MICROALBUMIN  Discontinued "       Current Meds:    Current Outpatient Medications:   •  aspirin 325 MG tablet, Take 81 mg by mouth Daily., Disp: , Rfl:   •  atorvastatin (LIPITOR) 40 MG tablet, Take 1 tablet by mouth Daily., Disp: 90 tablet, Rfl: 3  •  Blood Glucose Monitoring Suppl (ONE TOUCH ULTRA 2) w/Device kit, Use 1 daily to test blood glucose level., Disp: 1 each, Rfl: 5  •  fluticasone (FLONASE) 50 MCG/ACT nasal spray, 2 SPRAY IN EACH NOSTRIL DAILY, Disp: , Rfl: 0  •  glucose blood test strip, USE TO TEST BLOOD SUGAR 1 TIME DAILY E11.9, Disp: 100 each, Rfl: 12  •  glucose monitor monitoring kit, 1 each As Needed (Diabetes). USE TO TEST 1 TIME DAILY, Disp: 1 each, Rfl: 0  •  hydrOXYzine (ATARAX) 25 MG tablet, Take 1 tablet by mouth Every 8 (Eight) Hours As Needed for Itching., Disp: 30 tablet, Rfl: 1  •  ibuprofen (ADVIL,MOTRIN) 600 MG tablet, Take 1 tablet by mouth Every 6 (Six) Hours As Needed for Mild Pain ., Disp: 15 tablet, Rfl: 0  •  JARDIANCE 25 MG tablet, TAKE 25 MG BY MOUTH DAILY., Disp: 30 tablet, Rfl: 5  •  Lancets (ACCU-CHEK SOFT TOUCH) lancets, Use TO TEST BLOOD SUGAR 1 TIME DAILY., Disp: 100 each, Rfl: 12  •  lisinopril (PRINIVIL,ZESTRIL) 40 MG tablet, Take 1 tablet by mouth Daily., Disp: 30 tablet, Rfl: 5  •  metFORMIN (GLUCOPHAGE) 1000 MG tablet, Take 1 tablet by mouth 2 (Two) Times a Day With Meals., Disp: 60 tablet, Rfl: 2  •  metoprolol tartrate (LOPRESSOR) 50 MG tablet, Take 1 tablet by mouth Every 12 (Twelve) Hours., Disp: 180 tablet, Rfl: 1  •  nitroglycerin (NITROSTAT) 0.4 MG SL tablet, Place 1 tablet under the tongue Every 5 (Five) Minutes As Needed for Chest Pain. Take no more than 3 doses in 15 minutes., Disp: 30 tablet, Rfl: 12  •  paliperidone palmitate (INVEGA SUSTENNA) 117 MG/0.75ML suspension IM injection, Inject 117 mg into the shoulder, thigh, or buttocks Every 30 (Thirty) Days., Disp: , Rfl:   •  SITagliptin (JANUVIA) 100 MG tablet, Take 1 tablet by mouth Daily., Disp: 90 tablet, Rfl: 1  •  sodium  chloride (OCEAN NASAL SPRAY) 0.65 % nasal spray, 1 spray into each nostril As Needed for Congestion., Disp: 60 mL, Rfl: 12  •  traZODone (DESYREL) 150 MG tablet, TAKE 1 TABLET BY MOUTH EVERY DAY AT BEDTIME AS NEEDED, Disp: , Rfl: 2  •  Triamcinolone Acetonide (NASACORT) 55 MCG/ACT nasal inhaler, 2 sprays into the nostril(s) as directed by provider Daily., Disp: 16.5 g, Rfl: 3  •  promethazine-dextromethorphan (PROMETHAZINE-DM) 6.25-15 MG/5ML syrup, Take one teaspoon qhs prn cough, Disp: 120 mL, Rfl: 0    Allergies:  Latex and Codeine    Family Hx:  Family History   Problem Relation Age of Onset   • Diabetes Other    • Hypertension Other    • Cancer Other    • Lung disease Other    • Thyroid disease Other    • Bleeding Disorder Other    • Colon cancer Paternal Grandmother    • Cancer Paternal Grandfather    • Crohn's disease Mother    • Diabetes Mother    • Hypertension Father    • Lung disease Father    • Hypertension Brother    • Hypertension Brother    • Hypertension Brother         Social History:  Social History     Socioeconomic History   • Marital status: Single     Spouse name: Not on file   • Number of children: Not on file   • Years of education: Not on file   • Highest education level: Not on file   Occupational History   • 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   • Sexual activity: Defer   Social History Narrative    Single, lives alone in [place].  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         Review of Systems  Review of Systems   Constitutional: Negative for  "appetite change, chills, diaphoresis, fatigue and fever.   HENT: Negative for ear discharge, ear pain, facial swelling, hearing loss, rhinorrhea, sinus pressure, sinus pain, sneezing, sore throat and voice change.    Eyes: Negative for pain, discharge and visual disturbance.   Respiratory: Negative for apnea, cough, chest tightness, shortness of breath, wheezing and stridor.    Cardiovascular: Negative for chest pain, palpitations and leg swelling.   Gastrointestinal: Negative for abdominal distention, abdominal pain, constipation, diarrhea, nausea and vomiting.   Genitourinary: Negative for dysuria, frequency and urgency.   Musculoskeletal: Negative for arthralgias, back pain, myalgias and neck pain.   Skin: Negative for color change, rash and wound.   Neurological: Negative for facial asymmetry, speech difficulty, light-headedness and headaches.   Psychiatric/Behavioral: Negative for agitation and decreased concentration. The patient is not nervous/anxious.             Objective:     /80 (BP Location: Left arm, Patient Position: Sitting)   Pulse 98   Temp 96.9 °F (36.1 °C) (Tympanic)   Ht 170.2 cm (67\")   Wt 116 kg (255 lb 6.4 oz)   SpO2 98%   BMI 40.00 kg/m²       Physical Exam   Constitutional: He is oriented to person, place, and time. He appears well-developed and well-nourished. No distress.   HENT:   Head: Normocephalic and atraumatic.   Right Ear: External ear normal.   Left Ear: External ear normal.   Mouth/Throat: Oropharynx is clear and moist.   Eyes: Pupils are equal, round, and reactive to light. Conjunctivae and EOM are normal. No scleral icterus.   Neck: Normal range of motion. No JVD present.   Cardiovascular: Normal rate, regular rhythm and normal heart sounds. Exam reveals no gallop and no friction rub.   No murmur heard.  Pulmonary/Chest: Effort normal and breath sounds normal. No stridor. No respiratory distress. He has no wheezes. He has no rales. He exhibits no tenderness. "   Abdominal: Soft. Bowel sounds are normal. He exhibits no distension. There is no tenderness.   Musculoskeletal: Normal range of motion. He exhibits no edema or tenderness.   Neurological: He is alert and oriented to person, place, and time.   Skin: Skin is warm and dry. No rash noted. He is not diaphoretic. No erythema. No pallor.   Psychiatric: He has a normal mood and affect. His behavior is normal.       Assessment/Plan:     Kingsley was seen today for diabetes and chest pain.    Diagnoses and all orders for this visit:    Type 2 diabetes mellitus without complication, without long-term current use of insulin (CMS/[facility])  Comments:  -Continue current medication regimen. Follow up with Endo in 1 month, Appreciate recs.    - Continue to encourage following ADA diet and exercise 30 minutes a day x5 days       Follow-up:     Return in about 3 months (around 4/13/2020).    Goals     • Blood Pressure < 140/90      Barriers:  Sporadic follow-up      • Lower Blood Sugar      Barriers:  Questionable insight            Preventative:    Vaccines Recommended at this visit:   No Vaccines recommended today. Patient is up to date on all vaccines.     Vaccines Received at this visit:  No Vaccines recommended today. Patient is up to date on all vaccines.     Screenings Recommended at this visit:  No Screenings offered today. Patient is up to date on all screenings at this time.     Screenings Ordered at this visit:  No screenings were offered today. Patient is up to date on all screenings.     Smoking Status:  Patient is a former smoker.    Alcohol Intake:  Patient does not drink    Patient's Body mass index is 40 kg/m². BMI is above normal parameters. Recommendations include: exercise counseling and nutrition counseling.         RISK SCORE: 3          This document has been electronically signed by Eric Vargas MD on January 13, 2020 5:29 PM

## 2020-01-13 NOTE — PROGRESS NOTES
"Kingsley Littlejohn  1970  49 y.o. male  [facility] - 9/6/2019  BS - 200     Patient presents today for routine diabetic foot care.     01/13/2020     Chief Complaint   Patient presents with   • Left Foot - diabetic nail care   • Right Foot - diabetic nail care       History of Present Illness    Patient presents to clinic today for routine diabetic footcare.  States that his toenails have become long and painful.  Pain is relieved with debridement.  States that his blood sugars have been up and down recently.  His most recent blood sugar was 200 mg/dL.  Relates to numbness in both feet.  Denies any injuries or trauma to his feet.  He has no other pedal complaints.    Past Medical History:   Diagnosis Date   • Abdominal pain    • Allergic rhinitis    • Diabetes mellitus (CMS/[facility])     Prev HbA1c 14.0 2014, states \"I'm cured!\"   • Essential hypertension    • Fracture closed, fibula, shaft     tib-fib   • Onychomycosis          Past Surgical History:   Procedure Laterality Date   • ANKLE SURGERY      right and left   • COLONOSCOPY N/A 6/4/2018    Procedure: COLONOSCOPY;  Surgeon: Jaylon Castro MD;  Location: [facility] ENDOSCOPY;  Service: Gastroenterology   • CYST REMOVAL      x 3, from hand, thigh, and buttocks   • ENDOSCOPY      20 years ago   • TIBIA FRACTURE SURGERY Bilateral 2011    tib-fib         Family History   Problem Relation Age of Onset   • Diabetes Other    • Hypertension Other    • Cancer Other    • Lung disease Other    • Thyroid disease Other    • Bleeding Disorder Other    • Colon cancer Paternal Grandmother    • Cancer Paternal Grandfather    • Crohn's disease Mother    • Diabetes Mother    • Hypertension Father    • Lung disease Father    • Hypertension Brother    • Hypertension Brother    • Hypertension Brother        Allergies   Allergen Reactions   • Latex Irritability   • Codeine Nausea Only       Social History     Socioeconomic History   • Marital status: Single     Spouse name: Not on " file   • Number of children: Not on file   • Years of education: Not on file   • Highest education level: Not on file   Occupational History   • 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   • Sexual activity: Defer   Social History Narrative    Single, lives alone in [place].  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           Current Outpatient Medications   Medication Sig Dispense Refill   • aspirin 325 MG tablet Take 81 mg by mouth Daily.     • atorvastatin (LIPITOR) 40 MG tablet Take 1 tablet by mouth Daily. 90 tablet 3   • Blood Glucose Monitoring Suppl (ONE TOUCH ULTRA 2) w/Device kit Use 1 daily to test blood glucose level. 1 each 5   • fluticasone (FLONASE) 50 MCG/ACT nasal spray 2 SPRAY IN EACH NOSTRIL DAILY  0   • glucose blood test strip USE TO TEST BLOOD SUGAR 1 TIME DAILY E11.9 100 each 12   • glucose monitor monitoring kit 1 each As Needed (Diabetes). USE TO TEST 1 TIME DAILY 1 each 0   • hydrOXYzine (ATARAX) 25 MG tablet Take 1 tablet by mouth Every 8 (Eight) Hours As Needed for Itching. 30 tablet 1   • ibuprofen (ADVIL,MOTRIN) 600 MG tablet Take 1 tablet by mouth Every 6 (Six) Hours As Needed for Mild Pain . 15 tablet 0   • JARDIANCE 25 MG tablet TAKE 25 MG BY MOUTH DAILY. 30 tablet 5   • Lancets (ACCU-CHEK SOFT TOUCH) lancets Use TO TEST BLOOD SUGAR 1 TIME DAILY. 100 each 12   • lisinopril (PRINIVIL,ZESTRIL) 40 MG tablet Take 1 tablet by mouth Daily. 30 tablet 5   • metFORMIN (GLUCOPHAGE) 1000 MG tablet Take 1 tablet by mouth 2 (Two) Times a Day With Meals. 60  "tablet 2   • metoprolol tartrate (LOPRESSOR) 50 MG tablet Take 1 tablet by mouth Every 12 (Twelve) Hours. 180 tablet 1   • paliperidone palmitate (INVEGA SUSTENNA) 117 MG/0.75ML suspension IM injection Inject 117 mg into the shoulder, thigh, or buttocks Every 30 (Thirty) Days.     • SITagliptin (JANUVIA) 100 MG tablet Take 1 tablet by mouth Daily. 90 tablet 1   • sodium chloride (OCEAN NASAL SPRAY) 0.65 % nasal spray 1 spray into each nostril As Needed for Congestion. 60 mL 12   • traZODone (DESYREL) 150 MG tablet TAKE 1 TABLET BY MOUTH EVERY DAY AT BEDTIME AS NEEDED  2   • Triamcinolone Acetonide (NASACORT) 55 MCG/ACT nasal inhaler 2 sprays into the nostril(s) as directed by provider Daily. 16.5 g 3   • nitroglycerin (NITROSTAT) 0.4 MG SL tablet Place 1 tablet under the tongue Every 5 (Five) Minutes As Needed for Chest Pain. Take no more than 3 doses in 15 minutes. 30 tablet 12     No current facility-administered medications for this visit.        Review of Systems   Constitutional: Negative.    HENT: Negative.    Eyes: Negative.    Respiratory: Negative.    Cardiovascular: Positive for leg swelling.   Gastrointestinal: Negative.    Musculoskeletal:        Toe pain    Skin: Negative.    Allergic/Immunologic: Negative.    Neurological: Positive for numbness. Negative for dizziness.   Psychiatric/Behavioral: Negative.          OBJECTIVE    Pulse 107   Ht 170.2 cm (67\")   Wt 116 kg (255 lb)   SpO2 96%   BMI 39.94 kg/m²       Physical Exam   Constitutional: He is oriented to person, place, and time. He appears well-developed and well-nourished.   HENT:   Head: Normocephalic and atraumatic.   Eyes: Pupils are equal, round, and reactive to light. EOM are normal.   Pulmonary/Chest: Effort normal. No respiratory distress.   Musculoskeletal: Normal range of motion. He exhibits tenderness. He exhibits no edema or deformity.    Kingsley had a diabetic foot exam performed today.   During the foot exam he had a monofilament " test performed.  Neurological: He is alert and oriented to person, place, and time.   Skin: Skin is warm and dry. Capillary refill takes less than 2 seconds. He is not diaphoretic. No erythema.   Psychiatric: He has a normal mood and affect. His behavior is normal.   Vitals reviewed.      Gait: Normal     Assistive Device: None    Lower Extremity    Cardiovascular:    DP/PT pulses palpable bilateral  CFT brisk  to all digits  Skin temp is warm to warm from proximal tibia to distal digits bilateral  Pedal hair growth present.   Musculoskeletal:  Muscle strength is 5/5 for all muscle groups tested   ROM of the 1st MTP is full without pain or crepitus bilateral  ROM of the ankle joint is full without pain or crepitus  bilateral  Dermatological:   Nails 1-5 bilateral are thickened, discolored, elongated.  Pain on palpation to the nail plates.  Skin is warm, dry and intact bilateral  Webspaces 1-4 bilateral are clean, dry and intact.   Neurological:   Protective sensation intact   Sensation intact to light touch        Procedures        ASSESSMENT AND PLAN    Kinsgley was seen today for diabetic nail care and diabetic nail care.    Diagnoses and all orders for this visit:    Encounter for diabetic foot exam (CMS/[facility])    Onychomycosis    Chronic toe pain, bilateral    Type 2 diabetes mellitus with peripheral neuropathy (CMS/[facility])      - A diabetic foot screening exam was performed and the patient was educated on the foot complications related to diabetes,  preventative foot care and what signs and symptoms to watch for.  Instructed to contact our office if any foot problems develop before next visit.  - Nails 1-5 bilateral were debrided in length and thickness with nail nipper and electric  to decrease fungal load and risk of infection.  - All the patients questions were answered.  - RTC 3 months or sooner if needed.               This document has been electronically signed by Abhijeet Thomason DPM on January 15, 2020  9:25 AM     1/15/2020  9:25 AM

## 2020-01-23 NOTE — PROGRESS NOTES
I have reviewed the notes, assessments, and/or procedures performed by Dr. Vargas, I concur with her/his documentation and assessment and plan for Kingsley Littlejohn.       This document has been electronically signed by Popeye Martinez MD on January 23, 2020 11:34 AM

## 2020-02-24 ENCOUNTER — OFFICE VISIT (OUTPATIENT)
Dept: ENDOCRINOLOGY | Facility: CLINIC | Age: 50
End: 2020-02-24

## 2020-02-24 VITALS
OXYGEN SATURATION: 98 % | HEART RATE: 88 BPM | WEIGHT: 256.6 LBS | DIASTOLIC BLOOD PRESSURE: 80 MMHG | SYSTOLIC BLOOD PRESSURE: 132 MMHG | HEIGHT: 67 IN | BODY MASS INDEX: 40.27 KG/M2

## 2020-02-24 DIAGNOSIS — E11.65 TYPE 2 DIABETES MELLITUS WITH HYPERGLYCEMIA, WITHOUT LONG-TERM CURRENT USE OF INSULIN (HCC): Primary | ICD-10-CM

## 2020-02-24 DIAGNOSIS — I10 ESSENTIAL HYPERTENSION: ICD-10-CM

## 2020-02-24 DIAGNOSIS — E66.01 OBESITY, CLASS III, BMI 40-49.9 (MORBID OBESITY) (HCC): ICD-10-CM

## 2020-02-24 PROCEDURE — 95250 CONT GLUC MNTR PHYS/QHP EQP: CPT | Performed by: NURSE PRACTITIONER

## 2020-02-24 PROCEDURE — 99214 OFFICE O/P EST MOD 30 MIN: CPT | Performed by: NURSE PRACTITIONER

## 2020-02-24 NOTE — PROGRESS NOTES
Subjective    HPI     Referring provider Yang Velzo MD    50 year old male presents for consultation       REASON - diabetes mellitus type 2 not controlled       Duration/Timing - diagnosed in 2014/ constant       Quality -not controlled       Severity - high due to complications     Complications - neuropathy, hyperglycemia       Severity (Complication/Hospitalizations)        Secondary Macrovascular--  No CVA, no PAD, no CAD     Had a MI in the past         Secondary Microvascular -- neuropathy, no diabetic retinopathy, no renal disease       Context-- presented with symptoms of diabetes increased thirst and urination           Diabetes Regimen-- oral medication          Lab Results   Component Value Date    HGBA1C 11.46 (H) 12/10/2019           Blood Glucose Readings    200 up to 400      Diet-     High carb, high sugar drinks     Associated Signs/Symptoms       Hyperglycemic Symptoms: increased thirst       Hypoglycemic Episodes: none    Aggravating factors -- refusing insulin       Review of Systems  Review of Systems   Constitutional: Negative for activity change, appetite change, chills, fatigue, unexpected weight gain and unexpected weight loss.   HENT: Negative for congestion, dental problem, ear discharge, ear pain, swollen glands, tinnitus, trouble swallowing and voice change.    Eyes: Negative for blurred vision, photophobia, pain, discharge, redness, itching and visual disturbance.   Respiratory: Negative for apnea, cough, choking, chest tightness, shortness of breath and wheezing.    Cardiovascular: Negative for chest pain, palpitations and leg swelling.   Gastrointestinal: Negative for abdominal distention, abdominal pain, constipation, diarrhea, nausea and vomiting.   Endocrine: Negative for cold intolerance, heat intolerance, polydipsia and polyphagia.   Genitourinary: Negative for breast discharge, decreased libido, decreased urine volume and difficulty urinating.   Musculoskeletal: Negative  for arthralgias, back pain and neck pain.   Skin: Negative for color change, dry skin and skin lesions.   Allergic/Immunologic: Negative for environmental allergies, food allergies and immunocompromised state.   Neurological: Negative for dizziness, tremors, weakness, numbness, headache, memory problem and confusion.   Hematological: Negative for adenopathy.   Psychiatric/Behavioral: Negative for agitation, behavioral problems, decreased concentration and depressed mood. The patient is not nervous/anxious.        Wt Readings from Last 3 Encounters:   02/24/20 116 kg (256 lb 9.6 oz)   01/13/20 116 kg (255 lb)   01/13/20 116 kg (255 lb 6.4 oz)     Temp Readings from Last 3 Encounters:   01/13/20 96.9 °F (36.1 °C) (Tympanic)   12/10/19 98.3 °F (36.8 °C)   12/06/19 98.3 °F (36.8 °C) (Temporal)     BP Readings from Last 3 Encounters:   02/24/20 132/80   01/13/20 138/80   12/17/19 120/80     Pulse Readings from Last 3 Encounters:   02/24/20 88   01/13/20 107   01/13/20 98       Physical Exam  Physical Exam   Constitutional: He is oriented to person, place, and time. He appears well-developed and well-nourished. No distress.   HENT:   Head: Normocephalic and atraumatic.   Right Ear: External ear normal.   Left Ear: External ear normal.   Nose: Nose normal.   Eyes: Pupils are equal, round, and reactive to light. Conjunctivae and EOM are normal.   Neck: Normal range of motion. Neck supple. No tracheal deviation present. No thyromegaly present.   Cardiovascular: Normal rate, regular rhythm and normal heart sounds.   No murmur heard.  Pulmonary/Chest: Effort normal and breath sounds normal. No respiratory distress. He has no wheezes.   Abdominal: Soft. Bowel sounds are normal. There is no tenderness. There is no rebound and no guarding.   Musculoskeletal: Normal range of motion. He exhibits no edema, tenderness or deformity.   Neurological: He is alert and oriented to person, place, and time. No cranial nerve deficit.   Skin:  Skin is warm and dry. No rash noted.   Psychiatric: He has a normal mood and affect. His behavior is normal. Judgment and thought content normal.         Assessment/Plan       Diagnosis Plan   1. Type 2 diabetes mellitus with hyperglycemia, without long-term current use of insulin (CMS/[facility])     2. Obesity, Class III, BMI 40-49.9 (morbid obesity) (CMS/[facility])     3. Essential hypertension             Glycemic Management    Type 2 diabetes not controlled with hyperglycemia     Lab Results   Component Value Date    HGBA1C 11.46 (H) 12/10/2019       Metformin 1000 mg po BID -keep      Jardiance 25 mg one daily -keep      Januvia 100 mg daily ---- stop not effective     Change to Ozempic       Start Ozempic 0.25 once weekly times 4 weeks then increase to 0.5 mg once weekly    Side effects discussed, try to eat half your plate not a full plate    If nauseated eat less    If vomiting or abdominal pain stop medication    We discussed insulin and he refuses but agrees if this does not work he will start insulin     Meet with [facility] for diabetes education and carb counting       Aim 60 gm of CHO per meal     If you have a snack 15 gms of CHO or less     Stop regular sweet drinks     Start walking if able    Uncontrolled diabetes  Hyperglycemia    Insertion of continuous glucose monitor to define patter    The continuous glucose monitor that was inserted is a oumar glucose monitor    Return in 2 weeks         Lipid Management      Lipitor 40 mg one at night       Lab Results   Component Value Date    CHOL 157 12/10/2019     Lab Results   Component Value Date    TRIG 546 (H) 12/10/2019     Lab Results   Component Value Date    HDL 37 (L) 12/10/2019     Lab Results   Component Value Date    LDL  12/10/2019      Comment:      Unable to calculate    LDL 64 12/10/2019     Lab Results   Component Value Date    VLDL  12/10/2019      Comment:      Unable to calculate     Lab Results   Component Value Date    LDLHDL  12/10/2019       Comment:      Unable to calculate       Blood Pressure Management    Lisinopril 40 mg once daily     Metoprolol 50 mg daily         Microvascular Complication Monitoring      Last eye exam -- Jan. 2020 no DR         Lab Results   Component Value Date    MICROALBUR 0.9 09/04/2018         Neuropathy mild     Prefers no RX at this time       Bone Health      Lab Results   Component Value Date    CALCIUM 8.9 08/29/2019             Other Diabetes Related Aspects      No results found for: [id number]      Thyroid Health    No results found for: TSH      Weight Management:    Patient's Body mass index is 40.19 kg/m². BMI is above normal parameters. Recommendations include: nutrition counseling.    Decrease daily caloric intake by 500 calories per day     Added ozempic   Discussed decreasing carb intake     Return in about 4 weeks (around 3/23/2020) for Recheck.      Records from  received and reviewed from [id number]  Thank you for this referral

## 2020-02-24 NOTE — PROGRESS NOTES
Kingsley Littlejohn is a 50 y.o.. male seen by diabetes educator on 02/24/2020 for review of medications and carbohydrate counting education.     1. Carbohydrate Counting/ Exchange Choices and Healthy Foods   I. Reviewed carbohydrate-containing foods, standard serving sizes, how to measure foods, and reading nutrition labels.   II. Provided patient with carbohydrate counting booklet (Carb counting and meal planning book).   III. Provided patient with list of non-starchy vegetables and foods that are low in carbohydrate for snacks and to incorporate with meals.     IV. Instructed patient to eat 60 grams of carbohydrate with each meal (4 exchange choices).    V. Reviewed the difference between simple and complex carbohydrate. Encouraged patient to choose complex carbohydrates more often.     VI. Choose fruits, vegetables, whole grains, legumes, low-fat milk, fiber-rich foods, minimal saturated fats, and watch cholesterol and sodium intake.    VII. Patient demonstrated understanding by correctly identifying and calculating carbohydrate amounts for various meals.    2. Metformin - keep   1,000mg BID    3. Start Jardiance 25mg daily    4. STOP januvia.    5. Ozempic Training   I. Ozempic is a once-weekly injection to be given on the same day of each week.    II. Start with 0.25mg injection once a week for 4 weeks, then increase to 0.5mg weekly and continue this dose.    III. Refrigerate Ozempic pens and keep out of direct sunlight.    IV. Pen needles are one-time use only. Dispose of them properly.   V. You may experience decreased appetite and nausea. If you have vomiting on this medication, stop the medicine and call us.   VI. Please clean your skin with alcohol wipes prior to injection. You may inject in the back of the arm, top or side of thigh, or abdomen (avoiding navel area).   VII. Patient will give first injection tomorrow.     6.  Will return in 4 weeks for diagnostic sensor removal and results. Instructed  patient to remove sensor if a CT scan, MRI, or X-ray is done, or if skin irritation occurs.     HARVEY ColmenaresN, RN, [facility]  Diabetes Educator

## 2020-02-26 ENCOUNTER — OFFICE VISIT (OUTPATIENT)
Dept: FAMILY MEDICINE CLINIC | Facility: CLINIC | Age: 50
End: 2020-02-26

## 2020-02-26 VITALS
HEART RATE: 90 BPM | BODY MASS INDEX: 39.96 KG/M2 | DIASTOLIC BLOOD PRESSURE: 80 MMHG | OXYGEN SATURATION: 97 % | HEIGHT: 67 IN | WEIGHT: 254.6 LBS | SYSTOLIC BLOOD PRESSURE: 128 MMHG

## 2020-02-26 DIAGNOSIS — R07.9 CHEST PAIN, UNSPECIFIED TYPE: Primary | ICD-10-CM

## 2020-02-26 PROCEDURE — 99213 OFFICE O/P EST LOW 20 MIN: CPT | Performed by: STUDENT IN AN ORGANIZED HEALTH CARE EDUCATION/TRAINING PROGRAM

## 2020-02-26 RX ORDER — HYDROXYZINE 50 MG/1
50 TABLET, FILM COATED ORAL 4 TIMES DAILY
COMMUNITY
Start: 2020-01-31

## 2020-02-26 NOTE — PROGRESS NOTES
"ID: Kingsley Littlejohn    CC:   Chief Complaint   Patient presents with   • Chest Pain     6 week rechk         Subjective:     Kingsley Littlejohn is a 49 y.o. male who presents for:    HPI   Patient is seen endocrinology for his diabetes due to it being uncontrolled despite medical treatment in our clinic.    Patient reports today for follow-up of his chest pain.  Patient reports he will occasionally get chest pain that is squeezing in nature.  Is relieved by nitro.  His chest pain is usually followed with sweating, nausea, vomiting.  He has not had this pain for the last 2 weeks.  He has had cardiac work-up in the past with a CT a coronary in August 2019 with a calcium score of 0.  Patient has not seen cardiologist.  He reports being hospitalized at [facility] in the past couple months and they did a battery of tests and told him that everything was normal.  Patient does not have records with him, but we have requested  he bring them at the next visit.      Past Medical Hx:  Past Medical History:   Diagnosis Date   • Abdominal pain    • Allergic rhinitis    • Diabetes mellitus (CMS/[facility])     Prev HbA1c 14.0 2014, states \"I'm cured!\"   • Essential hypertension    • Fracture closed, fibula, shaft     tib-fib   • Onychomycosis        Past Surgical Hx:  Past Surgical History:   Procedure Laterality Date   • ANKLE SURGERY      right and left   • COLONOSCOPY N/A 6/4/2018    Procedure: COLONOSCOPY;  Surgeon: Jaylon Castro MD;  Location: Health system ENDOSCOPY;  Service: Gastroenterology   • CYST REMOVAL      x 3, from hand, thigh, and buttocks   • ENDOSCOPY      20 years ago   • TIBIA FRACTURE SURGERY Bilateral 2011    tib-fib       Health Maintenance:  Health Maintenance   Topic Date Due   • MEDICARE ANNUAL WELLNESS  12/18/2019   • ZOSTER VACCINE (1 of 2) 09/01/2021 (Originally 2/20/2018)   • HEMOGLOBIN A1C  06/10/2020   • LIPID PANEL  12/10/2020   • TDAP/TD VACCINES (2 - Td) 01/01/2021   • DIABETIC EYE EXAM  01/07/2021   • " DIABETIC FOOT EXAM  01/15/2021   • COLONOSCOPY  06/04/2021   • PNEUMOCOCCAL VACCINE (19-64 MEDIUM RISK)  Addressed   • INFLUENZA VACCINE  Discontinued   • URINE MICROALBUMIN  Discontinued       Current Meds:    Current Outpatient Medications:   •  aspirin 325 MG tablet, Take 81 mg by mouth Daily., Disp: , Rfl:   •  atorvastatin (LIPITOR) 40 MG tablet, Take 1 tablet by mouth Daily., Disp: 90 tablet, Rfl: 3  •  Blood Glucose Monitoring Suppl (ONE TOUCH ULTRA 2) w/Device kit, Use 1 daily to test blood glucose level., Disp: 1 each, Rfl: 5  •  fluticasone (FLONASE) 50 MCG/ACT nasal spray, 2 SPRAY IN EACH NOSTRIL DAILY, Disp: , Rfl: 0  •  glucose blood test strip, USE TO TEST BLOOD SUGAR 1 TIME DAILY E11.9, Disp: 100 each, Rfl: 12  •  glucose monitor monitoring kit, 1 each As Needed (Diabetes). USE TO TEST 1 TIME DAILY, Disp: 1 each, Rfl: 0  •  ibuprofen (ADVIL,MOTRIN) 600 MG tablet, Take 1 tablet by mouth Every 6 (Six) Hours As Needed for Mild Pain ., Disp: 15 tablet, Rfl: 0  •  JARDIANCE 25 MG tablet, TAKE 25 MG BY MOUTH DAILY., Disp: 30 tablet, Rfl: 5  •  Lancets (ACCU-CHEK SOFT TOUCH) lancets, Use TO TEST BLOOD SUGAR 1 TIME DAILY., Disp: 100 each, Rfl: 12  •  lisinopril (PRINIVIL,ZESTRIL) 40 MG tablet, Take 1 tablet by mouth Daily., Disp: 30 tablet, Rfl: 5  •  metFORMIN (GLUCOPHAGE) 1000 MG tablet, Take 1 tablet by mouth 2 (Two) Times a Day With Meals., Disp: 60 tablet, Rfl: 2  •  metoprolol tartrate (LOPRESSOR) 50 MG tablet, Take 1 tablet by mouth Every 12 (Twelve) Hours., Disp: 180 tablet, Rfl: 1  •  nitroglycerin (NITROSTAT) 0.4 MG SL tablet, Place 1 tablet under the tongue Every 5 (Five) Minutes As Needed for Chest Pain. Take no more than 3 doses in 15 minutes., Disp: 30 tablet, Rfl: 12  •  paliperidone palmitate (INVEGA SUSTENNA) 117 MG/0.75ML suspension IM injection, Inject 117 mg into the shoulder, thigh, or buttocks Every 30 (Thirty) Days., Disp: , Rfl:   •  SITagliptin (JANUVIA) 100 MG tablet, Take 1 tablet  by mouth Daily., Disp: 90 tablet, Rfl: 1  •  sodium chloride (OCEAN NASAL SPRAY) 0.65 % nasal spray, 1 spray into each nostril As Needed for Congestion., Disp: 60 mL, Rfl: 12  •  traZODone (DESYREL) 150 MG tablet, TAKE 1 TABLET BY MOUTH EVERY DAY AT BEDTIME AS NEEDED, Disp: , Rfl: 2  •  Triamcinolone Acetonide (NASACORT) 55 MCG/ACT nasal inhaler, 2 sprays into the nostril(s) as directed by provider Daily., Disp: 16.5 g, Rfl: 3  •  hydrOXYzine (ATARAX) 50 MG tablet, Take 50 mg by mouth 4 (Four) Times a Day., Disp: , Rfl:     Allergies:  Latex and Codeine    Family Hx:  Family History   Problem Relation Age of Onset   • Diabetes Other    • Hypertension Other    • Cancer Other    • Lung disease Other    • Thyroid disease Other    • Bleeding Disorder Other    • Colon cancer Paternal Grandmother    • Cancer Paternal Grandfather    • Crohn's disease Mother    • Diabetes Mother    • Hypertension Father    • Lung disease Father    • Hypertension Brother    • Hypertension Brother    • Hypertension Brother         Social History:  Social History     Socioeconomic History   • Marital status: Single     Spouse name: Not on file   • Number of children: Not on file   • Years of education: Not on file   • Highest education level: Not on file   Occupational History   • 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   • Sexual activity: Defer   Social History Narrative    Single, lives alone in [place].  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         Review of Systems   Constitutional: Negative for  "chills, diaphoresis, fatigue and fever.   HENT: Negative for congestion and rhinorrhea.    Respiratory: Negative for cough, shortness of breath and wheezing.    Cardiovascular: Positive for chest pain. Negative for palpitations and leg swelling.   Gastrointestinal: Negative for abdominal pain, diarrhea and nausea.   Genitourinary: Negative for dysuria and flank pain.   Skin: Negative for color change and rash.   Neurological: Negative for dizziness and headaches.   Psychiatric/Behavioral: Negative for agitation. The patient is not nervous/anxious.            Objective:     /80   Pulse 90   Ht 170.2 cm (67\")   Wt 115 kg (254 lb 9.6 oz)   SpO2 97%   BMI 39.88 kg/m²     Physical Exam   Constitutional: He is oriented to person, place, and time. He appears well-developed and well-nourished. He is active.  Non-toxic appearance. He does not have a sickly appearance. He does not appear ill. No distress.   HENT:   Head: Normocephalic.   Right Ear: External ear normal.   Left Ear: External ear normal.   Mouth/Throat: Mucous membranes are normal.   Eyes: Conjunctivae are normal.   Cardiovascular: Normal rate.   Pulmonary/Chest: Effort normal. No accessory muscle usage. No respiratory distress. He has no decreased breath sounds. He has no wheezes. He exhibits no tenderness.   Abdominal: Soft. Bowel sounds are normal. There is no tenderness (deep palpation). There is no rigidity.   Neurological: He is alert and oriented to person, place, and time. He is not disoriented. No cranial nerve deficit (grossly intact). GCS eye subscore is 4. GCS verbal subscore is 5. GCS motor subscore is 6.   Skin: Skin is warm and dry. Capillary refill takes less than 2 seconds. He is not diaphoretic.   Psychiatric: He has a normal mood and affect. His behavior is normal.   Nursing note and vitals reviewed.      Assessment/Plan:      Diagnosis Plan   1. Chest pain, unspecified type       Patient has not had chest pain in last 2 weeks.  It " could be an esophageal spasm or Prinzmetal angina since it is relieved by nitro.  Return precautions to the ER discussed.  We will follow-up in 1 month's time to reassess his chest pain.  Consider referral to GI or cardiology if the chest pain persist.    Follow-up:     Return in about 4 weeks (around 3/25/2020) for Recheck chest pain.      Goals: improve diet  Barriers to goals: pt compliance    Health Maintenance   Topic Date Due   • MEDICARE ANNUAL WELLNESS  12/18/2019   • ZOSTER VACCINE (1 of 2) 09/01/2021 (Originally 2/20/2018)   • HEMOGLOBIN A1C  06/10/2020   • LIPID PANEL  12/10/2020   • TDAP/TD VACCINES (2 - Td) 01/01/2021   • DIABETIC EYE EXAM  01/07/2021   • DIABETIC FOOT EXAM  01/15/2021   • COLONOSCOPY  06/04/2021   • PNEUMOCOCCAL VACCINE (19-64 MEDIUM RISK)  Addressed   • INFLUENZA VACCINE  Discontinued   • URINE MICROALBUMIN  Discontinued     Tobacco: former smoker  Alcohol: does not drink  Lifestyle: Body mass index is 39.88 kg/m². eat more fruits and vegetables, decrease soda or juice intake, increase water intake, increase physical activity, reduce screen time, reduce portion size, cut out extra servings, reduce fast food intake, family to eat at dinner table more often, keep TV off during meals, plan meals, eat breakfast and have 3 meals a day    RISK SCORE: 4        This document has been electronically signed by Yang Veloz MD on February 26, 2020 2:22 PM

## 2020-02-26 NOTE — PROGRESS NOTES
I have reviewed the notes, assessments, and/or procedures performed by Yang Veloz MD, I concur with her/his documentation and assessment and plan for Kingsley Littlejohn.                This document has been electronically signed by Favio Muro MD on February 26, 2020 3:49 PM

## 2020-03-09 ENCOUNTER — TELEPHONE (OUTPATIENT)
Dept: ENDOCRINOLOGY | Facility: CLINIC | Age: 50
End: 2020-03-09

## 2020-03-09 NOTE — TELEPHONE ENCOUNTER
Kingsley called and said that he was supposed to turn in his monitor tomorrow, however it has fallen off and he can't find it. He asked to be called and advised on what he needs to do.     Kingsley [phone].

## 2020-03-23 DIAGNOSIS — E11.9 TYPE 2 DIABETES MELLITUS WITHOUT COMPLICATION, WITHOUT LONG-TERM CURRENT USE OF INSULIN (HCC): ICD-10-CM

## 2020-03-24 RX ORDER — METOPROLOL TARTRATE 50 MG/1
50 TABLET, FILM COATED ORAL EVERY 12 HOURS
Qty: 180 TABLET | Refills: 1 | Status: SHIPPED | OUTPATIENT
Start: 2020-03-24 | End: 2020-10-29 | Stop reason: SDUPTHER

## 2020-03-26 ENCOUNTER — OFFICE VISIT (OUTPATIENT)
Dept: ENDOCRINOLOGY | Facility: CLINIC | Age: 50
End: 2020-03-26

## 2020-03-26 VITALS
OXYGEN SATURATION: 98 % | DIASTOLIC BLOOD PRESSURE: 90 MMHG | HEART RATE: 98 BPM | HEIGHT: 67 IN | BODY MASS INDEX: 39.39 KG/M2 | WEIGHT: 251 LBS | SYSTOLIC BLOOD PRESSURE: 140 MMHG

## 2020-03-26 DIAGNOSIS — I10 ESSENTIAL HYPERTENSION: Primary | ICD-10-CM

## 2020-03-26 DIAGNOSIS — E11.65 TYPE 2 DIABETES MELLITUS WITH HYPERGLYCEMIA, WITHOUT LONG-TERM CURRENT USE OF INSULIN (HCC): ICD-10-CM

## 2020-03-26 DIAGNOSIS — E78.49 OTHER HYPERLIPIDEMIA: ICD-10-CM

## 2020-03-26 DIAGNOSIS — E55.9 VITAMIN D DEFICIENCY: ICD-10-CM

## 2020-03-26 DIAGNOSIS — E66.09 NON MORBID OBESITY DUE TO EXCESS CALORIES: ICD-10-CM

## 2020-03-26 PROCEDURE — 99214 OFFICE O/P EST MOD 30 MIN: CPT | Performed by: NURSE PRACTITIONER

## 2020-03-26 PROCEDURE — 95251 CONT GLUC MNTR ANALYSIS I&R: CPT | Performed by: NURSE PRACTITIONER

## 2020-03-26 NOTE — PROGRESS NOTES
"  Subjective    Kingsley Littlejohn is a 50 y.o. male. he is here today for follow-up.    History of Present Illness         Referring provider Yang Veloz MD     50 year old male presents for consultation         REASON - diabetes mellitus type 2 not controlled         Duration/Timing - diagnosed in 2014/ constant         Quality -improved control          Severity - high due to complications      Complications - neuropathy, hyperglycemia         Severity (Complication/Hospitalizations)        Secondary Macrovascular--  No CVA, no PAD, no CAD      Had a MI in the past         Secondary Microvascular -- neuropathy, no diabetic retinopathy, no renal disease         Context-- presented with symptoms of diabetes increased thirst and urination             Diabetes Regimen-- oral medication                Lab Results   Component Value Date     HGBA1C 11.46 (H) 12/10/2019               Blood Glucose Readings     Seeing under 200      Melonie reviewed and downloaded   Dated from March 11 - March 22, 2020      Average bg 275     Target 43%     High 50 %     Very high 6 %          Diet-       Trying to keep carbs at 60 gm per meal      Associated Signs/Symptoms       Hyperglycemic Symptoms: increased thirst       Hypoglycemic Episodes: none     Aggravating factors -- refusing insulin         The following portions of the patient's history were reviewed and updated as appropriate:   Past Medical History:   Diagnosis Date   • Abdominal pain    • Allergic rhinitis    • Diabetes mellitus (CMS/[facility])     Prev HbA1c 14.0 2014, states \"I'm cured!\"   • Essential hypertension    • Fracture closed, fibula, shaft     tib-fib   • Onychomycosis      Past Surgical History:   Procedure Laterality Date   • ANKLE SURGERY      right and left   • COLONOSCOPY N/A 6/4/2018    Procedure: COLONOSCOPY;  Surgeon: Jaylon Castro MD;  Location: [facility] ENDOSCOPY;  Service: Gastroenterology   • CYST REMOVAL      x 3, from hand, thigh, and buttocks "   • ENDOSCOPY      20 years ago   • TIBIA FRACTURE SURGERY Bilateral 2011    tib-fib     Family History   Problem Relation Age of Onset   • Diabetes Other    • Hypertension Other    • Cancer Other    • Lung disease Other    • Thyroid disease Other    • Bleeding Disorder Other    • Colon cancer Paternal Grandmother    • Cancer Paternal Grandfather    • Crohn's disease Mother    • Diabetes Mother    • Hypertension Father    • Lung disease Father    • Hypertension Brother    • Hypertension Brother    • Hypertension Brother        Current Outpatient Medications   Medication Sig Dispense Refill   • aspirin 325 MG tablet Take 81 mg by mouth Daily.     • atorvastatin (LIPITOR) 40 MG tablet Take 1 tablet by mouth Daily. 90 tablet 3   • Blood Glucose Monitoring Suppl (ONE TOUCH ULTRA 2) w/Device kit Use 1 daily to test blood glucose level. 1 each 5   • fluticasone (FLONASE) 50 MCG/ACT nasal spray 2 SPRAY IN EACH NOSTRIL DAILY  0   • glucose blood test strip USE TO TEST BLOOD SUGAR 1 TIME DAILY E11.9 100 each 12   • glucose monitor monitoring kit 1 each As Needed (Diabetes). USE TO TEST 1 TIME DAILY 1 each 0   • hydrOXYzine (ATARAX) 50 MG tablet Take 50 mg by mouth 4 (Four) Times a Day.     • ibuprofen (ADVIL,MOTRIN) 600 MG tablet Take 1 tablet by mouth Every 6 (Six) Hours As Needed for Mild Pain . 15 tablet 0   • JARDIANCE 25 MG tablet TAKE 25 MG BY MOUTH DAILY. 30 tablet 5   • Lancets (ACCU-CHEK SOFT TOUCH) lancets Use TO TEST BLOOD SUGAR 1 TIME DAILY. 100 each 12   • lisinopril (PRINIVIL,ZESTRIL) 40 MG tablet Take 1 tablet by mouth Daily. 30 tablet 5   • metFORMIN (GLUCOPHAGE) 1000 MG tablet Take 1 tablet by mouth 2 (Two) Times a Day With Meals. 60 tablet 2   • metoprolol tartrate (LOPRESSOR) 50 MG tablet Take 1 tablet by mouth Every 12 (Twelve) Hours. 180 tablet 1   • nitroglycerin (NITROSTAT) 0.4 MG SL tablet Place 1 tablet under the tongue Every 5 (Five) Minutes As Needed for Chest Pain. Take no more than 3 doses in 15  minutes. 30 tablet 12   • paliperidone palmitate (INVEGA SUSTENNA) 117 MG/0.75ML suspension IM injection Inject 117 mg into the shoulder, thigh, or buttocks Every 30 (Thirty) Days.     • SITagliptin (JANUVIA) 100 MG tablet Take 1 tablet by mouth Daily. 90 tablet 1   • sodium chloride (OCEAN NASAL SPRAY) 0.65 % nasal spray 1 spray into each nostril As Needed for Congestion. 60 mL 12   • traZODone (DESYREL) 150 MG tablet TAKE 1 TABLET BY MOUTH EVERY DAY AT BEDTIME AS NEEDED  2   • Triamcinolone Acetonide (NASACORT) 55 MCG/ACT nasal inhaler 2 sprays into the nostril(s) as directed by provider Daily. 16.5 g 3   • Insulin Pen Needle (CareTouch Pen Needles) 32G X 5 MM misc Inject once camp 30 each 11   • Semaglutide,0.25 or 0.5MG/DOS, (Ozempic, 0.25 or 0.5 MG/DOSE,) 2 MG/1.5ML solution pen-injector Inject 0.5 mg under the skin into the appropriate area as directed 1 (One) Time Per Week. 3 pen 11     No current facility-administered medications for this visit.      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   • Sexual activity: Defer   Social History Narrative    Single, lives alone in [place].  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         Review of Systems  Review of  "Systems   Constitutional: Negative for activity change, appetite change, diaphoresis and fatigue.   HENT: Negative for facial swelling, sneezing, sore throat, tinnitus, trouble swallowing and voice change.    Eyes: Negative for photophobia, pain, discharge, redness, itching and visual disturbance.   Respiratory: Negative for apnea, cough, choking, chest tightness and shortness of breath.    Cardiovascular: Negative for chest pain, palpitations and leg swelling.   Gastrointestinal: Negative for abdominal distention, abdominal pain, constipation, diarrhea, nausea and vomiting.   Endocrine: Negative for cold intolerance, heat intolerance, polydipsia, polyphagia and polyuria.   Genitourinary: Negative for difficulty urinating, dysuria, frequency, hematuria and urgency.   Musculoskeletal: Negative for arthralgias, back pain, gait problem, joint swelling, myalgias, neck pain and neck stiffness.   Skin: Negative for color change, pallor, rash and wound.   Neurological: Negative for dizziness, tremors, weakness, light-headedness, numbness and headaches.   Hematological: Negative for adenopathy. Does not bruise/bleed easily.   Psychiatric/Behavioral: Negative for behavioral problems, confusion and sleep disturbance.        Objective    /90 (BP Location: Left arm)   Pulse 98   Ht 170.2 cm (67\")   Wt 114 kg (251 lb)   SpO2 98%   BMI 39.31 kg/m²   Physical Exam   Constitutional: He is oriented to person, place, and time. He appears well-developed and well-nourished. No distress.   HENT:   Head: Normocephalic and atraumatic.   Right Ear: External ear normal.   Left Ear: External ear normal.   Nose: Nose normal.   Eyes: Pupils are equal, round, and reactive to light. Conjunctivae and EOM are normal.   Neck: Normal range of motion. Neck supple. No tracheal deviation present. No thyromegaly present.   Cardiovascular: Normal rate, regular rhythm and normal heart sounds.   No murmur heard.  Pulmonary/Chest: Effort normal " and breath sounds normal. No respiratory distress. He has no wheezes.   Abdominal: Soft. Bowel sounds are normal. There is no tenderness. There is no rebound and no guarding.   Musculoskeletal: Normal range of motion. He exhibits no edema, tenderness or deformity.   Neurological: He is alert and oriented to person, place, and time. No cranial nerve deficit.   Skin: Skin is warm and dry. No rash noted.   Psychiatric: He has a normal mood and affect. His behavior is normal. Judgment and thought content normal.       Lab Review  Glucose (mg/dL)   Date Value   08/29/2019 214 (H)   08/28/2019 196 (H)   08/10/2018 320 (H)     Sodium   Date Value   08/29/2019 136 mmol/L   08/28/2019 137 mmol/L   08/10/2018 134 mmol/L (L)   05/18/2018 138 MMOL/L   03/02/2018 137 MMOL/L     Potassium   Date Value   08/29/2019 4.0 mmol/L   08/28/2019 4.0 mmol/L   08/10/2018 4.2 mmol/L   05/18/2018 4.5 MMOL/L   03/02/2018 3.9 MMOL/L     Chloride   Date Value   08/29/2019 103 mmol/L   08/28/2019 101 mmol/L   08/10/2018 99 mmol/L   05/18/2018 99 MMOL/L   03/02/2018 99 MMOL/L     CO2   Date Value   08/29/2019 22.0 mmol/L   08/28/2019 21.0 mmol/L (L)   08/10/2018 24.0 mmol/L   05/18/2018 29 MMOL/L   03/02/2018 25 MMOL/L     BUN   Date Value   08/29/2019 17 mg/dL   08/28/2019 18 mg/dL   08/10/2018 14 mg/dL   05/18/2018 16 MG/DL   03/02/2018 12 MG/DL     Creatinine   Date Value   08/29/2019 0.90 mg/dL   08/28/2019 0.88 mg/dL   08/10/2018 0.72 mg/dL   05/18/2018 0.9 MG/DL   03/02/2018 0.7 MG/DL     Hemoglobin A1C (%)   Date Value   12/10/2019 11.46 (H)   06/06/2019 8.90 (H)   02/21/2019 8.8 (H)   11/16/2018 8.7 (H)   08/10/2018 12.8 (H)   03/09/2018 6.4 (H)     Triglycerides (mg/dL)   Date Value   12/10/2019 546 (H)   09/04/2018 281 (H)   06/08/2018 172     LDL Cholesterol    Date Value   12/10/2019      Comment:     Unable to calculate   12/10/2019 64 mg/dL   09/04/2018 58 mg/dL   06/08/2018 81 mg/dL       Assessment/Plan      1. Essential  hypertension    2. Other hyperlipidemia    3. Type 2 diabetes mellitus with hyperglycemia, without long-term current use of insulin (CMS/[facility])    4. Vitamin D deficiency    5. Non morbid obesity due to excess calories    .    Medications prescribed:  Outpatient Encounter Medications as of 3/26/2020   Medication Sig Dispense Refill   • aspirin 325 MG tablet Take 81 mg by mouth Daily.     • atorvastatin (LIPITOR) 40 MG tablet Take 1 tablet by mouth Daily. 90 tablet 3   • Blood Glucose Monitoring Suppl (ONE TOUCH ULTRA 2) w/Device kit Use 1 daily to test blood glucose level. 1 each 5   • fluticasone (FLONASE) 50 MCG/ACT nasal spray 2 SPRAY IN EACH NOSTRIL DAILY  0   • glucose blood test strip USE TO TEST BLOOD SUGAR 1 TIME DAILY E11.9 100 each 12   • glucose monitor monitoring kit 1 each As Needed (Diabetes). USE TO TEST 1 TIME DAILY 1 each 0   • hydrOXYzine (ATARAX) 50 MG tablet Take 50 mg by mouth 4 (Four) Times a Day.     • ibuprofen (ADVIL,MOTRIN) 600 MG tablet Take 1 tablet by mouth Every 6 (Six) Hours As Needed for Mild Pain . 15 tablet 0   • JARDIANCE 25 MG tablet TAKE 25 MG BY MOUTH DAILY. 30 tablet 5   • Lancets (ACCU-CHEK SOFT TOUCH) lancets Use TO TEST BLOOD SUGAR 1 TIME DAILY. 100 each 12   • lisinopril (PRINIVIL,ZESTRIL) 40 MG tablet Take 1 tablet by mouth Daily. 30 tablet 5   • metFORMIN (GLUCOPHAGE) 1000 MG tablet Take 1 tablet by mouth 2 (Two) Times a Day With Meals. 60 tablet 2   • metoprolol tartrate (LOPRESSOR) 50 MG tablet Take 1 tablet by mouth Every 12 (Twelve) Hours. 180 tablet 1   • nitroglycerin (NITROSTAT) 0.4 MG SL tablet Place 1 tablet under the tongue Every 5 (Five) Minutes As Needed for Chest Pain. Take no more than 3 doses in 15 minutes. 30 tablet 12   • paliperidone palmitate (INVEGA SUSTENNA) 117 MG/0.75ML suspension IM injection Inject 117 mg into the shoulder, thigh, or buttocks Every 30 (Thirty) Days.     • SITagliptin (JANUVIA) 100 MG tablet Take 1 tablet by mouth Daily. 90 tablet 1    • sodium chloride (OCEAN NASAL SPRAY) 0.65 % nasal spray 1 spray into each nostril As Needed for Congestion. 60 mL 12   • traZODone (DESYREL) 150 MG tablet TAKE 1 TABLET BY MOUTH EVERY DAY AT BEDTIME AS NEEDED  2   • Triamcinolone Acetonide (NASACORT) 55 MCG/ACT nasal inhaler 2 sprays into the nostril(s) as directed by provider Daily. 16.5 g 3   • Insulin Pen Needle (CareTouch Pen Needles) 32G X 5 MM misc Inject once camp 30 each 11   • Semaglutide,0.25 or 0.5MG/DOS, (Ozempic, 0.25 or 0.5 MG/DOSE,) 2 MG/1.5ML solution pen-injector Inject 0.5 mg under the skin into the appropriate area as directed 1 (One) Time Per Week. 3 pen 11     No facility-administered encounter medications on file as of 3/26/2020.        Orders placed during this encounter include:  Orders Placed This Encounter   Procedures   • Comprehensive Metabolic Panel   • Hemoglobin A1c   • Lipid Panel   • Protein / Creatinine Ratio, Urine - Urine, Clean Catch   • Microalbumin / Creatinine Urine Ratio - Urine, Clean Catch   • TSH   • Vitamin B12   • Vitamin D 25 Hydroxy   • CBC & Differential     Order Specific Question:   Manual Differential     Answer:   No     Glycemic Management     Type 2 diabetes not controlled with hyperglycemia            Lab Results   Component Value Date     HGBA1C 11.46 (H) 12/10/2019       Melonie reviewed and downloaded     Average bg 275     Target 43%     High 50 %     Very high 6 %         Metformin 1000 mg po BID -keep        Jardiance 25 mg one daily -keep        Januvia 100 mg daily ---- stop not effective            Ozempic 0.5 mg once weekly      Side effects discussed, try to eat half your plate not a full plate     If nauseated eat less     If vomiting or abdominal pain stop medication     We discussed insulin and he refuses but agrees if this does not work he will start insulin      His blood sugars are improving and he has lost 5 lbs since starting ozempic    He is not able to do insulin do to his work , please  approve the ozempic      Aim 60 gm of CHO per meal      If you have a snack 15 gms of CHO or less      Stop regular sweet drinks      Start walking if able       Lipid Management        Lipitor 40 mg one at night               Lab Results   Component Value Date     CHOL 157 12/10/2019            Lab Results   Component Value Date     TRIG 546 (H) 12/10/2019            Lab Results   Component Value Date     HDL 37 (L) 12/10/2019              Lab Results   Component Value Date     LDL   12/10/2019         Comment:         Unable to calculate     LDL 64 12/10/2019              Lab Results   Component Value Date     VLDL   12/10/2019         Comment:         Unable to calculate              Lab Results   Component Value Date     LDLHDL   12/10/2019         Comment:         Unable to calculate         Blood Pressure Management     Lisinopril 40 mg once daily      Metoprolol 50 mg daily            Microvascular Complication Monitoring        Last eye exam -- Jan. 2020 no DR                  Lab Results   Component Value Date     MICROALBUR 0.9 09/04/2018            Neuropathy mild      Prefers no RX at this time         Bone Health              Lab Results   Component Value Date     CALCIUM 8.9 08/29/2019                  Other Diabetes Related Aspects        No results found for: [id number]        Thyroid Health     No results found for: TSH        Weight Management:     Patient's Body mass index is 39.31 kg/m². BMI is above normal parameters. Recommendations include: nutrition counseling.    Decrease daily caloric intake by 500 calories per day ,      He has lost 5 lbs            4. Follow-up: Return in about 3 months (around 6/26/2020) for Recheck.

## 2020-04-20 RX ORDER — EMPAGLIFLOZIN 25 MG/1
TABLET, FILM COATED ORAL
Qty: 90 TABLET | Refills: 1 | Status: SHIPPED | OUTPATIENT
Start: 2020-04-20 | End: 2020-10-15

## 2020-04-23 DIAGNOSIS — E11.9 TYPE 2 DIABETES MELLITUS WITHOUT COMPLICATION, WITHOUT LONG-TERM CURRENT USE OF INSULIN (HCC): ICD-10-CM

## 2020-04-24 DIAGNOSIS — I10 ESSENTIAL HYPERTENSION: ICD-10-CM

## 2020-04-24 DIAGNOSIS — E11.9 TYPE 2 DIABETES MELLITUS WITHOUT COMPLICATION, WITHOUT LONG-TERM CURRENT USE OF INSULIN (HCC): ICD-10-CM

## 2020-04-24 RX ORDER — LISINOPRIL 40 MG/1
TABLET ORAL
Qty: 90 TABLET | Refills: 3 | Status: SHIPPED | OUTPATIENT
Start: 2020-04-24 | End: 2020-10-29 | Stop reason: SDUPTHER

## 2020-05-20 RX ORDER — FLUTICASONE PROPIONATE 50 MCG
SPRAY, SUSPENSION (ML) NASAL
Qty: 48 ML | Refills: 0 | Status: SHIPPED | OUTPATIENT
Start: 2020-05-20 | End: 2020-08-13 | Stop reason: SDUPTHER

## 2020-06-02 ENCOUNTER — OFFICE VISIT (OUTPATIENT)
Dept: FAMILY MEDICINE CLINIC | Facility: CLINIC | Age: 50
End: 2020-06-02

## 2020-06-02 VITALS — HEIGHT: 67 IN | BODY MASS INDEX: 39.31 KG/M2

## 2020-06-02 DIAGNOSIS — R07.89 OTHER CHEST PAIN: ICD-10-CM

## 2020-06-02 DIAGNOSIS — E11.9 TYPE 2 DIABETES MELLITUS WITHOUT COMPLICATION, WITHOUT LONG-TERM CURRENT USE OF INSULIN (HCC): Primary | ICD-10-CM

## 2020-06-02 PROCEDURE — 99441 PR PHYS/QHP TELEPHONE EVALUATION 5-10 MIN: CPT | Performed by: STUDENT IN AN ORGANIZED HEALTH CARE EDUCATION/TRAINING PROGRAM

## 2020-06-02 NOTE — PROGRESS NOTES
"ID: Kingsley Littlejohn  You have chosen to receive care through a telephone visit. Do you consent to use a telephone visit for your medical care today? Yes    CC:   Chief Complaint   Patient presents with   • Hypertension        Subjective:     Kingsley Littlejohn is a 49 y.o. male who presents for:    HPI   Patient presents for follow-up requiring refills of his metformin.  He is seen endocrinology for his diabetes.    Patient continues to have chest pain occasionally.  He saw cardiology about a month ago.  He has a follow-up appointment with him and was told if he continues to have chest pain that cardiology would seriously consider cathing him.  Chest pain appears to be noncardiac/atypical in nature.  The frequency of the attacks have significantly decreased over the last couple of months.      Past Medical Hx:  Past Medical History:   Diagnosis Date   • Abdominal pain    • Allergic rhinitis    • Diabetes mellitus (CMS/[facility])     Prev HbA1c 14.0 2014, states \"I'm cured!\"   • Essential hypertension    • Fracture closed, fibula, shaft     tib-fib   • Onychomycosis        Past Surgical Hx:  Past Surgical History:   Procedure Laterality Date   • ANKLE SURGERY      right and left   • COLONOSCOPY N/A 6/4/2018    Procedure: COLONOSCOPY;  Surgeon: Jaylon Castro MD;  Location: [facility] ENDOSCOPY;  Service: Gastroenterology   • CYST REMOVAL      x 3, from hand, thigh, and buttocks   • ENDOSCOPY      20 years ago   • TIBIA FRACTURE SURGERY Bilateral 2011    tib-fib       Health Maintenance:  Health Maintenance   Topic Date Due   • HEPATITIS C SCREENING  05/26/2017   • MEDICARE ANNUAL WELLNESS  12/18/2019   • ZOSTER VACCINE (1 of 2) 09/01/2021 (Originally 2/20/2018)   • HEMOGLOBIN A1C  07/07/2020   • LIPID PANEL  12/10/2020   • TDAP/TD VACCINES (2 - Td) 01/01/2021   • DIABETIC EYE EXAM  01/07/2021   • DIABETIC FOOT EXAM  01/15/2021   • COLONOSCOPY  06/04/2021   • PNEUMOCOCCAL VACCINE (19-64 MEDIUM RISK)  Addressed   • " INFLUENZA VACCINE  Discontinued   • URINE MICROALBUMIN  Discontinued       Current Meds:    Current Outpatient Medications:   •  aspirin 325 MG tablet, Take 81 mg by mouth Daily., Disp: , Rfl:   •  atorvastatin (LIPITOR) 40 MG tablet, Take 1 tablet by mouth Daily., Disp: 90 tablet, Rfl: 3  •  Blood Glucose Monitoring Suppl (ONE TOUCH ULTRA 2) w/Device kit, Use 1 daily to test blood glucose level., Disp: 1 each, Rfl: 5  •  fluticasone (FLONASE) 50 MCG/ACT nasal spray, USE 2 SPRAYS IN EACH NOSTRIL DAILY, Disp: 48 mL, Rfl: 0  •  glucose blood test strip, USE TO TEST BLOOD SUGAR 1 TIME DAILY E11.9, Disp: 100 each, Rfl: 12  •  glucose monitor monitoring kit, 1 each As Needed (Diabetes). USE TO TEST 1 TIME DAILY, Disp: 1 each, Rfl: 0  •  hydrOXYzine (ATARAX) 50 MG tablet, Take 50 mg by mouth 4 (Four) Times a Day., Disp: , Rfl:   •  ibuprofen (ADVIL,MOTRIN) 600 MG tablet, Take 1 tablet by mouth Every 6 (Six) Hours As Needed for Mild Pain ., Disp: 15 tablet, Rfl: 0  •  Insulin Pen Needle (CareTouch Pen Needles) 32G X 5 MM misc, Inject once camp, Disp: 30 each, Rfl: 11  •  JARDIANCE 25 MG tablet, TAKE 1 TABLET (25 MG) BY MOUTH DAILY., Disp: 90 tablet, Rfl: 1  •  Lancets (ACCU-CHEK SOFT TOUCH) lancets, Use TO TEST BLOOD SUGAR 1 TIME DAILY., Disp: 100 each, Rfl: 12  •  lisinopril (PRINIVIL,ZESTRIL) 40 MG tablet, TAKE 1 TABLET BY MOUTH EVERY DAY, Disp: 90 tablet, Rfl: 3  •  metFORMIN (GLUCOPHAGE) 1000 MG tablet, TAKE 1 TABLET BY MOUTH TWICE A DAY WITH MEALS, Disp: 180 tablet, Rfl: 0  •  metoprolol tartrate (LOPRESSOR) 50 MG tablet, Take 1 tablet by mouth Every 12 (Twelve) Hours., Disp: 180 tablet, Rfl: 1  •  nitroglycerin (NITROSTAT) 0.4 MG SL tablet, Place 1 tablet under the tongue Every 5 (Five) Minutes As Needed for Chest Pain. Take no more than 3 doses in 15 minutes., Disp: 30 tablet, Rfl: 12  •  paliperidone palmitate (INVEGA SUSTENNA) 117 MG/0.75ML suspension IM injection, Inject 117 mg into the shoulder, thigh, or  buttocks Every 30 (Thirty) Days., Disp: , Rfl:   •  Semaglutide,0.25 or 0.5MG/DOS, (Ozempic, 0.25 or 0.5 MG/DOSE,) 2 MG/1.5ML solution pen-injector, Inject 0.5 mg under the skin into the appropriate area as directed 1 (One) Time Per Week., Disp: 3 pen, Rfl: 11  •  SITagliptin (JANUVIA) 100 MG tablet, Take 1 tablet by mouth Daily., Disp: 90 tablet, Rfl: 1  •  sodium chloride (OCEAN NASAL SPRAY) 0.65 % nasal spray, 1 spray into each nostril As Needed for Congestion., Disp: 60 mL, Rfl: 12  •  traZODone (DESYREL) 150 MG tablet, TAKE 1 TABLET BY MOUTH EVERY DAY AT BEDTIME AS NEEDED, Disp: , Rfl: 2  •  Triamcinolone Acetonide (NASACORT) 55 MCG/ACT nasal inhaler, 2 sprays into the nostril(s) as directed by provider Daily., Disp: 16.5 g, Rfl: 3    Allergies:  Latex and Codeine    Family Hx:  Family History   Problem Relation Age of Onset   • Diabetes Other    • Hypertension Other    • Cancer Other    • Lung disease Other    • Thyroid disease Other    • Bleeding Disorder Other    • Colon cancer Paternal Grandmother    • Cancer Paternal Grandfather    • Crohn's disease Mother    • Diabetes Mother    • Hypertension Father    • Lung disease Father    • Hypertension Brother    • Hypertension Brother    • Hypertension Brother         Social History:  Social History     Socioeconomic History   • Marital status: Single     Spouse name: Not on file   • Number of children: Not on file   • Years of education: Not on file   • Highest education level: Not on file   Occupational History   • 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   • Sexual activity: Defer   Social History Narrative    Single, lives alone in [place].  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         Review of Systems   Constitutional: Negative for chills, diaphoresis, fatigue and fever.   HENT: Negative for congestion and rhinorrhea.    Respiratory: Negative for cough, shortness of breath and wheezing.    Cardiovascular: Negative for chest pain, palpitations and leg swelling.   Gastrointestinal: Negative for abdominal pain, diarrhea and nausea.   Genitourinary: Negative for dysuria and flank pain.   Skin: Negative for color change and rash.   Neurological: Negative for dizziness and headaches.   Psychiatric/Behavioral: Negative for agitation. The patient is not nervous/anxious.            Objective:     Ht 170.2 cm (67\")   BMI 39.31 kg/m²       Physical Exam    Assessment/Plan:      Diagnosis Plan   1. Type 2 diabetes mellitus without complication, without long-term current use of insulin (CMS/[facility])  metFORMIN (GLUCOPHAGE) 1000 MG tablet   2. Other chest pain       Patient doing well on current medications.  We will refill his metformin for him because he is running low.  Defer chest pain further work-up with cardiology.    Follow-up:     Return in about 6 weeks (around 7/14/2020) for Recheck DM.      Goals: improve diet  Barriers to goals: pt compliance    Health Maintenance   Topic Date Due   • HEPATITIS C SCREENING  05/26/2017   • MEDICARE ANNUAL WELLNESS  12/18/2019   • ZOSTER VACCINE (1 of 2) 09/01/2021 (Originally 2/20/2018)   • HEMOGLOBIN A1C  07/07/2020   • LIPID PANEL  12/10/2020   • TDAP/TD VACCINES (2 - Td) 01/01/2021   • DIABETIC EYE EXAM  01/07/2021   • DIABETIC FOOT EXAM  01/15/2021   • COLONOSCOPY  06/04/2021   • PNEUMOCOCCAL VACCINE (19-64 MEDIUM RISK)  Addressed   • INFLUENZA VACCINE  Discontinued   • URINE MICROALBUMIN  Discontinued     Tobacco: former smoker  Alcohol: does not drink  Lifestyle: Body mass index is 39.31 " kg/m². eat more fruits and vegetables, decrease soda or juice intake, increase water intake, increase physical activity, reduce screen time, reduce portion size, cut out extra servings, reduce fast food intake, family to eat at dinner table more often, keep TV off during meals, plan meals, eat breakfast and have 3 meals a day    RISK SCORE: 4  Spent 6 minutes      This document has been electronically signed by Yang Veloz MD on June 2, 2020 15:20

## 2020-06-03 NOTE — PROGRESS NOTES
I have reviewed the notes, assessments, and/or procedures performed by Yang Veloz MD, I concur with her/his documentation and assessment and plan for Kingsley Littlejohn.                This document has been electronically signed by Favio Muro MD on Ashley 3, 2020 15:53

## 2020-06-10 ENCOUNTER — OFFICE VISIT (OUTPATIENT)
Dept: PODIATRY | Facility: CLINIC | Age: 50
End: 2020-06-10

## 2020-06-10 VITALS — HEART RATE: 110 BPM | BODY MASS INDEX: 39.39 KG/M2 | WEIGHT: 251 LBS | OXYGEN SATURATION: 98 % | HEIGHT: 67 IN

## 2020-06-10 DIAGNOSIS — B35.1 ONYCHOMYCOSIS: Primary | ICD-10-CM

## 2020-06-10 DIAGNOSIS — M79.674 CHRONIC TOE PAIN, BILATERAL: ICD-10-CM

## 2020-06-10 DIAGNOSIS — M79.675 CHRONIC TOE PAIN, BILATERAL: ICD-10-CM

## 2020-06-10 DIAGNOSIS — G89.29 CHRONIC TOE PAIN, BILATERAL: ICD-10-CM

## 2020-06-10 DIAGNOSIS — E11.42 TYPE 2 DIABETES MELLITUS WITH PERIPHERAL NEUROPATHY (HCC): ICD-10-CM

## 2020-06-10 PROCEDURE — 11721 DEBRIDE NAIL 6 OR MORE: CPT | Performed by: PODIATRIST

## 2020-06-10 NOTE — PROGRESS NOTES
"Kingsley Dumont Eron  1970  50 y.o. male  RC Veloz - 6/2/20  BS - 158    Patient presents today for routine diabetic foot care.     06/10/2020     Chief Complaint   Patient presents with   • Left Foot - diabetic nail care   • Right Foot - diabetic nail care       History of Present Illness    Patient presents to clinic today for routine diabetic footcare.       Past Medical History:   Diagnosis Date   • Abdominal pain    • Allergic rhinitis    • Diabetes mellitus (CMS/[facility])     Prev HbA1c 14.0 2014, states \"I'm cured!\"   • Essential hypertension    • Fracture closed, fibula, shaft     tib-fib   • Onychomycosis          Past Surgical History:   Procedure Laterality Date   • ANKLE SURGERY      right and left   • COLONOSCOPY N/A 6/4/2018    Procedure: COLONOSCOPY;  Surgeon: Jaylon Castro MD;  Location: [facility] ENDOSCOPY;  Service: Gastroenterology   • CYST REMOVAL      x 3, from hand, thigh, and buttocks   • ENDOSCOPY      20 years ago   • TIBIA FRACTURE SURGERY Bilateral 2011    tib-fib         Family History   Problem Relation Age of Onset   • Diabetes Other    • Hypertension Other    • Cancer Other    • Lung disease Other    • Thyroid disease Other    • Bleeding Disorder Other    • Colon cancer Paternal Grandmother    • Cancer Paternal Grandfather    • Crohn's disease Mother    • Diabetes Mother    • Hypertension Father    • Lung disease Father    • Hypertension Brother    • Hypertension Brother    • Hypertension Brother        Allergies   Allergen Reactions   • Latex Irritability   • Codeine Nausea Only       Social History     Socioeconomic History   • Marital status: Single     Spouse name: Not on file   • Number of children: Not on file   • Years of education: Not on file   • Highest education level: Not on file   Occupational History   • 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   • Sexual activity: Defer   Social History Narrative    Single, lives alone in [place].  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           Current Outpatient Medications   Medication Sig Dispense Refill   • aspirin 325 MG tablet Take 81 mg by mouth Daily.     • atorvastatin (LIPITOR) 40 MG tablet Take 1 tablet by mouth Daily. 90 tablet 3   • Blood Glucose Monitoring Suppl (ONE TOUCH ULTRA 2) w/Device kit Use 1 daily to test blood glucose level. 1 each 5   • fluticasone (FLONASE) 50 MCG/ACT nasal spray USE 2 SPRAYS IN EACH NOSTRIL DAILY 48 mL 0   • glucose blood test strip USE TO TEST BLOOD SUGAR 1 TIME DAILY E11.9 100 each 12   • glucose monitor monitoring kit 1 each As Needed (Diabetes). USE TO TEST 1 TIME DAILY 1 each 0   • hydrOXYzine (ATARAX) 50 MG tablet Take 50 mg by mouth 4 (Four) Times a Day.     • ibuprofen (ADVIL,MOTRIN) 600 MG tablet Take 1 tablet by mouth Every 6 (Six) Hours As Needed for Mild Pain . 15 tablet 0   • Insulin Pen Needle (CareTouch Pen Needles) 32G X 5 MM misc Inject once camp 30 each 11   • JARDIANCE 25 MG tablet TAKE 1 TABLET (25 MG) BY MOUTH DAILY. 90 tablet 1   • Lancets (ACCU-CHEK SOFT TOUCH) lancets Use TO TEST BLOOD SUGAR 1 TIME DAILY. 100 each 12   • lisinopril (PRINIVIL,ZESTRIL) 40 MG tablet TAKE 1 TABLET BY MOUTH EVERY DAY 90 tablet 3   • metFORMIN (GLUCOPHAGE) 1000 MG tablet Take 1 tablet by mouth 2 (Two) Times a Day With Meals. 180 tablet 0   • metoprolol tartrate (LOPRESSOR) 50 MG tablet Take 1 tablet by mouth Every 12 (Twelve) Hours. 180 tablet 1   • nitroglycerin (NITROSTAT) 0.4 MG SL tablet Place 1 tablet under the tongue Every 5 (Five) Minutes As Needed for  "Chest Pain. Take no more than 3 doses in 15 minutes. 30 tablet 12   • paliperidone palmitate (INVEGA SUSTENNA) 117 MG/0.75ML suspension IM injection Inject 117 mg into the shoulder, thigh, or buttocks Every 30 (Thirty) Days.     • Semaglutide,0.25 or 0.5MG/DOS, (Ozempic, 0.25 or 0.5 MG/DOSE,) 2 MG/1.5ML solution pen-injector Inject 0.5 mg under the skin into the appropriate area as directed 1 (One) Time Per Week. 3 pen 11   • SITagliptin (JANUVIA) 100 MG tablet Take 1 tablet by mouth Daily. 90 tablet 1   • sodium chloride (OCEAN NASAL SPRAY) 0.65 % nasal spray 1 spray into each nostril As Needed for Congestion. 60 mL 12   • traZODone (DESYREL) 150 MG tablet TAKE 1 TABLET BY MOUTH EVERY DAY AT BEDTIME AS NEEDED  2   • Triamcinolone Acetonide (NASACORT) 55 MCG/ACT nasal inhaler 2 sprays into the nostril(s) as directed by provider Daily. 16.5 g 3     No current facility-administered medications for this visit.        Review of Systems   Constitutional: Negative.    HENT: Negative.    Eyes: Negative.    Respiratory: Negative.    Cardiovascular: Positive for leg swelling.   Gastrointestinal: Negative.    Musculoskeletal:        Toe pain    Skin: Negative.    Allergic/Immunologic: Negative.    Neurological: Positive for numbness. Negative for dizziness.   Psychiatric/Behavioral: Negative.          OBJECTIVE    Pulse 110   Ht 170.2 cm (67\")   Wt 114 kg (251 lb)   SpO2 98%   BMI 39.31 kg/m²       Physical Exam   Constitutional: He is oriented to person, place, and time. He appears well-developed and well-nourished.   HENT:   Head: Normocephalic and atraumatic.   Eyes: Pupils are equal, round, and reactive to light. EOM are normal.   Pulmonary/Chest: Effort normal. No respiratory distress.   Musculoskeletal: Normal range of motion. He exhibits tenderness. He exhibits no edema or deformity.   Neurological: He is alert and oriented to person, place, and time.   Skin: Skin is warm and dry. Capillary refill takes less than 2 " seconds. He is not diaphoretic. No erythema.   Psychiatric: He has a normal mood and affect. His behavior is normal.   Vitals reviewed.      Gait: Normal     Assistive Device: None    Lower Extremity    Cardiovascular:    DP/PT pulses palpable bilateral  CFT brisk  to all digits  Skin temp is warm to warm from proximal tibia to distal digits bilateral  Pedal hair growth present.   Musculoskeletal:  Muscle strength is 5/5 for all muscle groups tested   ROM of the 1st MTP is full without pain or crepitus bilateral  ROM of the ankle joint is full without pain or crepitus  bilateral  Dermatological:   Nails 1-5 bilateral are thickened, discolored, elongated.  Pain on palpation to the nail plates.  Skin is warm, dry and intact bilateral  Webspaces 1-4 bilateral are clean, dry and intact.   Neurological:   Protective sensation intact   Sensation intact to light touch        Procedures        ASSESSMENT AND PLAN    Kingsley was seen today for diabetic nail care and diabetic nail care.    Diagnoses and all orders for this visit:    Onychomycosis    Chronic toe pain, bilateral    Type 2 diabetes mellitus with peripheral neuropathy (CMS/HCC)      - Nails 1-5 bilateral were debrided in length and thickness with nail nipper and electric  to decrease fungal load and risk of infection.   - All the patients questions were answered.  - RTC 3 months or sooner if needed.               This document has been electronically signed by Abhijeet Thomason DPM on Ashley 10, 2020 15:07     6/10/2020  15:07

## 2020-06-25 ENCOUNTER — LAB (OUTPATIENT)
Dept: LAB | Facility: HOSPITAL | Age: 50
End: 2020-06-25

## 2020-06-25 LAB
25(OH)D3 SERPL-MCNC: 26.6 NG/ML (ref 30–100)
ALBUMIN SERPL-MCNC: 4.2 G/DL (ref 3.5–5.2)
ALBUMIN UR-MCNC: <1.2 MG/DL
ALBUMIN/GLOB SERPL: 1.4 G/DL
ALP SERPL-CCNC: 77 U/L (ref 39–117)
ALT SERPL W P-5'-P-CCNC: 32 U/L (ref 1–41)
ANION GAP SERPL CALCULATED.3IONS-SCNC: 12.8 MMOL/L (ref 5–15)
ARTICHOKE IGE QN: 46 MG/DL (ref 0–100)
AST SERPL-CCNC: 11 U/L (ref 1–40)
BASOPHILS # BLD AUTO: 0.08 10*3/MM3 (ref 0–0.2)
BASOPHILS NFR BLD AUTO: 1.1 % (ref 0–1.5)
BILIRUB SERPL-MCNC: 0.6 MG/DL (ref 0.2–1.2)
BUN BLD-MCNC: 15 MG/DL (ref 6–20)
BUN/CREAT SERPL: 18.1 (ref 7–25)
CALCIUM SPEC-SCNC: 9.6 MG/DL (ref 8.6–10.5)
CHLORIDE SERPL-SCNC: 96 MMOL/L (ref 98–107)
CHOLEST SERPL-MCNC: 116 MG/DL (ref 0–200)
CO2 SERPL-SCNC: 23.2 MMOL/L (ref 22–29)
CREAT BLD-MCNC: 0.83 MG/DL (ref 0.76–1.27)
CREAT UR-MCNC: 32.2 MG/DL
CREAT UR-MCNC: 32.2 MG/DL
DEPRECATED RDW RBC AUTO: 34.8 FL (ref 37–54)
EOSINOPHIL # BLD AUTO: 0.17 10*3/MM3 (ref 0–0.4)
EOSINOPHIL NFR BLD AUTO: 2.2 % (ref 0.3–6.2)
ERYTHROCYTE [DISTWIDTH] IN BLOOD BY AUTOMATED COUNT: 13 % (ref 12.3–15.4)
GFR SERPL CREATININE-BSD FRML MDRD: 97 ML/MIN/1.73
GLOBULIN UR ELPH-MCNC: 3.1 GM/DL
GLUCOSE BLD-MCNC: 288 MG/DL (ref 65–99)
HBA1C MFR BLD: 11.21 % (ref 4.8–5.6)
HCT VFR BLD AUTO: 41.4 % (ref 37.5–51)
HDLC SERPL-MCNC: 35 MG/DL (ref 40–60)
HGB BLD-MCNC: 13.9 G/DL (ref 13–17.7)
IMM GRANULOCYTES # BLD AUTO: 0.04 10*3/MM3 (ref 0–0.05)
IMM GRANULOCYTES NFR BLD AUTO: 0.5 % (ref 0–0.5)
LDLC SERPL CALC-MCNC: ABNORMAL MG/DL
LDLC/HDLC SERPL: ABNORMAL {RATIO}
LYMPHOCYTES # BLD AUTO: 2.47 10*3/MM3 (ref 0.7–3.1)
LYMPHOCYTES NFR BLD AUTO: 32.6 % (ref 19.6–45.3)
MCH RBC QN AUTO: 25.6 PG (ref 26.6–33)
MCHC RBC AUTO-ENTMCNC: 33.6 G/DL (ref 31.5–35.7)
MCV RBC AUTO: 76.4 FL (ref 79–97)
MICROALBUMIN/CREAT UR: NORMAL MG/G{CREAT}
MONOCYTES # BLD AUTO: 0.7 10*3/MM3 (ref 0.1–0.9)
MONOCYTES NFR BLD AUTO: 9.2 % (ref 5–12)
NEUTROPHILS # BLD AUTO: 4.12 10*3/MM3 (ref 1.7–7)
NEUTROPHILS NFR BLD AUTO: 54.4 % (ref 42.7–76)
NRBC BLD AUTO-RTO: 0 /100 WBC (ref 0–0.2)
PLATELET # BLD AUTO: 252 10*3/MM3 (ref 140–450)
PMV BLD AUTO: 10.5 FL (ref 6–12)
POTASSIUM BLD-SCNC: 4.3 MMOL/L (ref 3.5–5.2)
PROT SERPL-MCNC: 7.3 G/DL (ref 6–8.5)
PROT UR-MCNC: <4 MG/DL
PROT/CREAT UR: NORMAL MG/G{CREAT}
RBC # BLD AUTO: 5.42 10*6/MM3 (ref 4.14–5.8)
SODIUM BLD-SCNC: 132 MMOL/L (ref 136–145)
TRIGL SERPL-MCNC: 412 MG/DL (ref 0–150)
TSH SERPL DL<=0.05 MIU/L-ACNC: 1.73 UIU/ML (ref 0.27–4.2)
VIT B12 BLD-MCNC: 577 PG/ML (ref 211–946)
VLDLC SERPL-MCNC: ABNORMAL MG/DL
WBC NRBC COR # BLD: 7.58 10*3/MM3 (ref 3.4–10.8)

## 2020-06-25 PROCEDURE — 82306 VITAMIN D 25 HYDROXY: CPT | Performed by: NURSE PRACTITIONER

## 2020-06-25 PROCEDURE — 80053 COMPREHEN METABOLIC PANEL: CPT | Performed by: NURSE PRACTITIONER

## 2020-06-25 PROCEDURE — 82607 VITAMIN B-12: CPT | Performed by: NURSE PRACTITIONER

## 2020-06-25 PROCEDURE — 85025 COMPLETE CBC W/AUTO DIFF WBC: CPT | Performed by: NURSE PRACTITIONER

## 2020-06-25 PROCEDURE — 83036 HEMOGLOBIN GLYCOSYLATED A1C: CPT | Performed by: NURSE PRACTITIONER

## 2020-06-25 PROCEDURE — 82570 ASSAY OF URINE CREATININE: CPT | Performed by: NURSE PRACTITIONER

## 2020-06-25 PROCEDURE — 80061 LIPID PANEL: CPT | Performed by: NURSE PRACTITIONER

## 2020-06-25 PROCEDURE — 83721 ASSAY OF BLOOD LIPOPROTEIN: CPT | Performed by: NURSE PRACTITIONER

## 2020-06-25 PROCEDURE — 36415 COLL VENOUS BLD VENIPUNCTURE: CPT | Performed by: NURSE PRACTITIONER

## 2020-06-25 PROCEDURE — 84443 ASSAY THYROID STIM HORMONE: CPT | Performed by: NURSE PRACTITIONER

## 2020-06-25 PROCEDURE — 84156 ASSAY OF PROTEIN URINE: CPT | Performed by: NURSE PRACTITIONER

## 2020-06-25 PROCEDURE — 82043 UR ALBUMIN QUANTITATIVE: CPT | Performed by: NURSE PRACTITIONER

## 2020-06-26 ENCOUNTER — OFFICE VISIT (OUTPATIENT)
Dept: ENDOCRINOLOGY | Facility: CLINIC | Age: 50
End: 2020-06-26

## 2020-06-26 VITALS
SYSTOLIC BLOOD PRESSURE: 136 MMHG | HEIGHT: 67 IN | DIASTOLIC BLOOD PRESSURE: 76 MMHG | BODY MASS INDEX: 40.02 KG/M2 | OXYGEN SATURATION: 100 % | WEIGHT: 255 LBS | HEART RATE: 102 BPM

## 2020-06-26 DIAGNOSIS — E55.9 VITAMIN D DEFICIENCY: ICD-10-CM

## 2020-06-26 DIAGNOSIS — E78.49 OTHER HYPERLIPIDEMIA: ICD-10-CM

## 2020-06-26 DIAGNOSIS — I10 ESSENTIAL HYPERTENSION: Primary | ICD-10-CM

## 2020-06-26 DIAGNOSIS — E11.65 TYPE 2 DIABETES MELLITUS WITH HYPERGLYCEMIA, WITHOUT LONG-TERM CURRENT USE OF INSULIN (HCC): ICD-10-CM

## 2020-06-26 PROCEDURE — 99214 OFFICE O/P EST MOD 30 MIN: CPT | Performed by: NURSE PRACTITIONER

## 2020-06-26 NOTE — PROGRESS NOTES
"  Subjective    Kingsley Littlejohn is a 50 y.o. male. he is here today for follow-up.    History of Present Illness     IN OFFICE VISIT       Referring provider Yang Veloz MD     50 year old male presents for follow up         REASON - diabetes mellitus type 2 not controlled         Duration/Timing - diagnosed in 2014/ constant         Quality -improved control           Severity - high due to complications      Complications - neuropathy, hyperglycemia         Severity (Complication/Hospitalizations)        Secondary Macrovascular--  No CVA, no PAD, no CAD      Had a MI in the past         Secondary Microvascular -- neuropathy, no diabetic retinopathy, no renal disease         Context-- presented with symptoms of diabetes increased thirst and urination             Diabetes Regimen-- oral medication       Lab Results   Component Value Date    HGBA1C 11.21 (H) 06/25/2020                   Blood Glucose Readings     Seeing under 200                   Diet-        Trying to keep carbs at 60 gm per meal      Associated Signs/Symptoms       Hyperglycemic Symptoms: increased thirst       Hypoglycemic Episodes: none     Aggravating factors -- refusing insulin                  The following portions of the patient's history were reviewed and updated as appropriate:   Past Medical History:   Diagnosis Date   • Abdominal pain    • Allergic rhinitis    • Diabetes mellitus (CMS/[facility])     Prev HbA1c 14.0 2014, states \"I'm cured!\"   • Essential hypertension    • Fracture closed, fibula, shaft     tib-fib   • Onychomycosis      Past Surgical History:   Procedure Laterality Date   • ANKLE SURGERY      right and left   • COLONOSCOPY N/A 6/4/2018    Procedure: COLONOSCOPY;  Surgeon: Jaylon Castro MD;  Location: [facility] ENDOSCOPY;  Service: Gastroenterology   • CYST REMOVAL      x 3, from hand, thigh, and buttocks   • ENDOSCOPY      20 years ago   • TIBIA FRACTURE SURGERY Bilateral 2011    tib-fib     Family History   Problem " Relation Age of Onset   • Diabetes Other    • Hypertension Other    • Cancer Other    • Lung disease Other    • Thyroid disease Other    • Bleeding Disorder Other    • Colon cancer Paternal Grandmother    • Cancer Paternal Grandfather    • Crohn's disease Mother    • Diabetes Mother    • Hypertension Father    • Lung disease Father    • Hypertension Brother    • Hypertension Brother    • Hypertension Brother        Current Outpatient Medications   Medication Sig Dispense Refill   • aspirin 325 MG tablet Take 325 mg by mouth Daily.     • atorvastatin (LIPITOR) 40 MG tablet Take 1 tablet by mouth Daily. 90 tablet 3   • Blood Glucose Monitoring Suppl (ONE TOUCH ULTRA 2) w/Device kit Use 1 daily to test blood glucose level. 1 each 5   • fluticasone (FLONASE) 50 MCG/ACT nasal spray USE 2 SPRAYS IN EACH NOSTRIL DAILY 48 mL 0   • glucose blood test strip USE TO TEST BLOOD SUGAR 1 TIME DAILY E11.9 100 each 12   • glucose monitor monitoring kit 1 each As Needed (Diabetes). USE TO TEST 1 TIME DAILY 1 each 0   • hydrOXYzine (ATARAX) 50 MG tablet Take 50 mg by mouth 4 (Four) Times a Day.     • ibuprofen (ADVIL,MOTRIN) 600 MG tablet Take 1 tablet by mouth Every 6 (Six) Hours As Needed for Mild Pain . 15 tablet 0   • Insulin Pen Needle (CareTouch Pen Needles) 32G X 5 MM misc Inject once camp 30 each 11   • JARDIANCE 25 MG tablet TAKE 1 TABLET (25 MG) BY MOUTH DAILY. 90 tablet 1   • Lancets (ACCU-CHEK SOFT TOUCH) lancets Use TO TEST BLOOD SUGAR 1 TIME DAILY. 100 each 12   • lisinopril (PRINIVIL,ZESTRIL) 40 MG tablet TAKE 1 TABLET BY MOUTH EVERY DAY 90 tablet 3   • metFORMIN (GLUCOPHAGE) 1000 MG tablet Take 1 tablet by mouth 2 (Two) Times a Day With Meals. 180 tablet 0   • metoprolol tartrate (LOPRESSOR) 50 MG tablet Take 1 tablet by mouth Every 12 (Twelve) Hours. 180 tablet 1   • nitroglycerin (NITROSTAT) 0.4 MG SL tablet Place 1 tablet under the tongue Every 5 (Five) Minutes As Needed for Chest Pain. Take no more than 3 doses in  15 minutes. 30 tablet 12   • paliperidone palmitate (INVEGA SUSTENNA) 117 MG/0.75ML suspension IM injection Inject 117 mg into the shoulder, thigh, or buttocks Every 30 (Thirty) Days.     • Semaglutide,0.25 or 0.5MG/DOS, (Ozempic, 0.25 or 0.5 MG/DOSE,) 2 MG/1.5ML solution pen-injector Inject 0.5 mg under the skin into the appropriate area as directed 1 (One) Time Per Week. 3 pen 11   • traZODone (DESYREL) 150 MG tablet TAKE 1 TABLET BY MOUTH EVERY DAY AT BEDTIME AS NEEDED  2   • Triamcinolone Acetonide (NASACORT) 55 MCG/ACT nasal inhaler 2 sprays into the nostril(s) as directed by provider Daily. 16.5 g 3   • Semaglutide, 1 MG/DOSE, (Ozempic, 1 MG/DOSE,) 2 MG/1.5ML solution pen-injector Inject 1 mg under the skin into the appropriate area as directed 1 (One) Time Per Week. 3 pen 11   • sodium chloride (OCEAN NASAL SPRAY) 0.65 % nasal spray 1 spray into each nostril As Needed for Congestion. 60 mL 12     No current facility-administered medications for this visit.      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   • Sexual activity: Defer   Social History Narrative    Single, lives alone in [place].  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         Review of  "Systems  Review of Systems   Constitutional: Negative for activity change, appetite change, diaphoresis and fatigue.   HENT: Negative for facial swelling, sneezing, sore throat, tinnitus, trouble swallowing and voice change.    Eyes: Negative for photophobia, pain, discharge, redness, itching and visual disturbance.   Respiratory: Negative for apnea, cough, choking, chest tightness and shortness of breath.    Cardiovascular: Negative for chest pain, palpitations and leg swelling.   Gastrointestinal: Negative for abdominal distention, abdominal pain, constipation, diarrhea, nausea and vomiting.   Endocrine: Negative for cold intolerance, heat intolerance, polydipsia, polyphagia and polyuria.   Genitourinary: Negative for difficulty urinating, dysuria, frequency, hematuria and urgency.   Musculoskeletal: Negative for arthralgias, back pain, gait problem, joint swelling, myalgias, neck pain and neck stiffness.   Skin: Negative for color change, pallor, rash and wound.   Neurological: Negative for dizziness, tremors, weakness, light-headedness, numbness and headaches.   Hematological: Negative for adenopathy. Does not bruise/bleed easily.   Psychiatric/Behavioral: Negative for behavioral problems, confusion and sleep disturbance.        Objective    /76   Pulse 102   Ht 170.2 cm (67\")   Wt 116 kg (255 lb)   SpO2 100%   BMI 39.94 kg/m²   Physical Exam   Constitutional: He is oriented to person, place, and time. He appears well-developed and well-nourished. No distress.   HENT:   Head: Normocephalic and atraumatic.   Right Ear: External ear normal.   Left Ear: External ear normal.   Nose: Nose normal.   Eyes: Pupils are equal, round, and reactive to light. Conjunctivae and EOM are normal.   Neck: Normal range of motion. Neck supple. No tracheal deviation present. No thyromegaly present.   Cardiovascular: Normal rate, regular rhythm and normal heart sounds.   No murmur heard.  Pulmonary/Chest: Effort normal and " breath sounds normal. No respiratory distress. He has no wheezes.   Abdominal: Soft. Bowel sounds are normal. There is no tenderness. There is no rebound and no guarding.   Musculoskeletal: Normal range of motion. He exhibits no edema, tenderness or deformity.   Neurological: He is alert and oriented to person, place, and time. No cranial nerve deficit.   Skin: Skin is warm and dry. No rash noted.   Psychiatric: He has a normal mood and affect. His behavior is normal. Judgment and thought content normal.       Lab Review  Glucose (mg/dL)   Date Value   06/25/2020 288 (H)   08/29/2019 214 (H)   08/28/2019 196 (H)     Sodium   Date Value   06/25/2020 132 mmol/L (L)   08/29/2019 136 mmol/L   08/28/2019 137 mmol/L   05/18/2018 138 MMOL/L   03/02/2018 137 MMOL/L     Potassium   Date Value   06/25/2020 4.3 mmol/L   08/29/2019 4.0 mmol/L   08/28/2019 4.0 mmol/L   05/18/2018 4.5 MMOL/L   03/02/2018 3.9 MMOL/L     Chloride   Date Value   06/25/2020 96 mmol/L (L)   08/29/2019 103 mmol/L   08/28/2019 101 mmol/L   05/18/2018 99 MMOL/L   03/02/2018 99 MMOL/L     CO2   Date Value   06/25/2020 23.2 mmol/L   08/29/2019 22.0 mmol/L   08/28/2019 21.0 mmol/L (L)   05/18/2018 29 MMOL/L   03/02/2018 25 MMOL/L     BUN   Date Value   06/25/2020 15 mg/dL   08/29/2019 17 mg/dL   08/28/2019 18 mg/dL   05/18/2018 16 MG/DL   03/02/2018 12 MG/DL     Creatinine   Date Value   06/25/2020 0.83 mg/dL   08/29/2019 0.90 mg/dL   08/28/2019 0.88 mg/dL   05/18/2018 0.9 MG/DL   03/02/2018 0.7 MG/DL     Hemoglobin A1C (%)   Date Value   06/25/2020 11.21 (H)   12/10/2019 11.46 (H)   06/06/2019 8.90 (H)   03/09/2018 6.4 (H)     Triglycerides (mg/dL)   Date Value   06/25/2020 412 (H)   12/10/2019 546 (H)   09/04/2018 281 (H)     LDL Cholesterol    Date Value   06/25/2020      Comment:     Unable to calculate   06/25/2020 46 mg/dL   12/10/2019      Comment:     Unable to calculate   12/10/2019 64 mg/dL   09/04/2018 58 mg/dL       Assessment/Plan      1.  Essential hypertension    2. Other hyperlipidemia    3. Type 2 diabetes mellitus with hyperglycemia, without long-term current use of insulin (CMS/[facility])    4. Vitamin D deficiency    .    Medications prescribed:  Outpatient Encounter Medications as of 6/26/2020   Medication Sig Dispense Refill   • aspirin 325 MG tablet Take 325 mg by mouth Daily.     • atorvastatin (LIPITOR) 40 MG tablet Take 1 tablet by mouth Daily. 90 tablet 3   • Blood Glucose Monitoring Suppl (ONE TOUCH ULTRA 2) w/Device kit Use 1 daily to test blood glucose level. 1 each 5   • fluticasone (FLONASE) 50 MCG/ACT nasal spray USE 2 SPRAYS IN EACH NOSTRIL DAILY 48 mL 0   • glucose blood test strip USE TO TEST BLOOD SUGAR 1 TIME DAILY E11.9 100 each 12   • glucose monitor monitoring kit 1 each As Needed (Diabetes). USE TO TEST 1 TIME DAILY 1 each 0   • hydrOXYzine (ATARAX) 50 MG tablet Take 50 mg by mouth 4 (Four) Times a Day.     • ibuprofen (ADVIL,MOTRIN) 600 MG tablet Take 1 tablet by mouth Every 6 (Six) Hours As Needed for Mild Pain . 15 tablet 0   • Insulin Pen Needle (CareTouch Pen Needles) 32G X 5 MM misc Inject once camp 30 each 11   • JARDIANCE 25 MG tablet TAKE 1 TABLET (25 MG) BY MOUTH DAILY. 90 tablet 1   • Lancets (ACCU-CHEK SOFT TOUCH) lancets Use TO TEST BLOOD SUGAR 1 TIME DAILY. 100 each 12   • lisinopril (PRINIVIL,ZESTRIL) 40 MG tablet TAKE 1 TABLET BY MOUTH EVERY DAY 90 tablet 3   • metFORMIN (GLUCOPHAGE) 1000 MG tablet Take 1 tablet by mouth 2 (Two) Times a Day With Meals. 180 tablet 0   • metoprolol tartrate (LOPRESSOR) 50 MG tablet Take 1 tablet by mouth Every 12 (Twelve) Hours. 180 tablet 1   • nitroglycerin (NITROSTAT) 0.4 MG SL tablet Place 1 tablet under the tongue Every 5 (Five) Minutes As Needed for Chest Pain. Take no more than 3 doses in 15 minutes. 30 tablet 12   • paliperidone palmitate (INVEGA SUSTENNA) 117 MG/0.75ML suspension IM injection Inject 117 mg into the shoulder, thigh, or buttocks Every 30 (Thirty) Days.     •  Semaglutide,0.25 or 0.5MG/DOS, (Ozempic, 0.25 or 0.5 MG/DOSE,) 2 MG/1.5ML solution pen-injector Inject 0.5 mg under the skin into the appropriate area as directed 1 (One) Time Per Week. 3 pen 11   • traZODone (DESYREL) 150 MG tablet TAKE 1 TABLET BY MOUTH EVERY DAY AT BEDTIME AS NEEDED  2   • Triamcinolone Acetonide (NASACORT) 55 MCG/ACT nasal inhaler 2 sprays into the nostril(s) as directed by provider Daily. 16.5 g 3   • Semaglutide, 1 MG/DOSE, (Ozempic, 1 MG/DOSE,) 2 MG/1.5ML solution pen-injector Inject 1 mg under the skin into the appropriate area as directed 1 (One) Time Per Week. 3 pen 11   • sodium chloride (OCEAN NASAL SPRAY) 0.65 % nasal spray 1 spray into each nostril As Needed for Congestion. 60 mL 12   • [DISCONTINUED] SITagliptin (JANUVIA) 100 MG tablet Take 1 tablet by mouth Daily. 90 tablet 1     No facility-administered encounter medications on file as of 6/26/2020.        Orders placed during this encounter include:  Orders Placed This Encounter   Procedures   • Comprehensive Metabolic Panel     Standing Status:   Future     Standing Expiration Date:   6/26/2021   • Hemoglobin A1c     Standing Status:   Future     Standing Expiration Date:   6/26/2021   • Lipid Panel     Standing Status:   Future     Standing Expiration Date:   6/26/2021   • Vitamin D 25 Hydroxy     Standing Status:   Future     Standing Expiration Date:   6/26/2021   • Vitamin B12     Standing Status:   Future     Standing Expiration Date:   6/26/2021   • TSH     Standing Status:   Future     Standing Expiration Date:   6/26/2021   • Protein / Creatinine Ratio, Urine - Urine, Clean Catch     Standing Status:   Future     Standing Expiration Date:   6/26/2021   • Microalbumin / Creatinine Urine Ratio - Urine, Clean Catch     Standing Status:   Future     Standing Expiration Date:   6/26/2021   • CBC & Differential     Standing Status:   Future     Standing Expiration Date:   6/26/2021     Order Specific Question:   Manual  Differential     Answer:   No     Glycemic Management     Type 2 diabetes not controlled with hyperglycemia     Lab Results   Component Value Date    HGBA1C 11.21 (H) 06/25/2020                      Metformin 1000 mg po BID -keep        Jardiance 25 mg one daily -keep        Januvia 100 mg daily ---- stop not effective             Ozempic 0.5 mg once weekly ---- increase to 1 mg      Side effects discussed, try to eat half your plate not a full plate     If nauseated eat less     If vomiting or abdominal pain stop medication     We discussed insulin and he refuses but agrees if this does not work he will start insulin      His blood sugars are improving and he has lost 5 lbs since starting ozempic     He is not able to do insulin do to his work , please approve the ozempic              Aim 60 gm of CHO per meal      If you have a snack 15 gms of CHO or less      Stop regular sweet drinks      Start walking if able        Lipid Management        Lipitor 40 mg one at night      Component      Latest Ref Rng & Units 6/25/2020 6/25/2020           1:28 PM  1:28 PM   Total Cholesterol      0 - 200 mg/dL 116    Triglycerides      0 - 150 mg/dL 412 (H)    HDL Cholesterol      40 - 60 mg/dL 35 (L)    LDL Cholesterol        46   VLDL Cholesterol           LDL/HDL Ratio                     Blood Pressure Management     Lisinopril 40 mg once daily      Metoprolol 50 mg daily            Microvascular Complication Monitoring        Last eye exam -- Jan. 2020 no DR           Component      Latest Ref Rng & Units 6/25/2020 6/25/2020           1:28 PM  1:28 PM   Protein/Creatinine Ratio           Creatinine, Urine      mg/dL 32.2 32.2   Total Protein, Urine      mg/dL <4.0    Microalbumin/Creatinine Ratio           Microalbumin, Urine      mg/dL  <1.2           Neuropathy mild      Prefers no RX at this time         Bone Health           Component      Latest Ref Rng & Units 6/25/2020   25 Hydroxy, Vitamin D      30.0 - 100.0 ng/ml  26.6 (L)                 Other Diabetes Related Aspects        Lab Results   Component Value Date    [id number] 06/25/2020             Thyroid Health       Lab Results   Component Value Date    TSH 1.730 06/25/2020             Weight Management:   Patient's Body mass index is 39.94 kg/m². BMI is above normal parameters. Recommendations include: nutrition counseling.       He has lost 5 lbs         4. Follow-up: Return in about 3 months (around 9/26/2020) for Recheck.

## 2020-07-17 ENCOUNTER — OFFICE VISIT (OUTPATIENT)
Dept: FAMILY MEDICINE CLINIC | Facility: CLINIC | Age: 50
End: 2020-07-17

## 2020-07-17 VITALS
TEMPERATURE: 97 F | HEART RATE: 89 BPM | DIASTOLIC BLOOD PRESSURE: 100 MMHG | HEIGHT: 67 IN | WEIGHT: 253.9 LBS | OXYGEN SATURATION: 98 % | BODY MASS INDEX: 39.85 KG/M2 | SYSTOLIC BLOOD PRESSURE: 142 MMHG

## 2020-07-17 DIAGNOSIS — H61.23 BILATERAL IMPACTED CERUMEN: ICD-10-CM

## 2020-07-17 DIAGNOSIS — Z00.00 MEDICARE ANNUAL WELLNESS VISIT, SUBSEQUENT: Primary | ICD-10-CM

## 2020-07-17 PROCEDURE — G0439 PPPS, SUBSEQ VISIT: HCPCS | Performed by: STUDENT IN AN ORGANIZED HEALTH CARE EDUCATION/TRAINING PROGRAM

## 2020-07-17 NOTE — PROGRESS NOTES
.  The ABCs of the Annual Wellness Visit  Subsequent Medicare Wellness Visit    Chief Complaint   Patient presents with   • Medicare Wellness-subsequent       Subjective   History of Present Illness:  Kingsley Littlejohn is a 50 y.o. male who presents for a Subsequent Medicare Wellness Visit.    HEALTH RISK ASSESSMENT    Recent Hospitalizations:  Recently treated at the following:  [facility]  Admitted 19 for chest pain. Workup negative for any cardiac causes, chest pain resolved prior to discharge.     Current Medical Providers:  Patient Care Team:  Bonilla Stanton MD as PCP - General (Family Medicine)  Gina Iniguez APRN as PCP - Claims Attributed  Lori Rivers MD as Resident (Family Medicine)  Ottoniel Rivers MD (Family Medicine)  Gabriella Whyte MD (Family Medicine)  Jair Mcbride MD as Resident (Family Medicine)  Eric Vargas MD as Resident (Family Medicine)    Smoking Status:  Social History     Tobacco Use   Smoking Status Former Smoker   • Packs/day: 1.00   • Years: 25.00   • Pack years: 25.00   • Types: Cigarettes   • Start date:    • Last attempt to quit: 2018   • Years since quittin.6   Smokeless Tobacco Never Used       Alcohol Consumption:  Social History     Substance and Sexual Activity   Alcohol Use No    Comment: FORMER pint of whiskVoxeo daily, states quit 1 year ago       Depression Screen:   PHQ-2/PHQ-9 Depression Screening 2020   Little interest or pleasure in doing things 0   Feeling down, depressed, or hopeless 0   Trouble falling or staying asleep, or sleeping too much 0   Feeling tired or having little energy 1   Poor appetite or overeating 0   Feeling bad about yourself - or that you are a failure or have let yourself or your family down 0   Trouble concentrating on things, such as reading the newspaper or watching television 0   Moving or speaking so slowly that other people could have noticed. Or the opposite - being so  fidgety or restless that you have been moving around a lot more than usual 0   Thoughts that you would be better off dead, or of hurting yourself in some way 0   Total Score 1   If you checked off any problems, how difficult have these problems made it for you to do your work, take care of things at home, or get along with other people? Not difficult at all       Fall Risk Screen:  SEVERINO Fall Risk Assessment has not been completed.    Health Habits and Functional and Cognitive Screening:  Functional & Cognitive Status 7/17/2020   Do you have difficulty preparing food and eating? No   Do you have difficulty bathing yourself, getting dressed or grooming yourself? No   Do you have difficulty using the toilet? No   Do you have difficulty moving around from place to place? No   Do you have trouble with steps or getting out of a bed or a chair? No   Current Diet Well Balanced Diet   Dental Exam Not up to date   Eye Exam Up to date   Exercise (times per week) 0 times per week   Current Exercise Activities Include None   Do you need help using the phone?  No   Are you deaf or do you have serious difficulty hearing?  No   Do you need help with transportation? No   Do you need help shopping? No   Do you need help preparing meals?  No   Do you need help with housework?  No   Do you need help with laundry? No   Do you need help taking your medications? No   Do you need help managing money? No   Do you ever drive or ride in a car without wearing a seat belt? No   Have you felt unusual stress, anger or loneliness in the last month? No   Who do you live with? Alone   If you need help, do you have trouble finding someone available to you? No   Have you been bothered in the last four weeks by sexual problems? No   Do you have difficulty concentrating, remembering or making decisions? No         Does the patient have evidence of cognitive impairment? No    Asprin use counseling:Taking ASA appropriately as  indicated    Age-appropriate Screening Schedule:  Refer to the list below for future screening recommendations based on patient's age, sex and/or medical conditions. Orders for these recommended tests are listed in the plan section. The patient has been provided with a written plan.    Health Maintenance   Topic Date Due   • ZOSTER VACCINE (1 of 2) 09/01/2021 (Originally 2/20/2018)   • HEMOGLOBIN A1C  12/25/2020   • TDAP/TD VACCINES (2 - Td) 01/01/2021   • DIABETIC EYE EXAM  01/07/2021   • COLONOSCOPY  06/04/2021   • DIABETIC FOOT EXAM  06/10/2021   • LIPID PANEL  06/25/2021   • PNEUMOCOCCAL VACCINE (19-64 MEDIUM RISK)  Addressed   • INFLUENZA VACCINE  Discontinued   • URINE MICROALBUMIN  Discontinued          The following portions of the patient's history were reviewed and updated as appropriate: allergies, current medications, past family history, past medical history, past social history, past surgical history and problem list.    Outpatient Medications Prior to Visit   Medication Sig Dispense Refill   • aspirin 325 MG tablet Take 325 mg by mouth Daily.     • atorvastatin (LIPITOR) 40 MG tablet Take 1 tablet by mouth Daily. 90 tablet 3   • Blood Glucose Monitoring Suppl (ONE TOUCH ULTRA 2) w/Device kit Use 1 daily to test blood glucose level. 1 each 5   • fluticasone (FLONASE) 50 MCG/ACT nasal spray USE 2 SPRAYS IN EACH NOSTRIL DAILY 48 mL 0   • glucose blood test strip USE TO TEST BLOOD SUGAR 1 TIME DAILY E11.9 100 each 12   • glucose monitor monitoring kit 1 each As Needed (Diabetes). USE TO TEST 1 TIME DAILY 1 each 0   • hydrOXYzine (ATARAX) 50 MG tablet Take 50 mg by mouth 4 (Four) Times a Day.     • ibuprofen (ADVIL,MOTRIN) 600 MG tablet Take 1 tablet by mouth Every 6 (Six) Hours As Needed for Mild Pain . 15 tablet 0   • Insulin Pen Needle (CareTouch Pen Needles) 32G X 5 MM misc Inject once camp 30 each 11   • JARDIANCE 25 MG tablet TAKE 1 TABLET (25 MG) BY MOUTH DAILY. 90 tablet 1   • Lancets (ACCU-CHEK  SOFT TOUCH) lancets Use TO TEST BLOOD SUGAR 1 TIME DAILY. 100 each 12   • lisinopril (PRINIVIL,ZESTRIL) 40 MG tablet TAKE 1 TABLET BY MOUTH EVERY DAY 90 tablet 3   • metFORMIN (GLUCOPHAGE) 1000 MG tablet Take 1 tablet by mouth 2 (Two) Times a Day With Meals. 180 tablet 0   • metoprolol tartrate (LOPRESSOR) 50 MG tablet Take 1 tablet by mouth Every 12 (Twelve) Hours. 180 tablet 1   • nitroglycerin (NITROSTAT) 0.4 MG SL tablet Place 1 tablet under the tongue Every 5 (Five) Minutes As Needed for Chest Pain. Take no more than 3 doses in 15 minutes. 30 tablet 12   • paliperidone palmitate (INVEGA SUSTENNA) 117 MG/0.75ML suspension IM injection Inject 117 mg into the shoulder, thigh, or buttocks Every 30 (Thirty) Days.     • Semaglutide, 1 MG/DOSE, (Ozempic, 1 MG/DOSE,) 2 MG/1.5ML solution pen-injector Inject 1 mg under the skin into the appropriate area as directed 1 (One) Time Per Week. 3 pen 11   • Semaglutide,0.25 or 0.5MG/DOS, (Ozempic, 0.25 or 0.5 MG/DOSE,) 2 MG/1.5ML solution pen-injector Inject 0.5 mg under the skin into the appropriate area as directed 1 (One) Time Per Week. 3 pen 11   • sodium chloride (OCEAN NASAL SPRAY) 0.65 % nasal spray 1 spray into each nostril As Needed for Congestion. 60 mL 12   • traZODone (DESYREL) 150 MG tablet TAKE 1 TABLET BY MOUTH EVERY DAY AT BEDTIME AS NEEDED  2   • Triamcinolone Acetonide (NASACORT) 55 MCG/ACT nasal inhaler 2 sprays into the nostril(s) as directed by provider Daily. 16.5 g 3     No facility-administered medications prior to visit.        Patient Active Problem List   Diagnosis   • Essential hypertension   • Anxiety   • Tobacco dependence   • Polysubstance (excluding opioids) dependence (CMS/[facility])   • Non morbid obesity due to excess calories   • Type 2 diabetes mellitus with hyperglycemia, without long-term current use of insulin (CMS/[facility])   • Other hyperlipidemia   • Psychotic mood disorder (CMS/[facility])   • Anxiety disorder   • Medicare annual wellness visit,  subsequent   • Other chest pain   • Obstructive sleep apnea syndrome   • Cyst of skin and subcutaneous tissue   • Hx of adenomatous colonic polyps   • Perianal abscess   • Scrotal abscess   • Chest pain with high risk for cardiac etiology   • Obesity, Class III, BMI 40-49.9 (morbid obesity) (CMS/[facility])       Advanced Care Planning:  ACP discussion was declined by the patient. Patient does not have an advance directive, information provided.    Review of Systems   Constitutional: Negative for appetite change, chills, diaphoresis, fatigue and fever.   HENT: Negative for ear discharge, ear pain, facial swelling, hearing loss, rhinorrhea, sinus pressure, sinus pain, sneezing, sore throat and voice change.    Eyes: Negative for pain, discharge and visual disturbance.   Respiratory: Negative for apnea, cough, chest tightness, shortness of breath, wheezing and stridor.    Cardiovascular: Negative for chest pain, palpitations and leg swelling.   Gastrointestinal: Negative for abdominal distention, abdominal pain, constipation, diarrhea, rectal pain and vomiting.   Genitourinary: Negative for dysuria, frequency and urgency.   Musculoskeletal: Negative for arthralgias, back pain, myalgias and neck pain.   Skin: Negative for color change, rash and wound.   Neurological: Negative for facial asymmetry, speech difficulty, light-headedness and headaches.   Psychiatric/Behavioral: Negative for agitation and decreased concentration. The patient is not nervous/anxious.        Compared to one year ago, the patient feels his physical health is the same.  Compared to one year ago, the patient feels his mental health is the same.    Reviewed chart for potential of high risk medication in the elderly: yes  Reviewed chart for potential of harmful drug interactions in the elderly:yes    Objective         Vitals:    07/17/20 0909   BP: 142/100  Comment: hasn't had med this morning   Pulse: 89   Temp: 97 °F (36.1 °C)   SpO2: 98%   Weight: 115  "kg (253 lb 14.4 oz)   Height: 170.2 cm (67\")   PainSc: 0-No pain       Body mass index is 39.77 kg/m².  Discussed the patient's BMI with him. The BMI is above average; BMI management plan is completed.    Physical Exam   Constitutional: He is oriented to person, place, and time. He appears well-developed and well-nourished. No distress.   HENT:   Head: Normocephalic and atraumatic.   Right Ear: External ear normal.   Left Ear: External ear normal.   Mouth/Throat: Oropharynx is clear and moist.   Cerumen impaction bilaterally     Eyes: Pupils are equal, round, and reactive to light. Conjunctivae and EOM are normal. No scleral icterus.   Neck: Normal range of motion.   Cardiovascular: Normal rate, regular rhythm and normal heart sounds. Exam reveals no gallop and no friction rub.   No murmur heard.  Pulmonary/Chest: Effort normal and breath sounds normal. No stridor. No respiratory distress. He has no wheezes. He has no rales. He exhibits no tenderness.   Abdominal: Soft. Bowel sounds are normal. He exhibits no distension. There is no tenderness.   Musculoskeletal: Normal range of motion. He exhibits no edema or tenderness.   Neurological: He is alert and oriented to person, place, and time.   Skin: Skin is warm and dry. He is not diaphoretic. No erythema.   Psychiatric: He has a normal mood and affect. His behavior is normal.       Lab Results   Component Value Date    TRIG 412 (H) 06/25/2020    HDL 35 (L) 06/25/2020    LDL  06/25/2020      Comment:      Unable to calculate    LDL 46 06/25/2020    VLDL  06/25/2020      Comment:      Unable to calculate    HGBA1C 11.21 (H) 06/25/2020        Assessment/Plan   Medicare Risks and Personalized Health Plan  CMS Preventative Services Quick Reference  Advance Directive Discussion  Alcohol Misuse  Cardiovascular risk  Depression/Dysphoria  Diabetic Lab Screening   Glaucoma Risk  Inactivity/Sedentary  Obesity/Overweight     The above risks/problems have been discussed with the " patient.  Pertinent information has been shared with the patient in the After Visit Summary.  Follow up plans and orders are seen below in the Assessment/Plan Section.    Diagnoses and all orders for this visit:    1. Medicare annual wellness visit, subsequent (Primary)    2. Bilateral impacted cerumen    Age and gender appropriate counseling given. Advised to increase physical activity. At least 30 minutes 5 days a week. A1C remains elevated at >11. Currently working with Endocrine for this. Continue Metformin, Jardiance, and Trulicity.     Return to office on 7/29/20 for cerumen removal.    Follow Up:  Return in about 1 year (around 7/17/2021).     An After Visit Summary and PPPS were given to the patient.               This document has been electronically signed by Eric Vargas MD on July 17, 2020 10:11

## 2020-07-24 NOTE — PROGRESS NOTES
I have reviewed the notes, assessments, and/or procedures performed by Dr. Eric Vargas, I concur with his  documentation and assessment and plan for Kingsley Littlejohn        This document has been electronically signed by Kg Kc MD on July 24, 2020 09:07

## 2020-07-29 ENCOUNTER — PROCEDURE VISIT (OUTPATIENT)
Dept: FAMILY MEDICINE CLINIC | Facility: CLINIC | Age: 50
End: 2020-07-29

## 2020-07-29 VITALS
HEART RATE: 97 BPM | WEIGHT: 254.19 LBS | DIASTOLIC BLOOD PRESSURE: 88 MMHG | OXYGEN SATURATION: 96 % | TEMPERATURE: 96.1 F | SYSTOLIC BLOOD PRESSURE: 126 MMHG | HEIGHT: 67 IN | BODY MASS INDEX: 39.9 KG/M2

## 2020-07-29 DIAGNOSIS — H92.03 OTALGIA, BILATERAL: Primary | ICD-10-CM

## 2020-07-29 PROCEDURE — 99213 OFFICE O/P EST LOW 20 MIN: CPT | Performed by: STUDENT IN AN ORGANIZED HEALTH CARE EDUCATION/TRAINING PROGRAM

## 2020-07-29 PROCEDURE — 69209 REMOVE IMPACTED EAR WAX UNI: CPT | Performed by: STUDENT IN AN ORGANIZED HEALTH CARE EDUCATION/TRAINING PROGRAM

## 2020-07-29 NOTE — PROGRESS NOTES
I have seen the patient.  I have reviewed the notes, assessments, and/or procedures performed by Dr. Forte, I concur with her/his documentation and assessment and plan for Kingsley Littlejohn.               This document has been electronically signed by Favio Muro MD on July 29, 2020 14:34

## 2020-07-29 NOTE — PROGRESS NOTES
"Subjective:     Kingsley Littlejohn is a 50 y.o. male who presents for evaluation of acute illness.    Chief Complaint   Patient presents with   • Ear Problem     Pt here for ear cleaning       History of Present Illness     Patient presents with complaint of bilateral otalgia. He believes this is because he uses ear plugs at night. He uses ear plugs because he sleeps in the living room where his bedridden mother stays to tend to her and this helps cut down on noise pollution. He additionally uses qtips to clean ears. Otalgia going on 2 weeks. Mild. No other complaints.       The following portions of the patient's history were reviewed and updated as appropriate: allergies, current medications, past family history, past medical history, past social history, past surgical history and problem list.    Past Medical History:   Diagnosis Date   • Abdominal pain    • Allergic rhinitis    • Diabetes mellitus (CMS/[facility])     Prev HbA1c 14.0 2014, states \"I'm cured!\"   • Essential hypertension    • Fracture closed, fibula, shaft     tib-fib   • Onychomycosis        Past Surgical History:   Procedure Laterality Date   • ANKLE SURGERY      right and left   • COLONOSCOPY N/A 6/4/2018    Procedure: COLONOSCOPY;  Surgeon: Jaylon Castro MD;  Location: [facility] ENDOSCOPY;  Service: Gastroenterology   • CYST REMOVAL      x 3, from hand, thigh, and buttocks   • ENDOSCOPY      20 years ago   • TIBIA FRACTURE SURGERY Bilateral 2011    tib-fib       Family History   Problem Relation Age of Onset   • Diabetes Other    • Hypertension Other    • Cancer Other    • Lung disease Other    • Thyroid disease Other    • Bleeding Disorder Other    • Colon cancer Paternal Grandmother    • Cancer Paternal Grandfather    • Crohn's disease Mother    • Diabetes Mother    • Hypertension Father    • Lung disease Father    • Hypertension Brother    • Hypertension Brother    • Hypertension Brother          Current Outpatient Medications:   •  aspirin 325 " MG tablet, Take 325 mg by mouth Daily., Disp: , Rfl:   •  atorvastatin (LIPITOR) 40 MG tablet, Take 1 tablet by mouth Daily., Disp: 90 tablet, Rfl: 3  •  Blood Glucose Monitoring Suppl (ONE TOUCH ULTRA 2) w/Device kit, Use 1 daily to test blood glucose level., Disp: 1 each, Rfl: 5  •  fluticasone (FLONASE) 50 MCG/ACT nasal spray, USE 2 SPRAYS IN EACH NOSTRIL DAILY, Disp: 48 mL, Rfl: 0  •  glucose blood test strip, USE TO TEST BLOOD SUGAR 1 TIME DAILY E11.9, Disp: 100 each, Rfl: 12  •  glucose monitor monitoring kit, 1 each As Needed (Diabetes). USE TO TEST 1 TIME DAILY, Disp: 1 each, Rfl: 0  •  hydrOXYzine (ATARAX) 50 MG tablet, Take 50 mg by mouth 4 (Four) Times a Day., Disp: , Rfl:   •  ibuprofen (ADVIL,MOTRIN) 600 MG tablet, Take 1 tablet by mouth Every 6 (Six) Hours As Needed for Mild Pain ., Disp: 15 tablet, Rfl: 0  •  Insulin Pen Needle (CareTouch Pen Needles) 32G X 5 MM misc, Inject once camp, Disp: 30 each, Rfl: 11  •  JARDIANCE 25 MG tablet, TAKE 1 TABLET (25 MG) BY MOUTH DAILY., Disp: 90 tablet, Rfl: 1  •  Lancets (ACCU-CHEK SOFT TOUCH) lancets, Use TO TEST BLOOD SUGAR 1 TIME DAILY., Disp: 100 each, Rfl: 12  •  lisinopril (PRINIVIL,ZESTRIL) 40 MG tablet, TAKE 1 TABLET BY MOUTH EVERY DAY, Disp: 90 tablet, Rfl: 3  •  metFORMIN (GLUCOPHAGE) 1000 MG tablet, Take 1 tablet by mouth 2 (Two) Times a Day With Meals., Disp: 180 tablet, Rfl: 0  •  metoprolol tartrate (LOPRESSOR) 50 MG tablet, Take 1 tablet by mouth Every 12 (Twelve) Hours., Disp: 180 tablet, Rfl: 1  •  nitroglycerin (NITROSTAT) 0.4 MG SL tablet, Place 1 tablet under the tongue Every 5 (Five) Minutes As Needed for Chest Pain. Take no more than 3 doses in 15 minutes., Disp: 30 tablet, Rfl: 12  •  paliperidone palmitate (INVEGA SUSTENNA) 117 MG/0.75ML suspension IM injection, Inject 117 mg into the shoulder, thigh, or buttocks Every 30 (Thirty) Days., Disp: , Rfl:   •  Semaglutide, 1 MG/DOSE, (Ozempic, 1 MG/DOSE,) 2 MG/1.5ML solution pen-injector,  Inject 1 mg under the skin into the appropriate area as directed 1 (One) Time Per Week., Disp: 3 pen, Rfl: 11  •  Semaglutide,0.25 or 0.5MG/DOS, (Ozempic, 0.25 or 0.5 MG/DOSE,) 2 MG/1.5ML solution pen-injector, Inject 0.5 mg under the skin into the appropriate area as directed 1 (One) Time Per Week., Disp: 3 pen, Rfl: 11  •  sodium chloride (OCEAN NASAL SPRAY) 0.65 % nasal spray, 1 spray into each nostril As Needed for Congestion., Disp: 60 mL, Rfl: 12  •  traZODone (DESYREL) 150 MG tablet, TAKE 1 TABLET BY MOUTH EVERY DAY AT BEDTIME AS NEEDED, Disp: , Rfl: 2  •  Triamcinolone Acetonide (NASACORT) 55 MCG/ACT nasal inhaler, 2 sprays into the nostril(s) as directed by provider Daily., Disp: 16.5 g, Rfl: 3  •  neomycin-colistin-hydrocortisone-thonzonium (Cortisporin-TC) 3.3-3-10-0.5 MG/ML otic suspension, Administer 3 drops to the right ear 2 (two) times a day for 4 days., Disp: 10 mL, Rfl: 0    Allergies   Allergen Reactions   • Latex Irritability   • Codeine Nausea Only       Social History     Socioeconomic History   • Marital status: Single     Spouse name: Not on file   • Number of children: Not on file   • Years of education: Not on file   • Highest education level: Not on file   Occupational History   • 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   • Sexual activity: Defer   Social History Narrative    Single, lives alone in [place].  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         Review of Systems   Constitutional: Negative for chills and fever.   HENT: Positive for ear  "pain. Negative for congestion and ear discharge.    Eyes: Negative for pain and visual disturbance.   Respiratory: Negative for cough and shortness of breath.    Cardiovascular: Negative for chest pain and palpitations.   Gastrointestinal: Negative for abdominal pain and nausea.   Genitourinary: Negative for dysuria and flank pain.   Musculoskeletal: Negative for arthralgias and back pain.   Skin: Negative for pallor and rash.   Neurological: Negative for dizziness and headaches.   Psychiatric/Behavioral: Negative for behavioral problems and confusion.       Objective:     /88   Pulse 97   Temp 96.1 °F (35.6 °C) (Tympanic)   Ht 170.2 cm (67\")   Wt 115 kg (254 lb 3 oz)   SpO2 96%   BMI 39.81 kg/m²     Physical Exam   Constitutional: He is oriented to person, place, and time. He appears well-developed and well-nourished.   HENT:   Head: Normocephalic and atraumatic.   Bilateral ears impacted with cerumen   Eyes: Pupils are equal, round, and reactive to light. EOM are normal.   Neck: Normal range of motion. Neck supple.   Abdominal: Soft. Bowel sounds are normal.   Musculoskeletal: Normal range of motion.   Neurological: He is alert and oriented to person, place, and time.   Skin: Skin is warm. No rash noted.   Psychiatric: He has a normal mood and affect. His behavior is normal.       Assessment/Plan:     Kingsley was seen today for ear problem.    Diagnoses and all orders for this visit:    Otalgia, bilateral    Other orders  -     neomycin-colistin-hydrocortisone-thonzonium (Cortisporin-TC) 3.3-3-10-0.5 MG/ML otic suspension; Administer 3 drops to the right ear 2 (two) times a day for 4 days.    49 y/o with bilateral otalgia and impacted ear bilaterally on exam. Lavaged with warm water - peroxide solution. Ears clear afterwards with resolution of otalgia and improvement in hearing. Will give ear drops for some ear trauma with bleeding on right external auditory canal entrance. Advised using external noise " reducer and avoiding qtips.       Return if symptoms worsen or fail to improve.    Preventative:  Health Maintenance   Topic Date Due   • HEPATITIS C SCREENING  05/26/2017   • ZOSTER VACCINE (1 of 2) 09/01/2021 (Originally 2/20/2018)   • HEMOGLOBIN A1C  12/25/2020   • TDAP/TD VACCINES (2 - Td) 01/01/2021   • DIABETIC EYE EXAM  01/07/2021   • COLONOSCOPY  06/04/2021   • DIABETIC FOOT EXAM  06/10/2021   • LIPID PANEL  06/25/2021   • MEDICARE ANNUAL WELLNESS  07/17/2021   • PNEUMOCOCCAL VACCINE (19-64 MEDIUM RISK)  Addressed   • INFLUENZA VACCINE  Discontinued   • URINE MICROALBUMIN  Discontinued     Male Preventative: UTD  Recommended: none    Goals     • Blood Pressure < 140/90      Barriers:  Sporadic follow-up      • Lower Blood Sugar      Barriers:  Questionable insight            RISK SCORE: 3      Tio Forte M.D. PGY3  [facility]  [address] [place] [address]  Office: [phone]    This document has been electronically signed by Tio Forte MD on July 29, 2020 11:45

## 2020-08-13 RX ORDER — FLUTICASONE PROPIONATE 50 MCG
2 SPRAY, SUSPENSION (ML) NASAL DAILY
Qty: 48 ML | Refills: 0 | Status: SHIPPED | OUTPATIENT
Start: 2020-08-13 | End: 2020-12-01

## 2020-09-16 ENCOUNTER — OFFICE VISIT (OUTPATIENT)
Dept: PODIATRY | Facility: CLINIC | Age: 50
End: 2020-09-16

## 2020-09-16 ENCOUNTER — LAB (OUTPATIENT)
Dept: LAB | Facility: HOSPITAL | Age: 50
End: 2020-09-16

## 2020-09-16 VITALS — OXYGEN SATURATION: 98 % | WEIGHT: 254 LBS | HEIGHT: 66 IN | HEART RATE: 109 BPM | BODY MASS INDEX: 40.82 KG/M2

## 2020-09-16 DIAGNOSIS — E11.9 ENCOUNTER FOR DIABETIC FOOT EXAM (HCC): ICD-10-CM

## 2020-09-16 DIAGNOSIS — B35.1 ONYCHOMYCOSIS: ICD-10-CM

## 2020-09-16 DIAGNOSIS — E11.42 TYPE 2 DIABETES MELLITUS WITH PERIPHERAL NEUROPATHY (HCC): Primary | ICD-10-CM

## 2020-09-16 DIAGNOSIS — M79.674 CHRONIC TOE PAIN, BILATERAL: ICD-10-CM

## 2020-09-16 DIAGNOSIS — G89.29 CHRONIC TOE PAIN, BILATERAL: ICD-10-CM

## 2020-09-16 DIAGNOSIS — I10 ESSENTIAL HYPERTENSION: ICD-10-CM

## 2020-09-16 DIAGNOSIS — E78.49 OTHER HYPERLIPIDEMIA: ICD-10-CM

## 2020-09-16 DIAGNOSIS — E11.65 TYPE 2 DIABETES MELLITUS WITH HYPERGLYCEMIA, WITHOUT LONG-TERM CURRENT USE OF INSULIN (HCC): ICD-10-CM

## 2020-09-16 DIAGNOSIS — M79.675 CHRONIC TOE PAIN, BILATERAL: ICD-10-CM

## 2020-09-16 DIAGNOSIS — E55.9 VITAMIN D DEFICIENCY: ICD-10-CM

## 2020-09-16 LAB
25(OH)D3 SERPL-MCNC: 27.7 NG/ML (ref 30–100)
ALBUMIN SERPL-MCNC: 4.2 G/DL (ref 3.5–5.2)
ALBUMIN UR-MCNC: <1.2 MG/DL
ALBUMIN/GLOB SERPL: 1.4 G/DL
ALP SERPL-CCNC: 82 U/L (ref 39–117)
ALT SERPL W P-5'-P-CCNC: 33 U/L (ref 1–41)
ANION GAP SERPL CALCULATED.3IONS-SCNC: 15 MMOL/L (ref 5–15)
AST SERPL-CCNC: 17 U/L (ref 1–40)
BASOPHILS # BLD AUTO: 0.09 10*3/MM3 (ref 0–0.2)
BASOPHILS NFR BLD AUTO: 1.2 % (ref 0–1.5)
BILIRUB SERPL-MCNC: 0.5 MG/DL (ref 0–1.2)
BUN SERPL-MCNC: 17 MG/DL (ref 6–20)
BUN/CREAT SERPL: 18.3 (ref 7–25)
CALCIUM SPEC-SCNC: 9.8 MG/DL (ref 8.6–10.5)
CHLORIDE SERPL-SCNC: 96 MMOL/L (ref 98–107)
CHOLEST SERPL-MCNC: 120 MG/DL (ref 0–200)
CO2 SERPL-SCNC: 25 MMOL/L (ref 22–29)
CREAT SERPL-MCNC: 0.93 MG/DL (ref 0.76–1.27)
CREAT UR-MCNC: 47.8 MG/DL
CREAT UR-MCNC: 47.8 MG/DL
DEPRECATED RDW RBC AUTO: 38.8 FL (ref 37–54)
EOSINOPHIL # BLD AUTO: 0.21 10*3/MM3 (ref 0–0.4)
EOSINOPHIL NFR BLD AUTO: 2.7 % (ref 0.3–6.2)
ERYTHROCYTE [DISTWIDTH] IN BLOOD BY AUTOMATED COUNT: 16.7 % (ref 12.3–15.4)
GFR SERPL CREATININE-BSD FRML MDRD: 85 ML/MIN/1.73
GLOBULIN UR ELPH-MCNC: 3 GM/DL
GLUCOSE BLDC GLUCOMTR-MCNC: 220 MG/DL (ref 70–130)
GLUCOSE SERPL-MCNC: 232 MG/DL (ref 65–99)
HBA1C MFR BLD: 10.2 % (ref 4.8–5.6)
HCT VFR BLD AUTO: 44.6 % (ref 37.5–51)
HDLC SERPL-MCNC: 35 MG/DL (ref 40–60)
HGB BLD-MCNC: 15 G/DL (ref 13–17.7)
IMM GRANULOCYTES # BLD AUTO: 0.04 10*3/MM3 (ref 0–0.05)
IMM GRANULOCYTES NFR BLD AUTO: 0.5 % (ref 0–0.5)
LDLC SERPL CALC-MCNC: ABNORMAL MG/DL
LDLC/HDLC SERPL: ABNORMAL {RATIO}
LYMPHOCYTES # BLD AUTO: 2.56 10*3/MM3 (ref 0.7–3.1)
LYMPHOCYTES NFR BLD AUTO: 33 % (ref 19.6–45.3)
MCH RBC QN AUTO: 24.5 PG (ref 26.6–33)
MCHC RBC AUTO-ENTMCNC: 33.6 G/DL (ref 31.5–35.7)
MCV RBC AUTO: 72.8 FL (ref 79–97)
MICROALBUMIN/CREAT UR: NORMAL MG/G{CREAT}
MICROCYTES BLD QL: NORMAL
MONOCYTES # BLD AUTO: 0.7 10*3/MM3 (ref 0.1–0.9)
MONOCYTES NFR BLD AUTO: 9 % (ref 5–12)
NEUTROPHILS NFR BLD AUTO: 4.16 10*3/MM3 (ref 1.7–7)
NEUTROPHILS NFR BLD AUTO: 53.6 % (ref 42.7–76)
NRBC BLD AUTO-RTO: 0 /100 WBC (ref 0–0.2)
PLAT MORPH BLD: NORMAL
PLATELET # BLD AUTO: 236 10*3/MM3 (ref 140–450)
PMV BLD AUTO: 10.5 FL (ref 6–12)
POTASSIUM SERPL-SCNC: 4.1 MMOL/L (ref 3.5–5.2)
PROT SERPL-MCNC: 7.2 G/DL (ref 6–8.5)
PROT UR-MCNC: 4.7 MG/DL
PROT/CREAT UR: 98.3 MG/G CREA (ref 0–200)
RBC # BLD AUTO: 6.13 10*6/MM3 (ref 4.14–5.8)
SODIUM SERPL-SCNC: 136 MMOL/L (ref 136–145)
TRIGL SERPL-MCNC: 654 MG/DL (ref 0–150)
TSH SERPL DL<=0.05 MIU/L-ACNC: 2.09 UIU/ML (ref 0.27–4.2)
VIT B12 BLD-MCNC: 580 PG/ML (ref 211–946)
VLDLC SERPL-MCNC: ABNORMAL MG/DL
WBC # BLD AUTO: 7.76 10*3/MM3 (ref 3.4–10.8)
WBC MORPH BLD: NORMAL

## 2020-09-16 PROCEDURE — 80061 LIPID PANEL: CPT

## 2020-09-16 PROCEDURE — 36415 COLL VENOUS BLD VENIPUNCTURE: CPT

## 2020-09-16 PROCEDURE — 99213 OFFICE O/P EST LOW 20 MIN: CPT | Performed by: PODIATRIST

## 2020-09-16 PROCEDURE — 83721 ASSAY OF BLOOD LIPOPROTEIN: CPT

## 2020-09-16 PROCEDURE — 82043 UR ALBUMIN QUANTITATIVE: CPT

## 2020-09-16 PROCEDURE — 82607 VITAMIN B-12: CPT

## 2020-09-16 PROCEDURE — 85007 BL SMEAR W/DIFF WBC COUNT: CPT

## 2020-09-16 PROCEDURE — 82570 ASSAY OF URINE CREATININE: CPT

## 2020-09-16 PROCEDURE — 85025 COMPLETE CBC W/AUTO DIFF WBC: CPT

## 2020-09-16 PROCEDURE — 83036 HEMOGLOBIN GLYCOSYLATED A1C: CPT

## 2020-09-16 PROCEDURE — 80053 COMPREHEN METABOLIC PANEL: CPT

## 2020-09-16 PROCEDURE — 82962 GLUCOSE BLOOD TEST: CPT | Performed by: PODIATRIST

## 2020-09-16 PROCEDURE — 11721 DEBRIDE NAIL 6 OR MORE: CPT | Performed by: PODIATRIST

## 2020-09-16 PROCEDURE — 82306 VITAMIN D 25 HYDROXY: CPT

## 2020-09-16 PROCEDURE — 84156 ASSAY OF PROTEIN URINE: CPT

## 2020-09-16 PROCEDURE — 84443 ASSAY THYROID STIM HORMONE: CPT

## 2020-09-16 RX ORDER — PALIPERIDONE PALMITATE 117 MG/.75ML
INJECTION INTRAMUSCULAR
COMMUNITY
Start: 2020-09-02 | End: 2020-12-28

## 2020-09-16 NOTE — PROGRESS NOTES
"Kingsley Littlejohn  1970  50 y.o. male  BRIAN JOYNERAMMED 7/17/2020  BS - 220 IN OFFICE     Patient presents today for routine diabetic foot care.     09/16/2020     Chief Complaint   Patient presents with   • Left Foot - diabetic foot care   • Right Foot - diabetic foot care       History of Present Illness    Patient presents to clinic today for routine diabetic footcare.  Patient states his blood sugars are not very well controlled at the moment.  He is supposed to start insulin very soon.  Does complain today of long, thickened painful toenails.  Pain is relieved with debridement.  He denies any other complaints today.    Past Medical History:   Diagnosis Date   • Abdominal pain    • Allergic rhinitis    • Diabetes mellitus (CMS/[facility])     Prev HbA1c 14.0 2014, states \"I'm cured!\"   • Essential hypertension    • Fracture closed, fibula, shaft     tib-fib   • Onychomycosis          Past Surgical History:   Procedure Laterality Date   • ANKLE SURGERY      right and left   • COLONOSCOPY N/A 6/4/2018    Procedure: COLONOSCOPY;  Surgeon: Jaylon Castro MD;  Location: [facility] ENDOSCOPY;  Service: Gastroenterology   • CYST REMOVAL      x 3, from hand, thigh, and buttocks   • ENDOSCOPY      20 years ago   • TIBIA FRACTURE SURGERY Bilateral 2011    tib-fib         Family History   Problem Relation Age of Onset   • Diabetes Other    • Hypertension Other    • Cancer Other    • Lung disease Other    • Thyroid disease Other    • Bleeding Disorder Other    • Colon cancer Paternal Grandmother    • Cancer Paternal Grandfather    • Crohn's disease Mother    • Diabetes Mother    • Hypertension Father    • Lung disease Father    • Hypertension Brother    • Hypertension Brother    • Hypertension Brother        Allergies   Allergen Reactions   • Latex Irritability   • Codeine Nausea Only       Social History     Socioeconomic History   • Marital status: Single     Spouse name: Not on file   • Number of children: Not on file   • " Years of education: Not on file   • Highest education level: Not on file   Occupational History   • 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   • Sexual activity: Defer   Social History Narrative    Single, lives alone in [place].  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           Current Outpatient Medications   Medication Sig Dispense Refill   • aspirin 325 MG tablet Take 325 mg by mouth Daily.     • atorvastatin (LIPITOR) 40 MG tablet Take 1 tablet by mouth Daily. 90 tablet 3   • Blood Glucose Monitoring Suppl (ONE TOUCH ULTRA 2) w/Device kit Use 1 daily to test blood glucose level. 1 each 5   • fluticasone (FLONASE) 50 MCG/ACT nasal spray 2 sprays by Each Nare route Daily. 48 mL 0   • glucose blood test strip USE TO TEST BLOOD SUGAR 1 TIME DAILY E11.9 100 each 12   • glucose monitor monitoring kit 1 each As Needed (Diabetes). USE TO TEST 1 TIME DAILY 1 each 0   • hydrOXYzine (ATARAX) 50 MG tablet Take 50 mg by mouth 4 (Four) Times a Day.     • ibuprofen (ADVIL,MOTRIN) 600 MG tablet Take 1 tablet by mouth Every 6 (Six) Hours As Needed for Mild Pain . 15 tablet 0   • Insulin Pen Needle (CareTouch Pen Needles) 32G X 5 MM misc Inject once camp 30 each 11   • Invega Sustenna 117 MG/0.75ML suspension prefilled syringe IM injection      • JARDIANCE 25 MG tablet TAKE 1 TABLET (25 MG) BY MOUTH DAILY. 90 tablet 1   • Lancets (ACCU-CHEK SOFT TOUCH) lancets Use TO TEST BLOOD SUGAR 1 TIME DAILY. 100 each 12   • lisinopril (PRINIVIL,ZESTRIL) 40 MG tablet TAKE 1 TABLET BY MOUTH EVERY DAY 90 tablet 3  "  • metFORMIN (GLUCOPHAGE) 1000 MG tablet Take 1 tablet by mouth 2 (Two) Times a Day With Meals. 180 tablet 0   • metoprolol tartrate (LOPRESSOR) 50 MG tablet Take 1 tablet by mouth Every 12 (Twelve) Hours. 180 tablet 1   • nitroglycerin (NITROSTAT) 0.4 MG SL tablet Place 1 tablet under the tongue Every 5 (Five) Minutes As Needed for Chest Pain. Take no more than 3 doses in 15 minutes. 30 tablet 12   • paliperidone palmitate (INVEGA SUSTENNA) 117 MG/0.75ML suspension IM injection Inject 117 mg into the shoulder, thigh, or buttocks Every 30 (Thirty) Days.     • Semaglutide, 1 MG/DOSE, (Ozempic, 1 MG/DOSE,) 2 MG/1.5ML solution pen-injector Inject 1 mg under the skin into the appropriate area as directed 1 (One) Time Per Week. 3 pen 11   • Semaglutide,0.25 or 0.5MG/DOS, (Ozempic, 0.25 or 0.5 MG/DOSE,) 2 MG/1.5ML solution pen-injector Inject 0.5 mg under the skin into the appropriate area as directed 1 (One) Time Per Week. 3 pen 11   • sodium chloride (OCEAN NASAL SPRAY) 0.65 % nasal spray 1 spray into each nostril As Needed for Congestion. 60 mL 12   • traZODone (DESYREL) 150 MG tablet TAKE 1 TABLET BY MOUTH EVERY DAY AT BEDTIME AS NEEDED  2   • Triamcinolone Acetonide (NASACORT) 55 MCG/ACT nasal inhaler 2 sprays into the nostril(s) as directed by provider Daily. 16.5 g 3     No current facility-administered medications for this visit.        Review of Systems   Constitutional: Negative.    HENT: Negative.    Eyes: Negative.    Respiratory: Negative.    Cardiovascular: Positive for leg swelling.   Gastrointestinal: Negative.    Musculoskeletal:        Toe pain    Skin: Negative.    Neurological: Positive for numbness. Negative for dizziness.   Psychiatric/Behavioral: Negative.          OBJECTIVE    Pulse 109   Ht 167.6 cm (66\")   Wt 115 kg (254 lb)   SpO2 98%   BMI 41.00 kg/m²       Physical Exam   Constitutional: He is oriented to person, place, and time. He appears well-developed.   HENT:   Head: Normocephalic " and atraumatic.   Eyes: Pupils are equal, round, and reactive to light.   Pulmonary/Chest: Effort normal. No respiratory distress.   Musculoskeletal: Normal range of motion. Tenderness present. No deformity.    Kingsley had a diabetic foot exam performed today.   During the foot exam he had a monofilament test performed.  Neurological: He is alert and oriented to person, place, and time.   Skin: Skin is warm and dry. Capillary refill takes less than 2 seconds. He is not diaphoretic. No erythema.   Psychiatric: His behavior is normal.   Vitals reviewed.      Gait: Normal     Assistive Device: None    Lower Extremity    Cardiovascular:    DP/PT pulses palpable bilateral  CFT brisk  to all digits  Skin temp is warm to warm from proximal tibia to distal digits bilateral  Pedal hair growth present.   Musculoskeletal:  Muscle strength is 5/5 for all muscle groups tested   ROM of the 1st MTP is full without pain or crepitus bilateral  ROM of the ankle joint is full without pain or crepitus  bilateral  Dermatological:   Nails 1-5 bilateral are thickened, discolored, elongated.  Pain on palpation to the nail plates.  Skin is warm, dry and intact bilateral  Webspaces 1-4 bilateral are clean, dry and intact.   Neurological:   Protective sensation intact   Sensation intact to light touch        Procedures        ASSESSMENT AND PLAN    Kingsley was seen today for diabetic foot care and diabetic foot care.    Diagnoses and all orders for this visit:    Type 2 diabetes mellitus with peripheral neuropathy (CMS/[facility])  -     Cancel: POC Glucose Fingerstick  -     POC Glucose Fingerstick    Encounter for diabetic foot exam (CMS/[facility])    Onychomycosis    Chronic toe pain, bilateral      - A diabetic foot screening exam was performed and the patient was educated on the foot complications related to diabetes,  preventative foot care and what signs and symptoms to watch for.  Instructed to contact our office if any foot problems develop before  next visit.  - Nails 1-5 bilateral were debrided in length and thickness with nail nipper and electric  to decrease fungal load and risk of infection.  - All the patients questions were answered.  - RTC 3 months or sooner if needed.                This document has been electronically signed by Abhijeet Thomason DPM on September 16, 2020 17:58 CDT     9/16/2020  17:58 CDT

## 2020-09-17 LAB — ARTICHOKE IGE QN: 30 MG/DL (ref 0–100)

## 2020-09-18 DIAGNOSIS — E11.9 TYPE 2 DIABETES MELLITUS WITHOUT COMPLICATION, WITHOUT LONG-TERM CURRENT USE OF INSULIN (HCC): ICD-10-CM

## 2020-09-21 ENCOUNTER — OFFICE VISIT (OUTPATIENT)
Dept: ENDOCRINOLOGY | Facility: CLINIC | Age: 50
End: 2020-09-21

## 2020-09-21 VITALS
OXYGEN SATURATION: 98 % | SYSTOLIC BLOOD PRESSURE: 110 MMHG | BODY MASS INDEX: 41.05 KG/M2 | HEIGHT: 66 IN | WEIGHT: 255.4 LBS | DIASTOLIC BLOOD PRESSURE: 78 MMHG | HEART RATE: 89 BPM

## 2020-09-21 DIAGNOSIS — E11.65 TYPE 2 DIABETES MELLITUS WITH HYPERGLYCEMIA, WITHOUT LONG-TERM CURRENT USE OF INSULIN (HCC): ICD-10-CM

## 2020-09-21 DIAGNOSIS — E55.9 VITAMIN D DEFICIENCY: ICD-10-CM

## 2020-09-21 DIAGNOSIS — E78.49 OTHER HYPERLIPIDEMIA: ICD-10-CM

## 2020-09-21 DIAGNOSIS — I10 ESSENTIAL HYPERTENSION: Primary | ICD-10-CM

## 2020-09-21 PROCEDURE — 99214 OFFICE O/P EST MOD 30 MIN: CPT | Performed by: NURSE PRACTITIONER

## 2020-09-21 RX ORDER — PEN NEEDLE, DIABETIC 32 GX3/16"
NEEDLE, DISPOSABLE MISCELLANEOUS
Qty: 30 EACH | Refills: 11 | Status: SHIPPED | OUTPATIENT
Start: 2020-09-21 | End: 2021-12-16

## 2020-09-21 RX ORDER — INSULIN GLARGINE 300 U/ML
INJECTION, SOLUTION SUBCUTANEOUS
Qty: 3 PEN | Refills: 11 | Status: SHIPPED | OUTPATIENT
Start: 2020-09-21 | End: 2022-02-10

## 2020-09-21 RX ORDER — ICOSAPENT ETHYL 1000 MG/1
2 CAPSULE ORAL 2 TIMES DAILY WITH MEALS
Qty: 120 CAPSULE | Refills: 11 | Status: SHIPPED | OUTPATIENT
Start: 2020-09-21 | End: 2021-07-26 | Stop reason: ALTCHOICE

## 2020-09-21 NOTE — PROGRESS NOTES
"  Subjective    Kingsley Littlejohn is a 50 y.o. male. he is here today for follow-up.    History of Present Illness     IN OFFICE VISIT       Referring provider Yang Veloz MD     50 year old male presents for follow up         REASON - diabetes mellitus type 2 not controlled         Duration/Timing - diagnosed in 2014/ constant         Quality -improved control           Severity - high due to complications      Complications - neuropathy, hyperglycemia         Severity (Complication/Hospitalizations)        Secondary Macrovascular--  No CVA, no PAD, no CAD      Had a MI in the past         Secondary Microvascular -- neuropathy, no diabetic retinopathy, no renal disease         Context-- presented with symptoms of diabetes increased thirst and urination             Diabetes Regimen-- oral medication                 Lab Results   Component Value Date    HGBA1C 10.20 (H) 09/16/2020                Blood Glucose Readings         States running 200 up to 250    No low sugars                     Diet-        Trying to keep carbs at 60 gm per meal      Associated Signs/Symptoms       Hyperglycemic Symptoms: increased thirst       Hypoglycemic Episodes: none     Aggravating factors -- refusing insulin          The following portions of the patient's history were reviewed and updated as appropriate:   Past Medical History:   Diagnosis Date   • Abdominal pain    • Allergic rhinitis    • Diabetes mellitus (CMS/[facility])     Prev HbA1c 14.0 2014, states \"I'm cured!\"   • Essential hypertension    • Fracture closed, fibula, shaft     tib-fib   • Onychomycosis      Past Surgical History:   Procedure Laterality Date   • ANKLE SURGERY      right and left   • COLONOSCOPY N/A 6/4/2018    Procedure: COLONOSCOPY;  Surgeon: Jaylon Castro MD;  Location: [facility] ENDOSCOPY;  Service: Gastroenterology   • CYST REMOVAL      x 3, from hand, thigh, and buttocks   • ENDOSCOPY      20 years ago   • TIBIA FRACTURE SURGERY Bilateral 2011    " tib-fib     Family History   Problem Relation Age of Onset   • Diabetes Other    • Hypertension Other    • Cancer Other    • Lung disease Other    • Thyroid disease Other    • Bleeding Disorder Other    • Colon cancer Paternal Grandmother    • Cancer Paternal Grandfather    • Crohn's disease Mother    • Diabetes Mother    • Hypertension Father    • Lung disease Father    • Hypertension Brother    • Hypertension Brother    • Hypertension Brother        Current Outpatient Medications   Medication Sig Dispense Refill   • aspirin 325 MG tablet Take 325 mg by mouth Daily.     • atorvastatin (LIPITOR) 40 MG tablet Take 1 tablet by mouth Daily. 90 tablet 3   • Blood Glucose Monitoring Suppl (ONE TOUCH ULTRA 2) w/Device kit Use 1 daily to test blood glucose level. 1 each 5   • fluticasone (FLONASE) 50 MCG/ACT nasal spray 2 sprays by Each Nare route Daily. 48 mL 0   • glucose blood test strip USE TO TEST BLOOD SUGAR 1 TIME DAILY E11.9 100 each 12   • glucose monitor monitoring kit 1 each As Needed (Diabetes). USE TO TEST 1 TIME DAILY 1 each 0   • hydrOXYzine (ATARAX) 50 MG tablet Take 50 mg by mouth 4 (Four) Times a Day.     • ibuprofen (ADVIL,MOTRIN) 600 MG tablet Take 1 tablet by mouth Every 6 (Six) Hours As Needed for Mild Pain . 15 tablet 0   • Insulin Pen Needle (CareTouch Pen Needles) 32G X 5 MM misc Inject once camp 30 each 11   • Invega Sustenna 117 MG/0.75ML suspension prefilled syringe IM injection      • JARDIANCE 25 MG tablet TAKE 1 TABLET (25 MG) BY MOUTH DAILY. 90 tablet 1   • Lancets (ACCU-CHEK SOFT TOUCH) lancets Use TO TEST BLOOD SUGAR 1 TIME DAILY. 100 each 12   • lisinopril (PRINIVIL,ZESTRIL) 40 MG tablet TAKE 1 TABLET BY MOUTH EVERY DAY 90 tablet 3   • metFORMIN (GLUCOPHAGE) 1000 MG tablet Take 1 tablet by mouth 2 (Two) Times a Day With Meals. 180 tablet 2   • metoprolol tartrate (LOPRESSOR) 50 MG tablet Take 1 tablet by mouth Every 12 (Twelve) Hours. (Patient taking differently: Take 75 mg by mouth Every  12 (Twelve) Hours.) 180 tablet 1   • nitroglycerin (NITROSTAT) 0.4 MG SL tablet Place 1 tablet under the tongue Every 5 (Five) Minutes As Needed for Chest Pain. Take no more than 3 doses in 15 minutes. 30 tablet 12   • paliperidone palmitate (INVEGA SUSTENNA) 117 MG/0.75ML suspension IM injection Inject 117 mg into the shoulder, thigh, or buttocks Every 30 (Thirty) Days.     • Semaglutide, 1 MG/DOSE, (Ozempic, 1 MG/DOSE,) 2 MG/1.5ML solution pen-injector Inject 1 mg under the skin into the appropriate area as directed 1 (One) Time Per Week. 3 pen 11   • sodium chloride (OCEAN NASAL SPRAY) 0.65 % nasal spray 1 spray into each nostril As Needed for Congestion. 60 mL 12   • traZODone (DESYREL) 150 MG tablet TAKE 1 TABLET BY MOUTH EVERY DAY AT BEDTIME AS NEEDED  2   • Triamcinolone Acetonide (NASACORT) 55 MCG/ACT nasal inhaler 2 sprays into the nostril(s) as directed by provider Daily. 16.5 g 3   • icosapent ethyl (Vascepa) 1 g capsule capsule Take 2 g by mouth 2 (Two) Times a Day With Meals. 120 capsule 11   • Insulin Glargine, 2 Unit Dial, (Toukeenan AsifoStar) 300 UNIT/ML solution pen-injector injection Inject 15 up to 30 units daily 3 pen 11     No current facility-administered medications for this visit.      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   • Sexual activity: Defer   Social History Narrative    Single, lives alone in [place].  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         Review of Systems  Review of Systems   Constitutional: Negative for activity change, appetite change, diaphoresis and fatigue.   HENT: Negative for facial swelling, sneezing, sore throat, tinnitus, trouble swallowing and voice change.    Eyes: Negative for photophobia, pain, discharge, redness, itching and visual disturbance.   Respiratory: Negative for apnea, cough, choking, chest tightness and shortness of breath.    Cardiovascular: Negative for chest pain, palpitations and leg swelling.   Gastrointestinal: Negative for abdominal distention, abdominal pain, constipation, diarrhea, nausea and vomiting.   Endocrine: Negative for cold intolerance, heat intolerance, polydipsia, polyphagia and polyuria.   Genitourinary: Negative for difficulty urinating, dysuria, frequency, hematuria and urgency.   Musculoskeletal: Negative for arthralgias, back pain, gait problem, joint swelling, myalgias, neck pain and neck stiffness.   Skin: Negative for color change, pallor, rash and wound.   Neurological: Negative for dizziness, tremors, weakness, light-headedness, numbness and headaches.   Hematological: Negative for adenopathy. Does not bruise/bleed easily.   Psychiatric/Behavioral: Negative for behavioral problems, confusion and sleep disturbance.        Objective    /78 (BP Location: Right arm, Patient Position: Sitting, Cuff Size: Large Adult)   Pulse 89   Ht 167.6 cm (66\")   Wt 116 kg (255 lb 6.4 oz)   SpO2 98%   BMI 41.22 kg/m²   Physical Exam  Constitutional:       General: He is not in acute distress.     Appearance: He is well-developed.   HENT:      Head: Normocephalic and atraumatic.      Right Ear: External ear normal.      Left Ear: External ear normal.      Nose: Nose normal.   Eyes:      " Conjunctiva/sclera: Conjunctivae normal.      Pupils: Pupils are equal, round, and reactive to light.   Neck:      Musculoskeletal: Normal range of motion and neck supple.      Thyroid: No thyromegaly.      Trachea: No tracheal deviation.   Cardiovascular:      Rate and Rhythm: Normal rate and regular rhythm.      Heart sounds: Normal heart sounds. No murmur.   Pulmonary:      Effort: Pulmonary effort is normal. No respiratory distress.      Breath sounds: Normal breath sounds. No wheezing.   Abdominal:      General: Bowel sounds are normal.      Palpations: Abdomen is soft.      Tenderness: There is no abdominal tenderness. There is no guarding or rebound.   Musculoskeletal: Normal range of motion.         General: No tenderness or deformity.   Skin:     General: Skin is warm and dry.      Findings: No rash.   Neurological:      Mental Status: He is alert and oriented to person, place, and time.      Cranial Nerves: No cranial nerve deficit.   Psychiatric:         Behavior: Behavior normal.         Thought Content: Thought content normal.         Judgment: Judgment normal.         Lab Review  Glucose (mg/dL)   Date Value   09/16/2020 232 (H)   06/25/2020 288 (H)   08/29/2019 214 (H)     Sodium   Date Value   09/16/2020 136 mmol/L   06/25/2020 132 mmol/L (L)   08/29/2019 136 mmol/L   05/18/2018 138 MMOL/L   03/02/2018 137 MMOL/L     Potassium   Date Value   09/16/2020 4.1 mmol/L   06/25/2020 4.3 mmol/L   08/29/2019 4.0 mmol/L   05/18/2018 4.5 MMOL/L   03/02/2018 3.9 MMOL/L     Chloride   Date Value   09/16/2020 96 mmol/L (L)   06/25/2020 96 mmol/L (L)   08/29/2019 103 mmol/L   05/18/2018 99 MMOL/L   03/02/2018 99 MMOL/L     CO2   Date Value   09/16/2020 25.0 mmol/L   06/25/2020 23.2 mmol/L   08/29/2019 22.0 mmol/L   05/18/2018 29 MMOL/L   03/02/2018 25 MMOL/L     BUN   Date Value   09/16/2020 17 mg/dL   06/25/2020 15 mg/dL   08/29/2019 17 mg/dL   05/18/2018 16 MG/DL   03/02/2018 12 MG/DL     Creatinine   Date Value    09/16/2020 0.93 mg/dL   06/25/2020 0.83 mg/dL   08/29/2019 0.90 mg/dL   05/18/2018 0.9 MG/DL   03/02/2018 0.7 MG/DL     Hemoglobin A1C (%)   Date Value   09/16/2020 10.20 (H)   06/25/2020 11.21 (H)   12/10/2019 11.46 (H)   03/09/2018 6.4 (H)     Triglycerides (mg/dL)   Date Value   09/16/2020 654 (H)   06/25/2020 412 (H)   12/10/2019 546 (H)     LDL Cholesterol    Date Value   09/16/2020      Comment:     Unable to calculate   09/16/2020 30 mg/dL   06/25/2020      Comment:     Unable to calculate   06/25/2020 46 mg/dL   12/10/2019      Comment:     Unable to calculate   12/10/2019 64 mg/dL       Assessment/Plan      1. Essential hypertension    2. Other hyperlipidemia    3. Type 2 diabetes mellitus with hyperglycemia, without long-term current use of insulin (CMS/[facility])    4. Vitamin D deficiency    .    Medications prescribed:  Outpatient Encounter Medications as of 9/21/2020   Medication Sig Dispense Refill   • aspirin 325 MG tablet Take 325 mg by mouth Daily.     • atorvastatin (LIPITOR) 40 MG tablet Take 1 tablet by mouth Daily. 90 tablet 3   • Blood Glucose Monitoring Suppl (ONE TOUCH ULTRA 2) w/Device kit Use 1 daily to test blood glucose level. 1 each 5   • fluticasone (FLONASE) 50 MCG/ACT nasal spray 2 sprays by Each Nare route Daily. 48 mL 0   • glucose blood test strip USE TO TEST BLOOD SUGAR 1 TIME DAILY E11.9 100 each 12   • glucose monitor monitoring kit 1 each As Needed (Diabetes). USE TO TEST 1 TIME DAILY 1 each 0   • hydrOXYzine (ATARAX) 50 MG tablet Take 50 mg by mouth 4 (Four) Times a Day.     • ibuprofen (ADVIL,MOTRIN) 600 MG tablet Take 1 tablet by mouth Every 6 (Six) Hours As Needed for Mild Pain . 15 tablet 0   • Insulin Pen Needle (CareTouch Pen Needles) 32G X 5 MM misc Inject once camp 30 each 11   • Invega Sustenna 117 MG/0.75ML suspension prefilled syringe IM injection      • JARDIANCE 25 MG tablet TAKE 1 TABLET (25 MG) BY MOUTH DAILY. 90 tablet 1   • Lancets (ACCU-CHEK SOFT TOUCH) lancets  Use TO TEST BLOOD SUGAR 1 TIME DAILY. 100 each 12   • lisinopril (PRINIVIL,ZESTRIL) 40 MG tablet TAKE 1 TABLET BY MOUTH EVERY DAY 90 tablet 3   • metFORMIN (GLUCOPHAGE) 1000 MG tablet Take 1 tablet by mouth 2 (Two) Times a Day With Meals. 180 tablet 2   • metoprolol tartrate (LOPRESSOR) 50 MG tablet Take 1 tablet by mouth Every 12 (Twelve) Hours. (Patient taking differently: Take 75 mg by mouth Every 12 (Twelve) Hours.) 180 tablet 1   • nitroglycerin (NITROSTAT) 0.4 MG SL tablet Place 1 tablet under the tongue Every 5 (Five) Minutes As Needed for Chest Pain. Take no more than 3 doses in 15 minutes. 30 tablet 12   • paliperidone palmitate (INVEGA SUSTENNA) 117 MG/0.75ML suspension IM injection Inject 117 mg into the shoulder, thigh, or buttocks Every 30 (Thirty) Days.     • Semaglutide, 1 MG/DOSE, (Ozempic, 1 MG/DOSE,) 2 MG/1.5ML solution pen-injector Inject 1 mg under the skin into the appropriate area as directed 1 (One) Time Per Week. 3 pen 11   • sodium chloride (OCEAN NASAL SPRAY) 0.65 % nasal spray 1 spray into each nostril As Needed for Congestion. 60 mL 12   • traZODone (DESYREL) 150 MG tablet TAKE 1 TABLET BY MOUTH EVERY DAY AT BEDTIME AS NEEDED  2   • Triamcinolone Acetonide (NASACORT) 55 MCG/ACT nasal inhaler 2 sprays into the nostril(s) as directed by provider Daily. 16.5 g 3   • [DISCONTINUED] Insulin Pen Needle (CareTouch Pen Needles) 32G X 5 MM misc Inject once camp 30 each 11   • icosapent ethyl (Vascepa) 1 g capsule capsule Take 2 g by mouth 2 (Two) Times a Day With Meals. 120 capsule 11   • Insulin Glargine, 2 Unit Dial, (Toujeo Max SoloStar) 300 UNIT/ML solution pen-injector injection Inject 15 up to 30 units daily 3 pen 11   • [DISCONTINUED] Semaglutide,0.25 or 0.5MG/DOS, (Ozempic, 0.25 or 0.5 MG/DOSE,) 2 MG/1.5ML solution pen-injector Inject 0.5 mg under the skin into the appropriate area as directed 1 (One) Time Per Week. 3 pen 11     No facility-administered encounter medications on file as of  9/21/2020.        Orders placed during this encounter include:  Orders Placed This Encounter   Procedures   • Comprehensive Metabolic Panel   • Hemoglobin A1c   • Lipid Panel   • Vitamin D 25 Hydroxy   • Vitamin B12   • TSH   • CBC & Differential     Order Specific Question:   Manual Differential     Answer:   No       Glycemic Management     Type 2 diabetes not controlled with hyperglycemia       Lab Results   Component Value Date    HGBA1C 10.20 (H) 09/16/2020                            Metformin 1000 mg po BID -keep        Jardiance 25 mg one daily -keep        Januvia 100 mg daily ---- stop not effective             Ozempic 1mg once weekly      Side effects discussed, try to eat half your plate not a full plate     If nauseated eat less     If vomiting or abdominal pain stop medication     We discussed insulin and he refuses but agrees if this does not work he will start insulin      His blood sugars are improving and he has lost 5 lbs since starting ozempic           start Toujeo       Start 15 units once daily                Aim 60 gm of CHO per meal      If you have a snack 15 gms of CHO or less      Stop regular sweet drinks      Start walking if able        Lipid Management        Lipitor 40 mg one at night       Component      Latest Ref Rng & Units 9/16/2020 9/16/2020           2:31 PM  2:31 PM   Total Cholesterol      0 - 200 mg/dL 120    Triglycerides      0 - 150 mg/dL 654 (H)    HDL Cholesterol      40 - 60 mg/dL 35 (L)    LDL Cholesterol        30   VLDL Cholesterol           LDL/HDL Ratio                   start Vascepa 2 gm po BID        Blood Pressure Management     Lisinopril 40 mg once daily      Metoprolol 50 mg daily            Microvascular Complication Monitoring        Last eye exam -- Jan. 2020 no DR      Component      Latest Ref Rng & Units 9/16/2020 9/16/2020           2:31 PM  2:31 PM   Protein/Creatinine Ratio      0.0 - 200.0 mg/G Crea 98.3    Creatinine, Urine      mg/dL 47.8 47.8    Total Protein, Urine      mg/dL 4.7    Microalbumin/Creatinine Ratio           Microalbumin, Urine      mg/dL  <1.2              Neuropathy mild      Prefers no RX at this time         Bone Health          Component      Latest Ref Rng & Units 9/16/2020   25 Hydroxy, Vitamin D      30.0 - 100.0 ng/ml 27.7 (L)                 Other Diabetes Related Aspects     Lab Results   Component Value Date    [id number] 580 09/16/2020                   Thyroid Health  Lab Results   Component Value Date    TSH 2.090 09/16/2020                   Weight Management:      Patient's Body mass index is 41.22 kg/m². BMI is above normal parameters. Recommendations include: nutrition counseling.       He has lost 5 lbs             4. Follow-up: Return in about 3 months (around 12/21/2020) for Recheck.

## 2020-09-24 ENCOUNTER — TELEPHONE (OUTPATIENT)
Dept: ENDOCRINOLOGY | Facility: CLINIC | Age: 50
End: 2020-09-24

## 2020-09-24 NOTE — TELEPHONE ENCOUNTER
Pt called we ordered Toujeo , insurance won't cover it and Pharmacy sent us a fax but has not heard back about it     [facility] in [place]     Thank You

## 2020-09-25 NOTE — PROGRESS NOTES
PA on covermymeds for Toujeo - cannot be processed electronically.   Faxed paper form [employer] PA for Toujeo.

## 2020-10-13 ENCOUNTER — TELEPHONE (OUTPATIENT)
Dept: PODIATRY | Facility: CLINIC | Age: 50
End: 2020-10-13

## 2020-10-13 ENCOUNTER — OFFICE VISIT (OUTPATIENT)
Dept: PODIATRY | Facility: CLINIC | Age: 50
End: 2020-10-13

## 2020-10-13 VITALS — HEART RATE: 100 BPM | OXYGEN SATURATION: 96 % | BODY MASS INDEX: 40.98 KG/M2 | HEIGHT: 66 IN | WEIGHT: 255 LBS

## 2020-10-13 DIAGNOSIS — L03.031 PARONYCHIA OF GREAT TOE OF RIGHT FOOT: Primary | ICD-10-CM

## 2020-10-13 PROCEDURE — 99213 OFFICE O/P EST LOW 20 MIN: CPT | Performed by: PODIATRIST

## 2020-10-13 PROCEDURE — 11730 AVULSION NAIL PLATE SIMPLE 1: CPT | Performed by: PODIATRIST

## 2020-10-13 RX ORDER — PEN NEEDLE, DIABETIC 32GX 5/32"
NEEDLE, DISPOSABLE MISCELLANEOUS
COMMUNITY
Start: 2020-09-23

## 2020-10-13 NOTE — PROGRESS NOTES
"Kingsley Littlejohn  1970  50 y.o. male  SHELBY Doan 9/16/2020  BS - 220 per patient     Patient presents today for concern of redness around the right hallux     10/13/2020     Chief Complaint   Patient presents with   • Right Foot - Wound Check       History of Present Illness    Patient presents to clinic today with chief complaint of right great toe pain and discoloration.  Patient states that over the week his nail has become progressively more red and slightly painful.  Today there is drainage which is new.  He has done nothing treated.  He denies any injuries or trauma.  He has no other complaints.    Past Medical History:   Diagnosis Date   • Abdominal pain    • Allergic rhinitis    • Diabetes mellitus (CMS/[facility])     Prev HbA1c 14.0 2014, states \"I'm cured!\"   • Essential hypertension    • Fracture closed, fibula, shaft     tib-fib   • Onychomycosis          Past Surgical History:   Procedure Laterality Date   • ANKLE SURGERY      right and left   • COLONOSCOPY N/A 6/4/2018    Procedure: COLONOSCOPY;  Surgeon: Jaylon Castro MD;  Location: [facility] ENDOSCOPY;  Service: Gastroenterology   • CYST REMOVAL      x 3, from hand, thigh, and buttocks   • ENDOSCOPY      20 years ago   • TIBIA FRACTURE SURGERY Bilateral 2011    tib-fib         Family History   Problem Relation Age of Onset   • Diabetes Other    • Hypertension Other    • Cancer Other    • Lung disease Other    • Thyroid disease Other    • Bleeding Disorder Other    • Colon cancer Paternal Grandmother    • Cancer Paternal Grandfather    • Crohn's disease Mother    • Diabetes Mother    • Hypertension Father    • Lung disease Father    • Hypertension Brother    • Hypertension Brother    • Hypertension Brother        Allergies   Allergen Reactions   • Latex Irritability   • Codeine Nausea Only       Social History     Socioeconomic History   • Marital status: Single     Spouse name: Not on file   • Number of children: Not on file   • Years of education: " Not on file   • Highest education level: Not on file   Occupational History   • 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   • Sexual activity: Defer   Social History Narrative    Single, lives alone in [place].  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           Current Outpatient Medications   Medication Sig Dispense Refill   • aspirin 325 MG tablet Take 325 mg by mouth Daily.     • atorvastatin (LIPITOR) 40 MG tablet Take 1 tablet by mouth Daily. 90 tablet 3   • BD Pen Needle Nola U/F 32G X 4 MM misc INJECT ONCE WALTON     • Blood Glucose Monitoring Suppl (ONE TOUCH ULTRA 2) w/Device kit Use 1 daily to test blood glucose level. 1 each 5   • fluticasone (FLONASE) 50 MCG/ACT nasal spray 2 sprays by Each Nare route Daily. 48 mL 0   • glucose blood test strip USE TO TEST BLOOD SUGAR 1 TIME DAILY E11.9 100 each 12   • glucose monitor monitoring kit 1 each As Needed (Diabetes). USE TO TEST 1 TIME DAILY 1 each 0   • hydrOXYzine (ATARAX) 50 MG tablet Take 50 mg by mouth 4 (Four) Times a Day.     • ibuprofen (ADVIL,MOTRIN) 600 MG tablet Take 1 tablet by mouth Every 6 (Six) Hours As Needed for Mild Pain . 15 tablet 0   • icosapent ethyl (Vascepa) 1 g capsule capsule Take 2 g by mouth 2 (Two) Times a Day With Meals. 120 capsule 11   • Insulin Glargine, 2 Unit Dial, (Toujeo Max SoloStar) 300 UNIT/ML solution pen-injector injection Inject 15 up to 30 units daily 3 pen 11   • Insulin Pen Needle (CareTouch Pen Needles) 32G X 5 MM misc Inject once walton 30 each 11   • Invega Sustenna 117 MG/0.75ML  "suspension prefilled syringe IM injection      • JARDIANCE 25 MG tablet TAKE 1 TABLET (25 MG) BY MOUTH DAILY. 90 tablet 1   • Lancets (ACCU-CHEK SOFT TOUCH) lancets Use TO TEST BLOOD SUGAR 1 TIME DAILY. 100 each 12   • lisinopril (PRINIVIL,ZESTRIL) 40 MG tablet TAKE 1 TABLET BY MOUTH EVERY DAY 90 tablet 3   • metFORMIN (GLUCOPHAGE) 1000 MG tablet Take 1 tablet by mouth 2 (Two) Times a Day With Meals. 180 tablet 2   • metoprolol tartrate (LOPRESSOR) 50 MG tablet Take 1 tablet by mouth Every 12 (Twelve) Hours. (Patient taking differently: Take 75 mg by mouth Every 12 (Twelve) Hours.) 180 tablet 1   • nitroglycerin (NITROSTAT) 0.4 MG SL tablet Place 1 tablet under the tongue Every 5 (Five) Minutes As Needed for Chest Pain. Take no more than 3 doses in 15 minutes. 30 tablet 12   • paliperidone palmitate (INVEGA SUSTENNA) 117 MG/0.75ML suspension IM injection Inject 117 mg into the shoulder, thigh, or buttocks Every 30 (Thirty) Days.     • Semaglutide, 1 MG/DOSE, (Ozempic, 1 MG/DOSE,) 2 MG/1.5ML solution pen-injector Inject 1 mg under the skin into the appropriate area as directed 1 (One) Time Per Week. 3 pen 11   • sodium chloride (OCEAN NASAL SPRAY) 0.65 % nasal spray 1 spray into each nostril As Needed for Congestion. 60 mL 12   • traZODone (DESYREL) 150 MG tablet TAKE 1 TABLET BY MOUTH EVERY DAY AT BEDTIME AS NEEDED  2   • Triamcinolone Acetonide (NASACORT) 55 MCG/ACT nasal inhaler 2 sprays into the nostril(s) as directed by provider Daily. 16.5 g 3     No current facility-administered medications for this visit.        Review of Systems   Constitutional: Negative.    HENT: Negative.    Eyes: Negative.    Respiratory: Negative.    Cardiovascular: Positive for leg swelling.   Gastrointestinal: Negative.    Musculoskeletal:        Toe pain    Skin: Negative.    Neurological: Positive for numbness. Negative for dizziness.   Psychiatric/Behavioral: Negative.          OBJECTIVE    Pulse 100   Ht 167.6 cm (66\")   Wt 116 " kg (255 lb)   SpO2 96%   BMI 41.16 kg/m²       Physical Exam   Constitutional: He is oriented to person, place, and time. He appears well-developed.   HENT:   Head: Normocephalic and atraumatic.   Eyes: Pupils are equal, round, and reactive to light.   Pulmonary/Chest: Effort normal. No respiratory distress.   Musculoskeletal: Normal range of motion. Tenderness present. No deformity.   Neurological: He is alert and oriented to person, place, and time.   Skin: Skin is warm and dry. Capillary refill takes less than 2 seconds. He is not diaphoretic. No erythema.   Psychiatric: His behavior is normal.   Vitals reviewed.      Gait: Normal     Assistive Device: None    Lower Extremity    Cardiovascular:    DP/PT pulses palpable bilateral  CFT brisk  to all digits  Skin temp is warm to warm from proximal tibia to distal digits bilateral  Pedal hair growth present.   Musculoskeletal:  Muscle strength is 5/5 for all muscle groups tested   ROM of the 1st MTP is full without pain or crepitus bilateral  ROM of the ankle joint is full without pain or crepitus  bilateral  Dermatological:   Right hallux nail is incurvated and ingrowing with surrounding erythema.  Scant drainage noted to lateral border.  Skin is warm, dry and intact bilateral  Webspaces 1-4 bilateral are clean, dry and intact.   Neurological:   Protective sensation intact   Sensation intact to light touch        Nail Removal    Date/Time: 10/13/2020 2:05 PM  Performed by: Abhijeet Thomason DPM  Authorized by: Abhijeet Thomason DPM   Consent: Written consent obtained.  Risks and benefits: risks, benefits and alternatives were discussed  Consent given by: patient  Patient identity confirmed: verbally with patient  Location: right foot  Location details: right big toe  Anesthesia: digital block    Anesthesia:  Local Anesthetic: lidocaine 2% without epinephrine    Sedation:  Patient sedated: no    Preparation: skin prepped with Betadine  Amount removed: complete  Nail  matrix removed: none  Dressing: antibiotic ointment and dressing applied  Patient tolerance: patient tolerated the procedure well with no immediate complications              ASSESSMENT AND PLAN    Diagnoses and all orders for this visit:    1. Paronychia of great toe of right foot (Primary)    Other orders  -     Nail Removal      - Comprehensive foot and ankle exam performed  - Diagnosis, prevention and treatment of ingrown toenails discussed with patient, including risks and potential benefits of nail avulsion both temporary and permanent versus simple debridement.  - Patient elected for a total temporary nail avulsion  - Dispensed aftercare instruction sheet  - All questions were answered and the patient is in agreement with the current treatment plan.  - RTC in 2 weeks                This document has been electronically signed by Abhijeet Thomason DPM on October 13, 2020 14:05 CDT     10/13/2020  14:05 CDT

## 2020-10-13 NOTE — TELEPHONE ENCOUNTER
PT REQUESTS A CALL BACK RE HIS BANDAGE CAME OFF AND NOW HIS TOE IS BLEEDING A LOT.  CALL BACK # [phone].  THANK YOU.

## 2020-10-15 RX ORDER — EMPAGLIFLOZIN 25 MG/1
TABLET, FILM COATED ORAL
Qty: 90 TABLET | Refills: 1 | Status: SHIPPED | OUTPATIENT
Start: 2020-10-15 | End: 2021-06-21 | Stop reason: SDUPTHER

## 2020-10-22 DIAGNOSIS — E11.8 TYPE 2 DIABETES MELLITUS WITH COMPLICATION, WITHOUT LONG-TERM CURRENT USE OF INSULIN (HCC): ICD-10-CM

## 2020-10-22 RX ORDER — ATORVASTATIN CALCIUM 40 MG/1
40 TABLET, FILM COATED ORAL DAILY
Qty: 90 TABLET | Refills: 3 | Status: SHIPPED | OUTPATIENT
Start: 2020-10-22 | End: 2020-10-29 | Stop reason: SDUPTHER

## 2020-10-27 ENCOUNTER — OFFICE VISIT (OUTPATIENT)
Dept: PODIATRY | Facility: CLINIC | Age: 50
End: 2020-10-27

## 2020-10-27 VITALS — BODY MASS INDEX: 40.98 KG/M2 | WEIGHT: 255 LBS | HEIGHT: 66 IN | OXYGEN SATURATION: 97 % | HEART RATE: 97 BPM

## 2020-10-27 DIAGNOSIS — L03.031 PARONYCHIA OF GREAT TOE OF RIGHT FOOT: Primary | ICD-10-CM

## 2020-10-27 PROCEDURE — 99212 OFFICE O/P EST SF 10 MIN: CPT | Performed by: PODIATRIST

## 2020-10-27 NOTE — PROGRESS NOTES
"Kingsley Littlejohn  1970  50 y.o. male  SHELBY Doan 9/16/2020  BS - 231 per patient     Patient return to clinic for a follow up appointment of right hallux total temp nail avulsion      10/27/2020     Chief Complaint   Patient presents with   • Right Foot - Follow-up       History of Present Illness    Patient presents for follow-up of his right hallux total temporary nail avulsion.    Past Medical History:   Diagnosis Date   • Abdominal pain    • Allergic rhinitis    • Diabetes mellitus (CMS/HCC)     Prev HbA1c 14.0 2014, states \"I'm cured!\"   • Essential hypertension    • Fracture closed, fibula, shaft     tib-fib   • Onychomycosis          Past Surgical History:   Procedure Laterality Date   • ANKLE SURGERY      right and left   • COLONOSCOPY N/A 6/4/2018    Procedure: COLONOSCOPY;  Surgeon: Jaylon Castro MD;  Location: [facility] ENDOSCOPY;  Service: Gastroenterology   • CYST REMOVAL      x 3, from hand, thigh, and buttocks   • ENDOSCOPY      20 years ago   • TIBIA FRACTURE SURGERY Bilateral 2011    tib-fib         Family History   Problem Relation Age of Onset   • Diabetes Other    • Hypertension Other    • Cancer Other    • Lung disease Other    • Thyroid disease Other    • Bleeding Disorder Other    • Colon cancer Paternal Grandmother    • Cancer Paternal Grandfather    • Crohn's disease Mother    • Diabetes Mother    • Hypertension Father    • Lung disease Father    • Hypertension Brother    • Hypertension Brother    • Hypertension Brother        Allergies   Allergen Reactions   • Latex Irritability   • Codeine Nausea Only       Social History     Socioeconomic History   • Marital status: Single     Spouse name: Not on file   • Number of children: Not on file   • Years of education: Not on file   • Highest education level: Not on file   Occupational History   • 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   • Sexual activity: Defer   Social History Narrative    Single, lives alone in [place].  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           Current Outpatient Medications   Medication Sig Dispense Refill   • aspirin 325 MG tablet Take 325 mg by mouth Daily.     • atorvastatin (LIPITOR) 40 MG tablet Take 1 tablet by mouth Daily. 90 tablet 3   • BD Pen Needle Nola U/F 32G X 4 MM misc INJECT ONCE WALTON     • Blood Glucose Monitoring Suppl (ONE TOUCH ULTRA 2) w/Device kit Use 1 daily to test blood glucose level. 1 each 5   • fluticasone (FLONASE) 50 MCG/ACT nasal spray 2 sprays by Each Nare route Daily. 48 mL 0   • glucose blood test strip USE TO TEST BLOOD SUGAR 1 TIME DAILY E11.9 100 each 12   • glucose monitor monitoring kit 1 each As Needed (Diabetes). USE TO TEST 1 TIME DAILY 1 each 0   • hydrOXYzine (ATARAX) 50 MG tablet Take 50 mg by mouth 4 (Four) Times a Day.     • ibuprofen (ADVIL,MOTRIN) 600 MG tablet Take 1 tablet by mouth Every 6 (Six) Hours As Needed for Mild Pain . 15 tablet 0   • icosapent ethyl (Vascepa) 1 g capsule capsule Take 2 g by mouth 2 (Two) Times a Day With Meals. 120 capsule 11   • Insulin Glargine, 2 Unit Dial, (Toujeo Max SoloStar) 300 UNIT/ML solution pen-injector injection Inject 15 up to 30 units daily 3 pen 11   • Insulin Pen Needle (CareTouch Pen Needles) 32G X 5 MM misc Inject once walton 30 each 11   • Invega Sustenna 117 MG/0.75ML suspension prefilled syringe IM injection      • Jardiance 25 MG tablet TAKE 1 TABLET BY MOUTH EVERY DAY 90 tablet 1   • Lancets (ACCU-CHEK SOFT TOUCH) lancets Use TO TEST BLOOD SUGAR 1 TIME DAILY. 100 each 12   • lisinopril  "(PRINIVIL,ZESTRIL) 40 MG tablet TAKE 1 TABLET BY MOUTH EVERY DAY 90 tablet 3   • metFORMIN (GLUCOPHAGE) 1000 MG tablet Take 1 tablet by mouth 2 (Two) Times a Day With Meals. 180 tablet 2   • metoprolol tartrate (LOPRESSOR) 50 MG tablet Take 1 tablet by mouth Every 12 (Twelve) Hours. (Patient taking differently: Take 75 mg by mouth Every 12 (Twelve) Hours.) 180 tablet 1   • nitroglycerin (NITROSTAT) 0.4 MG SL tablet Place 1 tablet under the tongue Every 5 (Five) Minutes As Needed for Chest Pain. Take no more than 3 doses in 15 minutes. 30 tablet 12   • paliperidone palmitate (INVEGA SUSTENNA) 117 MG/0.75ML suspension IM injection Inject 117 mg into the shoulder, thigh, or buttocks Every 30 (Thirty) Days.     • Semaglutide, 1 MG/DOSE, (Ozempic, 1 MG/DOSE,) 2 MG/1.5ML solution pen-injector Inject 1 mg under the skin into the appropriate area as directed 1 (One) Time Per Week. 3 pen 11   • sodium chloride (OCEAN NASAL SPRAY) 0.65 % nasal spray 1 spray into each nostril As Needed for Congestion. 60 mL 12   • traZODone (DESYREL) 150 MG tablet TAKE 1 TABLET BY MOUTH EVERY DAY AT BEDTIME AS NEEDED  2   • Triamcinolone Acetonide (NASACORT) 55 MCG/ACT nasal inhaler 2 sprays into the nostril(s) as directed by provider Daily. 16.5 g 3     No current facility-administered medications for this visit.        Review of Systems   Constitutional: Negative.    HENT: Negative.    Eyes: Negative.    Respiratory: Negative.    Cardiovascular: Positive for leg swelling.   Gastrointestinal: Negative.    Musculoskeletal:        Toe pain    Skin: Negative.    Neurological: Positive for numbness. Negative for dizziness.   Psychiatric/Behavioral: Negative.          OBJECTIVE    Pulse 97   Ht 167.6 cm (66\")   Wt 116 kg (255 lb)   SpO2 97%   BMI 41.16 kg/m²       Physical Exam   Constitutional: He is oriented to person, place, and time. He appears well-developed.   HENT:   Head: Normocephalic and atraumatic.   Eyes: Pupils are equal, round, " and reactive to light.   Pulmonary/Chest: Effort normal. No respiratory distress.   Musculoskeletal: Normal range of motion. Tenderness present. No deformity.   Neurological: He is alert and oriented to person, place, and time.   Skin: Skin is warm and dry. Capillary refill takes less than 2 seconds. He is not diaphoretic. No erythema.   Psychiatric: His behavior is normal.   Vitals reviewed.      Gait: Normal     Assistive Device: None    Lower Extremity    Cardiovascular:    DP/PT pulses palpable bilateral  CFT brisk  to all digits  Skin temp is warm to warm from proximal tibia to distal digits bilateral  Pedal hair growth present.   Musculoskeletal:  Muscle strength is 5/5 for all muscle groups tested   ROM of the 1st MTP is full without pain or crepitus bilateral  ROM of the ankle joint is full without pain or crepitus  bilateral  Dermatological:   Right hallux nail absent   Skin is warm, dry and intact bilateral  Webspaces 1-4 bilateral are clean, dry and intact.   Neurological:   Protective sensation intact   Sensation intact to light touch        Procedures        ASSESSMENT AND PLAN    Diagnoses and all orders for this visit:    1. Paronychia of great toe of right foot (Primary)      - Patient doing well following nail procedure.  Advised on site care going forward.  All of her questions were answered.  Recheck at his next scheduled follow up.           This document has been electronically signed by Abhijeet Thomason DPM on October 28, 2020 13:18 CDT     10/28/2020  13:18 CDT

## 2020-10-29 ENCOUNTER — OFFICE VISIT (OUTPATIENT)
Dept: FAMILY MEDICINE CLINIC | Facility: CLINIC | Age: 50
End: 2020-10-29

## 2020-10-29 VITALS
TEMPERATURE: 97.6 F | DIASTOLIC BLOOD PRESSURE: 76 MMHG | BODY MASS INDEX: 41.46 KG/M2 | WEIGHT: 256.9 LBS | SYSTOLIC BLOOD PRESSURE: 128 MMHG | OXYGEN SATURATION: 98 % | HEART RATE: 92 BPM

## 2020-10-29 DIAGNOSIS — F39 UNSPECIFIED MOOD (AFFECTIVE) DISORDER (HCC): ICD-10-CM

## 2020-10-29 DIAGNOSIS — E78.49 OTHER HYPERLIPIDEMIA: Primary | ICD-10-CM

## 2020-10-29 DIAGNOSIS — I10 ESSENTIAL HYPERTENSION: ICD-10-CM

## 2020-10-29 DIAGNOSIS — E66.09 NON MORBID OBESITY DUE TO EXCESS CALORIES: ICD-10-CM

## 2020-10-29 DIAGNOSIS — E11.8 TYPE 2 DIABETES MELLITUS WITH COMPLICATION, WITHOUT LONG-TERM CURRENT USE OF INSULIN (HCC): ICD-10-CM

## 2020-10-29 DIAGNOSIS — E11.9 TYPE 2 DIABETES MELLITUS WITHOUT COMPLICATION, WITHOUT LONG-TERM CURRENT USE OF INSULIN (HCC): ICD-10-CM

## 2020-10-29 PROCEDURE — 99213 OFFICE O/P EST LOW 20 MIN: CPT | Performed by: STUDENT IN AN ORGANIZED HEALTH CARE EDUCATION/TRAINING PROGRAM

## 2020-10-29 RX ORDER — LISINOPRIL 40 MG/1
40 TABLET ORAL DAILY
Qty: 90 TABLET | Refills: 3 | Status: SHIPPED | OUTPATIENT
Start: 2020-10-29 | End: 2021-11-22

## 2020-10-29 RX ORDER — ATORVASTATIN CALCIUM 40 MG/1
40 TABLET, FILM COATED ORAL DAILY
Qty: 90 TABLET | Refills: 3 | Status: SHIPPED | OUTPATIENT
Start: 2020-10-29 | End: 2021-11-22

## 2020-10-29 RX ORDER — METOPROLOL TARTRATE 50 MG/1
75 TABLET, FILM COATED ORAL EVERY 12 HOURS
Qty: 270 TABLET | Refills: 0 | Status: SHIPPED | OUTPATIENT
Start: 2020-10-29 | End: 2021-02-01 | Stop reason: SDUPTHER

## 2020-10-29 NOTE — PROGRESS NOTES
Family Medicine Residency  Bonilla Stanton MD    Subjective:     Kingsley Littlejohn is a 50 y.o. male who presents for Type 2 DM, hypertension and medication refills. Patient also sees endocrinology for uncontrolled diabetes and last visit was on 9/21/2020 with Al Doan. Patient was advised to continue Metformin 1000 mg BID and Jardiance 25 mg daily and discontinue Januvia 100 mg daily due to treatment failure. He was started on Ozempic (Semaglutide) 1 mg once weekly. Insulin therapy was discussed however patient refused initially and then was started on Toujeo 15 units once daily. His last Hb A1c on 9/16/2020 was 10.20. Hb A1c was 11.21 on 6/25/20 8.90 on 6/6/19. His home sugars are running about 230s. Patient reports his last diabetic eye exam was about 6 months ago and was unremarkable.      Hypercholesteremia   Patient is currently on Lipitor 40 mg daily and was recently started on Vascepa 2 gm BID which he discontinued due to intolerance secondary to GI symptoms.  His last lipid profile on 9/16/20 showed Triglycerides of 654, HDL 35.    Hypertension   Patient is currently on Lisinopril 40 mg once daily and Metoprolol 75 mg daily. He is taking 1.5 tablet of Metoprolol twice a day which was increased by  recently. His blood pressure is well controlled. Patient had an episode of chest pain about 2 yrs ago and had negative cardiac work up . Has nitro prn. Patient follows up with  on yearly basis, although he does not remember the exam frequency.     He sees Podiatry and had diabetic foot exam on 9/16/2020. The right great toe nail was removed on 10/13/2020 due to paronychia.     Patient was last seen by Dr. Vargas on 7/17/2020 for medicare wellness check. patient had bilateral cerumen impaction which was cleared on 7/29/20.     Patient receives Invega shot at [facility] for psychotic mood disorder.     Patient denies any recent illness or sick contact, no ER visits or  "hospitalizations. He denies chest pain, dizziness, shortness of breath, palpitations, nausea/vomiting, polyuria, polyphagia, fevers/chills.     The following portions of the patient's history were reviewed and updated as appropriate: allergies, current medications, past family history, past medical history, past social history, past surgical history and problem list.    Past Medical Hx:  Past Medical History:   Diagnosis Date   • Abdominal pain    • Allergic rhinitis    • Diabetes mellitus (CMS/[facility])     Prev HbA1c 14.0 2014, states \"I'm cured!\"   • Essential hypertension    • Fracture closed, fibula, shaft     tib-fib   • Onychomycosis        Past Surgical Hx:  Past Surgical History:   Procedure Laterality Date   • ANKLE SURGERY      right and left   • COLONOSCOPY N/A 6/4/2018    Procedure: COLONOSCOPY;  Surgeon: Jaylon Castro MD;  Location: [facility] ENDOSCOPY;  Service: Gastroenterology   • CYST REMOVAL      x 3, from hand, thigh, and buttocks   • ENDOSCOPY      20 years ago   • TIBIA FRACTURE SURGERY Bilateral 2011    tib-fib       Current Meds:    Current Outpatient Medications:   •  aspirin 325 MG tablet, Take 325 mg by mouth Daily., Disp: , Rfl:   •  atorvastatin (LIPITOR) 40 MG tablet, Take 1 tablet by mouth Daily., Disp: 90 tablet, Rfl: 3  •  BD Pen Needle Nola U/F 32G X 4 MM misc, INJECT ONCE WALTON, Disp: , Rfl:   •  Blood Glucose Monitoring Suppl (ONE TOUCH ULTRA 2) w/Device kit, Use 1 daily to test blood glucose level., Disp: 1 each, Rfl: 5  •  fluticasone (FLONASE) 50 MCG/ACT nasal spray, 2 sprays by Each Nare route Daily., Disp: 48 mL, Rfl: 0  •  glucose blood test strip, USE TO TEST BLOOD SUGAR 1 TIME DAILY E11.9, Disp: 100 each, Rfl: 12  •  glucose monitor monitoring kit, 1 each As Needed (Diabetes). USE TO TEST 1 TIME DAILY, Disp: 1 each, Rfl: 0  •  hydrOXYzine (ATARAX) 50 MG tablet, Take 50 mg by mouth 4 (Four) Times a Day., Disp: , Rfl:   •  ibuprofen (ADVIL,MOTRIN) 600 MG tablet, Take 1 tablet by " mouth Every 6 (Six) Hours As Needed for Mild Pain ., Disp: 15 tablet, Rfl: 0  •  icosapent ethyl (Vascepa) 1 g capsule capsule, Take 2 g by mouth 2 (Two) Times a Day With Meals., Disp: 120 capsule, Rfl: 11  •  Insulin Glargine, 2 Unit Dial, (Toujeo Max SoloStar) 300 UNIT/ML solution pen-injector injection, Inject 15 up to 30 units daily, Disp: 3 pen, Rfl: 11  •  Insulin Pen Needle (CareTouch Pen Needles) 32G X 5 MM misc, Inject once camp, Disp: 30 each, Rfl: 11  •  Invega Sustenna 117 MG/0.75ML suspension prefilled syringe IM injection, , Disp: , Rfl:   •  Jardiance 25 MG tablet, TAKE 1 TABLET BY MOUTH EVERY DAY, Disp: 90 tablet, Rfl: 1  •  Lancets (ACCU-CHEK SOFT TOUCH) lancets, Use TO TEST BLOOD SUGAR 1 TIME DAILY., Disp: 100 each, Rfl: 12  •  lisinopril (PRINIVIL,ZESTRIL) 40 MG tablet, Take 1 tablet by mouth Daily., Disp: 90 tablet, Rfl: 3  •  metFORMIN (GLUCOPHAGE) 1000 MG tablet, Take 1 tablet by mouth 2 (Two) Times a Day With Meals., Disp: 180 tablet, Rfl: 2  •  metoprolol tartrate (LOPRESSOR) 50 MG tablet, Take 1.5 tablets by mouth Every 12 (Twelve) Hours. Patient takes 1.5 tablet twice a day, Disp: 270 tablet, Rfl: 0  •  nitroglycerin (NITROSTAT) 0.4 MG SL tablet, Place 1 tablet under the tongue Every 5 (Five) Minutes As Needed for Chest Pain. Take no more than 3 doses in 15 minutes., Disp: 30 tablet, Rfl: 12  •  paliperidone palmitate (INVEGA SUSTENNA) 117 MG/0.75ML suspension IM injection, Inject 117 mg into the shoulder, thigh, or buttocks Every 30 (Thirty) Days., Disp: , Rfl:   •  Semaglutide, 1 MG/DOSE, (Ozempic, 1 MG/DOSE,) 2 MG/1.5ML solution pen-injector, Inject 1 mg under the skin into the appropriate area as directed 1 (One) Time Per Week., Disp: 3 pen, Rfl: 11  •  sodium chloride (OCEAN NASAL SPRAY) 0.65 % nasal spray, 1 spray into each nostril As Needed for Congestion., Disp: 60 mL, Rfl: 12  •  traZODone (DESYREL) 150 MG tablet, TAKE 1 TABLET BY MOUTH EVERY DAY AT BEDTIME AS NEEDED, Disp: , Rfl:  2  •  Triamcinolone Acetonide (NASACORT) 55 MCG/ACT nasal inhaler, 2 sprays into the nostril(s) as directed by provider Daily., Disp: 16.5 g, Rfl: 3    Allergies:  Allergies   Allergen Reactions   • Latex Irritability   • Codeine Nausea Only       Family Hx:  Family History   Problem Relation Age of Onset   • Diabetes Other    • Hypertension Other    • Cancer Other    • Lung disease Other    • Thyroid disease Other    • Bleeding Disorder Other    • Colon cancer Paternal Grandmother    • Cancer Paternal Grandfather    • Crohn's disease Mother    • Diabetes Mother    • Hypertension Father    • Lung disease Father    • Hypertension Brother    • Hypertension Brother    • Hypertension Brother         Social History:  Social History     Socioeconomic History   • Marital status: Single     Spouse name: Not on file   • Number of children: Not on file   • Years of education: Not on file   • Highest education level: Not on file   Occupational History   • 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   • Sexual activity: Defer   Social History Narrative    Single, lives alone in [place].  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         Review of Systems  Review of Systems   Constitutional: Negative for activity change, appetite change, chills, fatigue and unexpected weight change.   HENT: Negative for congestion.    Eyes: Negative for photophobia and visual disturbance.   Respiratory: Negative for cough, chest tightness, shortness of breath and wheezing.    Cardiovascular: Negative for chest pain,  palpitations and leg swelling.   Gastrointestinal: Negative for abdominal distention, abdominal pain, diarrhea, nausea and vomiting.   Endocrine: Negative for polydipsia, polyphagia and polyuria.   Genitourinary: Negative for difficulty urinating and frequency.   Musculoskeletal: Negative for arthralgias and myalgias.   Skin: Negative for color change and wound.        Healing right great toe region. Status post nail removal    Neurological: Negative for dizziness, weakness, light-headedness and headaches.   Psychiatric/Behavioral: Negative for agitation, behavioral problems, confusion and sleep disturbance. The patient is not nervous/anxious.        Objective:     /76   Pulse 92   Temp 97.6 °F (36.4 °C)   Wt 117 kg (256 lb 14.4 oz)   SpO2 98%   BMI 41.46 kg/m²   Physical Exam  Vitals signs and nursing note reviewed.   Constitutional:       Appearance: Normal appearance. He is obese.      Comments: Distended abdomen secondary to morbid obesity    HENT:      Head: Normocephalic and atraumatic.      Right Ear: Tympanic membrane, ear canal and external ear normal. There is impacted cerumen.      Left Ear: Tympanic membrane, ear canal and external ear normal. There is impacted cerumen.      Nose: Nose normal. No congestion or rhinorrhea.      Mouth/Throat:      Mouth: Mucous membranes are moist.      Pharynx: Oropharynx is clear.   Eyes:      General:         Right eye: No discharge.         Left eye: No discharge.      Extraocular Movements: Extraocular movements intact.      Conjunctiva/sclera: Conjunctivae normal.      Pupils: Pupils are equal, round, and reactive to light.   Neck:      Musculoskeletal: Normal range of motion.   Cardiovascular:      Rate and Rhythm: Normal rate and regular rhythm.      Pulses: Normal pulses.      Heart sounds: Normal heart sounds.   Pulmonary:      Effort: Pulmonary effort is normal. No respiratory distress.      Breath sounds: Normal breath sounds.   Abdominal:       General: Abdomen is flat. Bowel sounds are normal. There is distension (secondary to morbid obesity ).      Palpations: Abdomen is soft. There is no mass.      Tenderness: There is no abdominal tenderness. There is no guarding or rebound.      Hernia: No hernia is present.   Musculoskeletal: Normal range of motion.         General: No swelling or tenderness.   Skin:     General: Skin is warm and dry.      Capillary Refill: Capillary refill takes 2 to 3 seconds.      Comments:  Status post right great toe nail removal. No signs or evidence of erythema, discharge.    Neurological:      General: No focal deficit present.      Mental Status: He is alert and oriented to person, place, and time.      Cranial Nerves: No cranial nerve deficit.      Sensory: No sensory deficit.   Psychiatric:         Mood and Affect: Mood normal.         Behavior: Behavior normal.         Thought Content: Thought content normal.         Judgment: Judgment normal.          Assessment/Plan:     Diagnoses and all orders for this visit. Flu vaccine was offered today and patient declined. Patient was advised to follow up with endocrinology for his diabetes follow up and get blood work done prior to his next visit. He will be seen every 6 months for further management of his htn and hyperlipidemia. Patient's medications were refilled today and all his questions were answered.     1. Other hyperlipidemia (Primary)  -     atorvastatin (LIPITOR) 40 MG tablet; Take 1 tablet by mouth Daily.  Dispense: 90 tablet; Refill: 3    2. Type 2 diabetes mellitus with complication, without long-term current use of insulin (CMS/[facility])    3. Essential hypertension  -     lisinopril (PRINIVIL,ZESTRIL) 40 MG tablet; Take 1 tablet by mouth Daily.  Dispense: 90 tablet; Refill: 3  -     metoprolol tartrate (LOPRESSOR) 50 MG tablet; Take 1.5 tablets by mouth Every 12 (Twelve) Hours. Patient takes 1.5 tablet twice a day  Dispense: 270 tablet; Refill: 0    4. Non morbid  obesity due to excess calories        · Rx changes: none   · Patient Education: none  · Compliance at present is estimated to be good.   · Efforts to improve compliance (if necessary) will be directed at dietary modifications: low carb and sugar diet and increased exercise.     Follow-up:     Return in about 6 months (around 4/29/2021), or if symptoms worsen or fail to improve, for Recheck.    Preventative:  Health Maintenance   Topic Date Due   • Pneumococcal Vaccine 0-64 (1 of 1 - PPSV23) 02/20/1974   • Hepatitis B (1 of 3 - Risk 3-dose series) 02/20/1987   • HEPATITIS C SCREENING  05/26/2017   • ZOSTER VACCINE (1 of 2) 09/01/2021 (Originally 2/20/2018)   • TDAP/TD VACCINES (2 - Td) 01/01/2021   • DIABETIC EYE EXAM  01/07/2021   • HEMOGLOBIN A1C  03/16/2021   • COLONOSCOPY  06/04/2021   • ANNUAL WELLNESS VISIT  07/17/2021   • LIPID PANEL  09/16/2021   • DIABETIC FOOT EXAM  10/13/2021   • INFLUENZA VACCINE  Discontinued   • URINE MICROALBUMIN  Discontinued       Recommended: Influenza. Patient declined  Vaccine Counseling: none    Weight  -Class: Obese Class III extreme obesity: > or equal to 40kg/m2  -Patient's Body mass index is 41.46 kg/m². BMI is above normal parameters. Recommendations include: exercise counseling and nutrition counseling.   eat more fruits and vegetables, increase water intake, increase physical activity, reduce screen time, reduce portion size and cut out extra servings    Alcohol use:  reports no history of alcohol use.  Nicotine status  reports that he quit smoking about 22 months ago. His smoking use included cigarettes. He started smoking about 28 years ago. He has a 25.00 pack-year smoking history. He has never used smokeless tobacco.    Goals     • Blood Pressure < 140/90      Barriers:  Sporadic follow-up      • Lower Blood Sugar      Barriers:  Questionable insight            RISK SCORE: 3        Bonilla Stanton MD PGY-1  [facility] Family Medicine Residency   This  document has been electronically signed by Bonilla Stanton MD on October 29, 2020 10:13 CDT

## 2020-10-29 NOTE — PROGRESS NOTES
I saw and evaluated the patient with the resident.  I discussed the case with the resident and agree with the findings and plan as documented in the residents note.  I have helped formulate and discussed the assessment and plan with Dr.Lohita Stanton.           Subjective   Kingsley Littlejohn is a 50 y.o. male.     History of Present Illness   Comes in for follow-up.  States he is doing well.  He needs refills of medicine for hypertension and hyperlipidemia.  He has been followed by Dr. Duarte for his coronary artery disease.  Metoprolol has been increased to 75 mg twice daily.  Needs refill of statin as well.  He is being followed by Juanita Doan and Dr. Conner Lynne for his diabetes.  Says it is better but not under really good control yet.    The following portions of the patient's history were reviewed and updated as appropriate: allergies, current medications,  and problem list.    Review of Systems     General no new complaints     Cardiac no chest pain no palpitations     Pulmonary no shortness of breath or cough     GI negative     Musculoskeletal negative     Neurological negative     Other history of mood disorder being followed by the [facility] with monthly Invega injections    Physical Exam    Objective   Cooperative talkative good historian.  Otherwise examination per Dr Stanton  Assessment/Plan   Diagnoses and all orders for this visit:    1. Other hyperlipidemia (Primary)  -     atorvastatin (LIPITOR) 40 MG tablet; Take 1 tablet by mouth Daily.  Dispense: 90 tablet; Refill: 3    2. Essential hypertension  -     lisinopril (PRINIVIL,ZESTRIL) 40 MG tablet; Take 1 tablet by mouth Daily.  Dispense: 90 tablet; Refill: 3  -     metoprolol tartrate (LOPRESSOR) 50 MG tablet; Take 1.5 tablets by mouth Every 12 (Twelve) Hours. Patient takes 1.5 tablet twice a day  Dispense: 270 tablet; Refill: 0    3. Non morbid obesity due to excess calories    4. Unspecified mood (affective) disorder (CMS/[facility])    5. Type  2 diabetes mellitus with complication, without long-term current use of insulin (CMS/[facility])    6. Type 2 diabetes mellitus without complication, without long-term current use of insulin (CMS/[facility])        Continue current medications refill of his Lipitor 40 mg a day lisinopril 40 daily and metoprolol tartrate 50 mg tablets 1/2 tablet twice daily.  He will follow-up as  Needed here.  We will keep follow-up appointments with endocrinology for diabetes.           This document has been electronically signed by Kg Kc MD on October 29, 2020 13:37 CDT

## 2020-11-13 ENCOUNTER — OFFICE VISIT (OUTPATIENT)
Dept: PODIATRY | Facility: CLINIC | Age: 50
End: 2020-11-13

## 2020-11-13 VITALS — HEART RATE: 98 BPM | OXYGEN SATURATION: 98 % | WEIGHT: 256 LBS | BODY MASS INDEX: 41.14 KG/M2 | HEIGHT: 66 IN

## 2020-11-13 DIAGNOSIS — L03.032 PARONYCHIA OF GREAT TOE OF LEFT FOOT: ICD-10-CM

## 2020-11-13 DIAGNOSIS — M79.675 PAIN OF LEFT GREAT TOE: Primary | ICD-10-CM

## 2020-11-13 PROCEDURE — 99213 OFFICE O/P EST LOW 20 MIN: CPT | Performed by: PODIATRIST

## 2020-11-13 PROCEDURE — 11730 AVULSION NAIL PLATE SIMPLE 1: CPT | Performed by: PODIATRIST

## 2020-11-13 NOTE — PROGRESS NOTES
"Kingsley Littlejohn  1970  50 y.o. male  SHELBY Doan 9/16/2020  BS - 231 per patient     Patient presents here today for a ingrown toenail on his left hallux.     11/13/2020     Chief Complaint   Patient presents with   • Left Foot - Ingrown Toenail     Big toe       History of Present Illness    50-year-old male presents to clinic today with chief complaint of left great toe pain.  Patient states that a few days ago he noticed swelling and drainage from his left great toenail.  He does not recall any specific injuries or trauma to the toe.  He has been soaking and dressing it daily without improvement.  The toe is painful.  He rates his pain as a 5 out of 10.  Pain is aggravated with pressure.  Denies any other pedal complaints today.    Past Medical History:   Diagnosis Date   • Abdominal pain    • Allergic rhinitis    • Diabetes mellitus (CMS/[facility])     Prev HbA1c 14.0 2014, states \"I'm cured!\"   • Essential hypertension    • Fracture closed, fibula, shaft     tib-fib   • Onychomycosis          Past Surgical History:   Procedure Laterality Date   • ANKLE SURGERY      right and left   • COLONOSCOPY N/A 6/4/2018    Procedure: COLONOSCOPY;  Surgeon: Jaylon Castro MD;  Location: [facility] ENDOSCOPY;  Service: Gastroenterology   • CYST REMOVAL      x 3, from hand, thigh, and buttocks   • ENDOSCOPY      20 years ago   • TIBIA FRACTURE SURGERY Bilateral 2011    tib-fib         Family History   Problem Relation Age of Onset   • Diabetes Other    • Hypertension Other    • Cancer Other    • Lung disease Other    • Thyroid disease Other    • Bleeding Disorder Other    • Colon cancer Paternal Grandmother    • Cancer Paternal Grandfather    • Crohn's disease Mother    • Diabetes Mother    • Hypertension Father    • Lung disease Father    • Hypertension Brother    • Hypertension Brother    • Hypertension Brother        Allergies   Allergen Reactions   • Latex Irritability   • Codeine Nausea Only       Social History     "     Socioeconomic History   • Marital status: Single     Spouse name: Not on file   • Number of children: Not on file   • Years of education: Not on file   • Highest education level: Not on file   Occupational History   • 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   • Sexual activity: Defer   Social History Narrative    Single, lives alone in [place].  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           Current Outpatient Medications   Medication Sig Dispense Refill   • aspirin 325 MG tablet Take 325 mg by mouth Daily.     • atorvastatin (LIPITOR) 40 MG tablet Take 1 tablet by mouth Daily. 90 tablet 3   • BD Pen Needle Nola U/F 32G X 4 MM misc INJECT ONCE WALTON     • Blood Glucose Monitoring Suppl (ONE TOUCH ULTRA 2) w/Device kit Use 1 daily to test blood glucose level. 1 each 5   • fluticasone (FLONASE) 50 MCG/ACT nasal spray 2 sprays by Each Nare route Daily. 48 mL 0   • glucose blood test strip USE TO TEST BLOOD SUGAR 1 TIME DAILY E11.9 100 each 12   • glucose monitor monitoring kit 1 each As Needed (Diabetes). USE TO TEST 1 TIME DAILY 1 each 0   • hydrOXYzine (ATARAX) 50 MG tablet Take 50 mg by mouth 4 (Four) Times a Day.     • ibuprofen (ADVIL,MOTRIN) 600 MG tablet Take 1 tablet by mouth Every 6 (Six) Hours As Needed for Mild Pain . 15 tablet 0   • icosapent ethyl (Vascepa) 1 g capsule capsule Take 2 g by mouth 2 (Two) Times a Day With Meals. 120 capsule 11   • Insulin Glargine, 2 Unit Dial, (Toujeo Max SoloStar) 300 UNIT/ML solution pen-injector injection Inject 15 up to 30 units daily  3 pen 11   • Insulin Pen Needle (CareTouch Pen Needles) 32G X 5 MM misc Inject once camp 30 each 11   • Invega Sustenna 117 MG/0.75ML suspension prefilled syringe IM injection      • Jardiance 25 MG tablet TAKE 1 TABLET BY MOUTH EVERY DAY 90 tablet 1   • Lancets (ACCU-CHEK SOFT TOUCH) lancets Use TO TEST BLOOD SUGAR 1 TIME DAILY. 100 each 12   • lisinopril (PRINIVIL,ZESTRIL) 40 MG tablet Take 1 tablet by mouth Daily. 90 tablet 3   • metFORMIN (GLUCOPHAGE) 1000 MG tablet Take 1 tablet by mouth 2 (Two) Times a Day With Meals. 180 tablet 2   • metoprolol tartrate (LOPRESSOR) 50 MG tablet Take 1.5 tablets by mouth Every 12 (Twelve) Hours. Patient takes 1.5 tablet twice a day 270 tablet 0   • nitroglycerin (NITROSTAT) 0.4 MG SL tablet Place 1 tablet under the tongue Every 5 (Five) Minutes As Needed for Chest Pain. Take no more than 3 doses in 15 minutes. 30 tablet 12   • paliperidone palmitate (INVEGA SUSTENNA) 117 MG/0.75ML suspension IM injection Inject 117 mg into the shoulder, thigh, or buttocks Every 30 (Thirty) Days.     • Semaglutide, 1 MG/DOSE, (Ozempic, 1 MG/DOSE,) 2 MG/1.5ML solution pen-injector Inject 1 mg under the skin into the appropriate area as directed 1 (One) Time Per Week. 3 pen 11   • sodium chloride (OCEAN NASAL SPRAY) 0.65 % nasal spray 1 spray into each nostril As Needed for Congestion. 60 mL 12   • traZODone (DESYREL) 150 MG tablet TAKE 1 TABLET BY MOUTH EVERY DAY AT BEDTIME AS NEEDED  2   • Triamcinolone Acetonide (NASACORT) 55 MCG/ACT nasal inhaler 2 sprays into the nostril(s) as directed by provider Daily. 16.5 g 3     No current facility-administered medications for this visit.        Review of Systems   Constitutional: Negative.    HENT: Negative.    Eyes: Negative.    Respiratory: Negative.    Cardiovascular: Positive for leg swelling.   Gastrointestinal: Negative.    Musculoskeletal:        Left great toe pain    Skin: Positive for color change.   Neurological: Positive for numbness.  "Negative for dizziness.   Psychiatric/Behavioral: Negative.          OBJECTIVE    Pulse 98   Ht 167.6 cm (66\")   Wt 116 kg (256 lb)   SpO2 98%   BMI 41.32 kg/m²       Physical Exam   Constitutional: He is oriented to person, place, and time. He appears well-developed.   HENT:   Head: Normocephalic and atraumatic.   Eyes: Pupils are equal, round, and reactive to light.   Pulmonary/Chest: Effort normal. No respiratory distress.   Musculoskeletal: Normal range of motion. Tenderness present. No deformity.   Neurological: He is alert and oriented to person, place, and time.   Skin: Skin is warm and dry. Capillary refill takes less than 2 seconds. He is not diaphoretic. No erythema.   Psychiatric: His behavior is normal.   Vitals reviewed.      Gait: Normal     Assistive Device: None    Lower Extremity    Cardiovascular:    DP/PT pulses palpable bilateral  CFT brisk  to all digits  Skin temp is warm to warm from proximal tibia to distal digits bilateral  Pedal hair growth present.   Musculoskeletal:  Muscle strength is 5/5 for all muscle groups tested   ROM of the 1st MTP is full without pain or crepitus bilateral  ROM of the ankle joint is full without pain or crepitus  bilateral  Dermatological:   Paronychia left hallux nail.  Scant drainage.  Proximal nail fold erythema.  Skin is warm, dry and intact bilateral  Webspaces 1-4 bilateral are clean, dry and intact.   Neurological:   Protective sensation intact   Sensation intact to light touch        Nail Removal    Date/Time: 11/13/2020 3:41 PM  Performed by: Abhijeet Thomason DPM  Authorized by: Abhijeet Thomason DPM   Consent: Verbal consent obtained. Written consent obtained.  Risks and benefits: risks, benefits and alternatives were discussed  Consent given by: patient  Patient identity confirmed: verbally with patient  Location: left foot  Location details: left big toe  Anesthesia: digital block    Anesthesia:  Local Anesthetic: lidocaine 2% without " epinephrine    Sedation:  Patient sedated: no    Preparation: skin prepped with Betadine  Amount removed: complete  Nail matrix removed: none  Dressing: antibiotic ointment and dressing applied  Patient tolerance: patient tolerated the procedure well with no immediate complications              ASSESSMENT AND PLAN    Diagnoses and all orders for this visit:    1. Pain of left great toe (Primary)    2. Paronychia of great toe of left foot  -     Nail Removal      - Comprehensive foot and ankle exam performed  - Diagnosis, prevention and treatment of ingrown toenails discussed with patient, including risks and potential benefits of nail avulsion both temporary and permanent versus simple debridement.  - Patient elected for a total temporary nail avulsion  - Dispensed aftercare instruction sheet  - All questions were answered and the patient is in agreement with the current treatment plan.  - RTC in 2 weeks           This document has been electronically signed by Abhijeet Thomason DPM on November 14, 2020 15:42 CST     11/14/2020  15:42 CST

## 2020-11-20 ENCOUNTER — TELEPHONE (OUTPATIENT)
Dept: ENDOCRINOLOGY | Facility: CLINIC | Age: 50
End: 2020-11-20

## 2020-11-20 NOTE — TELEPHONE ENCOUNTER
Spoke with pt. Reviewed titration. Currently on Ozempic 1mg weekly. Toujeo 16 un. No meal time insulin. Instructed to increase by 2 units every Monday and Thursday if waking sugars are above 130. If pt gets up to 30-40 units of Toujeo - call us back. Pt verbalized understanding.

## 2020-11-20 NOTE — TELEPHONE ENCOUNTER
Pt called stating that his sugar is still staying 200-300, he was told to call if this was still going on and she would change his dosages. He is wanting to know what needs to be changed.

## 2020-11-30 ENCOUNTER — OFFICE VISIT (OUTPATIENT)
Dept: PODIATRY | Facility: CLINIC | Age: 50
End: 2020-11-30

## 2020-11-30 VITALS — HEART RATE: 98 BPM | WEIGHT: 256 LBS | OXYGEN SATURATION: 98 % | HEIGHT: 66 IN | BODY MASS INDEX: 41.14 KG/M2

## 2020-11-30 DIAGNOSIS — L03.032 PARONYCHIA OF GREAT TOE OF LEFT FOOT: Primary | ICD-10-CM

## 2020-11-30 PROCEDURE — 99212 OFFICE O/P EST SF 10 MIN: CPT | Performed by: PODIATRIST

## 2020-12-01 RX ORDER — FLUTICASONE PROPIONATE 50 MCG
SPRAY, SUSPENSION (ML) NASAL
Qty: 48 ML | Refills: 0 | Status: SHIPPED | OUTPATIENT
Start: 2020-12-01 | End: 2021-03-03

## 2020-12-10 NOTE — TELEPHONE ENCOUNTER
PATIENT CALLED TO SAY THAT [facility] HAS FAXED A PA REQUEST FOR THE NICODERM PATCHES    THANK YOU   hard copy, drawn during this pregnancy

## 2020-12-16 ENCOUNTER — LAB (OUTPATIENT)
Dept: LAB | Facility: HOSPITAL | Age: 50
End: 2020-12-16

## 2020-12-16 ENCOUNTER — OFFICE VISIT (OUTPATIENT)
Dept: PODIATRY | Facility: CLINIC | Age: 50
End: 2020-12-16

## 2020-12-16 VITALS — HEIGHT: 66 IN | OXYGEN SATURATION: 99 % | HEART RATE: 101 BPM | BODY MASS INDEX: 41.14 KG/M2 | WEIGHT: 256 LBS

## 2020-12-16 DIAGNOSIS — B35.1 ONYCHOMYCOSIS: Primary | ICD-10-CM

## 2020-12-16 DIAGNOSIS — M79.674 CHRONIC TOE PAIN, BILATERAL: ICD-10-CM

## 2020-12-16 DIAGNOSIS — E11.42 TYPE 2 DIABETES MELLITUS WITH PERIPHERAL NEUROPATHY (HCC): ICD-10-CM

## 2020-12-16 DIAGNOSIS — G89.29 CHRONIC TOE PAIN, BILATERAL: ICD-10-CM

## 2020-12-16 DIAGNOSIS — M79.675 CHRONIC TOE PAIN, BILATERAL: ICD-10-CM

## 2020-12-16 PROCEDURE — 80053 COMPREHEN METABOLIC PANEL: CPT | Performed by: NURSE PRACTITIONER

## 2020-12-16 PROCEDURE — 83036 HEMOGLOBIN GLYCOSYLATED A1C: CPT | Performed by: NURSE PRACTITIONER

## 2020-12-16 PROCEDURE — 82607 VITAMIN B-12: CPT | Performed by: NURSE PRACTITIONER

## 2020-12-16 PROCEDURE — 36415 COLL VENOUS BLD VENIPUNCTURE: CPT | Performed by: NURSE PRACTITIONER

## 2020-12-16 PROCEDURE — 84443 ASSAY THYROID STIM HORMONE: CPT | Performed by: NURSE PRACTITIONER

## 2020-12-16 PROCEDURE — 80061 LIPID PANEL: CPT | Performed by: NURSE PRACTITIONER

## 2020-12-16 PROCEDURE — 82306 VITAMIN D 25 HYDROXY: CPT | Performed by: NURSE PRACTITIONER

## 2020-12-16 PROCEDURE — 11721 DEBRIDE NAIL 6 OR MORE: CPT | Performed by: PODIATRIST

## 2020-12-16 PROCEDURE — 85025 COMPLETE CBC W/AUTO DIFF WBC: CPT | Performed by: NURSE PRACTITIONER

## 2020-12-16 NOTE — PROGRESS NOTES
"Kingsley Littlejohn  1970  50 y.o. male  FINA Stanton - 10/29/2020  BS - 158 per patient     Patient presents today for routine diabetic foot care.    12/16/2020     Chief Complaint   Patient presents with   • Left Foot - diabetic foot care   • Right Foot - diabetic foot care       History of Present Illness    Patient presents for routine diabetic foot care.      Past Medical History:   Diagnosis Date   • Abdominal pain    • Allergic rhinitis    • Diabetes mellitus (CMS/[facility])     Prev HbA1c 14.0 2014, states \"I'm cured!\"   • Essential hypertension    • Fracture closed, fibula, shaft     tib-fib   • Onychomycosis          Past Surgical History:   Procedure Laterality Date   • ANKLE SURGERY      right and left   • COLONOSCOPY N/A 6/4/2018    Procedure: COLONOSCOPY;  Surgeon: Jaylon Castro MD;  Location: [facility] ENDOSCOPY;  Service: Gastroenterology   • CYST REMOVAL      x 3, from hand, thigh, and buttocks   • ENDOSCOPY      20 years ago   • TIBIA FRACTURE SURGERY Bilateral 2011    tib-fib         Family History   Problem Relation Age of Onset   • Diabetes Other    • Hypertension Other    • Cancer Other    • Lung disease Other    • Thyroid disease Other    • Bleeding Disorder Other    • Colon cancer Paternal Grandmother    • Cancer Paternal Grandfather    • Crohn's disease Mother    • Diabetes Mother    • Hypertension Father    • Lung disease Father    • Hypertension Brother    • Hypertension Brother    • Hypertension Brother        Allergies   Allergen Reactions   • Latex Irritability   • Codeine Nausea Only       Social History     Socioeconomic History   • Marital status: Single     Spouse name: Not on file   • Number of children: Not on file   • Years of education: Not on file   • Highest education level: Not on file   Occupational History   • 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   • Sexual activity: Defer   Social History Narrative    Single, lives alone in [place].  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           Current Outpatient Medications   Medication Sig Dispense Refill   • aspirin 325 MG tablet Take 325 mg by mouth Daily.     • atorvastatin (LIPITOR) 40 MG tablet Take 1 tablet by mouth Daily. 90 tablet 3   • BD Pen Needle Nola U/F 32G X 4 MM misc INJECT ONCE [place]     • Blood Glucose Monitoring Suppl (ONE TOUCH ULTRA 2) w/Device kit Use 1 daily to test blood glucose level. 1 each 5   • fluticasone (FLONASE) 50 MCG/ACT nasal spray SPRAY 2 SPRAYS INTO EACH NOSTRIL EVERY DAY 48 mL 0   • glucose blood test strip USE TO TEST BLOOD SUGAR 1 TIME DAILY E11.9 100 each 12   • glucose monitor monitoring kit 1 each As Needed (Diabetes). USE TO TEST 1 TIME DAILY 1 each 0   • hydrOXYzine (ATARAX) 50 MG tablet Take 50 mg by mouth 4 (Four) Times a Day.     • ibuprofen (ADVIL,MOTRIN) 600 MG tablet Take 1 tablet by mouth Every 6 (Six) Hours As Needed for Mild Pain . 15 tablet 0   • icosapent ethyl (Vascepa) 1 g capsule capsule Take 2 g by mouth 2 (Two) Times a Day With Meals. 120 capsule 11   • Insulin Glargine, 2 Unit Dial, (Toujeo Max SoloStar) 300 UNIT/ML solution pen-injector injection Inject 15 up to 30 units daily 3 pen 11   • Insulin Pen Needle (CareTouch Pen Needles) 32G X 5 MM misc Inject once [place] 30 each 11   • Invega Sustenna 117 MG/0.75ML suspension prefilled syringe IM injection      • Jardiance 25 MG tablet TAKE 1 TABLET BY MOUTH EVERY DAY 90 tablet 1   • Lancets (ACCU-CHEK SOFT TOUCH) lancets Use TO TEST BLOOD SUGAR 1 TIME DAILY. 100 each 12   • lisinopril (PRINIVIL,ZESTRIL) 40 MG  "tablet Take 1 tablet by mouth Daily. 90 tablet 3   • metFORMIN (GLUCOPHAGE) 1000 MG tablet Take 1 tablet by mouth 2 (Two) Times a Day With Meals. 180 tablet 2   • metoprolol tartrate (LOPRESSOR) 50 MG tablet Take 1.5 tablets by mouth Every 12 (Twelve) Hours. Patient takes 1.5 tablet twice a day 270 tablet 0   • nitroglycerin (NITROSTAT) 0.4 MG SL tablet Place 1 tablet under the tongue Every 5 (Five) Minutes As Needed for Chest Pain. Take no more than 3 doses in 15 minutes. 30 tablet 12   • paliperidone palmitate (INVEGA SUSTENNA) 117 MG/0.75ML suspension IM injection Inject 117 mg into the shoulder, thigh, or buttocks Every 30 (Thirty) Days.     • Semaglutide, 1 MG/DOSE, (Ozempic, 1 MG/DOSE,) 2 MG/1.5ML solution pen-injector Inject 1 mg under the skin into the appropriate area as directed 1 (One) Time Per Week. 3 pen 11   • sodium chloride (OCEAN NASAL SPRAY) 0.65 % nasal spray 1 spray into each nostril As Needed for Congestion. 60 mL 12   • traZODone (DESYREL) 150 MG tablet TAKE 1 TABLET BY MOUTH EVERY DAY AT BEDTIME AS NEEDED  2     No current facility-administered medications for this visit.        Review of Systems   Constitutional: Negative.    HENT: Negative.    Eyes: Negative.    Respiratory: Negative.    Cardiovascular: Positive for leg swelling.   Gastrointestinal: Negative.    Musculoskeletal:        Toe pain    Allergic/Immunologic: Negative.    Neurological: Positive for numbness.   Hematological: Negative.    Psychiatric/Behavioral: Negative.          OBJECTIVE    Pulse 101   Ht 167.6 cm (66\")   Wt 116 kg (256 lb)   SpO2 99%   BMI 41.32 kg/m²       Physical Exam   Constitutional: He is oriented to person, place, and time. He appears well-developed.   HENT:   Head: Normocephalic and atraumatic.   Pulmonary/Chest: Effort normal. No respiratory distress.   Musculoskeletal: Normal range of motion. No deformity.   Neurological: He is alert and oriented to person, place, and time.   Skin: Skin is warm and " dry. Capillary refill takes less than 2 seconds. He is not diaphoretic. No erythema.   Psychiatric: His behavior is normal.   Vitals reviewed.      Gait: Normal     Assistive Device: None    Lower Extremity    Cardiovascular:    DP/PT pulses palpable bilateral  CFT brisk  to all digits  Skin temp is warm to warm from proximal tibia to distal digits bilateral  Pedal hair growth present.   Musculoskeletal:  Muscle strength is 5/5 for all muscle groups tested   ROM of the 1st MTP is full without pain or crepitus bilateral  ROM of the ankle joint is full without pain or crepitus  bilateral  Dermatological:   Bilateral hallux nails absent.  Remaining toenails are thickened, discolored, elongated with subungually.  Pain on palpation of the nail plates.  Skin is warm, dry and intact bilateral  Webspaces 1-4 bilateral are clean, dry and intact.   Neurological:   Protective sensation intact   Sensation intact to light touch        Procedures        ASSESSMENT AND PLAN    Diagnoses and all orders for this visit:    1. Onychomycosis (Primary)    2. Chronic toe pain, bilateral    3. Type 2 diabetes mellitus with peripheral neuropathy (CMS/HCC)      - Nails 2-5 bilateral were debrided in length and thickness with nail nipper and electric  to decrease fungal load and risk of infection.   -All questions were answered  -Recheck 3 months          This document has been electronically signed by Abhijeet Thomason DPM on December 16, 2020 14:55 CST     12/16/2020  14:55 CST

## 2020-12-17 LAB
25(OH)D3 SERPL-MCNC: 26.7 NG/ML (ref 30–100)
ALBUMIN SERPL-MCNC: 4.3 G/DL (ref 3.5–5.2)
ALBUMIN/GLOB SERPL: 1.4 G/DL
ALP SERPL-CCNC: 73 U/L (ref 39–117)
ALT SERPL W P-5'-P-CCNC: 40 U/L (ref 1–41)
ANION GAP SERPL CALCULATED.3IONS-SCNC: 11.9 MMOL/L (ref 5–15)
AST SERPL-CCNC: 19 U/L (ref 1–40)
BASOPHILS # BLD AUTO: 0.1 10*3/MM3 (ref 0–0.2)
BASOPHILS NFR BLD AUTO: 1.2 % (ref 0–1.5)
BILIRUB SERPL-MCNC: 0.4 MG/DL (ref 0–1.2)
BUN SERPL-MCNC: 16 MG/DL (ref 6–20)
BUN/CREAT SERPL: 17 (ref 7–25)
CALCIUM SPEC-SCNC: 10 MG/DL (ref 8.6–10.5)
CHLORIDE SERPL-SCNC: 98 MMOL/L (ref 98–107)
CHOLEST SERPL-MCNC: 115 MG/DL (ref 0–200)
CO2 SERPL-SCNC: 27.1 MMOL/L (ref 22–29)
CREAT SERPL-MCNC: 0.94 MG/DL (ref 0.76–1.27)
DEPRECATED RDW RBC AUTO: 50 FL (ref 37–54)
EOSINOPHIL # BLD AUTO: 0.17 10*3/MM3 (ref 0–0.4)
EOSINOPHIL NFR BLD AUTO: 2.1 % (ref 0.3–6.2)
ERYTHROCYTE [DISTWIDTH] IN BLOOD BY AUTOMATED COUNT: 18.8 % (ref 12.3–15.4)
GFR SERPL CREATININE-BSD FRML MDRD: 84 ML/MIN/1.73
GLOBULIN UR ELPH-MCNC: 3 GM/DL
GLUCOSE SERPL-MCNC: 170 MG/DL (ref 65–99)
HBA1C MFR BLD: 8.92 % (ref 4.8–5.6)
HCT VFR BLD AUTO: 45.6 % (ref 37.5–51)
HDLC SERPL-MCNC: 35 MG/DL (ref 40–60)
HGB BLD-MCNC: 15.5 G/DL (ref 13–17.7)
IMM GRANULOCYTES # BLD AUTO: 0.03 10*3/MM3 (ref 0–0.05)
IMM GRANULOCYTES NFR BLD AUTO: 0.4 % (ref 0–0.5)
LDLC SERPL CALC-MCNC: 16 MG/DL (ref 0–100)
LDLC/HDLC SERPL: -0.49 {RATIO}
LYMPHOCYTES # BLD AUTO: 2.4 10*3/MM3 (ref 0.7–3.1)
LYMPHOCYTES NFR BLD AUTO: 29.2 % (ref 19.6–45.3)
MCH RBC QN AUTO: 26.6 PG (ref 26.6–33)
MCHC RBC AUTO-ENTMCNC: 34 G/DL (ref 31.5–35.7)
MCV RBC AUTO: 78.2 FL (ref 79–97)
MONOCYTES # BLD AUTO: 0.75 10*3/MM3 (ref 0.1–0.9)
MONOCYTES NFR BLD AUTO: 9.1 % (ref 5–12)
NEUTROPHILS NFR BLD AUTO: 4.77 10*3/MM3 (ref 1.7–7)
NEUTROPHILS NFR BLD AUTO: 58 % (ref 42.7–76)
NRBC BLD AUTO-RTO: 0 /100 WBC (ref 0–0.2)
PLATELET # BLD AUTO: 218 10*3/MM3 (ref 140–450)
PMV BLD AUTO: 10.9 FL (ref 6–12)
POTASSIUM SERPL-SCNC: 4.2 MMOL/L (ref 3.5–5.2)
PROT SERPL-MCNC: 7.3 G/DL (ref 6–8.5)
RBC # BLD AUTO: 5.83 10*6/MM3 (ref 4.14–5.8)
SODIUM SERPL-SCNC: 137 MMOL/L (ref 136–145)
TRIGL SERPL-MCNC: 485 MG/DL (ref 0–150)
TSH SERPL DL<=0.05 MIU/L-ACNC: 1.33 UIU/ML (ref 0.27–4.2)
VIT B12 BLD-MCNC: 509 PG/ML (ref 211–946)
VLDLC SERPL-MCNC: 64 MG/DL (ref 5–40)
WBC # BLD AUTO: 8.22 10*3/MM3 (ref 3.4–10.8)

## 2020-12-28 ENCOUNTER — OFFICE VISIT (OUTPATIENT)
Dept: ENDOCRINOLOGY | Facility: CLINIC | Age: 50
End: 2020-12-28

## 2020-12-28 VITALS
BODY MASS INDEX: 40.67 KG/M2 | OXYGEN SATURATION: 99 % | HEART RATE: 90 BPM | DIASTOLIC BLOOD PRESSURE: 70 MMHG | WEIGHT: 259.1 LBS | HEIGHT: 67 IN | SYSTOLIC BLOOD PRESSURE: 118 MMHG

## 2020-12-28 DIAGNOSIS — E78.49 OTHER HYPERLIPIDEMIA: ICD-10-CM

## 2020-12-28 DIAGNOSIS — I10 ESSENTIAL HYPERTENSION: Primary | ICD-10-CM

## 2020-12-28 DIAGNOSIS — E55.9 VITAMIN D DEFICIENCY: ICD-10-CM

## 2020-12-28 DIAGNOSIS — E11.65 TYPE 2 DIABETES MELLITUS WITH HYPERGLYCEMIA, WITHOUT LONG-TERM CURRENT USE OF INSULIN (HCC): ICD-10-CM

## 2020-12-28 PROCEDURE — 99214 OFFICE O/P EST MOD 30 MIN: CPT | Performed by: NURSE PRACTITIONER

## 2020-12-28 NOTE — PROGRESS NOTES
"  Subjective    Kingsley Littlejohn is a 50 y.o. male. he is here today for follow-up.    History of Present Illness     In office visit     50 year old male presents for follow up     Reason diabetes mellitus type 2    Duration diagnosed in 2014    Timing constant     Quality improved control              Severity - high due to complications      Complications - neuropathy, hyperglycemia         Severity (Complication/Hospitalizations)        Secondary Macrovascular--  No CVA, no PAD, no CAD      Had a MI in the past         Secondary Microvascular -- neuropathy, no diabetic retinopathy, no renal disease         Context-- presented with symptoms of diabetes increased thirst and urination             Diabetes Regimen-- oral medication , insulin, GLP- 1               Lab Results   Component Value Date    HGBA1C 8.92 (H) 12/16/2020                Blood Glucose Readings        Running 150 up to 180                  Diet-        Trying to keep carbs at 60 gm per meal      Associated Signs/Symptoms       Hyperglycemic Symptoms: none        Hypoglycemic Episodes: none     Aggravating factors -- refusing insulin              The following portions of the patient's history were reviewed and updated as appropriate:   Past Medical History:   Diagnosis Date   • Abdominal pain    • Allergic rhinitis    • Diabetes mellitus (CMS/[facility])     Prev HbA1c 14.0 2014, states \"I'm cured!\"   • Essential hypertension    • Fracture closed, fibula, shaft     tib-fib   • Onychomycosis      Past Surgical History:   Procedure Laterality Date   • ANKLE SURGERY      right and left   • COLONOSCOPY N/A 6/4/2018    Procedure: COLONOSCOPY;  Surgeon: Jaylon Castro MD;  Location: [facility] ENDOSCOPY;  Service: Gastroenterology   • CYST REMOVAL      x 3, from hand, thigh, and buttocks   • ENDOSCOPY      20 years ago   • TIBIA FRACTURE SURGERY Bilateral 2011    tib-fib     Family History   Problem Relation Age of Onset   • Diabetes Other    • Hypertension " Other    • Cancer Other    • Lung disease Other    • Thyroid disease Other    • Bleeding Disorder Other    • Colon cancer Paternal Grandmother    • Cancer Paternal Grandfather    • Crohn's disease Mother    • Diabetes Mother    • Hypertension Father    • Lung disease Father    • Hypertension Brother    • Hypertension Brother    • Hypertension Brother        Current Outpatient Medications   Medication Sig Dispense Refill   • aspirin 325 MG tablet Take 325 mg by mouth Daily.     • atorvastatin (LIPITOR) 40 MG tablet Take 1 tablet by mouth Daily. 90 tablet 3   • BD Pen Needle Nola U/F 32G X 4 MM misc INJECT ONCE WALTON     • Blood Glucose Monitoring Suppl (ONE TOUCH ULTRA 2) w/Device kit Use 1 daily to test blood glucose level. 1 each 5   • fluticasone (FLONASE) 50 MCG/ACT nasal spray SPRAY 2 SPRAYS INTO EACH NOSTRIL EVERY DAY 48 mL 0   • glucose blood test strip USE TO TEST BLOOD SUGAR 1 TIME DAILY E11.9 100 each 12   • glucose monitor monitoring kit 1 each As Needed (Diabetes). USE TO TEST 1 TIME DAILY 1 each 0   • hydrOXYzine (ATARAX) 50 MG tablet Take 50 mg by mouth 4 (Four) Times a Day.     • ibuprofen (ADVIL,MOTRIN) 600 MG tablet Take 1 tablet by mouth Every 6 (Six) Hours As Needed for Mild Pain . 15 tablet 0   • Insulin Glargine, 2 Unit Dial, (Toujeo Max SoloStar) 300 UNIT/ML solution pen-injector injection Inject 15 up to 30 units daily (Patient taking differently: 36 Units Daily.) 3 pen 11   • Insulin Pen Needle (CareTouch Pen Needles) 32G X 5 MM misc Inject once walton 30 each 11   • Jardiance 25 MG tablet TAKE 1 TABLET BY MOUTH EVERY DAY 90 tablet 1   • Lancets (ACCU-CHEK SOFT TOUCH) lancets Use TO TEST BLOOD SUGAR 1 TIME DAILY. 100 each 12   • lisinopril (PRINIVIL,ZESTRIL) 40 MG tablet Take 1 tablet by mouth Daily. 90 tablet 3   • metFORMIN (GLUCOPHAGE) 1000 MG tablet Take 1 tablet by mouth 2 (Two) Times a Day With Meals. 180 tablet 2   • metoprolol tartrate (LOPRESSOR) 50 MG tablet Take 1.5 tablets by mouth  Every 12 (Twelve) Hours. Patient takes 1.5 tablet twice a day 270 tablet 0   • nitroglycerin (NITROSTAT) 0.4 MG SL tablet Place 1 tablet under the tongue Every 5 (Five) Minutes As Needed for Chest Pain. Take no more than 3 doses in 15 minutes. 30 tablet 12   • paliperidone palmitate (INVEGA SUSTENNA) 117 MG/0.75ML suspension IM injection Inject 117 mg into the shoulder, thigh, or buttocks Every 30 (Thirty) Days.     • Semaglutide, 1 MG/DOSE, (Ozempic, 1 MG/DOSE,) 2 MG/1.5ML solution pen-injector Inject 1 mg under the skin into the appropriate area as directed 1 (One) Time Per Week. 3 pen 11   • sodium chloride (OCEAN NASAL SPRAY) 0.65 % nasal spray 1 spray into each nostril As Needed for Congestion. 60 mL 12   • traZODone (DESYREL) 150 MG tablet TAKE 1 TABLET BY MOUTH EVERY DAY AT BEDTIME AS NEEDED  2   • icosapent ethyl (Vascepa) 1 g capsule capsule Take 2 g by mouth 2 (Two) Times a Day With Meals. 120 capsule 11     No current facility-administered medications for this visit.      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   • Sexual activity: Defer   Social History Narrative    Single, lives alone in [place].  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         Review of  "Systems  Review of Systems   Constitutional: Negative for activity change, appetite change, diaphoresis and fatigue.   HENT: Negative for facial swelling, sneezing, sore throat, tinnitus, trouble swallowing and voice change.    Eyes: Negative for photophobia, pain, discharge, redness, itching and visual disturbance.   Respiratory: Negative for apnea, cough, choking, chest tightness and shortness of breath.    Cardiovascular: Negative for chest pain, palpitations and leg swelling.   Gastrointestinal: Negative for abdominal distention, abdominal pain, constipation, diarrhea, nausea and vomiting.   Endocrine: Negative for cold intolerance, heat intolerance, polydipsia, polyphagia and polyuria.   Genitourinary: Negative for difficulty urinating, dysuria, frequency, hematuria and urgency.   Musculoskeletal: Positive for arthralgias. Negative for back pain, gait problem, joint swelling, myalgias, neck pain and neck stiffness.   Skin: Negative for color change, pallor, rash and wound.   Neurological: Negative for dizziness, tremors, weakness, light-headedness, numbness and headaches.   Hematological: Negative for adenopathy. Does not bruise/bleed easily.   Psychiatric/Behavioral: Negative for behavioral problems, confusion and sleep disturbance.        Objective    /70   Pulse 90   Ht 170.2 cm (67\")   Wt 118 kg (259 lb 1.6 oz)   SpO2 99%   BMI 40.58 kg/m²   Physical Exam  Constitutional:       General: He is not in acute distress.     Appearance: He is well-developed.   HENT:      Head: Normocephalic and atraumatic.      Right Ear: External ear normal.      Left Ear: External ear normal.      Nose: Nose normal.   Eyes:      Conjunctiva/sclera: Conjunctivae normal.      Pupils: Pupils are equal, round, and reactive to light.   Neck:      Musculoskeletal: Normal range of motion and neck supple.      Thyroid: No thyromegaly.      Trachea: No tracheal deviation.   Cardiovascular:      Rate and Rhythm: Normal rate " and regular rhythm.      Heart sounds: Normal heart sounds. No murmur.   Pulmonary:      Effort: Pulmonary effort is normal. No respiratory distress.      Breath sounds: Normal breath sounds. No wheezing.   Abdominal:      General: Bowel sounds are normal.      Palpations: Abdomen is soft.      Tenderness: There is no abdominal tenderness. There is no guarding or rebound.   Musculoskeletal: Normal range of motion.         General: No tenderness or deformity.   Skin:     General: Skin is warm and dry.      Findings: No rash.   Neurological:      Mental Status: He is alert and oriented to person, place, and time.      Cranial Nerves: No cranial nerve deficit.   Psychiatric:         Behavior: Behavior normal.         Thought Content: Thought content normal.         Judgment: Judgment normal.         Lab Review  Glucose (mg/dL)   Date Value   12/16/2020 170 (H)   09/16/2020 232 (H)   06/25/2020 288 (H)     Sodium   Date Value   12/16/2020 137 mmol/L   09/16/2020 136 mmol/L   06/25/2020 132 mmol/L (L)   05/18/2018 138 MMOL/L   03/02/2018 137 MMOL/L     Potassium   Date Value   12/16/2020 4.2 mmol/L   09/16/2020 4.1 mmol/L   06/25/2020 4.3 mmol/L   05/18/2018 4.5 MMOL/L   03/02/2018 3.9 MMOL/L     Chloride   Date Value   12/16/2020 98 mmol/L   09/16/2020 96 mmol/L (L)   06/25/2020 96 mmol/L (L)   05/18/2018 99 MMOL/L   03/02/2018 99 MMOL/L     CO2   Date Value   12/16/2020 27.1 mmol/L   09/16/2020 25.0 mmol/L   06/25/2020 23.2 mmol/L   05/18/2018 29 MMOL/L   03/02/2018 25 MMOL/L     BUN   Date Value   12/16/2020 16 mg/dL   09/16/2020 17 mg/dL   06/25/2020 15 mg/dL   05/18/2018 16 MG/DL   03/02/2018 12 MG/DL     Creatinine   Date Value   12/16/2020 0.94 mg/dL   09/16/2020 0.93 mg/dL   06/25/2020 0.83 mg/dL   05/18/2018 0.9 MG/DL   03/02/2018 0.7 MG/DL     Hemoglobin A1C (%)   Date Value   12/16/2020 8.92 (H)   09/16/2020 10.20 (H)   06/25/2020 11.21 (H)   03/09/2018 6.4 (H)     Triglycerides (mg/dL)   Date Value    12/16/2020 485 (H)   09/16/2020 654 (H)   06/25/2020 412 (H)     LDL Cholesterol    Date Value   12/16/2020 16 mg/dL   09/16/2020      Comment:     Unable to calculate   09/16/2020 30 mg/dL   06/25/2020      Comment:     Unable to calculate   06/25/2020 46 mg/dL       Assessment/Plan      1. Essential hypertension    2. Other hyperlipidemia    3. Type 2 diabetes mellitus with hyperglycemia, without long-term current use of insulin (CMS/[facility])    4. Vitamin D deficiency    .    Medications prescribed:  Outpatient Encounter Medications as of 12/28/2020   Medication Sig Dispense Refill   • aspirin 325 MG tablet Take 325 mg by mouth Daily.     • atorvastatin (LIPITOR) 40 MG tablet Take 1 tablet by mouth Daily. 90 tablet 3   • BD Pen Needle Nola U/F 32G X 4 MM misc INJECT ONCE WALTON     • Blood Glucose Monitoring Suppl (ONE TOUCH ULTRA 2) w/Device kit Use 1 daily to test blood glucose level. 1 each 5   • fluticasone (FLONASE) 50 MCG/ACT nasal spray SPRAY 2 SPRAYS INTO EACH NOSTRIL EVERY DAY 48 mL 0   • glucose blood test strip USE TO TEST BLOOD SUGAR 1 TIME DAILY E11.9 100 each 12   • glucose monitor monitoring kit 1 each As Needed (Diabetes). USE TO TEST 1 TIME DAILY 1 each 0   • hydrOXYzine (ATARAX) 50 MG tablet Take 50 mg by mouth 4 (Four) Times a Day.     • ibuprofen (ADVIL,MOTRIN) 600 MG tablet Take 1 tablet by mouth Every 6 (Six) Hours As Needed for Mild Pain . 15 tablet 0   • Insulin Glargine, 2 Unit Dial, (Toujeo Philipp SoloStar) 300 UNIT/ML solution pen-injector injection Inject 15 up to 30 units daily (Patient taking differently: 36 Units Daily.) 3 pen 11   • Insulin Pen Needle (CareTouch Pen Needles) 32G X 5 MM misc Inject once walton 30 each 11   • Jardiance 25 MG tablet TAKE 1 TABLET BY MOUTH EVERY DAY 90 tablet 1   • Lancets (ACCU-CHEK SOFT TOUCH) lancets Use TO TEST BLOOD SUGAR 1 TIME DAILY. 100 each 12   • lisinopril (PRINIVIL,ZESTRIL) 40 MG tablet Take 1 tablet by mouth Daily. 90 tablet 3   • metFORMIN  (GLUCOPHAGE) 1000 MG tablet Take 1 tablet by mouth 2 (Two) Times a Day With Meals. 180 tablet 2   • metoprolol tartrate (LOPRESSOR) 50 MG tablet Take 1.5 tablets by mouth Every 12 (Twelve) Hours. Patient takes 1.5 tablet twice a day 270 tablet 0   • nitroglycerin (NITROSTAT) 0.4 MG SL tablet Place 1 tablet under the tongue Every 5 (Five) Minutes As Needed for Chest Pain. Take no more than 3 doses in 15 minutes. 30 tablet 12   • paliperidone palmitate (INVEGA SUSTENNA) 117 MG/0.75ML suspension IM injection Inject 117 mg into the shoulder, thigh, or buttocks Every 30 (Thirty) Days.     • Semaglutide, 1 MG/DOSE, (Ozempic, 1 MG/DOSE,) 2 MG/1.5ML solution pen-injector Inject 1 mg under the skin into the appropriate area as directed 1 (One) Time Per Week. 3 pen 11   • sodium chloride (OCEAN NASAL SPRAY) 0.65 % nasal spray 1 spray into each nostril As Needed for Congestion. 60 mL 12   • traZODone (DESYREL) 150 MG tablet TAKE 1 TABLET BY MOUTH EVERY DAY AT BEDTIME AS NEEDED  2   • icosapent ethyl (Vascepa) 1 g capsule capsule Take 2 g by mouth 2 (Two) Times a Day With Meals. 120 capsule 11   • [DISCONTINUED] Invega Sustenna 117 MG/0.75ML suspension prefilled syringe IM injection        No facility-administered encounter medications on file as of 12/28/2020.        Orders placed during this encounter include:  Orders Placed This Encounter   Procedures   • Comprehensive Metabolic Panel     Standing Status:   Future     Standing Expiration Date:   12/28/2021   • Hemoglobin A1c     Standing Status:   Future     Standing Expiration Date:   12/28/2021   • Lipid Panel     Standing Status:   Future     Standing Expiration Date:   12/28/2021   • Protein / Creatinine Ratio, Urine - Urine, Clean Catch     Standing Status:   Future     Standing Expiration Date:   12/28/2021   • Microalbumin / Creatinine Urine Ratio - Urine, Clean Catch     Standing Status:   Future     Standing Expiration Date:   12/28/2021   • TSH     Standing Status:    Future     Standing Expiration Date:   12/28/2021   • Vitamin B12     Standing Status:   Future     Standing Expiration Date:   12/28/2021   • Vitamin D 25 Hydroxy     Standing Status:   Future     Standing Expiration Date:   12/28/2021   • CBC & Differential     Standing Status:   Future     Standing Expiration Date:   12/28/2021     Order Specific Question:   Manual Differential     Answer:   No     Glycemic Management     Type 2 diabetes not controlled with hyperglycemia              Lab Results   Component Value Date    HGBA1C 8.92 (H) 12/16/2020                      Metformin 1000 mg po BID        Jardiance 25 mg one daily -keep        Januvia 100 mg daily ---- stop not effective             Ozempic 1mg once weekly      Side effects discussed, try to eat half your plate not a full plate     If nauseated eat less     If vomiting or abdominal pain stop medication     We discussed insulin and he refuses but agrees if this does not work he will start insulin      His blood sugars are improving and he has lost 5 lbs since starting ozempic           Toujeo taking 36 units               Aim 60 gm of CHO per meal      If you have a snack 15 gms of CHO or less      Stop regular sweet drinks      Add Humalog for each meal    Start with 4 units for small meal     8 units for large meal         Lipid Management        Lipitor 40 mg one at night         Total Cholesterol   Date Value Ref Range Status   12/16/2020 115 0 - 200 mg/dL Final     Triglycerides   Date Value Ref Range Status   12/16/2020 485 (H) 0 - 150 mg/dL Final     HDL Cholesterol   Date Value Ref Range Status   12/16/2020 35 (L) 40 - 60 mg/dL Final     LDL Cholesterol    Date Value Ref Range Status   12/16/2020 16 0 - 100 mg/dL Final           Vascepa 2 gm po BID--could not take         Blood Pressure Management     Taking Lisinopril 40 mg once daily      Taking Metoprolol 50 mg daily            Microvascular Complication Monitoring        Last eye exam --  Jan. 2020 no DR      Component      Latest Ref Rng & Units 9/16/2020 9/16/2020           2:31 PM  2:31 PM   Protein/Creatinine Ratio      0.0 - 200.0 mg/G Crea 98.3     Creatinine, Urine      mg/dL 47.8 47.8   Total Protein, Urine      mg/dL 4.7     Microalbumin/Creatinine Ratio             Microalbumin, Urine      mg/dL   <1.2               Neuropathy mild      Prefers no RX at this time         Bone Health      vitamin d def.       Start otc vitamin d daily       Component      Latest Ref Rng & Units 12/16/2020   25 Hydroxy, Vitamin D      30.0 - 100.0 ng/ml 26.7 (L)           Other Diabetes Related Aspects     Lab Results   Component Value Date    [id number] 509 12/16/2020             Thyroid Health      Lab Results   Component Value Date    TSH 1.330 12/16/2020                   Weight Management:      Patient's Body mass index is 40.58 kg/m². BMI is above normal parameters. Recommendations include: nutrition counseling.    Decrease caloric 500 per day             4. Follow-up: Return in about 3 months (around 3/28/2021) for Recheck.

## 2021-02-01 ENCOUNTER — TELEPHONE (OUTPATIENT)
Dept: FAMILY MEDICINE CLINIC | Facility: CLINIC | Age: 51
End: 2021-02-01

## 2021-02-01 DIAGNOSIS — I10 ESSENTIAL HYPERTENSION: ICD-10-CM

## 2021-02-01 RX ORDER — METOPROLOL TARTRATE 50 MG/1
75 TABLET, FILM COATED ORAL 2 TIMES DAILY
Qty: 270 TABLET | Refills: 1 | Status: SHIPPED | OUTPATIENT
Start: 2021-02-01 | End: 2021-07-26 | Stop reason: ALTCHOICE

## 2021-02-01 NOTE — TELEPHONE ENCOUNTER
PATIENT CALLED AND IS NEEDING REFILLS ON HIS metoprolol tartrate (LOPRESSOR) 50 MG tablet AND WOULD LIKE FOR IT TO BE SENT TO [facility]. HIS NUMBER TO CALL BACK [phone].        THANK YOU,      SAYRA

## 2021-02-01 NOTE — TELEPHONE ENCOUNTER
Sent the prescription to the requested pharmacy.         Bonilla Stanton MD PGY-1  [facility]   This document has been electronically signed by Bonilla Stanton MD on February 1, 2021 10:08 CST

## 2021-03-03 RX ORDER — FLUTICASONE PROPIONATE 50 MCG
SPRAY, SUSPENSION (ML) NASAL
Qty: 48 ML | Refills: 0 | Status: SHIPPED | OUTPATIENT
Start: 2021-03-03 | End: 2021-06-03

## 2021-03-25 ENCOUNTER — LAB (OUTPATIENT)
Dept: LAB | Facility: HOSPITAL | Age: 51
End: 2021-03-25

## 2021-03-25 DIAGNOSIS — I10 ESSENTIAL HYPERTENSION: ICD-10-CM

## 2021-03-25 DIAGNOSIS — E55.9 VITAMIN D DEFICIENCY: ICD-10-CM

## 2021-03-25 DIAGNOSIS — E11.65 TYPE 2 DIABETES MELLITUS WITH HYPERGLYCEMIA, WITHOUT LONG-TERM CURRENT USE OF INSULIN (HCC): ICD-10-CM

## 2021-03-25 DIAGNOSIS — E78.49 OTHER HYPERLIPIDEMIA: ICD-10-CM

## 2021-03-25 LAB
25(OH)D3 SERPL-MCNC: 31.7 NG/ML (ref 30–100)
ALBUMIN SERPL-MCNC: 4.2 G/DL (ref 3.5–5.2)
ALBUMIN UR-MCNC: <1.2 MG/DL
ALBUMIN/GLOB SERPL: 1.5 G/DL
ALP SERPL-CCNC: 72 U/L (ref 39–117)
ALT SERPL W P-5'-P-CCNC: 31 U/L (ref 1–41)
ANION GAP SERPL CALCULATED.3IONS-SCNC: 13.3 MMOL/L (ref 5–15)
AST SERPL-CCNC: 18 U/L (ref 1–40)
BASOPHILS # BLD AUTO: 0.09 10*3/MM3 (ref 0–0.2)
BASOPHILS NFR BLD AUTO: 1.2 % (ref 0–1.5)
BILIRUB SERPL-MCNC: 0.5 MG/DL (ref 0–1.2)
BUN SERPL-MCNC: 14 MG/DL (ref 6–20)
BUN/CREAT SERPL: 19.7 (ref 7–25)
CALCIUM SPEC-SCNC: 9.2 MG/DL (ref 8.6–10.5)
CHLORIDE SERPL-SCNC: 101 MMOL/L (ref 98–107)
CHOLEST SERPL-MCNC: 109 MG/DL (ref 0–200)
CO2 SERPL-SCNC: 22.7 MMOL/L (ref 22–29)
CREAT SERPL-MCNC: 0.71 MG/DL (ref 0.76–1.27)
CREAT UR-MCNC: 43.8 MG/DL
CREAT UR-MCNC: 43.8 MG/DL
DEPRECATED RDW RBC AUTO: 38.8 FL (ref 37–54)
EOSINOPHIL # BLD AUTO: 0.2 10*3/MM3 (ref 0–0.4)
EOSINOPHIL NFR BLD AUTO: 2.6 % (ref 0.3–6.2)
ERYTHROCYTE [DISTWIDTH] IN BLOOD BY AUTOMATED COUNT: 14.4 % (ref 12.3–15.4)
GFR SERPL CREATININE-BSD FRML MDRD: 116 ML/MIN/1.73
GLOBULIN UR ELPH-MCNC: 2.8 GM/DL
GLUCOSE SERPL-MCNC: 173 MG/DL (ref 65–99)
HBA1C MFR BLD: 9.1 % (ref 4.8–5.6)
HCT VFR BLD AUTO: 45 % (ref 37.5–51)
HDLC SERPL-MCNC: 32 MG/DL (ref 40–60)
HGB BLD-MCNC: 16.1 G/DL (ref 13–17.7)
IMM GRANULOCYTES # BLD AUTO: 0.04 10*3/MM3 (ref 0–0.05)
IMM GRANULOCYTES NFR BLD AUTO: 0.5 % (ref 0–0.5)
LDLC SERPL CALC-MCNC: 11 MG/DL (ref 0–100)
LDLC/HDLC SERPL: -0.79 {RATIO}
LYMPHOCYTES # BLD AUTO: 2.21 10*3/MM3 (ref 0.7–3.1)
LYMPHOCYTES NFR BLD AUTO: 28.6 % (ref 19.6–45.3)
MCH RBC QN AUTO: 28 PG (ref 26.6–33)
MCHC RBC AUTO-ENTMCNC: 35.8 G/DL (ref 31.5–35.7)
MCV RBC AUTO: 78.1 FL (ref 79–97)
MICROALBUMIN/CREAT UR: NORMAL MG/G{CREAT}
MONOCYTES # BLD AUTO: 0.69 10*3/MM3 (ref 0.1–0.9)
MONOCYTES NFR BLD AUTO: 8.9 % (ref 5–12)
NEUTROPHILS NFR BLD AUTO: 4.5 10*3/MM3 (ref 1.7–7)
NEUTROPHILS NFR BLD AUTO: 58.2 % (ref 42.7–76)
NRBC BLD AUTO-RTO: 0 /100 WBC (ref 0–0.2)
PLATELET # BLD AUTO: 216 10*3/MM3 (ref 140–450)
PMV BLD AUTO: 11.4 FL (ref 6–12)
POTASSIUM SERPL-SCNC: 4.2 MMOL/L (ref 3.5–5.2)
PROT SERPL-MCNC: 7 G/DL (ref 6–8.5)
PROT UR-MCNC: 4 MG/DL
PROT/CREAT UR: 91.3 MG/G CREA (ref 0–200)
RBC # BLD AUTO: 5.76 10*6/MM3 (ref 4.14–5.8)
SODIUM SERPL-SCNC: 137 MMOL/L (ref 136–145)
TRIGL SERPL-MCNC: 511 MG/DL (ref 0–150)
TSH SERPL DL<=0.05 MIU/L-ACNC: 1.73 UIU/ML (ref 0.27–4.2)
VIT B12 BLD-MCNC: 622 PG/ML (ref 211–946)
VLDLC SERPL-MCNC: 66 MG/DL (ref 5–40)
WBC # BLD AUTO: 7.73 10*3/MM3 (ref 3.4–10.8)

## 2021-03-25 PROCEDURE — 80053 COMPREHEN METABOLIC PANEL: CPT

## 2021-03-25 PROCEDURE — 82043 UR ALBUMIN QUANTITATIVE: CPT

## 2021-03-25 PROCEDURE — 84443 ASSAY THYROID STIM HORMONE: CPT

## 2021-03-25 PROCEDURE — 82607 VITAMIN B-12: CPT

## 2021-03-25 PROCEDURE — 85025 COMPLETE CBC W/AUTO DIFF WBC: CPT

## 2021-03-25 PROCEDURE — 82306 VITAMIN D 25 HYDROXY: CPT

## 2021-03-25 PROCEDURE — 84156 ASSAY OF PROTEIN URINE: CPT

## 2021-03-25 PROCEDURE — 80061 LIPID PANEL: CPT

## 2021-03-25 PROCEDURE — 36415 COLL VENOUS BLD VENIPUNCTURE: CPT

## 2021-03-25 PROCEDURE — 83036 HEMOGLOBIN GLYCOSYLATED A1C: CPT

## 2021-03-25 PROCEDURE — 82570 ASSAY OF URINE CREATININE: CPT

## 2021-03-29 ENCOUNTER — OFFICE VISIT (OUTPATIENT)
Dept: ENDOCRINOLOGY | Facility: CLINIC | Age: 51
End: 2021-03-29

## 2021-03-29 VITALS
BODY MASS INDEX: 40.93 KG/M2 | OXYGEN SATURATION: 98 % | WEIGHT: 260.8 LBS | SYSTOLIC BLOOD PRESSURE: 132 MMHG | DIASTOLIC BLOOD PRESSURE: 88 MMHG | HEIGHT: 67 IN | HEART RATE: 89 BPM

## 2021-03-29 DIAGNOSIS — E11.65 TYPE 2 DIABETES MELLITUS WITH HYPERGLYCEMIA, WITHOUT LONG-TERM CURRENT USE OF INSULIN (HCC): ICD-10-CM

## 2021-03-29 DIAGNOSIS — E78.49 OTHER HYPERLIPIDEMIA: ICD-10-CM

## 2021-03-29 DIAGNOSIS — E55.9 VITAMIN D DEFICIENCY: ICD-10-CM

## 2021-03-29 DIAGNOSIS — E11.65 TYPE 2 DIABETES MELLITUS WITH HYPERGLYCEMIA, WITHOUT LONG-TERM CURRENT USE OF INSULIN (HCC): Primary | ICD-10-CM

## 2021-03-29 DIAGNOSIS — I10 ESSENTIAL HYPERTENSION: Primary | ICD-10-CM

## 2021-03-29 PROCEDURE — 99214 OFFICE O/P EST MOD 30 MIN: CPT | Performed by: NURSE PRACTITIONER

## 2021-03-29 PROCEDURE — 95250 CONT GLUC MNTR PHYS/QHP EQP: CPT | Performed by: NURSE PRACTITIONER

## 2021-03-29 NOTE — PROGRESS NOTES
Kingsley Littlejohn is a 51 y.o.. male seen by diabetes educator on 03/29/2021 for afrezza training. Pt instructed to start with 4 units prior to meals. He can increase to 8 units prior to meals if 4 units doesn't work.     1. Afrezza Training   I. Reviewed proper storage of Afrezza cartridges and inhaler   II. Reviewed loading cartridges and proper inhalation    III. Reviewed cartridges colors and dosing: blue: 4 units, green: 8 units, yellow: 12 units; discussed how to combine cartridges to match insulin needs   IV. Unopened cartridge blister can be at room temperature for 10 days, opened cartridge blister can be at room temperature for 3 days   V. Instructed patient to place cartridges at room temperature for 10 minutes prior to inhaling   VI. Instructed patient to change inhaler every 15 days.    VII. To clean inhaler, only wipe mouthpiece. Do not submerge in water   VIII. Provided patient with handout detailing above information    HARVEY BrewsterN, RN, [facility]  Diabetes Educator

## 2021-04-09 ENCOUNTER — TELEPHONE (OUTPATIENT)
Dept: ENDOCRINOLOGY | Facility: CLINIC | Age: 51
End: 2021-04-09

## 2021-04-09 NOTE — TELEPHONE ENCOUNTER
Pt called stating that he has still not heard from the oumar company, his oumar runs out in four days, he is wanting to know what to do.

## 2021-04-12 ENCOUNTER — DOCUMENTATION (OUTPATIENT)
Dept: ENDOCRINOLOGY | Facility: CLINIC | Age: 51
End: 2021-04-12

## 2021-04-12 ENCOUNTER — TELEPHONE (OUTPATIENT)
Dept: ENDOCRINOLOGY | Facility: CLINIC | Age: 51
End: 2021-04-12

## 2021-04-12 NOTE — TELEPHONE ENCOUNTER
Pt called back, he says insurance won't cover this, they had to send it back for further documentation for Medicare. He says his oumar runs out in 4 hrs.

## 2021-04-12 NOTE — TELEPHONE ENCOUNTER
Pt says Melonie runs out in 4 hours and has no refills uses [facility] in [place]    Was already done and received on 4/9 told to call pharmacy

## 2021-04-20 ENCOUNTER — TELEPHONE (OUTPATIENT)
Dept: ENDOCRINOLOGY | Facility: CLINIC | Age: 51
End: 2021-04-20

## 2021-04-20 NOTE — TELEPHONE ENCOUNTER
Pt left a vm that the oumar company is needing clinical notes sent to them, says they have tried to reach out to our office.

## 2021-04-23 ENCOUNTER — TELEPHONE (OUTPATIENT)
Dept: ENDOCRINOLOGY | Facility: CLINIC | Age: 51
End: 2021-04-23

## 2021-04-23 NOTE — TELEPHONE ENCOUNTER
[facility] medical supply received back from request but the physicians written order was cut off needs to be resent   Office notes misses injection frequency and signature please complete and resend     Fax [phone]    Ph [phone]

## 2021-04-27 ENCOUNTER — OFFICE VISIT (OUTPATIENT)
Dept: FAMILY MEDICINE CLINIC | Facility: CLINIC | Age: 51
End: 2021-04-27

## 2021-04-27 ENCOUNTER — DOCUMENTATION (OUTPATIENT)
Dept: ENDOCRINOLOGY | Facility: CLINIC | Age: 51
End: 2021-04-27

## 2021-04-27 VITALS
WEIGHT: 261.8 LBS | DIASTOLIC BLOOD PRESSURE: 80 MMHG | HEIGHT: 67 IN | HEART RATE: 89 BPM | OXYGEN SATURATION: 96 % | SYSTOLIC BLOOD PRESSURE: 142 MMHG | BODY MASS INDEX: 41.09 KG/M2

## 2021-04-27 DIAGNOSIS — E11.65 TYPE 2 DIABETES MELLITUS WITH HYPERGLYCEMIA, UNSPECIFIED WHETHER LONG TERM INSULIN USE (HCC): Primary | ICD-10-CM

## 2021-04-27 DIAGNOSIS — E78.1 HYPERTRIGLYCERIDEMIA: ICD-10-CM

## 2021-04-27 DIAGNOSIS — I10 ESSENTIAL HYPERTENSION: ICD-10-CM

## 2021-04-27 PROCEDURE — 99213 OFFICE O/P EST LOW 20 MIN: CPT | Performed by: STUDENT IN AN ORGANIZED HEALTH CARE EDUCATION/TRAINING PROGRAM

## 2021-04-27 NOTE — PROGRESS NOTES
Family Medicine Residency  Bonilla Stanton MD    Subjective:     Kingsley Littlejohn is a 51 y.o. male with concurrent medical history of Type 2 DM, hypertension hypertriglyceridemia. Patient was last seen by me on 10/29/2020 for medication refill. No medication changes were made at that time. No new complaints today.     Diabetes  Patient sees endocrinology who manages her diabetes. Last saw Al Doan RAJAT on 12/28/2020. He was instructed to continue Metformin 1000 mg BID, Jardiance 25 mg daily, Ozempic 1 mg weekly. Al Franki discussed insulin however patient was hesitant to begin the therapy unless his blood glucose is not controlled. Last A1C was done on 3/25/21 which was 9.10 which has increased from 8.92 on 12/16/20. He is also taking short acting inhaled insulin. If eating large meal then taking 10 units and if light meal then 4 units. Patient is not following any specific diet or exercise plan. Patient has a freestyle oumar and is happy with it.     Hypertriglyceridemia     Patient is currently on Lipitor 40 mg daily. Last lipid panel was done on 3/25/21, showed Triglycerides of 511, Total cholesterol 109.     Hypertension   Patient is currently on Lisinopril 40 mg once daily and Metoprolol 50 mg 1.5 tablet daily.  His blood pressure is well controlled. Patient had an episode of chest pain about 2 yrs ago and had negative cardiac work up . Has nitro prn. Patient follows up with  on yearly basis.      Patient receives Invega shot at [facility] for psychotic mood disorder. Patient denies any recent illness or sick contact, no ER visits or hospitalizations. He denies chest pain, dizziness, shortness of breath, palpitations, nausea/vomiting, polyuria, polyphagia, fevers/chills.     The following portions of the patient's history were reviewed and updated as appropriate: allergies, current medications, past family history, past medical history, past social history, past surgical  "history and problem list.    Past Medical Hx:  Past Medical History:   Diagnosis Date   • Abdominal pain    • Allergic rhinitis    • Diabetes mellitus (CMS/[facility])     Prev HbA1c 14.0 2014, states \"I'm cured!\"   • Essential hypertension    • Fracture closed, fibula, shaft     tib-fib   • Onychomycosis        Past Surgical Hx:  Past Surgical History:   Procedure Laterality Date   • ANKLE SURGERY      right and left   • COLONOSCOPY N/A 6/4/2018    Procedure: COLONOSCOPY;  Surgeon: Jaylon Castro MD;  Location: [facility] ENDOSCOPY;  Service: Gastroenterology   • CYST REMOVAL      x 3, from hand, thigh, and buttocks   • ENDOSCOPY      20 years ago   • TIBIA FRACTURE SURGERY Bilateral 2011    tib-fib       Current Meds:    Current Outpatient Medications:   •  aspirin 325 MG tablet, Take 325 mg by mouth Daily., Disp: , Rfl:   •  atorvastatin (LIPITOR) 40 MG tablet, Take 1 tablet by mouth Daily., Disp: 90 tablet, Rfl: 3  •  BD Pen Needle Nola U/F 32G X 4 MM misc, INJECT ONCE WAL[place], Disp: , Rfl:   •  Blood Glucose Monitoring Suppl (ONE TOUCH ULTRA 2) w/Device kit, Use 1 daily to test blood glucose level., Disp: 1 each, Rfl: 5  •  Continuous Blood Gluc  (FreeStyle Melonie 2 [place]) device, 1 each Continuous., Disp: 1 each, Rfl: 1  •  Continuous Blood Gluc Sensor (FreeStyle Melonie 2 Sensor) misc, 1 each Every 14 (Fourteen) Days., Disp: 3 each, Rfl: 11  •  fluticasone (FLONASE) 50 MCG/ACT nasal spray, SPRAY 2 SPRAYS INTO EACH NOSTRIL EVERY DAY, Disp: 48 mL, Rfl: 0  •  glucose blood test strip, USE TO TEST BLOOD SUGAR 1 TIME DAILY E11.9, Disp: 100 each, Rfl: 12  •  glucose monitor monitoring kit, 1 each As Needed (Diabetes). USE TO TEST 1 TIME DAILY, Disp: 1 each, Rfl: 0  •  hydrOXYzine (ATARAX) 50 MG tablet, Take 50 mg by mouth 4 (Four) Times a Day., Disp: , Rfl:   •  ibuprofen (ADVIL,MOTRIN) 600 MG tablet, Take 1 tablet by mouth Every 6 (Six) Hours As Needed for Mild Pain ., Disp: 15 tablet, Rfl: 0  •  Insulin Glargine, 2 " Unit Dial, (Toujeo Max SoloStar) 300 UNIT/ML solution pen-injector injection, Inject 15 up to 30 units daily (Patient taking differently: 36 Units Daily.), Disp: 3 pen, Rfl: 11  •  Insulin Pen Needle (CareTouch Pen Needles) 32G X 5 MM misc, Inject once camp, Disp: 30 each, Rfl: 11  •  Insulin Regular Human 4 & 8 & 12 units powder, Inhale 4-32 Units 3 (Three) Times a Day With Meals. 4-32 units TID , max dose 96 units daily, Disp: 360 each, Rfl: 11  •  Jardiance 25 MG tablet, TAKE 1 TABLET BY MOUTH EVERY DAY, Disp: 90 tablet, Rfl: 1  •  Lancets (ACCU-CHEK SOFT TOUCH) lancets, Use TO TEST BLOOD SUGAR 1 TIME DAILY., Disp: 100 each, Rfl: 12  •  lisinopril (PRINIVIL,ZESTRIL) 40 MG tablet, Take 1 tablet by mouth Daily., Disp: 90 tablet, Rfl: 3  •  metFORMIN (GLUCOPHAGE) 1000 MG tablet, Take 1 tablet by mouth 2 (Two) Times a Day With Meals., Disp: 180 tablet, Rfl: 2  •  metoprolol tartrate (LOPRESSOR) 50 MG tablet, Take 1.5 tablets by mouth 2 (Two) Times a Day. Patient takes 1.5 tablet twice a day, Disp: 270 tablet, Rfl: 1  •  nitroglycerin (NITROSTAT) 0.4 MG SL tablet, Place 1 tablet under the tongue Every 5 (Five) Minutes As Needed for Chest Pain. Take no more than 3 doses in 15 minutes., Disp: 30 tablet, Rfl: 12  •  paliperidone palmitate (INVEGA SUSTENNA) 117 MG/0.75ML suspension IM injection, Inject 117 mg into the shoulder, thigh, or buttocks Every 30 (Thirty) Days., Disp: , Rfl:   •  Semaglutide, 1 MG/DOSE, (Ozempic, 1 MG/DOSE,) 2 MG/1.5ML solution pen-injector, Inject 1 mg under the skin into the appropriate area as directed 1 (One) Time Per Week., Disp: 3 pen, Rfl: 11  •  sodium chloride (OCEAN NASAL SPRAY) 0.65 % nasal spray, 1 spray into each nostril As Needed for Congestion., Disp: 60 mL, Rfl: 12  •  traZODone (DESYREL) 150 MG tablet, TAKE 1 TABLET BY MOUTH EVERY DAY AT BEDTIME AS NEEDED, Disp: , Rfl: 2  •  icosapent ethyl (Vascepa) 1 g capsule capsule, Take 2 g by mouth 2 (Two) Times a Day With Meals., Disp:  120 capsule, Rfl: 11    Allergies:  Allergies   Allergen Reactions   • Latex Irritability   • Codeine Nausea Only       Family Hx:  Family History   Problem Relation Age of Onset   • Diabetes Other    • Hypertension Other    • Cancer Other    • Lung disease Other    • Thyroid disease Other    • Bleeding Disorder Other    • Colon cancer Paternal Grandmother    • Cancer Paternal Grandfather    • Crohn's disease Mother    • Diabetes Mother    • Hypertension Father    • Lung disease Father    • Hypertension Brother    • Hypertension Brother    • Hypertension Brother         Social History:  Social History     Socioeconomic History   • Marital status: Single     Spouse name: Not on file   • Number of children: Not on file   • Years of education: Not on file   • Highest education level: Not on file   Tobacco Use   • Smoking status: Former Smoker     Packs/day: 1.00     Years: 25.00     Pack years: 25.00     Types: Cigarettes     Start date:      Quit date: 2018     Years since quittin.4   • Smokeless tobacco: Never Used   Substance and Sexual Activity   • Alcohol use: Defer     Comment: FORMER pint of whiskey daily, states quit 1 year ago   • Drug use: Defer     Comment: patient reports prior drug abuse, but sober for 8 months now.   • Sexual activity: Defer       Review of Systems  Review of Systems   Constitutional: Negative for activity change, appetite change, chills, diaphoresis, fatigue and fever.   Respiratory: Negative for cough, chest tightness, shortness of breath and wheezing.    Cardiovascular: Negative for chest pain and palpitations.   Gastrointestinal: Negative for abdominal pain, diarrhea, nausea and vomiting.   Genitourinary: Negative for difficulty urinating, frequency and urgency.   Musculoskeletal: Negative for arthralgias and myalgias.   Skin: Negative for color change.   Neurological: Negative for dizziness, weakness, light-headedness and headaches.   Psychiatric/Behavioral: Negative for  "behavioral problems, confusion and decreased concentration. The patient is not nervous/anxious.        Objective:     /80   Pulse 89   Ht 170.2 cm (67\")   Wt 119 kg (261 lb 12.8 oz)   SpO2 96%   BMI 41.00 kg/m²   Physical Exam  Vitals and nursing note reviewed.   Constitutional:       General: He is not in acute distress.     Appearance: Normal appearance. He is obese. He is not ill-appearing or toxic-appearing.   HENT:      Head: Normocephalic and atraumatic.      Right Ear: External ear normal.      Left Ear: External ear normal.      Nose: Nose normal.      Mouth/Throat:      Mouth: Mucous membranes are moist.      Pharynx: Oropharynx is clear.   Eyes:      Conjunctiva/sclera: Conjunctivae normal.      Pupils: Pupils are equal, round, and reactive to light.   Cardiovascular:      Rate and Rhythm: Normal rate and regular rhythm.      Pulses: Normal pulses.      Heart sounds: Normal heart sounds.   Pulmonary:      Effort: Pulmonary effort is normal. No respiratory distress.      Breath sounds: Normal breath sounds. No wheezing.   Abdominal:      General: Bowel sounds are normal.      Palpations: Abdomen is soft.      Tenderness: There is no abdominal tenderness.   Musculoskeletal:         General: Normal range of motion.      Right lower leg: No edema.      Left lower leg: No edema.   Skin:     General: Skin is warm and dry.      Capillary Refill: Capillary refill takes less than 2 seconds.   Neurological:      General: No focal deficit present.      Mental Status: He is alert and oriented to person, place, and time.   Psychiatric:         Attention and Perception: Attention normal.         Mood and Affect: Mood normal.         Speech: Speech normal.         Behavior: Behavior normal.         Thought Content: Thought content normal.          Assessment/Plan:     Mr Fabian is a 51 yr old male with concurrent medical history of type 2 dm, htn presents today for follow up for htn. Patient is doing well and " has no new complaints today. He is compliant with his medication. Patient continues to see endocrinology for his Type 2 DM. >15 mins were spent counseling on importance of low sugar and carb diet along with exercise.     Diagnoses and all orders for this visit:    1. Type 2 diabetes mellitus with hyperglycemia, unspecified whether long term insulin use (CMS/[facility]) (Primary)    2. Essential hypertension    3. Hypertriglyceridemia        · Rx changes: none  · Patient Education: exercise and low carb, sugar diet.   · Compliance at present is estimated to be good.   · Efforts to improve compliance (if necessary) will be directed at dietary modifications: low sugar and carb and increased exercise.    Depression screening: Up to date; last screen 7/17/2020     Follow-up:     Return in about 3 months (around 7/27/2021), or if symptoms worsen or fail to improve, for Annual.    Preventative:  Health Maintenance   Topic Date Due   • Pneumococcal Vaccine 0-64 (1 of 1 - PPSV23) Never done   • Hepatitis B (1 of 3 - Risk 3-dose series) Never done   • HEPATITIS C SCREENING  Never done   • LUNG CANCER SCREENING  08/28/2020   • TDAP/TD VACCINES (2 - Td) 01/01/2021   • ZOSTER VACCINE (1 of 2) 09/01/2021 (Originally 2/20/2018)   • COLONOSCOPY  06/04/2021   • ANNUAL WELLNESS VISIT  07/17/2021   • HEMOGLOBIN A1C  09/25/2021   • DIABETIC EYE EXAM  10/06/2021   • DIABETIC FOOT EXAM  10/13/2021   • LIPID PANEL  03/25/2022   • COVID-19 Vaccine  Completed   • INFLUENZA VACCINE  Discontinued   • URINE MICROALBUMIN  Discontinued       Recommended: none  Vaccine Counseling: N/A    Weight  -Class: Obese Class II: 35-39.9kg/m2  -Patient's Body mass index is 41 kg/m². BMI is above normal parameters. Recommendations include: exercise counseling and nutrition counseling.   eat more fruits and vegetables, decrease soda or juice intake, increase water intake, increase physical activity, reduce screen time, reduce portion size, cut out extra servings and  reduce fast food intake    Alcohol use: Alcohol use questions deferred to the physician.  Nicotine status  reports that he quit smoking about 2 years ago. His smoking use included cigarettes. He started smoking about 29 years ago. He has a 25.00 pack-year smoking history. He has never used smokeless tobacco.    Goals     • Blood Pressure < 140/90      Barriers:  Sporadic follow-up      • Lower Blood Sugar      Barriers:  Questionable insight            RISK SCORE: 2        Bonilla Stanton MD PGY-1  [facility] Family Medicine Residency   This document has been electronically signed by Bonilla Stanton MD on April 27, 2021 11:33 CDT

## 2021-04-28 NOTE — PROGRESS NOTES
"  Subjective:     Kingsley Littlejohn is a 51 y.o. male who presents for follow up on diabetes and hypertension. For his diabetes, he follows up with Endocrinology and had an increase in his A1C to 9.1. He is currently started on scheduled insulin regimen in addition to three hypoglycemic agents. He is monitoring his blood sugars. His blood pressure is also currently well controlled on current medication regimen.     The following portions of the patient's history were reviewed and updated as appropriate: allergies, current medications, past family history, past medical history, past social history, past surgical history and problem list.      Past Medical Hx:  Past Medical History:   Diagnosis Date   • Abdominal pain    • Allergic rhinitis    • Diabetes mellitus (CMS/[facility])     Prev HbA1c 14.0 2014, states \"I'm cured!\"   • Essential hypertension    • Fracture closed, fibula, shaft     tib-fib   • Onychomycosis        Past Surgical Hx:  Past Surgical History:   Procedure Laterality Date   • ANKLE SURGERY      right and left   • COLONOSCOPY N/A 6/4/2018    Procedure: COLONOSCOPY;  Surgeon: Jaylon Castro MD;  Location: [facility] ENDOSCOPY;  Service: Gastroenterology   • CYST REMOVAL      x 3, from hand, thigh, and buttocks   • ENDOSCOPY      20 years ago   • TIBIA FRACTURE SURGERY Bilateral 2011    tib-fib       Health Maintenance:  Health Maintenance   Topic Date Due   • Pneumococcal Vaccine 0-64 (1 of 1 - PPSV23) Never done   • Hepatitis B (1 of 3 - Risk 3-dose series) Never done   • HEPATITIS C SCREENING  Never done   • LUNG CANCER SCREENING  08/28/2020   • TDAP/TD VACCINES (2 - Td) 01/01/2021   • ZOSTER VACCINE (1 of 2) 09/01/2021 (Originally 2/20/2018)   • COLONOSCOPY  06/04/2021   • ANNUAL WELLNESS VISIT  07/17/2021   • HEMOGLOBIN A1C  09/25/2021   • DIABETIC EYE EXAM  10/06/2021   • DIABETIC FOOT EXAM  10/13/2021   • LIPID PANEL  03/25/2022   • COVID-19 Vaccine  Completed   • INFLUENZA VACCINE  Discontinued   • " URINE MICROALBUMIN  Discontinued       Current Meds:    Current Outpatient Medications:   •  aspirin 325 MG tablet, Take 325 mg by mouth Daily., Disp: , Rfl:   •  atorvastatin (LIPITOR) 40 MG tablet, Take 1 tablet by mouth Daily., Disp: 90 tablet, Rfl: 3  •  BD Pen Needle Nola U/F 32G X 4 MM misc, INJECT ONCE [place], Disp: , Rfl:   •  Blood Glucose Monitoring Suppl (ONE TOUCH ULTRA 2) w/Device kit, Use 1 daily to test blood glucose level., Disp: 1 each, Rfl: 5  •  Continuous Blood Gluc  (FreeStyle Melonie 2 [place]) device, 1 each Continuous., Disp: 1 each, Rfl: 1  •  Continuous Blood Gluc Sensor (FreeStyle Melonie 2 Sensor) misc, 1 each Every 14 (Fourteen) Days., Disp: 3 each, Rfl: 11  •  fluticasone (FLONASE) 50 MCG/ACT nasal spray, SPRAY 2 SPRAYS INTO EACH NOSTRIL EVERY DAY, Disp: 48 mL, Rfl: 0  •  glucose blood test strip, USE TO TEST BLOOD SUGAR 1 TIME DAILY E11.9, Disp: 100 each, Rfl: 12  •  glucose monitor monitoring kit, 1 each As Needed (Diabetes). USE TO TEST 1 TIME DAILY, Disp: 1 each, Rfl: 0  •  hydrOXYzine (ATARAX) 50 MG tablet, Take 50 mg by mouth 4 (Four) Times a Day., Disp: , Rfl:   •  ibuprofen (ADVIL,MOTRIN) 600 MG tablet, Take 1 tablet by mouth Every 6 (Six) Hours As Needed for Mild Pain ., Disp: 15 tablet, Rfl: 0  •  Insulin Glargine, 2 Unit Dial, (Toujeo Max SoloStar) 300 UNIT/ML solution pen-injector injection, Inject 15 up to 30 units daily (Patient taking differently: 36 Units Daily.), Disp: 3 pen, Rfl: 11  •  Insulin Pen Needle (CareTouch Pen Needles) 32G X 5 MM misc, Inject once [place], Disp: 30 each, Rfl: 11  •  Insulin Regular Human 4 & 8 & 12 units powder, Inhale 4-32 Units 3 (Three) Times a Day With Meals. 4-32 units TID , max dose 96 units daily, Disp: 360 each, Rfl: 11  •  Jardiance 25 MG tablet, TAKE 1 TABLET BY MOUTH EVERY DAY, Disp: 90 tablet, Rfl: 1  •  Lancets (ACCU-CHEK SOFT TOUCH) lancets, Use TO TEST BLOOD SUGAR 1 TIME DAILY., Disp: 100 each, Rfl: 12  •  lisinopril  (PRINIVIL,ZESTRIL) 40 MG tablet, Take 1 tablet by mouth Daily., Disp: 90 tablet, Rfl: 3  •  metFORMIN (GLUCOPHAGE) 1000 MG tablet, Take 1 tablet by mouth 2 (Two) Times a Day With Meals., Disp: 180 tablet, Rfl: 2  •  metoprolol tartrate (LOPRESSOR) 50 MG tablet, Take 1.5 tablets by mouth 2 (Two) Times a Day. Patient takes 1.5 tablet twice a day, Disp: 270 tablet, Rfl: 1  •  nitroglycerin (NITROSTAT) 0.4 MG SL tablet, Place 1 tablet under the tongue Every 5 (Five) Minutes As Needed for Chest Pain. Take no more than 3 doses in 15 minutes., Disp: 30 tablet, Rfl: 12  •  paliperidone palmitate (INVEGA SUSTENNA) 117 MG/0.75ML suspension IM injection, Inject 117 mg into the shoulder, thigh, or buttocks Every 30 (Thirty) Days., Disp: , Rfl:   •  Semaglutide, 1 MG/DOSE, (Ozempic, 1 MG/DOSE,) 2 MG/1.5ML solution pen-injector, Inject 1 mg under the skin into the appropriate area as directed 1 (One) Time Per Week., Disp: 3 pen, Rfl: 11  •  sodium chloride (OCEAN NASAL SPRAY) 0.65 % nasal spray, 1 spray into each nostril As Needed for Congestion., Disp: 60 mL, Rfl: 12  •  traZODone (DESYREL) 150 MG tablet, TAKE 1 TABLET BY MOUTH EVERY DAY AT BEDTIME AS NEEDED, Disp: , Rfl: 2  •  icosapent ethyl (Vascepa) 1 g capsule capsule, Take 2 g by mouth 2 (Two) Times a Day With Meals., Disp: 120 capsule, Rfl: 11    Allergies:  Latex and Codeine    Family Hx:  Family History   Problem Relation Age of Onset   • Diabetes Other    • Hypertension Other    • Cancer Other    • Lung disease Other    • Thyroid disease Other    • Bleeding Disorder Other    • Colon cancer Paternal Grandmother    • Cancer Paternal Grandfather    • Crohn's disease Mother    • Diabetes Mother    • Hypertension Father    • Lung disease Father    • Hypertension Brother    • Hypertension Brother    • Hypertension Brother         Social History:  Social History     Socioeconomic History   • Marital status: Single     Spouse name: Not on file   • Number of children: Not on  "file   • Years of education: Not on file   • Highest education level: Not on file   Tobacco Use   • Smoking status: Former Smoker     Packs/day: 1.00     Years: 25.00     Pack years: 25.00     Types: Cigarettes     Start date:      Quit date: 2018     Years since quittin.4   • Smokeless tobacco: Never Used   Substance and Sexual Activity   • Alcohol use: Defer     Comment: FORMER pint of whiskey daily, states quit 1 year ago   • Drug use: Defer     Comment: patient reports prior drug abuse, but sober for 8 months now.   • Sexual activity: Defer       Review of Systems  Review of Systems   Constitutional: Negative for chills and fever.   Respiratory: Negative for shortness of breath.    Cardiovascular: Negative for chest pain.   Gastrointestinal: Negative for abdominal pain, diarrhea, nausea and vomiting.   Genitourinary: Negative for dysuria.   Neurological: Negative for dizziness.       Objective:     /80   Pulse 89   Ht 170.2 cm (67\")   Wt 119 kg (261 lb 12.8 oz)   SpO2 96%   BMI 41.00 kg/m²   Physical Exam  Vitals reviewed.   Constitutional:       Appearance: He is well-developed.   Cardiovascular:      Rate and Rhythm: Normal rate and regular rhythm.      Heart sounds: Normal heart sounds. No murmur heard.   No friction rub. No gallop.    Pulmonary:      Effort: Pulmonary effort is normal. No respiratory distress.      Breath sounds: Normal breath sounds. No wheezing or rales.   Abdominal:      General: Bowel sounds are normal.      Palpations: Abdomen is soft.      Tenderness: There is no abdominal tenderness.   Musculoskeletal:         General: Normal range of motion.   Skin:     General: Skin is warm and dry.   Neurological:      Mental Status: He is alert and oriented to person, place, and time.   Psychiatric:         Behavior: Behavior normal.         Lab Review  Results for orders placed or performed in visit on 21   Comprehensive Metabolic Panel    Specimen: Blood   Result " Value Ref Range    Glucose 173 (H) 65 - 99 mg/dL    BUN 14 6 - 20 mg/dL    Creatinine 0.71 (L) 0.76 - 1.27 mg/dL    Sodium 137 136 - 145 mmol/L    Potassium 4.2 3.5 - 5.2 mmol/L    Chloride 101 98 - 107 mmol/L    CO2 22.7 22.0 - 29.0 mmol/L    Calcium 9.2 8.6 - 10.5 mg/dL    Total Protein 7.0 6.0 - 8.5 g/dL    Albumin 4.20 3.50 - 5.20 g/dL    ALT (SGPT) 31 1 - 41 U/L    AST (SGOT) 18 1 - 40 U/L    Alkaline Phosphatase 72 39 - 117 U/L    Total Bilirubin 0.5 0.0 - 1.2 mg/dL    eGFR Non African Amer 116 >60 mL/min/1.73    Globulin 2.8 gm/dL    A/G Ratio 1.5 g/dL    BUN/Creatinine Ratio 19.7 7.0 - 25.0    Anion Gap 13.3 5.0 - 15.0 mmol/L   Hemoglobin A1c    Specimen: Blood   Result Value Ref Range    Hemoglobin A1C 9.10 (H) 4.80 - 5.60 %   Lipid Panel    Specimen: Blood   Result Value Ref Range    Total Cholesterol 109 0 - 200 mg/dL    Triglycerides 511 (H) 0 - 150 mg/dL    HDL Cholesterol 32 (L) 40 - 60 mg/dL    LDL Cholesterol  11 0 - 100 mg/dL    VLDL Cholesterol 66 (H) 5 - 40 mg/dL    LDL/HDL Ratio -0.79    Protein / Creatinine Ratio, Urine - Urine, Clean Catch    Specimen: Urine, Clean Catch   Result Value Ref Range    Protein/Creatinine Ratio, Urine 91.3 0.0 - 200.0 mg/G Crea    Creatinine, Urine 43.8 mg/dL    Total Protein, Urine 4.0 mg/dL   Microalbumin / Creatinine Urine Ratio - Urine, Clean Catch    Specimen: Urine, Clean Catch   Result Value Ref Range    Microalbumin/Creatinine Ratio      Creatinine, Urine 43.8 mg/dL    Microalbumin, Urine <1.2 mg/dL   TSH    Specimen: Blood   Result Value Ref Range    TSH 1.730 0.270 - 4.200 uIU/mL   Vitamin B12    Specimen: Blood   Result Value Ref Range    Vitamin B-12 622 211 - 946 pg/mL   Vitamin D 25 Hydroxy    Specimen: Blood   Result Value Ref Range    25 Hydroxy, Vitamin D 31.7 30.0 - 100.0 ng/ml   CBC Auto Differential    Specimen: Blood   Result Value Ref Range    WBC 7.73 3.40 - 10.80 10*3/mm3    RBC 5.76 4.14 - 5.80 10*6/mm3    Hemoglobin 16.1 13.0 - 17.7 g/dL     Hematocrit 45.0 37.5 - 51.0 %    MCV 78.1 (L) 79.0 - 97.0 fL    MCH 28.0 26.6 - 33.0 pg    MCHC 35.8 (H) 31.5 - 35.7 g/dL    RDW 14.4 12.3 - 15.4 %    RDW-SD 38.8 37.0 - 54.0 fl    MPV 11.4 6.0 - 12.0 fL    Platelets 216 140 - 450 10*3/mm3    Neutrophil % 58.2 42.7 - 76.0 %    Lymphocyte % 28.6 19.6 - 45.3 %    Monocyte % 8.9 5.0 - 12.0 %    Eosinophil % 2.6 0.3 - 6.2 %    Basophil % 1.2 0.0 - 1.5 %    Immature Grans % 0.5 0.0 - 0.5 %    Neutrophils, Absolute 4.50 1.70 - 7.00 10*3/mm3    Lymphocytes, Absolute 2.21 0.70 - 3.10 10*3/mm3    Monocytes, Absolute 0.69 0.10 - 0.90 10*3/mm3    Eosinophils, Absolute 0.20 0.00 - 0.40 10*3/mm3    Basophils, Absolute 0.09 0.00 - 0.20 10*3/mm3    Immature Grans, Absolute 0.04 0.00 - 0.05 10*3/mm3    nRBC 0.0 0.0 - 0.2 /100 WBC       none     Assessment:     Diagnoses and all orders for this visit:    1. Type 2 diabetes mellitus with hyperglycemia, unspecified whether long term insulin use (CMS/[facility]) (Primary)    2. Essential hypertension    3. Hypertriglyceridemia        Plan:     I have seen and examined the patient.  I have reviewed the notes, assessments, and/or procedures performed by Dr. Stanton during the office visit.  I concur with her/his documentation and assessment and plan for Kingsley Littlejohn.

## 2021-04-29 ENCOUNTER — DOCUMENTATION (OUTPATIENT)
Dept: ENDOCRINOLOGY | Facility: CLINIC | Age: 51
End: 2021-04-29

## 2021-04-30 ENCOUNTER — TELEPHONE (OUTPATIENT)
Dept: ENDOCRINOLOGY | Facility: CLINIC | Age: 51
End: 2021-04-30

## 2021-05-03 ENCOUNTER — DOCUMENTATION (OUTPATIENT)
Dept: ENDOCRINOLOGY | Facility: CLINIC | Age: 51
End: 2021-05-03

## 2021-05-11 ENCOUNTER — LAB (OUTPATIENT)
Dept: LAB | Facility: HOSPITAL | Age: 51
End: 2021-05-11

## 2021-05-11 DIAGNOSIS — E55.9 VITAMIN D DEFICIENCY: ICD-10-CM

## 2021-05-11 DIAGNOSIS — I10 ESSENTIAL HYPERTENSION: ICD-10-CM

## 2021-05-11 DIAGNOSIS — E11.65 TYPE 2 DIABETES MELLITUS WITH HYPERGLYCEMIA, WITHOUT LONG-TERM CURRENT USE OF INSULIN (HCC): ICD-10-CM

## 2021-05-11 DIAGNOSIS — E78.49 OTHER HYPERLIPIDEMIA: ICD-10-CM

## 2021-05-11 PROCEDURE — 82607 VITAMIN B-12: CPT

## 2021-05-11 PROCEDURE — 84156 ASSAY OF PROTEIN URINE: CPT

## 2021-05-11 PROCEDURE — 82570 ASSAY OF URINE CREATININE: CPT

## 2021-05-11 PROCEDURE — 80061 LIPID PANEL: CPT

## 2021-05-11 PROCEDURE — 84443 ASSAY THYROID STIM HORMONE: CPT

## 2021-05-11 PROCEDURE — 80053 COMPREHEN METABOLIC PANEL: CPT

## 2021-05-11 PROCEDURE — 83036 HEMOGLOBIN GLYCOSYLATED A1C: CPT

## 2021-05-11 PROCEDURE — 82306 VITAMIN D 25 HYDROXY: CPT

## 2021-05-11 PROCEDURE — 36415 COLL VENOUS BLD VENIPUNCTURE: CPT

## 2021-05-11 PROCEDURE — 82043 UR ALBUMIN QUANTITATIVE: CPT

## 2021-05-12 ENCOUNTER — OFFICE VISIT (OUTPATIENT)
Dept: ENDOCRINOLOGY | Facility: CLINIC | Age: 51
End: 2021-05-12

## 2021-05-12 VITALS
SYSTOLIC BLOOD PRESSURE: 142 MMHG | DIASTOLIC BLOOD PRESSURE: 92 MMHG | OXYGEN SATURATION: 98 % | HEIGHT: 67 IN | WEIGHT: 262.8 LBS | HEART RATE: 82 BPM | BODY MASS INDEX: 41.25 KG/M2

## 2021-05-12 DIAGNOSIS — E55.9 VITAMIN D DEFICIENCY: ICD-10-CM

## 2021-05-12 DIAGNOSIS — E78.49 OTHER HYPERLIPIDEMIA: ICD-10-CM

## 2021-05-12 DIAGNOSIS — E11.65 TYPE 2 DIABETES MELLITUS WITH HYPERGLYCEMIA, WITHOUT LONG-TERM CURRENT USE OF INSULIN (HCC): ICD-10-CM

## 2021-05-12 DIAGNOSIS — I10 ESSENTIAL HYPERTENSION: Primary | ICD-10-CM

## 2021-05-12 PROCEDURE — 99214 OFFICE O/P EST MOD 30 MIN: CPT | Performed by: NURSE PRACTITIONER

## 2021-05-12 PROCEDURE — 95251 CONT GLUC MNTR ANALYSIS I&R: CPT | Performed by: NURSE PRACTITIONER

## 2021-05-12 NOTE — PROGRESS NOTES
"Chief Complaint  Diabetes    Subjective          Kingsley Littlejohn presents to [facility] ENDOCRINOLOGY  History of Present Illness     In office visit            Primary Provider Bonilla Stanton MD      51 year old male presents for follow up      Reason diabetes mellitus type 2      Duration diagnosed in 2014      Timing constant      Quality improving            Severity - high due to complications      Complications - neuropathy, hyperglycemia         Severity (Complication/Hospitalizations)        Secondary Macrovascular--  No CVA, no PAD, no CAD      Had a MI in the past         Secondary Microvascular -- neuropathy, no diabetic retinopathy, no renal disease         Context-- presented with symptoms of diabetes increased thirst and urination             Diabetes Regimen-- oral medication , insulin, GLP- 1       Now using Afrezza with meals           Lab Results   Component Value Date     HGBA1C 9.10 (H) 03/25/2021               Blood Glucose Readings     Checks 4 times daily      Melonie personal use     Downloaded and reviewed images below                  Diet-        Trying to keep carbs at 60 gm per meal      Associated Signs/Symptoms       Hyperglycemic Symptoms: none        Hypoglycemic Episodes: none           Review of Systems - General ROS: positive for  - fatigue          Objective   Vital Signs:   /92   Pulse 82   Ht 170.2 cm (67\")   Wt 119 kg (262 lb 12.8 oz)   SpO2 98%   BMI 41.16 kg/m²     Physical Exam  Constitutional:       Appearance: Normal appearance.   Cardiovascular:      Rate and Rhythm: Regular rhythm.      Heart sounds: Normal heart sounds.   Pulmonary:      Breath sounds: Normal breath sounds.   Musculoskeletal:         General: Normal range of motion.      Cervical back: Normal range of motion.   Skin:     Coloration: Skin is not pale.   Neurological:      General: No focal deficit present.      Mental Status: He is alert.        Result Review :   The following " data was reviewed by: RAJAT Doyle on 05/12/2021:  Common labs    Common Labsle 12/16/20 12/16/20 12/16/20 12/16/20 3/25/21 3/25/21 3/25/21 3/25/21 3/25/21 5/11/21 5/11/21 5/11/21 5/11/21    1338 1338 1338 1338 1114 1114 1114 1114 1114 1031 1031 1031 1031   Glucose   170 (A)    173 (A)      191 (A)   BUN   16    14      13   Creatinine   0.94    0.71 (A)      0.72 (A)   eGFR Non African Am   84    116      114   Sodium   137    137      137   Potassium   4.2    4.2      4.2   Chloride   98    101      99   Calcium   10.0    9.2      9.2   Albumin   4.30    4.20      4.00   Total Bilirubin   0.4    0.5      0.6   Alkaline Phosphatase   73    72      78   AST (SGOT)   19    18      15   ALT (SGPT)   40    31      28   WBC 8.22    7.73           Hemoglobin 15.5    16.1           Hematocrit 45.6    45.0           Platelets 218    216           Total Cholesterol    115  109      109    Triglycerides    485 (A)  511 (A)      597 (A)    HDL Cholesterol    35 (A)  32 (A)      32 (A)    LDL Cholesterol     16  11      <5    Hemoglobin A1C  8.92 (A)      9.10 (A)  8.00 (A)      Microalbumin, Urine         <1.2  <1.2     (A) Abnormal value                      Assessment and Plan    Diagnoses and all orders for this visit:    1. Essential hypertension (Primary)  -     CBC & Differential; Future  -     Comprehensive Metabolic Panel; Future  -     Hemoglobin A1c; Future  -     Lipid Panel; Future  -     Microalbumin / Creatinine Urine Ratio - Urine, Clean Catch; Future  -     Protein / Creatinine Ratio, Urine - Urine, Clean Catch; Future  -     TSH; Future  -     Vitamin B12; Future  -     Vitamin D 25 Hydroxy; Future    2. Type 2 diabetes mellitus with hyperglycemia, without long-term current use of insulin (CMS/[facility])  -     CBC & Differential; Future  -     Comprehensive Metabolic Panel; Future  -     Hemoglobin A1c; Future  -     Lipid Panel; Future  -     Microalbumin / Creatinine Urine Ratio - Urine, Clean  Catch; Future  -     Protein / Creatinine Ratio, Urine - Urine, Clean Catch; Future  -     TSH; Future  -     Vitamin B12; Future  -     Vitamin D 25 Hydroxy; Future    3. Other hyperlipidemia  -     CBC & Differential; Future  -     Comprehensive Metabolic Panel; Future  -     Hemoglobin A1c; Future  -     Lipid Panel; Future  -     Microalbumin / Creatinine Urine Ratio - Urine, Clean Catch; Future  -     Protein / Creatinine Ratio, Urine - Urine, Clean Catch; Future  -     TSH; Future  -     Vitamin B12; Future  -     Vitamin D 25 Hydroxy; Future    4. Vitamin D deficiency  -     CBC & Differential; Future  -     Comprehensive Metabolic Panel; Future  -     Hemoglobin A1c; Future  -     Lipid Panel; Future  -     Microalbumin / Creatinine Urine Ratio - Urine, Clean Catch; Future  -     Protein / Creatinine Ratio, Urine - Urine, Clean Catch; Future  -     TSH; Future  -     Vitamin B12; Future  -     Vitamin D 25 Hydroxy; Future             Glycemic Management     Type 2 diabetes not controlled with hyperglycemia           Download above    Still high most of the time without any lows we will need to make adjustments for mealtime insulin    However he did did have a low with missing a meal so we will need to back off the long-acting insulin           Lab Results   Component Value Date     HGBA1C 9.10 (H) 03/25/2021                  Taking Metformin 1000 mg po BID        Jardiance 25 mg one daily -keep        Januvia 100 mg daily ---- stop not effective             taking Ozempic 1mg once weekly      Side effects discussed, try to eat half your plate not a full plate     If nauseated eat less     If vomiting or abdominal pain stop medication                 Toujeo taking 36 units -- decrease to 34 units               Aim 60 gm of CHO per meal      If you have a snack 15 gms of CHO or less      Stop regular sweet drinks      Afreeza before each meal      8 up to 12 units  before each meal --- increase to 12 up to 24  before each meal 3 times a day        The following section of the note has been addended    This was addended on August 3, 2021 at 5:07 PM and has been electronically signed         Approve oumar            The patient has diabetes mellitus, insulin-dependent.     Our Diabetes Department has evaluated the patient in the last six months and will continue counseling on insulin adjustment.      The patient performs blood glucose testing at least four times daily with proven glucose variability from 50 to 300 mg per dl.     The patient is administering basal insulin and prandial insulin four times per day for more than six months.     The patient uses a home blood glucose monitor to assess blood glucose at least four times daily for more than six months.     The patient requires frequent adjustment of insulin treatment regimen based on blood glucose readings.     The patient has frequent variability in blood glucose readings due to activity and variability in meal content and time.      The patient has completed a diabetes education program with us.     The patient has demonstrated the ability to self-monitor her glucose.      The patient is motivated in improving diabetes control      The patient has hypoglycemia unawareness             This document has been electronically signed by RAJAT Doyle on August 3, 2021 17:07 CDT           Lipid Management        Taking Lipitor 40 mg one at night                   Total Cholesterol   Date Value Ref Range Status   12/16/2020 115 0 - 200 mg/dL Final                Triglycerides   Date Value Ref Range Status   12/16/2020 485 (H) 0 - 150 mg/dL Final                HDL Cholesterol   Date Value Ref Range Status   12/16/2020 35 (L) 40 - 60 mg/dL Final                LDL Cholesterol    Date Value Ref Range Status   12/16/2020 16 0 - 100 mg/dL Final            Vascepa 2 gm po BID--could not take         Blood Pressure Management     Taking Lisinopril 40 mg once daily       Taking Metoprolol 50 mg daily            Microvascular Complication Monitoring        Last eye exam -- Jan. 2020 no DR      Component      Latest Ref Rng & Units 9/16/2020 9/16/2020           2:31 PM  2:31 PM   Protein/Creatinine Ratio      0.0 - 200.0 mg/G Crea 98.3     Creatinine, Urine      mg/dL 47.8 47.8   Total Protein, Urine      mg/dL 4.7     Microalbumin/Creatinine Ratio             Microalbumin, Urine      mg/dL   <1.2               Neuropathy mild      Prefers no RX at this time         Bone Health      vitamin d def.         Start otc vitamin d daily      Component      Latest Ref Rng & Units 3/25/2021   25 Hydroxy, Vitamin D      30.0 - 100.0 ng/ml 31.7               Other Diabetes Related Aspects               Lab Results   Component Value Date     [id number] 509 12/16/2020               Thyroid Health                  Lab Results   Component Value Date     TSH 1.330 12/16/2020                     Weight Management:     Patient's Body mass index is 41.16 kg/m². indicating that he is morbidly obese (BMI > 40 or > 35 with obesity - related health condition). Obesity-related health conditions include the following: diabetes mellitus. Obesity is unchanged. BMI is is above average; no BMI management plan is appropriate. We discussed portion control and increasing exercise..           Follow Up   Return in about 3 months (around 8/12/2021) for Recheck.  Patient was given instructions and counseling regarding his condition or for health maintenance advice. Please see specific information pulled into the AVS if appropriate.

## 2021-05-14 ENCOUNTER — DOCUMENTATION (OUTPATIENT)
Dept: ENDOCRINOLOGY | Facility: CLINIC | Age: 51
End: 2021-05-14

## 2021-05-27 ENCOUNTER — TELEPHONE (OUTPATIENT)
Dept: ENDOCRINOLOGY | Facility: CLINIC | Age: 51
End: 2021-05-27

## 2021-06-01 ENCOUNTER — DOCUMENTATION (OUTPATIENT)
Dept: ENDOCRINOLOGY | Facility: CLINIC | Age: 51
End: 2021-06-01

## 2021-06-03 RX ORDER — FLUTICASONE PROPIONATE 50 MCG
SPRAY, SUSPENSION (ML) NASAL
Qty: 48 ML | Refills: 0 | Status: SHIPPED | OUTPATIENT
Start: 2021-06-03 | End: 2022-01-20 | Stop reason: SDUPTHER

## 2021-06-04 ENCOUNTER — TELEPHONE (OUTPATIENT)
Dept: ENDOCRINOLOGY | Facility: CLINIC | Age: 51
End: 2021-06-04

## 2021-06-04 NOTE — TELEPHONE ENCOUNTER
[facility] has been leaving messages on this patient requesting for the clinical notes to be refaxed back after being signed and dated.     This message was relayed on 05/27/21, and the MA said she had left a voicemail, I am not sure if this means that this has been taken care of?

## 2021-06-09 ENCOUNTER — OFFICE VISIT (OUTPATIENT)
Dept: PODIATRY | Facility: CLINIC | Age: 51
End: 2021-06-09

## 2021-06-09 VITALS
WEIGHT: 263 LBS | DIASTOLIC BLOOD PRESSURE: 101 MMHG | HEART RATE: 88 BPM | HEIGHT: 67 IN | BODY MASS INDEX: 41.28 KG/M2 | OXYGEN SATURATION: 94 % | SYSTOLIC BLOOD PRESSURE: 140 MMHG

## 2021-06-09 DIAGNOSIS — L84 CORNS: ICD-10-CM

## 2021-06-09 DIAGNOSIS — B35.1 ONYCHOMYCOSIS: ICD-10-CM

## 2021-06-09 DIAGNOSIS — M79.674 CHRONIC TOE PAIN, BILATERAL: ICD-10-CM

## 2021-06-09 DIAGNOSIS — E11.42 TYPE 2 DIABETES MELLITUS WITH PERIPHERAL NEUROPATHY (HCC): ICD-10-CM

## 2021-06-09 DIAGNOSIS — M79.675 CHRONIC TOE PAIN, BILATERAL: ICD-10-CM

## 2021-06-09 DIAGNOSIS — G89.29 CHRONIC TOE PAIN, BILATERAL: ICD-10-CM

## 2021-06-09 DIAGNOSIS — E11.9 ENCOUNTER FOR DIABETIC FOOT EXAM (HCC): Primary | ICD-10-CM

## 2021-06-09 PROCEDURE — 99213 OFFICE O/P EST LOW 20 MIN: CPT | Performed by: PODIATRIST

## 2021-06-09 PROCEDURE — 11056 PARNG/CUTG B9 HYPRKR LES 2-4: CPT | Performed by: PODIATRIST

## 2021-06-09 PROCEDURE — 11721 DEBRIDE NAIL 6 OR MORE: CPT | Performed by: PODIATRIST

## 2021-06-09 NOTE — PROGRESS NOTES
"Kingsley Littlejohn  1970  51 y.o. male  SHELBY Doan - 5/12/2021  BS - 104 per patient     06/09/2021     Chief Complaint   Patient presents with   • Left Foot - diabetic foot care   • Right Foot - diabetic foot care       History of Present Illness    Patient presents for routine diabetic foot care.  He denies any changes in his medical history since his last office visit.      Past Medical History:   Diagnosis Date   • Abdominal pain    • Allergic rhinitis    • Diabetes mellitus (CMS/[facility])     Prev HbA1c 14.0 2014, states \"I'm cured!\"   • Essential hypertension    • Fracture closed, fibula, shaft     tib-fib   • Onychomycosis          Past Surgical History:   Procedure Laterality Date   • ANKLE SURGERY      right and left   • COLONOSCOPY N/A 6/4/2018    Procedure: COLONOSCOPY;  Surgeon: Jaylon Castro MD;  Location: [facility] ENDOSCOPY;  Service: Gastroenterology   • CYST REMOVAL      x 3, from hand, thigh, and buttocks   • ENDOSCOPY      20 years ago   • TIBIA FRACTURE SURGERY Bilateral 2011    tib-fib         Family History   Problem Relation Age of Onset   • Diabetes Other    • Hypertension Other    • Cancer Other    • Lung disease Other    • Thyroid disease Other    • Bleeding Disorder Other    • Colon cancer Paternal Grandmother    • Cancer Paternal Grandfather    • Crohn's disease Mother    • Diabetes Mother    • Hypertension Father    • Lung disease Father    • Hypertension Brother    • Hypertension Brother    • Hypertension Brother        Allergies   Allergen Reactions   • Latex Irritability   • Codeine Nausea Only       Social History     Socioeconomic History   • Marital status: Single     Spouse name: Not on file   • Number of children: Not on file   • Years of education: Not on file   • Highest education level: Not on file   Tobacco Use   • Smoking status: Former Smoker     Packs/day: 1.00     Years: 25.00     Pack years: 25.00     Types: Cigarettes     Start date: 1992     Quit date: 12/2018     " Years since quittin.5   • Smokeless tobacco: Never Used   Vaping Use   • Vaping Use: Never used   Substance and Sexual Activity   • Alcohol use: Defer     Comment: FORMER pint of whiskey daily, states quit 1 year ago   • Drug use: Defer     Comment: patient reports prior drug abuse, but sober for 8 months now.   • Sexual activity: Defer         Current Outpatient Medications   Medication Sig Dispense Refill   • aspirin 325 MG tablet Take 325 mg by mouth Daily.     • atorvastatin (LIPITOR) 40 MG tablet Take 1 tablet by mouth Daily. 90 tablet 3   • BD Pen Needle Nola U/F 32G X 4 MM misc INJECT ONCE WALTON     • Blood Glucose Monitoring Suppl (ONE TOUCH ULTRA 2) w/Device kit Use 1 daily to test blood glucose level. 1 each 5   • Continuous Blood Gluc  (FreeStyle Melonie 2 Parkin) device 1 each Continuous. 1 each 1   • Continuous Blood Gluc Sensor (FreeStyle Melonie 2 Sensor) misc 1 each Every 14 (Fourteen) Days. 3 each 11   • fluticasone (FLONASE) 50 MCG/ACT nasal spray SPRAY 2 SPRAYS INTO EACH NOSTRIL EVERY DAY 48 mL 0   • glucose blood test strip USE TO TEST BLOOD SUGAR 1 TIME DAILY E11.9 100 each 12   • glucose monitor monitoring kit 1 each As Needed (Diabetes). USE TO TEST 1 TIME DAILY 1 each 0   • hydrOXYzine (ATARAX) 50 MG tablet Take 50 mg by mouth 4 (Four) Times a Day.     • ibuprofen (ADVIL,MOTRIN) 600 MG tablet Take 1 tablet by mouth Every 6 (Six) Hours As Needed for Mild Pain . 15 tablet 0   • icosapent ethyl (Vascepa) 1 g capsule capsule Take 2 g by mouth 2 (Two) Times a Day With Meals. 120 capsule 11   • Insulin Glargine, 2 Unit Dial, (Toujeo Max SoloStar) 300 UNIT/ML solution pen-injector injection Inject 15 up to 30 units daily (Patient taking differently: 40 Units Daily.) 3 pen 11   • Insulin Pen Needle (CareTouch Pen Needles) 32G X 5 MM misc Inject once walton 30 each 11   • Insulin Regular Human 4 & 8 & 12 units powder Inhale 4-32 Units 3 (Three) Times a Day With Meals. 4-32 units TID , max dose 96  "units daily 360 each 11   • Jardiance 25 MG tablet TAKE 1 TABLET BY MOUTH EVERY DAY 90 tablet 1   • Lancets (ACCU-CHEK SOFT TOUCH) lancets Use TO TEST BLOOD SUGAR 1 TIME DAILY. 100 each 12   • lisinopril (PRINIVIL,ZESTRIL) 40 MG tablet Take 1 tablet by mouth Daily. 90 tablet 3   • metFORMIN (GLUCOPHAGE) 1000 MG tablet Take 1 tablet by mouth 2 (Two) Times a Day With Meals. 180 tablet 2   • metoprolol tartrate (LOPRESSOR) 50 MG tablet Take 1.5 tablets by mouth 2 (Two) Times a Day. Patient takes 1.5 tablet twice a day 270 tablet 1   • nitroglycerin (NITROSTAT) 0.4 MG SL tablet Place 1 tablet under the tongue Every 5 (Five) Minutes As Needed for Chest Pain. Take no more than 3 doses in 15 minutes. 30 tablet 12   • paliperidone palmitate (INVEGA SUSTENNA) 117 MG/0.75ML suspension IM injection Inject 117 mg into the shoulder, thigh, or buttocks Every 30 (Thirty) Days.     • Semaglutide, 1 MG/DOSE, (Ozempic, 1 MG/DOSE,) 2 MG/1.5ML solution pen-injector Inject 1 mg under the skin into the appropriate area as directed 1 (One) Time Per Week. 3 pen 11   • sodium chloride (OCEAN NASAL SPRAY) 0.65 % nasal spray 1 spray into each nostril As Needed for Congestion. 60 mL 12   • traZODone (DESYREL) 150 MG tablet TAKE 1 TABLET BY MOUTH EVERY DAY AT BEDTIME AS NEEDED  2     No current facility-administered medications for this visit.       Review of Systems   Constitutional: Negative.    HENT: Negative.    Eyes: Negative.    Respiratory: Negative.    Cardiovascular: Positive for leg swelling.   Gastrointestinal: Negative.    Musculoskeletal:        Toe pain    Allergic/Immunologic: Negative.    Neurological: Positive for numbness.   Hematological: Negative.    Psychiatric/Behavioral: Negative.          OBJECTIVE    BP (!) 140/101   Pulse 88   Ht 170.2 cm (67\")   Wt 119 kg (263 lb)   SpO2 94%   BMI 41.19 kg/m²       Physical Exam   Constitutional: He is oriented to person, place, and time. He appears well-developed.   HENT: "   Head: Normocephalic and atraumatic.   Pulmonary/Chest: Effort normal. No respiratory distress.   Musculoskeletal: Normal range of motion. No deformity.    Kingsley had a diabetic foot exam performed today.   During the foot exam he had a monofilament test performed.  Neurological: He is alert and oriented to person, place, and time.   Skin: Skin is warm and dry. Capillary refill takes less than 2 seconds. He is not diaphoretic. No erythema.   Psychiatric: His behavior is normal.   Vitals reviewed.      Gait: Normal     Assistive Device: None    Lower Extremity    Cardiovascular:    DP/PT pulses palpable bilateral  CFT brisk  to all digits  Skin temp is warm to warm from proximal tibia to distal digits bilateral  Pedal hair growth present.   Musculoskeletal:  Muscle strength is 5/5 for all muscle groups tested   ROM of the 1st MTP is full without pain or crepitus bilateral  ROM of the ankle joint is full without pain or crepitus  bilateral  Dermatological:   Bilateral hallux nails absent.  Remaining toenails are thickened, discolored, elongated with subungually.  Pain on palpation of the nail plates.  Skin is warm, dry and intact bilateral  Webspaces 1-4 bilateral are clean, dry and intact.   Hyperkeratotic tissue noted to medial IPJ of bilateral hallux.  Neurological:   Protective sensation decreased   Sensation intact to light touch        Procedures        ASSESSMENT AND PLAN    Diagnoses and all orders for this visit:    1. Encounter for diabetic foot exam (CMS/[facility]) (Primary)    2. Type 2 diabetes mellitus with peripheral neuropathy (CMS/[facility])    3. Onychomycosis    4. Chronic toe pain, bilateral    5. Corns      - A diabetic foot screening exam was performed and the patient was educated on the foot complications related to diabetes,  preventative foot care and what signs and symptoms to watch for.  Instructed to contact our office if any foot problems develop before next visit.   - Nails 2-5 bilateral were  debrided in length and thickness with nail nipper and electric  to decrease fungal load and risk of infection.   - Trimmed hyperkeratotic lesions noted in objective with a #15 blade without incident.   -All questions were answered  -Recheck 3 months          This document has been electronically signed by Abhijeet Thomason DPM on June 9, 2021 14:50 CDT     6/9/2021  14:50 CDT

## 2021-06-13 DIAGNOSIS — E11.9 TYPE 2 DIABETES MELLITUS WITHOUT COMPLICATION, WITHOUT LONG-TERM CURRENT USE OF INSULIN (HCC): ICD-10-CM

## 2021-06-21 RX ORDER — EMPAGLIFLOZIN 25 MG/1
1 TABLET, FILM COATED ORAL DAILY
Qty: 90 TABLET | Refills: 1 | Status: SHIPPED | OUTPATIENT
Start: 2021-06-21 | End: 2021-12-10

## 2021-06-21 NOTE — TELEPHONE ENCOUNTER
PATIENT CALLED ASKING FOR A REFILL ON HIS Jardiance 25 MG tablet.  PATIENT USES  [facility] IN [place].    CALL BACK NUMBER FOR THEM [phone].    THANKS,  CORAL

## 2021-07-06 NOTE — TELEPHONE ENCOUNTER
Pt A/O, disoriented to year. Denies pain/nausea. Endorses anxiety and frustration with discharge planning. Seroquel given x2 with relief. ABD slightly firm-paracentesis based on symptoms. Bruising to left para site. Leger cath removed; pt void successfully-bladder scan 94. Groin area slightly red-miconazole powder applied- Meatus slightly red-improved with good linda care and Vaseline. Albumin given. Regular diet w/ 1200ml FR. Up SBA+w for ambulation. Up for meals. Mepilex to coccyx. Plan: Await TCU placement and plan for PRN paracentesis and symptoms indicate    SAHIL F/JIMMY--APPT CANDIE 130P 8/10/2018

## 2021-07-13 RX ORDER — SEMAGLUTIDE 1.34 MG/ML
INJECTION, SOLUTION SUBCUTANEOUS
Qty: 6 PEN | Refills: 3 | Status: SHIPPED | OUTPATIENT
Start: 2021-07-13 | End: 2021-07-26 | Stop reason: ALTCHOICE

## 2021-07-15 ENCOUNTER — TELEPHONE (OUTPATIENT)
Dept: ENDOCRINOLOGY | Facility: CLINIC | Age: 51
End: 2021-07-15

## 2021-07-15 NOTE — TELEPHONE ENCOUNTER
[facility] medical left a vm stating that they are needing a call regarding the CGM for the oumar 2 [phone]

## 2021-07-26 ENCOUNTER — OFFICE VISIT (OUTPATIENT)
Dept: SURGERY | Facility: CLINIC | Age: 51
End: 2021-07-26

## 2021-07-26 VITALS
WEIGHT: 269 LBS | DIASTOLIC BLOOD PRESSURE: 88 MMHG | HEIGHT: 67 IN | SYSTOLIC BLOOD PRESSURE: 140 MMHG | HEART RATE: 94 BPM | BODY MASS INDEX: 42.22 KG/M2 | TEMPERATURE: 96.8 F

## 2021-07-26 DIAGNOSIS — Z86.010 HX OF ADENOMATOUS COLONIC POLYPS: Primary | ICD-10-CM

## 2021-07-26 PROCEDURE — S0285 CNSLT BEFORE SCREEN COLONOSC: HCPCS | Performed by: SURGERY

## 2021-07-26 RX ORDER — TRAZODONE HYDROCHLORIDE 50 MG/1
TABLET ORAL
COMMUNITY
Start: 2021-07-22 | End: 2022-09-14

## 2021-07-26 RX ORDER — SEMAGLUTIDE 1.34 MG/ML
1 INJECTION, SOLUTION SUBCUTANEOUS
COMMUNITY
Start: 2021-07-13 | End: 2022-05-17

## 2021-07-26 RX ORDER — METOPROLOL TARTRATE 100 MG/1
TABLET ORAL
COMMUNITY
Start: 2021-05-27

## 2021-07-26 RX ORDER — DEXTROSE AND SODIUM CHLORIDE 5; .45 G/100ML; G/100ML
100 INJECTION, SOLUTION INTRAVENOUS CONTINUOUS PRN
Status: CANCELLED | OUTPATIENT
Start: 2021-08-13

## 2021-07-26 NOTE — PROGRESS NOTES
"Chief Complaint   Patient presents with   • Colonoscopy     3 Year follow up to Schedule Colonoscopy       Kingsley Littlejohn is a 51 y.o. male referred today for evaluation for colonoscopy.  He notes no change in bowel habits, no blood in the stool.   3 years out from his first ever colonoscopy and he had a small tubular adenomatous polyp removed from his descending colon.  Is also noted diverticulosis.  Prep was adequate identify polyps.  Presents now for follow-up colonoscopy    Prior Colonoscopy:yes  Prior Polyps:yes  Family History of Colon Cancer:no  On anticoagulation:no    Past Surgical History:   Procedure Laterality Date   • ANKLE SURGERY      right and left   • COLONOSCOPY N/A 2018    Procedure: COLONOSCOPY;  Surgeon: Jaylon Castro MD;  Location: [facility] ENDOSCOPY;  Service: Gastroenterology   • CYST REMOVAL      x 3, from hand, thigh, and buttocks   • ENDOSCOPY      20 years ago   • TIBIA FRACTURE SURGERY Bilateral     tib-fib     Past Medical History:   Diagnosis Date   • Abdominal pain    • Allergic rhinitis    • Diabetes mellitus (CMS/[facility])     Prev HbA1c 14.0 2014, states \"I'm cured!\"   • Essential hypertension    • Fracture closed, fibula, shaft     tib-fib   • Onychomycosis      Social History     Socioeconomic History   • Marital status: Single     Spouse name: Not on file   • Number of children: Not on file   • Years of education: Not on file   • Highest education level: Not on file   Tobacco Use   • Smoking status: Former Smoker     Packs/day: 1.00     Years: 25.00     Pack years: 25.00     Types: Cigarettes     Start date:      Quit date: 2018     Years since quittin.6   • Smokeless tobacco: Never Used   Vaping Use   • Vaping Use: Never used   Substance and Sexual Activity   • Alcohol use: Defer     Comment: FORMER pint of whiskey daily, states quit 1 year ago   • Drug use: Defer     Comment: patient reports prior drug abuse, but sober for 8 months now.   • Sexual activity: " Defer     Family History   Problem Relation Age of Onset   • Diabetes Other    • Hypertension Other    • Cancer Other    • Lung disease Other    • Thyroid disease Other    • Bleeding Disorder Other    • Colon cancer Paternal Grandmother    • Cancer Paternal Grandfather    • Crohn's disease Mother    • Diabetes Mother    • Hypertension Father    • Lung disease Father    • Hypertension Brother    • Hypertension Brother    • Hypertension Brother      Allergies   Allergen Reactions   • Latex Irritability   • Codeine Nausea Only       Home Medications:  Prior to Admission medications    Medication Sig Start Date End Date Taking? Authorizing Provider   aspirin 325 MG tablet Take 325 mg by mouth Daily.    Evelia Perrin MD   atorvastatin (LIPITOR) 40 MG tablet Take 1 tablet by mouth Daily. 10/29/20   Kg Kc MD   BD Pen Needle Nola U/F 32G X 4 MM misc INJECT ONCE [place] 9/23/20   Evelia Perrin MD   Blood Glucose Monitoring Suppl (ONE TOUCH ULTRA 2) w/Device kit Use 1 daily to test blood glucose level. 9/6/19   Yang Veloz MD   Continuous Blood Gluc  (FreeStyle Melonie 2 [place]) device 1 each Continuous. 4/9/21   Al Doan APRN   Continuous Blood Gluc Sensor (FreeStyle Melonie 2 Sensor) misc 1 each Every 14 (Fourteen) Days. 4/9/21   Al Doan APRN   Empagliflozin (Jardiance) 25 MG tablet Take 25 mg by mouth Daily. 6/21/21   Kg Kc MD   fluticasone (FLONASE) 50 MCG/ACT nasal spray SPRAY 2 SPRAYS INTO EACH NOSTRIL EVERY DAY 6/3/21   Favio Muro MD   glucose blood test strip USE TO TEST BLOOD SUGAR 1 TIME DAILY E11.9 10/8/19   Yang Veloz MD   glucose monitor monitoring kit 1 each As Needed (Diabetes). USE TO TEST 1 TIME DAILY 10/8/19   Yang Veloz MD   hydrOXYzine (ATARAX) 50 MG tablet Take 50 mg by mouth 4 (Four) Times a Day. 1/31/20   Evelia Perrin MD   ibuprofen (ADVIL,MOTRIN) 600 MG tablet Take 1 tablet by mouth Every  6 (Six) Hours As Needed for Mild Pain . 7/16/18   Ottoniel Gonsales MD   Insulin Glargine, 2 Unit Dial, (Toujeo Max SoloStar) 300 UNIT/ML solution pen-injector injection Inject 15 up to 30 units daily  Patient taking differently: 40 Units Daily. 9/21/20   Al Doan APRN   Insulin Pen Needle (CareTouch Pen Needles) 32G X 5 MM misc Inject once camp 9/21/20   Al Doan APRN   Insulin Regular Human 4 & 8 & 12 units powder Inhale 4-32 Units 3 (Three) Times a Day With Meals. 4-32 units TID , max dose 96 units daily 3/29/21   Al Doan APRN   Lancets (ACCU-CHEK SOFT TOUCH) lancets Use TO TEST BLOOD SUGAR 1 TIME DAILY. 10/8/19   Yang Veloz MD   lisinopril (PRINIVIL,ZESTRIL) 40 MG tablet Take 1 tablet by mouth Daily. 10/29/20   Kg Kc MD   metFORMIN (GLUCOPHAGE) 1000 MG tablet TAKE 1 TABLET BY MOUTH TWICE A DAY WITH MEALS 6/14/21   Eric Vargas MD   metoprolol tartrate (LOPRESSOR) 100 MG tablet TAKE 1 TAB(S) 2 TIMES A DAY 5/27/21   Evelia Perrin MD   nitroglycerin (NITROSTAT) 0.4 MG SL tablet Place 1 tablet under the tongue Every 5 (Five) Minutes As Needed for Chest Pain. Take no more than 3 doses in 15 minutes. 1/13/20   Eric Vargas MD   Ozempic, 1 MG/DOSE, 4 MG/3ML solution pen-injector Inject 1 mg under the skin into the appropriate area as directed Every 7 (Seven) Days. 7/13/21   Evelia Perrin MD   paliperidone palmitate (INVEGA SUSTENNA) 117 MG/0.75ML suspension IM injection Inject 117 mg into the shoulder, thigh, or buttocks Every 30 (Thirty) Days.    Evelia Perrin MD   sodium chloride (OCEAN NASAL SPRAY) 0.65 % nasal spray 1 spray into each nostril As Needed for Congestion. 10/13/17   Jaret Banks MD   traZODone (DESYREL) 150 MG tablet TAKE 1 TABLET BY MOUTH EVERY DAY AT BEDTIME AS NEEDED 2/11/19   Provider, MD Evelia   traZODone (DESYREL) 50 MG tablet  7/22/21   Provider, MD Evelia   icosapent ethyl  (Vascepa) 1 g capsule capsule Take 2 g by mouth 2 (Two) Times a Day With Meals. 9/21/20 7/26/21  Al Doan APRN   metoprolol tartrate (LOPRESSOR) 50 MG tablet Take 1.5 tablets by mouth 2 (Two) Times a Day. Patient takes 1.5 tablet twice a day 2/1/21 7/26/21  Favio Muro MD   Ozempic, 1 MG/DOSE, 2 MG/1.5ML solution pen-injector INJECT 1 MG UNDER THE SKIN INTO THE APPROPRIATE AREA AS DIRECTED 1 (ONE) TIME PER WEEK. 7/13/21 7/26/21  Chao Delgado MD       Review of Systems   Constitutional: Negative.    HENT: Negative for hearing loss, nosebleeds and trouble swallowing.    Respiratory: Negative for apnea, chest tightness and shortness of breath.    Cardiovascular: Negative for chest pain and palpitations.   Gastrointestinal: Negative for abdominal distention, abdominal pain, blood in stool, constipation, diarrhea, nausea and vomiting.   Genitourinary: Negative for difficulty urinating, dysuria, frequency and urgency.   Musculoskeletal: Negative for back pain, joint swelling and neck pain.   Skin: Negative for rash.   Neurological: Negative for dizziness, seizures, weakness, light-headedness, numbness and headaches.   Hematological: Negative for adenopathy.   Psychiatric/Behavioral: Negative for agitation. The patient is not nervous/anxious.        Vitals:    07/26/21 1404   BP: 140/88   Pulse: 94   Temp: 96.8 °F (36 °C)       Physical Exam  Vitals reviewed.   Constitutional:       General: He is not in acute distress.     Appearance: He is well-developed.   HENT:      Head: Normocephalic and atraumatic.      Nose: Nose normal.   Eyes:      Conjunctiva/sclera: Conjunctivae normal.   Neck:      Thyroid: No thyromegaly.      Trachea: No tracheal deviation.   Cardiovascular:      Rate and Rhythm: Normal rate and regular rhythm.      Heart sounds: Normal heart sounds. No murmur heard.     Pulmonary:      Effort: Pulmonary effort is normal. No respiratory distress.      Breath sounds: Normal  breath sounds. No wheezing or rales.   Chest:      Chest wall: No tenderness.   Abdominal:      General: There is no distension.      Palpations: Abdomen is soft.      Tenderness: There is no abdominal tenderness. There is no guarding or rebound.      Hernia: No hernia is present.   Musculoskeletal:         General: No tenderness or deformity.      Cervical back: Normal range of motion.   Skin:     General: Skin is warm and dry.      Findings: No rash.   Neurological:      Mental Status: He is alert and oriented to person, place, and time.   Psychiatric:         Behavior: Behavior normal.         Thought Content: Thought content normal.         Judgment: Judgment normal.         Assessment     In need of  colonoscopy.    Plan     Risks, benefits, rationale and prep for colonoscopy have been discussed with the patient.  The patient indicates understanding of these issues and agrees with the plan.

## 2021-07-26 NOTE — PATIENT INSTRUCTIONS
"BMI for Adults  What is BMI?  Body mass index (BMI) is a number that is calculated from a person's weight and height. BMI can help estimate how much of a person's weight is composed of fat. BMI does not measure body fat directly. Rather, it is an alternative to procedures that directly measure body fat, which can be difficult and expensive.  BMI can help identify people who may be at higher risk for certain medical problems.  What are BMI measurements used for?  BMI is used as a screening tool to identify possible weight problems. It helps determine whether a person is obese, overweight, a healthy weight, or underweight.  BMI is useful for:  · Identifying a weight problem that may be related to a medical condition or may increase the risk for medical problems.  · Promoting changes, such as changes in diet and exercise, to help reach a healthy weight. BMI screening can be repeated to see if these changes are working.  How is BMI calculated?  BMI involves measuring your weight in relation to your height. Both height and weight are measured, and the BMI is calculated from those numbers. This can be done either in English ([place] or metric measurements. Note that charts and online BMI calculators are available to help you find your BMI quickly and easily without having to do these calculations yourself.  To calculate your BMI in English ([place] measurements:    1. Measure your weight in pounds (lb).  2. Multiply the number of pounds by 703.  ? For example, for a person who weighs 180 lb, multiply that number by 703, which equals 126,540.  3. Measure your height in inches. Then multiply that number by itself to get a measurement called \"inches squared.\"  ? For example, for a person who is 70 inches tall, the \"inches squared\" measurement is 70 inches x 70 inches, which equals 4,900 inches squared.  4. Divide the total from step 2 (number of lb x 703) by the total from step 3 (inches squared): 126,540 ÷ 4,900 = 25.8. This is " "your BMI.  To calculate your BMI in metric measurements:  1. Measure your weight in kilograms (kg).  2. Measure your height in meters (m). Then multiply that number by itself to get a measurement called \"meters squared.\"  ? For example, for a person who is 1.75 m tall, the \"meters squared\" measurement is 1.75 m x 1.75 m, which is equal to 3.1 meters squared.  3. Divide the number of kilograms (your weight) by the meters squared number. In this example: 70 ÷ 3.1 = 22.6. This is your BMI.  What do the results mean?  BMI charts are used to identify whether you are underweight, normal weight, overweight, or obese. The following guidelines will be used:  · Underweight: BMI less than 18.5.  · Normal weight: BMI between 18.5 and 24.9.  · Overweight: BMI between 25 and 29.9.  · Obese: BMI of 30 or above.  Keep these notes in mind:  · Weight includes both fat and muscle, so someone with a muscular build, such as an athlete, may have a BMI that is higher than 24.9. In cases like these, BMI is not an accurate measure of body fat.  · To determine if excess body fat is the cause of a BMI of 25 or higher, further assessments may need to be done by a health care provider.  · BMI is usually interpreted in the same way for men and women.  Where to find more information  For more information about BMI, including tools to quickly calculate your BMI, go to these websites:  · Centers for Disease Control and Prevention: www.cdc.gov  · American Heart Association: www.heart.org  · National Heart, Lung, and Blood [place]: www.nhlbi.nih.gov  Summary  · Body mass index (BMI) is a number that is calculated from a person's weight and height.  · BMI may help estimate how much of a person's weight is composed of fat. BMI can help identify those who may be at higher risk for certain medical problems.  · BMI can be measured using English measurements or metric measurements.  · BMI charts are used to identify whether you are underweight, normal " weight, overweight, or obese.  This information is not intended to replace advice given to you by your health care provider. Make sure you discuss any questions you have with your health care provider.  Document Revised: 09/09/2020 Document Reviewed: 07/17/2020  Elsevier Patient Education © 2021 [employer]

## 2021-07-27 ENCOUNTER — OFFICE VISIT (OUTPATIENT)
Dept: FAMILY MEDICINE CLINIC | Facility: CLINIC | Age: 51
End: 2021-07-27

## 2021-07-27 VITALS
DIASTOLIC BLOOD PRESSURE: 92 MMHG | SYSTOLIC BLOOD PRESSURE: 150 MMHG | HEART RATE: 89 BPM | BODY MASS INDEX: 42.19 KG/M2 | OXYGEN SATURATION: 96 % | WEIGHT: 268.8 LBS | HEIGHT: 67 IN

## 2021-07-27 DIAGNOSIS — Z87.891 HISTORY OF SMOKING 10-25 PACK YEARS: ICD-10-CM

## 2021-07-27 DIAGNOSIS — Z00.00 ENCOUNTER FOR ANNUAL PHYSICAL EXAM: Primary | ICD-10-CM

## 2021-07-27 PROCEDURE — 99396 PREV VISIT EST AGE 40-64: CPT | Performed by: STUDENT IN AN ORGANIZED HEALTH CARE EDUCATION/TRAINING PROGRAM

## 2021-07-27 RX ORDER — TRAZODONE HYDROCHLORIDE 50 MG/1
TABLET ORAL
Status: CANCELLED | OUTPATIENT
Start: 2021-07-27

## 2021-07-27 RX ORDER — TRAZODONE HYDROCHLORIDE 150 MG/1
150 TABLET ORAL NIGHTLY PRN
Refills: 2 | Status: CANCELLED | OUTPATIENT
Start: 2021-07-27

## 2021-07-27 NOTE — PROGRESS NOTES
Family Medicine Residency  Bonilla Stanton MD    Subjective:     Kingsley Littlejohn is a 51 y.o. male with concurrent medical history of Type 2 DM, hypertension, hypertriglyceridemia presents today for annual physical exam and medication management. Patient was last seen on 4/27/21 with no new complaints.     Type 2 DM  Currently being managed by endocrinologist. Last saw Franki EARL on 5/12/21. Current diabetes complications include neuropathy and history of CAD.   Diabetes regemine includes : Metformin 1000 mg BID, Discontinued Januvia 100 mg daily as it is not effective, Ozempic 1 mg once weekly, Toujeo decreased to 34 units, Afreeza 12-24 units before each meal TID. Last A1C was 8.00 on 5/11/21. Patient is not following low carb and sugar diet. Saw Podiatry on 6/9/21 for diabetic foot exam.     Hypertriglyceridemia   Patient is currently on Lipitor 40 mg daily. Last lipid panel was done on 5/11/21, showed Triglycerides of 597, Total cholesterol 109.     Hypertension   Patient is currently on Lisinopril 40 mg once daily and Metoprolol 100 mg daily (recently increased the dosage).  His blood pressure is currently not controlled. Patient reports he is not following his diet and has been eating alot. Patient had an episode of chest pain about 2 yrs ago and had negative cardiac work up . Has nitro prn. Patient follows up with  on yearly basis.      Patient receives Invega shot at [facility] for psychotic mood disorder.   He denies chest pain, dizziness, shortness of breath, palpitations, nausea/vomiting, polyuria, polyphagia, fevers/chills.     Is scheduled for coloscopy 8/13/21 and unsure about his insulin regimen  when he is drinking his colon prep as he is worried about being hypoglycemic.   Smoked 2 - 2.5 packs a day and quit about 2 yrs ago. Smoked >10 yrs. Lung ca screening    The following portions of the patient's history were reviewed and updated as appropriate: allergies, current  "medications, past family history, past medical history, past social history, past surgical history and problem list.    Past Medical Hx:  Past Medical History:   Diagnosis Date   • Abdominal pain    • Allergic rhinitis    • Diabetes mellitus (CMS/[facility], located [facility])     Prev HbA1c 14.0 2014, states \"I'm cured!\"   • Essential hypertension    • Fracture closed, fibula, shaft     tib-fib   • Onychomycosis        Past Surgical Hx:  Past Surgical History:   Procedure Laterality Date   • ANKLE SURGERY      right and left   • COLONOSCOPY N/A 6/4/2018    Procedure: COLONOSCOPY;  Surgeon: Jaylon Castro MD;  Location: [facility] ENDOSCOPY;  Service: Gastroenterology   • CYST REMOVAL      x 3, from hand, thigh, and buttocks   • ENDOSCOPY      20 years ago   • TIBIA FRACTURE SURGERY Bilateral 2011    tib-fib       Current Meds:    Current Outpatient Medications:   •  aspirin 325 MG tablet, Take 325 mg by mouth Daily., Disp: , Rfl:   •  atorvastatin (LIPITOR) 40 MG tablet, Take 1 tablet by mouth Daily., Disp: 90 tablet, Rfl: 3  •  BD Pen Needle Nola U/F 32G X 4 MM misc, INJECT ONCE WALTON, Disp: , Rfl:   •  Blood Glucose Monitoring Suppl (ONE TOUCH ULTRA 2) w/Device kit, Use 1 daily to test blood glucose level., Disp: 1 each, Rfl: 5  •  Continuous Blood Gluc  (FreeStyle Melonie 2 [place]) device, 1 each Continuous., Disp: 1 each, Rfl: 1  •  Continuous Blood Gluc Sensor (FreeStyle Melonie 2 Sensor) misc, 1 each Every 14 (Fourteen) Days., Disp: 3 each, Rfl: 11  •  Empagliflozin (Jardiance) 25 MG tablet, Take 25 mg by mouth Daily., Disp: 90 tablet, Rfl: 1  •  fluticasone (FLONASE) 50 MCG/ACT nasal spray, SPRAY 2 SPRAYS INTO EACH NOSTRIL EVERY DAY, Disp: 48 mL, Rfl: 0  •  glucose blood test strip, USE TO TEST BLOOD SUGAR 1 TIME DAILY E11.9, Disp: 100 each, Rfl: 12  •  glucose monitor monitoring kit, 1 each As Needed (Diabetes). USE TO TEST 1 TIME DAILY, Disp: 1 each, Rfl: 0  •  hydrOXYzine (ATARAX) 50 MG tablet, Take 50 mg by mouth 4 (Four) Times " a Day., Disp: , Rfl:   •  ibuprofen (ADVIL,MOTRIN) 600 MG tablet, Take 1 tablet by mouth Every 6 (Six) Hours As Needed for Mild Pain ., Disp: 15 tablet, Rfl: 0  •  Insulin Glargine, 2 Unit Dial, (Toujeo Max SoloStar) 300 UNIT/ML solution pen-injector injection, Inject 15 up to 30 units daily (Patient taking differently: 40 Units Daily.), Disp: 3 pen, Rfl: 11  •  Insulin Pen Needle (CareTouch Pen Needles) 32G X 5 MM misc, Inject once camp, Disp: 30 each, Rfl: 11  •  Insulin Regular Human 4 & 8 & 12 units powder, Inhale 4-32 Units 3 (Three) Times a Day With Meals. 4-32 units TID , max dose 96 units daily, Disp: 360 each, Rfl: 11  •  Lancets (ACCU-CHEK SOFT TOUCH) lancets, Use TO TEST BLOOD SUGAR 1 TIME DAILY., Disp: 100 each, Rfl: 12  •  lisinopril (PRINIVIL,ZESTRIL) 40 MG tablet, Take 1 tablet by mouth Daily., Disp: 90 tablet, Rfl: 3  •  metFORMIN (GLUCOPHAGE) 1000 MG tablet, TAKE 1 TABLET BY MOUTH TWICE A DAY WITH MEALS, Disp: 180 tablet, Rfl: 2  •  metoprolol tartrate (LOPRESSOR) 100 MG tablet, TAKE 1 TAB(S) 2 TIMES A DAY, Disp: , Rfl:   •  nitroglycerin (NITROSTAT) 0.4 MG SL tablet, Place 1 tablet under the tongue Every 5 (Five) Minutes As Needed for Chest Pain. Take no more than 3 doses in 15 minutes., Disp: 30 tablet, Rfl: 12  •  Ozempic, 1 MG/DOSE, 4 MG/3ML solution pen-injector, Inject 1 mg under the skin into the appropriate area as directed Every 7 (Seven) Days., Disp: , Rfl:   •  paliperidone palmitate (INVEGA SUSTENNA) 117 MG/0.75ML suspension IM injection, Inject 117 mg into the shoulder, thigh, or buttocks Every 30 (Thirty) Days., Disp: , Rfl:   •  sodium chloride (OCEAN NASAL SPRAY) 0.65 % nasal spray, 1 spray into each nostril As Needed for Congestion., Disp: 60 mL, Rfl: 12  •  traZODone (DESYREL) 150 MG tablet, TAKE 1 TABLET BY MOUTH EVERY DAY AT BEDTIME AS NEEDED, Disp: , Rfl: 2  •  traZODone (DESYREL) 50 MG tablet, , Disp: , Rfl:     Allergies:  Allergies   Allergen Reactions   • Latex Irritability    • Codeine Nausea Only       Family Hx:  Family History   Problem Relation Age of Onset   • Diabetes Other    • Hypertension Other    • Cancer Other    • Lung disease Other    • Thyroid disease Other    • Bleeding Disorder Other    • Colon cancer Paternal Grandmother    • Cancer Paternal Grandfather    • Crohn's disease Mother    • Diabetes Mother    • Hypertension Father    • Lung disease Father    • Hypertension Brother    • Hypertension Brother    • Hypertension Brother         Social History:  Social History     Socioeconomic History   • Marital status: Single     Spouse name: Not on file   • Number of children: Not on file   • Years of education: Not on file   • Highest education level: Not on file   Tobacco Use   • Smoking status: Former Smoker     Packs/day: 1.00     Years: 25.00     Pack years: 25.00     Types: Cigarettes     Start date:      Quit date: 2018     Years since quittin.6   • Smokeless tobacco: Never Used   Vaping Use   • Vaping Use: Never used   Substance and Sexual Activity   • Alcohol use: Defer     Comment: FORMER pint of whiskey daily, states quit 1 year ago   • Drug use: Defer     Comment: patient reports prior drug abuse, but sober for 8 months now.   • Sexual activity: Defer       Review of Systems  Review of Systems   Constitutional: Negative for activity change, appetite change, chills, diaphoresis, fatigue, fever and unexpected weight change.   Respiratory: Negative for cough, chest tightness, shortness of breath and wheezing.    Cardiovascular: Negative for chest pain, palpitations and leg swelling.   Gastrointestinal: Negative for abdominal pain, diarrhea, nausea and vomiting.   Genitourinary: Negative for difficulty urinating, flank pain, frequency and urgency.   Musculoskeletal: Negative for arthralgias and myalgias.   Skin: Negative for color change and rash.   Neurological: Negative for dizziness, weakness, light-headedness and headaches.   Psychiatric/Behavioral:  "Negative for behavioral problems, confusion and sleep disturbance.       Objective:     /92   Pulse 89   Ht 170.2 cm (67\")   Wt 122 kg (268 lb 12.8 oz)   SpO2 96%   BMI 42.10 kg/m²   Physical Exam  Vitals and nursing note reviewed.   Constitutional:       General: He is not in acute distress.     Appearance: Normal appearance. He is obese. He is not ill-appearing, toxic-appearing or diaphoretic.   HENT:      Head: Normocephalic and atraumatic.      Right Ear: Tympanic membrane, ear canal and external ear normal.      Left Ear: Tympanic membrane, ear canal and external ear normal.      Nose: Nose normal.      Mouth/Throat:      Mouth: Mucous membranes are moist.      Pharynx: Oropharynx is clear.   Eyes:      Extraocular Movements: Extraocular movements intact.      Conjunctiva/sclera: Conjunctivae normal.      Pupils: Pupils are equal, round, and reactive to light.   Cardiovascular:      Rate and Rhythm: Normal rate and regular rhythm.      Pulses: Normal pulses.      Heart sounds: Normal heart sounds.   Pulmonary:      Effort: Pulmonary effort is normal. No respiratory distress.      Breath sounds: Normal breath sounds. No wheezing.   Abdominal:      General: Bowel sounds are normal.      Palpations: Abdomen is soft.      Tenderness: There is no abdominal tenderness. There is no guarding.   Musculoskeletal:         General: Normal range of motion.      Right lower leg: No edema.      Left lower leg: No edema.   Skin:     General: Skin is warm and dry.      Capillary Refill: Capillary refill takes less than 2 seconds.   Neurological:      General: No focal deficit present.      Mental Status: He is alert and oriented to person, place, and time.   Psychiatric:         Mood and Affect: Mood normal.         Behavior: Behavior normal.         Thought Content: Thought content normal.         Judgment: Judgment normal.          Assessment/Plan:     Kingsley Littlejohn is a 51 y.o. male with concurrent medical " history of Type 2 DM, hypertension, hypertriglyceridemia presents today for annual physical exam and medication management. Patient reports he is not following diet and has been eating a lot. He is aware that he needs to loose few pounds in order to control his sugar and BP. BP today was elevated and advised importance of diet and exercise. Patient is scheduled for colonoscopy and advised patient to call Franki EARL to get advise on how to take the insulin when he is taking colon prep to avoid hypoglycemic episodes. My recommendation is to cut down on meal time insulin and lower basal insulin as the patient is also on Ozempic which stays in the system for the whole week. Strongly advised to follow up with Franki and get blood work done prior to his appointment with her. Discussed Low dose CT scan for lung ca screening however insurance is not covering at this time. Will revisit it at next follow up with visit. Spent 15 mins on diet and exercise.       Diagnoses and all orders for this visit:    1. Encounter for annual physical exam (Primary)    2. History of smoking 10-25 pack years    Other orders  -     Cancel: traZODone (DESYREL) 150 MG tablet; Take 1 tablet by mouth At Night As Needed.; Refill: 2  -     Cancel: traZODone (DESYREL) 50 MG tablet        · Rx changes: none   · Patient Education: low  carbohydrate and low sugar diet. Exercise program   · Compliance at present is estimated to be good.   · Efforts to improve compliance (if necessary) will be directed at dietary modifications: low sugar and carb diet  and increased exercise.    Depression screening: Up to date; last screen 7/27/2021     Follow-up:     Return in about 6 months (around 1/27/2022) for Recheck.    Preventative:  Health Maintenance   Topic Date Due   • Pneumococcal Vaccine 0-64 (1 of 2 - PPSV23) Never done   • Hepatitis B (1 of 3 - Risk 3-dose series) Never done   • HEPATITIS C SCREENING  Never done   • LUNG CANCER SCREENING  08/28/2020   •  TDAP/TD VACCINES (2 - Td or Tdap) 01/01/2021   • COLORECTAL CANCER SCREENING  06/04/2021   • ANNUAL WELLNESS VISIT  07/17/2021   • ZOSTER VACCINE (1 of 2) 09/01/2021 (Originally 2/20/2018)   • DIABETIC EYE EXAM  10/06/2021   • HEMOGLOBIN A1C  11/11/2021   • LIPID PANEL  05/11/2022   • DIABETIC FOOT EXAM  06/09/2022   • COVID-19 Vaccine  Completed   • INFLUENZA VACCINE  Discontinued   • URINE MICROALBUMIN  Discontinued       Recommended: none  Vaccine Counseling: N/A    Weight  -Class: Obese Class III extreme obesity: > or equal to 40kg/m2  -Patient's Body mass index is 42.1 kg/m². indicating that he is morbidly obese (BMI > 40 or > 35 with obesity - related health condition). Obesity-related health conditions include the following: obstructive sleep apnea, hypertension, coronary heart disease, diabetes mellitus, dyslipidemias, GERD, peripheral vascular disease, idiopathic intracranial hypertension and osteoarthritis. Obesity is worsening. BMI is is above average; no BMI management plan is appropriate. We discussed low calorie, low carb based diet program, portion control, increasing exercise and joining a fitness center or start home based exercise program..   eat more fruits and vegetables, decrease soda or juice intake, increase water intake, increase physical activity, reduce screen time, reduce portion size and cut out extra servings    Alcohol use: Alcohol use questions deferred to the physician.  Nicotine status  reports that he quit smoking about 2 years ago. His smoking use included cigarettes. He started smoking about 29 years ago. He has a 25.00 pack-year smoking history. He has never used smokeless tobacco.    Goals     • Blood Pressure < 140/90      Barriers:  Sporadic follow-up      • Lower Blood Sugar      Barriers:  Questionable insight            RISK SCORE: 3        Bonilla Stanton MD PGY-2  [facility] Medicine Residency   This document has been electronically signed by Bonilla  MD Romy on July 27, 2021 15:25 CDT

## 2021-07-30 ENCOUNTER — TELEPHONE (OUTPATIENT)
Dept: ENDOCRINOLOGY | Facility: CLINIC | Age: 51
End: 2021-07-30

## 2021-07-30 NOTE — PROGRESS NOTES
"Kingsley Littlejohn  7/27/21  Subjective:     Kingsley Littlejohn is a 51 y.o. male who presents for   Chief Complaint   Patient presents with   • Annual Exam       51-year-old male history diabetes hypertension hypertriglyceridemia and schizophrenia currently being followed by endocrinology psychiatry and cardiology here today for annual assessment.  He voices no new ongoing complaints at present please see Dr. Stanton's report for more detailed information regarding today's encounter physical exam findings and plan of care.        Past Medical Hx:  Past Medical History:   Diagnosis Date   • Abdominal pain    • Allergic rhinitis    • Diabetes mellitus (CMS/HCC)     Prev HbA1c 14.0 2014, states \"I'm cured!\"   • Essential hypertension    • Fracture closed, fibula, shaft     tib-fib   • Onychomycosis        Past Surgical Hx:  Past Surgical History:   Procedure Laterality Date   • ANKLE SURGERY      right and left   • COLONOSCOPY N/A 6/4/2018    Procedure: COLONOSCOPY;  Surgeon: Jaylon Castro MD;  Location: [facility] ENDOSCOPY;  Service: Gastroenterology   • CYST REMOVAL      x 3, from hand, thigh, and buttocks   • ENDOSCOPY      20 years ago   • TIBIA FRACTURE SURGERY Bilateral 2011    tib-fib       Health Maintenance:  Health Maintenance   Topic Date Due   • Pneumococcal Vaccine 0-64 (1 of 2 - PPSV23) Never done   • Hepatitis B (1 of 3 - Risk 3-dose series) Never done   • HEPATITIS C SCREENING  Never done   • LUNG CANCER SCREENING  08/28/2020   • TDAP/TD VACCINES (2 - Td or Tdap) 01/01/2021   • COLORECTAL CANCER SCREENING  06/04/2021   • ANNUAL WELLNESS VISIT  07/17/2021   • ZOSTER VACCINE (1 of 2) 09/01/2021 (Originally 2/20/2018)   • DIABETIC EYE EXAM  10/06/2021   • HEMOGLOBIN A1C  11/11/2021   • LIPID PANEL  05/11/2022   • DIABETIC FOOT EXAM  06/09/2022   • COVID-19 Vaccine  Completed   • INFLUENZA VACCINE  Discontinued   • URINE MICROALBUMIN  Discontinued       Current Meds:    Current Outpatient Medications:   •  " aspirin 325 MG tablet, Take 325 mg by mouth Daily., Disp: , Rfl:   •  atorvastatin (LIPITOR) 40 MG tablet, Take 1 tablet by mouth Daily., Disp: 90 tablet, Rfl: 3  •  BD Pen Needle Nola U/F 32G X 4 MM misc, INJECT ONCE WALTON, Disp: , Rfl:   •  Blood Glucose Monitoring Suppl (ONE TOUCH ULTRA 2) w/Device kit, Use 1 daily to test blood glucose level., Disp: 1 each, Rfl: 5  •  Continuous Blood Gluc  (FreeStyle Melonie 2 [place]) device, 1 each Continuous., Disp: 1 each, Rfl: 1  •  Continuous Blood Gluc Sensor (FreeStyle Melonie 2 Sensor) misc, 1 each Every 14 (Fourteen) Days., Disp: 3 each, Rfl: 11  •  Empagliflozin (Jardiance) 25 MG tablet, Take 25 mg by mouth Daily., Disp: 90 tablet, Rfl: 1  •  fluticasone (FLONASE) 50 MCG/ACT nasal spray, SPRAY 2 SPRAYS INTO EACH NOSTRIL EVERY DAY, Disp: 48 mL, Rfl: 0  •  glucose blood test strip, USE TO TEST BLOOD SUGAR 1 TIME DAILY E11.9, Disp: 100 each, Rfl: 12  •  glucose monitor monitoring kit, 1 each As Needed (Diabetes). USE TO TEST 1 TIME DAILY, Disp: 1 each, Rfl: 0  •  hydrOXYzine (ATARAX) 50 MG tablet, Take 50 mg by mouth 4 (Four) Times a Day., Disp: , Rfl:   •  ibuprofen (ADVIL,MOTRIN) 600 MG tablet, Take 1 tablet by mouth Every 6 (Six) Hours As Needed for Mild Pain ., Disp: 15 tablet, Rfl: 0  •  Insulin Glargine, 2 Unit Dial, (Toujeo Max SoloStar) 300 UNIT/ML solution pen-injector injection, Inject 15 up to 30 units daily (Patient taking differently: 40 Units Daily.), Disp: 3 pen, Rfl: 11  •  Insulin Pen Needle (CareTouch Pen Needles) 32G X 5 MM misc, Inject once walton, Disp: 30 each, Rfl: 11  •  Insulin Regular Human 4 & 8 & 12 units powder, Inhale 4-32 Units 3 (Three) Times a Day With Meals. 4-32 units TID , max dose 96 units daily, Disp: 360 each, Rfl: 11  •  Lancets (ACCU-CHEK SOFT TOUCH) lancets, Use TO TEST BLOOD SUGAR 1 TIME DAILY., Disp: 100 each, Rfl: 12  •  lisinopril (PRINIVIL,ZESTRIL) 40 MG tablet, Take 1 tablet by mouth Daily., Disp: 90 tablet, Rfl: 3  •   metFORMIN (GLUCOPHAGE) 1000 MG tablet, TAKE 1 TABLET BY MOUTH TWICE A DAY WITH MEALS, Disp: 180 tablet, Rfl: 2  •  metoprolol tartrate (LOPRESSOR) 100 MG tablet, TAKE 1 TAB(S) 2 TIMES A DAY, Disp: , Rfl:   •  nitroglycerin (NITROSTAT) 0.4 MG SL tablet, Place 1 tablet under the tongue Every 5 (Five) Minutes As Needed for Chest Pain. Take no more than 3 doses in 15 minutes., Disp: 30 tablet, Rfl: 12  •  Ozempic, 1 MG/DOSE, 4 MG/3ML solution pen-injector, Inject 1 mg under the skin into the appropriate area as directed Every 7 (Seven) Days., Disp: , Rfl:   •  paliperidone palmitate (INVEGA SUSTENNA) 117 MG/0.75ML suspension IM injection, Inject 117 mg into the shoulder, thigh, or buttocks Every 30 (Thirty) Days., Disp: , Rfl:   •  sodium chloride (OCEAN NASAL SPRAY) 0.65 % nasal spray, 1 spray into each nostril As Needed for Congestion., Disp: 60 mL, Rfl: 12  •  traZODone (DESYREL) 150 MG tablet, TAKE 1 TABLET BY MOUTH EVERY DAY AT BEDTIME AS NEEDED, Disp: , Rfl: 2  •  traZODone (DESYREL) 50 MG tablet, , Disp: , Rfl:     Allergies:  Latex and Codeine    Family Hx:  Family History   Problem Relation Age of Onset   • Diabetes Other    • Hypertension Other    • Cancer Other    • Lung disease Other    • Thyroid disease Other    • Bleeding Disorder Other    • Colon cancer Paternal Grandmother    • Cancer Paternal Grandfather    • Crohn's disease Mother    • Diabetes Mother    • Hypertension Father    • Lung disease Father    • Hypertension Brother    • Hypertension Brother    • Hypertension Brother         Social History:  Social History     Socioeconomic History   • Marital status: Single     Spouse name: Not on file   • Number of children: Not on file   • Years of education: Not on file   • Highest education level: Not on file   Tobacco Use   • Smoking status: Former Smoker     Packs/day: 1.00     Years: 25.00     Pack years: 25.00     Types: Cigarettes     Start date: 1992     Quit date: 12/2018     Years since quitting:  "2.6   • Smokeless tobacco: Never Used   Vaping Use   • Vaping Use: Never used   Substance and Sexual Activity   • Alcohol use: Defer     Comment: FORMER pint of whiskey daily, states quit 1 year ago   • Drug use: Defer     Comment: patient reports prior drug abuse, but sober for 8 months now.   • Sexual activity: Defer       Review of Systems  Review of Systems    Objective:     /92   Pulse 89   Ht 170.2 cm (67\")   Wt 122 kg (268 lb 12.8 oz)   SpO2 96%   BMI 42.10 kg/m²   Physical Exam alert no distress ENT grossly normal please see Dr. Stanton's report for more detailed information regarding physical exam findings.    Lab Review  Results for orders placed or performed in visit on 05/11/21   Comprehensive Metabolic Panel    Specimen: Blood   Result Value Ref Range    Glucose 191 (H) 65 - 99 mg/dL    BUN 13 6 - 20 mg/dL    Creatinine 0.72 (L) 0.76 - 1.27 mg/dL    Sodium 137 136 - 145 mmol/L    Potassium 4.2 3.5 - 5.2 mmol/L    Chloride 99 98 - 107 mmol/L    CO2 24.7 22.0 - 29.0 mmol/L    Calcium 9.2 8.6 - 10.5 mg/dL    Total Protein 6.8 6.0 - 8.5 g/dL    Albumin 4.00 3.50 - 5.20 g/dL    ALT (SGPT) 28 1 - 41 U/L    AST (SGOT) 15 1 - 40 U/L    Alkaline Phosphatase 78 39 - 117 U/L    Total Bilirubin 0.6 0.0 - 1.2 mg/dL    eGFR Non African Amer 114 >60 mL/min/1.73    Globulin 2.8 gm/dL    A/G Ratio 1.4 g/dL    BUN/Creatinine Ratio 18.1 7.0 - 25.0    Anion Gap 13.3 5.0 - 15.0 mmol/L   Hemoglobin A1c    Specimen: Blood   Result Value Ref Range    Hemoglobin A1C 8.00 (H) 4.80 - 5.60 %   Lipid Panel    Specimen: Blood   Result Value Ref Range    Total Cholesterol 109 0 - 200 mg/dL    Triglycerides 597 (H) 0 - 150 mg/dL    HDL Cholesterol 32 (L) 40 - 60 mg/dL    LDL Cholesterol  <5 0 - 100 mg/dL    VLDL Cholesterol      LDL/HDL Ratio -1.33    Microalbumin / Creatinine Urine Ratio - Urine, Clean Catch    Specimen: Urine, Clean Catch   Result Value Ref Range    Microalbumin/Creatinine Ratio      Creatinine, Urine 42.6 " mg/dL    Microalbumin, Urine <1.2 mg/dL   Protein / Creatinine Ratio, Urine - Urine, Clean Catch    Specimen: Urine, Clean Catch   Result Value Ref Range    Protein/Creatinine Ratio, Urine 93.9 0.0 - 200.0 mg/G Crea    Creatinine, Urine 42.6 mg/dL    Total Protein, Urine 4.0 mg/dL   TSH    Specimen: Blood   Result Value Ref Range    TSH 1.750 0.270 - 4.200 uIU/mL   Vitamin B12    Specimen: Blood   Result Value Ref Range    Vitamin B-12 500 211 - 946 pg/mL   Vitamin D 25 Hydroxy    Specimen: Blood   Result Value Ref Range    25 Hydroxy, Vitamin D 28.2 ng/ml            Assessment:     Diagnoses and all orders for this visit:    1. Encounter for annual physical exam (Primary)    2. History of smoking 10-25 pack years    Other orders  -     Cancel: traZODone (DESYREL) 150 MG tablet; Take 1 tablet by mouth At Night As Needed.; Refill: 2  -     Cancel: traZODone (DESYREL) 50 MG tablet        Plan:   I was present onsite throughout the encounter spoke at length with the patient examined the patient  I have seen and examined the patient.  I have reviewed the notes, assessments, and/or procedures performed by Bonilla Stanton MD, I concur with her/his documentation and assessment and plan for Kingsley [facility].            This document has been electronically signed by Favio Muro MD on July 30, 2021 16:06 CDT

## 2021-08-03 ENCOUNTER — DOCUMENTATION (OUTPATIENT)
Dept: ENDOCRINOLOGY | Facility: CLINIC | Age: 51
End: 2021-08-03

## 2021-08-04 ENCOUNTER — TELEPHONE (OUTPATIENT)
Dept: ENDOCRINOLOGY | Facility: CLINIC | Age: 51
End: 2021-08-04

## 2021-08-04 NOTE — TELEPHONE ENCOUNTER
Pt has called now stating that he has a Colonoscopy coming up and he can't eat for 24 hrs, he is worried about his sugar and is needing to know about any special instructions.

## 2021-08-10 ENCOUNTER — LAB (OUTPATIENT)
Dept: LAB | Facility: HOSPITAL | Age: 51
End: 2021-08-10

## 2021-08-10 DIAGNOSIS — E11.65 TYPE 2 DIABETES MELLITUS WITH HYPERGLYCEMIA, WITHOUT LONG-TERM CURRENT USE OF INSULIN (HCC): ICD-10-CM

## 2021-08-10 DIAGNOSIS — E55.9 VITAMIN D DEFICIENCY: ICD-10-CM

## 2021-08-10 DIAGNOSIS — E78.49 OTHER HYPERLIPIDEMIA: ICD-10-CM

## 2021-08-10 DIAGNOSIS — I10 ESSENTIAL HYPERTENSION: ICD-10-CM

## 2021-08-10 PROCEDURE — 82306 VITAMIN D 25 HYDROXY: CPT

## 2021-08-10 PROCEDURE — 83036 HEMOGLOBIN GLYCOSYLATED A1C: CPT

## 2021-08-10 PROCEDURE — 82607 VITAMIN B-12: CPT

## 2021-08-10 PROCEDURE — 84443 ASSAY THYROID STIM HORMONE: CPT

## 2021-08-10 PROCEDURE — 85025 COMPLETE CBC W/AUTO DIFF WBC: CPT

## 2021-08-10 PROCEDURE — 84156 ASSAY OF PROTEIN URINE: CPT

## 2021-08-10 PROCEDURE — 36415 COLL VENOUS BLD VENIPUNCTURE: CPT

## 2021-08-10 PROCEDURE — 80053 COMPREHEN METABOLIC PANEL: CPT

## 2021-08-10 PROCEDURE — 82043 UR ALBUMIN QUANTITATIVE: CPT

## 2021-08-10 PROCEDURE — 82570 ASSAY OF URINE CREATININE: CPT

## 2021-08-10 PROCEDURE — 80061 LIPID PANEL: CPT

## 2021-08-11 LAB
25(OH)D3 SERPL-MCNC: 24.9 NG/ML (ref 30–100)
ALBUMIN SERPL-MCNC: 4.1 G/DL (ref 3.5–5.2)
ALBUMIN UR-MCNC: <1.2 MG/DL
ALBUMIN/GLOB SERPL: 1.4 G/DL
ALP SERPL-CCNC: 65 U/L (ref 39–117)
ALT SERPL W P-5'-P-CCNC: 35 U/L (ref 1–41)
ANION GAP SERPL CALCULATED.3IONS-SCNC: 11.2 MMOL/L (ref 5–15)
AST SERPL-CCNC: 15 U/L (ref 1–40)
BASOPHILS # BLD AUTO: 0.1 10*3/MM3 (ref 0–0.2)
BASOPHILS NFR BLD AUTO: 1.2 % (ref 0–1.5)
BILIRUB SERPL-MCNC: 0.5 MG/DL (ref 0–1.2)
BUN SERPL-MCNC: 16 MG/DL (ref 6–20)
BUN/CREAT SERPL: 20 (ref 7–25)
CALCIUM SPEC-SCNC: 9.2 MG/DL (ref 8.6–10.5)
CHLORIDE SERPL-SCNC: 101 MMOL/L (ref 98–107)
CHOLEST SERPL-MCNC: 103 MG/DL (ref 0–200)
CO2 SERPL-SCNC: 22.8 MMOL/L (ref 22–29)
CREAT SERPL-MCNC: 0.8 MG/DL (ref 0.76–1.27)
CREAT UR-MCNC: 43.8 MG/DL
CREAT UR-MCNC: 44.9 MG/DL
DEPRECATED RDW RBC AUTO: 41.9 FL (ref 37–54)
EOSINOPHIL # BLD AUTO: 0.15 10*3/MM3 (ref 0–0.4)
EOSINOPHIL NFR BLD AUTO: 1.8 % (ref 0.3–6.2)
ERYTHROCYTE [DISTWIDTH] IN BLOOD BY AUTOMATED COUNT: 14.7 % (ref 12.3–15.4)
GFR SERPL CREATININE-BSD FRML MDRD: 101 ML/MIN/1.73
GLOBULIN UR ELPH-MCNC: 2.9 GM/DL
GLUCOSE SERPL-MCNC: 128 MG/DL (ref 65–99)
HBA1C MFR BLD: 7.7 % (ref 4.8–5.6)
HCT VFR BLD AUTO: 46 % (ref 37.5–51)
HDLC SERPL-MCNC: 39 MG/DL (ref 40–60)
HGB BLD-MCNC: 15.4 G/DL (ref 13–17.7)
IMM GRANULOCYTES # BLD AUTO: 0.03 10*3/MM3 (ref 0–0.05)
IMM GRANULOCYTES NFR BLD AUTO: 0.4 % (ref 0–0.5)
LDLC SERPL CALC-MCNC: 16 MG/DL (ref 0–100)
LDLC/HDLC SERPL: -0.1 {RATIO}
LYMPHOCYTES # BLD AUTO: 2.36 10*3/MM3 (ref 0.7–3.1)
LYMPHOCYTES NFR BLD AUTO: 28.9 % (ref 19.6–45.3)
MCH RBC QN AUTO: 26.6 PG (ref 26.6–33)
MCHC RBC AUTO-ENTMCNC: 33.5 G/DL (ref 31.5–35.7)
MCV RBC AUTO: 79.6 FL (ref 79–97)
MICROALBUMIN/CREAT UR: NORMAL MG/G{CREAT}
MONOCYTES # BLD AUTO: 0.81 10*3/MM3 (ref 0.1–0.9)
MONOCYTES NFR BLD AUTO: 9.9 % (ref 5–12)
NEUTROPHILS NFR BLD AUTO: 4.72 10*3/MM3 (ref 1.7–7)
NEUTROPHILS NFR BLD AUTO: 57.8 % (ref 42.7–76)
NRBC BLD AUTO-RTO: 0 /100 WBC (ref 0–0.2)
PLATELET # BLD AUTO: 237 10*3/MM3 (ref 140–450)
PMV BLD AUTO: 11 FL (ref 6–12)
POTASSIUM SERPL-SCNC: 4.2 MMOL/L (ref 3.5–5.2)
PROT SERPL-MCNC: 7 G/DL (ref 6–8.5)
PROT UR-MCNC: <4 MG/DL
PROT/CREAT UR: NORMAL MG/G{CREAT}
RBC # BLD AUTO: 5.78 10*6/MM3 (ref 4.14–5.8)
SODIUM SERPL-SCNC: 135 MMOL/L (ref 136–145)
TRIGL SERPL-MCNC: 339 MG/DL (ref 0–150)
TSH SERPL DL<=0.05 MIU/L-ACNC: 1.94 UIU/ML (ref 0.27–4.2)
VIT B12 BLD-MCNC: 454 PG/ML (ref 211–946)
VLDLC SERPL-MCNC: 48 MG/DL (ref 5–40)
WBC # BLD AUTO: 8.17 10*3/MM3 (ref 3.4–10.8)

## 2021-08-12 ENCOUNTER — TELEPHONE (OUTPATIENT)
Dept: ENDOCRINOLOGY | Facility: CLINIC | Age: 51
End: 2021-08-12

## 2021-08-12 ENCOUNTER — TELEMEDICINE (OUTPATIENT)
Dept: ENDOCRINOLOGY | Facility: CLINIC | Age: 51
End: 2021-08-12

## 2021-08-12 DIAGNOSIS — E55.9 VITAMIN D DEFICIENCY: ICD-10-CM

## 2021-08-12 DIAGNOSIS — E78.49 OTHER HYPERLIPIDEMIA: ICD-10-CM

## 2021-08-12 DIAGNOSIS — I10 ESSENTIAL HYPERTENSION: ICD-10-CM

## 2021-08-12 DIAGNOSIS — E11.65 TYPE 2 DIABETES MELLITUS WITH HYPERGLYCEMIA, WITHOUT LONG-TERM CURRENT USE OF INSULIN (HCC): Primary | ICD-10-CM

## 2021-08-12 PROCEDURE — 99214 OFFICE O/P EST MOD 30 MIN: CPT | Performed by: NURSE PRACTITIONER

## 2021-08-12 PROCEDURE — 95251 CONT GLUC MNTR ANALYSIS I&R: CPT | Performed by: NURSE PRACTITIONER

## 2021-08-12 NOTE — PROGRESS NOTES
Chief Complaint  Diabetes    Subjective          Kingsley Littlejohn presents to [facility] ENDOCRINOLOGY  History of Present Illness       You have chosen to receive care through a telehealth visit.  Do you consent to use a video/audio connection for your medical care today? Yes            TELEHEALTH VIDEO VISIT     This a video visit due to [facility] current guidelines for social distancing due to the COVID 19 pandemic    Primary provider Bonilla Stanton MD     51 year old male presents for follow up     Reason diabetes mellitus type 2     Timing constant     Quality improved control      Lab Results   Component Value Date    HGBA1C 7.70 (H) 08/10/2021         Duration diagnosed in 2014    Severity is high    Macrovascular complications CAD, MI in the past    Microvascular complications neuropathy    Current diabetes regimen insulin and GLP-1    Taking basal and mealtime insulin total 4 shots daily    Current glucose monitoring    Checks 4-10 times daily    Uses a oumar personal system    See below      He did run out of sensors and was having to fingerstick to check blood glucose levels        He was experiencing low blood sugars but decrease his insulin and states he is no longer having lows             Review of Systems - General ROS: negative               Objective   Vital Signs:   There were no vitals taken for this visit.    Physical Exam  Neurological:      General: No focal deficit present.      Mental Status: He is alert.   Psychiatric:         Mood and Affect: Mood normal.         Thought Content: Thought content normal.         Judgment: Judgment normal.        Result Review :   The following data was reviewed by: RAJAT Doyle on 08/12/2021:  Common labs    Common Labsle 3/25/21 3/25/21 3/25/21 3/25/21 3/25/21 5/11/21 5/11/21 5/11/21 5/11/21 8/10/21 8/10/21 8/10/21 8/10/21 8/10/21    1114 1114 1114 1114 1114 1031 1031 1031 1031 1414 1414 1414 1414 1552   Glucose   173 (A)       191 (A)    128 (A)    BUN   14      13    16    Creatinine   0.71 (A)      0.72 (A)    0.80    eGFR Non  Am   116      114    101    Sodium   137      137    135 (A)    Potassium   4.2      4.2    4.2    Chloride   101      99    101    Calcium   9.2      9.2    9.2    Albumin   4.20      4.00    4.10    Total Bilirubin   0.5      0.6    0.5    Alkaline Phosphatase   72      78    65    AST (SGOT)   18      15    15    ALT (SGPT)   31      28    35    WBC 7.73           8.17     Hemoglobin 16.1           15.4     Hematocrit 45.0           46.0     Platelets 216           237     Total Cholesterol  109      109  103       Triglycerides  511 (A)      597 (A)  339 (A)       HDL Cholesterol  32 (A)      32 (A)  39 (A)       LDL Cholesterol   11      <5  16       Hemoglobin A1C    9.10 (A)  8.00 (A)     7.70 (A)      Microalbumin, Urine     <1.2  <1.2       <1.2   (A) Abnormal value                      Assessment and Plan    Diagnoses and all orders for this visit:    1. Type 2 diabetes mellitus with hyperglycemia, without long-term current use of insulin (CMS/[facility]) (Primary)  -     CBC & Differential; Future  -     Comprehensive Metabolic Panel; Future  -     Hemoglobin A1c; Future  -     Lipid Panel; Future  -     Microalbumin / Creatinine Urine Ratio - Urine, Clean Catch; Future  -     Protein / Creatinine Ratio, Urine - Urine, Clean Catch; Future  -     TSH; Future  -     Vitamin D 25 Hydroxy; Future    2. Other hyperlipidemia  -     CBC & Differential; Future  -     Comprehensive Metabolic Panel; Future  -     Hemoglobin A1c; Future  -     Lipid Panel; Future  -     Microalbumin / Creatinine Urine Ratio - Urine, Clean Catch; Future  -     Protein / Creatinine Ratio, Urine - Urine, Clean Catch; Future  -     TSH; Future  -     Vitamin D 25 Hydroxy; Future    3. Essential hypertension  -     CBC & Differential; Future  -     Comprehensive Metabolic Panel; Future  -     Hemoglobin A1c; Future  -     Lipid Panel;  Future  -     Microalbumin / Creatinine Urine Ratio - Urine, Clean Catch; Future  -     Protein / Creatinine Ratio, Urine - Urine, Clean Catch; Future  -     TSH; Future  -     Vitamin D 25 Hydroxy; Future    4. Vitamin D deficiency  -     CBC & Differential; Future  -     Comprehensive Metabolic Panel; Future  -     Hemoglobin A1c; Future  -     Lipid Panel; Future  -     Microalbumin / Creatinine Urine Ratio - Urine, Clean Catch; Future  -     Protein / Creatinine Ratio, Urine - Urine, Clean Catch; Future  -     TSH; Future  -     Vitamin D 25 Hydroxy; Future             Glycemic Management     Type 2 diabetes not controlled with hyperglycemia         Lab Results   Component Value Date    HGBA1C 7.70 (H) 08/10/2021                    Patient's download shows an average of 196    However he was having to use fingersticks in between times while waiting on sensors    We have currently adjusted his medication for low sugars and mealtime highs    The patient makes his own adjustments based on his CGM data and food intake            Taking Metformin 1000 mg po BID--- keep        Taking Jardiance 25 mg one daily -keep        Januvia 100 mg daily ---- stop not effective             taking Ozempic 1mg once weekly --keep     Side effects discussed, try to eat half your plate not a full plate     If nauseated eat less     If vomiting or abdominal pain stop medication           Patient is taking Toujeo 32 units once daily      We discussed how to self titrate the Toujeo dose using his CGM    Mealtime insulin--- uses 3 times a day before meals    Patient is using Afrezza    He uses anywhere from 12 up to 24 units 3 times a day before each meal    Plus sliding scale           Patient uses CGM data and his food intake to determine what dose of insulin he needs to use for meals        Melonie has allowed the patient to minimize hypoglycemia as alarms have predicted and avoided them    He also uses the arrows on the melonie as  follows:    If arrow is horizontal , he uses the predicted dosage    If the arrow is slanted up or down--he increase or decrease by 4 units    If the arrow is vertical up or down -he increases or decreases by 4 units                    Lipid Management        Taking Lipitor 40 mg one at night         Total Cholesterol   Date Value Ref Range Status   08/10/2021 103 0 - 200 mg/dL Final     Triglycerides   Date Value Ref Range Status   08/10/2021 339 (H) 0 - 150 mg/dL Final     HDL Cholesterol   Date Value Ref Range Status   08/10/2021 39 (L) 40 - 60 mg/dL Final     LDL Cholesterol    Date Value Ref Range Status   08/10/2021 16 0 - 100 mg/dL Final           Vascepa 2 gm po BID--could not take         Blood Pressure Management     Taking Lisinopril 40 mg once daily      Taking Metoprolol 50 mg daily            Microvascular Complication Monitoring        Last eye exam -- Jan. 2020 no DR           Component      Latest Ref Rng & Units 8/10/2021 8/10/2021           3:52 PM  3:52 PM   Protein/Creatinine Ratio           Creatinine, Urine      mg/dL 44.9 43.8   Total Protein, Urine      mg/dL <4.0    Microalbumin/Creatinine Ratio           Microalbumin, Urine      mg/dL  <1.2                 Neuropathy mild      Prefers no RX at this time         Bone Health      vitamin d def.         Start otc vitamin d daily      Component      Latest Ref Rng & Units 8/10/2021   25 Hydroxy, Vitamin D      30.0 - 100.0 ng/ml 24.9 (L)           Other Diabetes Related Aspects                 Lab Results   Component Value Date    [id number] 454 08/10/2021                Thyroid Health        Lab Results   Component Value Date    TSH 1.940 08/10/2021                Weight Management:     Obesity                       Follow Up   No follow-ups on file.  Patient was given instructions and counseling regarding his condition or for health maintenance advice. Please see specific information pulled into the AVS if appropriate.         This document  has been electronically signed by RAJAT Doyle on August 12, 2021 14:46 CDT.

## 2021-08-12 NOTE — TELEPHONE ENCOUNTER
[facility] left a vm calling again on a referral in [facility], the notes on file are . PWO needs updated. She says she faxed the information, she gave the phone and fax number too unclear to understand.

## 2021-08-13 ENCOUNTER — ANESTHESIA EVENT (OUTPATIENT)
Dept: GASTROENTEROLOGY | Facility: HOSPITAL | Age: 51
End: 2021-08-13

## 2021-08-13 ENCOUNTER — ANESTHESIA (OUTPATIENT)
Dept: GASTROENTEROLOGY | Facility: HOSPITAL | Age: 51
End: 2021-08-13

## 2021-08-13 ENCOUNTER — HOSPITAL ENCOUNTER (OUTPATIENT)
Facility: HOSPITAL | Age: 51
Setting detail: HOSPITAL OUTPATIENT SURGERY
Discharge: HOME | End: 2021-08-13
Attending: SURGERY | Admitting: SURGERY

## 2021-08-13 VITALS
RESPIRATION RATE: 20 BRPM | WEIGHT: 263 LBS | BODY MASS INDEX: 41.28 KG/M2 | TEMPERATURE: 99.1 F | DIASTOLIC BLOOD PRESSURE: 90 MMHG | OXYGEN SATURATION: 95 % | SYSTOLIC BLOOD PRESSURE: 160 MMHG | HEART RATE: 85 BPM | HEIGHT: 67 IN

## 2021-08-13 DIAGNOSIS — Z86.010 HX OF ADENOMATOUS COLONIC POLYPS: ICD-10-CM

## 2021-08-13 PROCEDURE — 25010000002 PROPOFOL 10 MG/ML EMULSION: Performed by: NURSE ANESTHETIST, CERTIFIED REGISTERED

## 2021-08-13 RX ORDER — DEXTROSE AND SODIUM CHLORIDE 5; .45 G/100ML; G/100ML
100 INJECTION, SOLUTION INTRAVENOUS CONTINUOUS PRN
Status: DISCONTINUED | OUTPATIENT
Start: 2021-08-13 | End: 2021-08-13 | Stop reason: HOSPADM

## 2021-08-13 RX ORDER — LIDOCAINE HYDROCHLORIDE 20 MG/ML
INJECTION, SOLUTION INTRAVENOUS AS NEEDED
Status: DISCONTINUED | OUTPATIENT
Start: 2021-08-13 | End: 2021-08-13 | Stop reason: SURG

## 2021-08-13 RX ORDER — PROPOFOL 10 MG/ML
VIAL (ML) INTRAVENOUS AS NEEDED
Status: DISCONTINUED | OUTPATIENT
Start: 2021-08-13 | End: 2021-08-13 | Stop reason: SURG

## 2021-08-13 RX ADMIN — PROPOFOL 30 MG: 10 INJECTION, EMULSION INTRAVENOUS at 10:17

## 2021-08-13 RX ADMIN — PROPOFOL 30 MG: 10 INJECTION, EMULSION INTRAVENOUS at 10:23

## 2021-08-13 RX ADMIN — PROPOFOL 30 MG: 10 INJECTION, EMULSION INTRAVENOUS at 10:13

## 2021-08-13 RX ADMIN — LIDOCAINE HYDROCHLORIDE 100 MG: 20 INJECTION, SOLUTION INTRAVENOUS at 10:10

## 2021-08-13 RX ADMIN — PROPOFOL 140 MG: 10 INJECTION, EMULSION INTRAVENOUS at 10:10

## 2021-08-13 RX ADMIN — DEXTROSE AND SODIUM CHLORIDE 100 ML/HR: 5; 450 INJECTION, SOLUTION INTRAVENOUS at 09:40

## 2021-08-13 NOTE — INTERVAL H&P NOTE
H&P reviewed. The patient was examined and there are no changes to the H&P.      Temp:  [97.3 °F (36.3 °C)] 97.3 °F (36.3 °C)  Heart Rate:  [83] 83  Resp:  [16] 16  BP: (185)/(100) 185/100

## 2021-08-13 NOTE — ANESTHESIA POSTPROCEDURE EVALUATION
Patient: Kingsley Littlejohn    Procedure Summary     Date: 08/13/21 Room / Location: [facility] ENDOSCOPY 2 / [facility] ENDOSCOPY    Anesthesia Start: 1008 Anesthesia Stop: 1025    Procedure: COLONOSCOPY (N/A ) Diagnosis:       Hx of adenomatous colonic polyps      (Hx of adenomatous colonic polyps [Z86.010])    Surgeons: Jaylon Castro MD Provider: Padmaja Gibson CRNA    Anesthesia Type: MAC ASA Status: 3          Anesthesia Type: MAC    Vitals  No vitals data found for the desired time range.          Post Anesthesia Care and Evaluation    Patient location during evaluation: bedside  Patient participation: complete - patient participated  Level of consciousness: awake  Pain score: 0  Pain management: adequate  Airway patency: patent  Anesthetic complications: No anesthetic complications  PONV Status: none  Cardiovascular status: acceptable  Respiratory status: acceptable  Hydration status: acceptable

## 2021-08-13 NOTE — ANESTHESIA PREPROCEDURE EVALUATION
Anesthesia Evaluation     NPO Solid Status: > 8 hours  NPO Liquid Status: > 8 hours           Airway   Mallampati: III  TM distance: >3 FB  Neck ROM: full  Possible difficult intubation, Small opening, Narrow palate and Large neck circumference  Dental - normal exam     Pulmonary - normal exam   (+) a smoker Former, sleep apnea,   Cardiovascular - normal exam    Patient on routine beta blocker    (+) hypertension, hyperlipidemia,       Neuro/Psych  GI/Hepatic/Renal/Endo    (+) obesity, morbid obesity,  diabetes mellitus type 2 well controlled using insulin,     Musculoskeletal     Abdominal  - normal exam   Substance History      OB/GYN          Other                        Anesthesia Plan    ASA 3     MAC     intravenous induction     Anesthetic plan, all risks, benefits, and alternatives have been provided, discussed and informed consent has been obtained with: patient.

## 2021-08-13 NOTE — DISCHARGE INSTRUCTIONS
Monitored Anesthesia Care, Care After  This sheet gives you information about how to care for yourself after your procedure. Your health care provider may also give you more specific instructions. If you have problems or questions, contact your health care provider.  What can I expect after the procedure?  After the procedure, it is common to have:  · Tiredness.  · Forgetfulness about what happened after the procedure.  · Impaired judgment for important decisions.  · Nausea or vomiting.  · Some difficulty with balance.  Follow these instructions at home:  For at least 24 hours after the procedure:         · Have a responsible adult stay with you. It is important to have someone help care for you until you are awake and alert.  · Rest as needed.  · Do not participate in activities in which you could fall or become injured.  · Do not drive.  · Do not use machinery.  · Do not drink alcohol.  · Do not take sleeping pills or medicines that cause drowsiness.  · Do not make important decisions or sign legal documents.  · Do not take care of children on your own.  Eating and drinking  · Follow the diet that is recommended by your health care provider.  · Drink enough fluid to keep your urine pale yellow.  · If you vomit:  ? Drink water, juice, or soup when you can drink without vomiting.  ? Make sure you have little or no nausea before eating solid foods.  General instructions  · Take over-the-counter and prescription medicines only as told by your health care provider.  · If you have sleep apnea, surgery and certain medicines can increase your risk for breathing problems. Follow instructions from your health care provider about wearing your sleep device:  ? Anytime you are sleeping, including during daytime naps.  ? While taking prescription pain medicines, sleeping medicines, or medicines that make you drowsy.  · Avoid smoking.  · Keep all follow-up visits as told by your health care provider. This is important.  Contact  a health care provider if:  · You keep feeling nauseous or you keep vomiting.  · You feel light-headed.  · You are still sleepy or having trouble with balance after 24 hours.  · You develop a rash.  · You have a fever.  · You have redness or swelling around the IV site.  Get help right away if:  · You have trouble breathing.  · You have new-onset confusion at home.  Summary  · For several hours after your procedure, you may feel tired. You may also be forgetful and have poor judgment.  · Have a responsible adult stay with you for at least 24 hours or until you are awake and alert.  · Rest as told. Do not drive or operate machinery. Do not drink alcohol or take sleeping pills.  · Get help right away if you have trouble breathing, or if you suddenly become confused.  This information is not intended to replace advice given to you by your health care provider. Make sure you discuss any questions you have with your health care provider.  Document Revised: 11/19/2020 Document Reviewed: 11/19/2020  [employer] Patient Education © 2021 [employer]  Diverticulosis    Diverticulosis is a condition that develops when small pouches (diverticula) form in the wall of the large intestine (colon). The colon is where water is absorbed and stool (feces) is formed. The pouches form when the inside layer of the colon pushes through weak spots in the outer layers of the colon. You may have a few pouches or many of them.  The pouches usually do not cause problems unless they become inflamed or infected. When this happens, the condition is called diverticulitis.  What are the causes?  The cause of this condition is not known.  What increases the risk?  The following factors may make you more likely to develop this condition:  · Being older than age 60. Your risk for this condition increases with age. Diverticulosis is rare among people younger than age 30. By age 80, many people have it.  · Eating a low-fiber diet.  · Having frequent  constipation.  · Being overweight.  · Not getting enough exercise.  · Smoking.  · Taking over-the-counter pain medicines, like aspirin and ibuprofen.  · Having a family history of diverticulosis.  What are the signs or symptoms?  In most people, there are no symptoms of this condition. If you do have symptoms, they may include:  · Bloating.  · Cramps in the abdomen.  · Constipation or diarrhea.  · Pain in the lower left side of the abdomen.  How is this diagnosed?  Because diverticulosis usually has no symptoms, it is most often diagnosed during an exam for other colon problems. The condition may be diagnosed by:  · Using a flexible scope to examine the colon (colonoscopy).  · Taking an X-ray of the colon after dye has been put into the colon (barium enema).  · Having a CT scan.  How is this treated?  You may not need treatment for this condition. Your health care provider may recommend treatment to prevent problems. You may need treatment if you have symptoms or if you previously had diverticulitis. Treatment may include:  · Eating a high-fiber diet.  · Taking a fiber supplement.  · Taking a live bacteria supplement (probiotic).  · Taking medicine to relax your colon.  Follow these instructions at home:  Medicines  · Take over-the-counter and prescription medicines only as told by your health care provider.  · If told by your health care provider, take a fiber supplement or probiotic.  Constipation prevention  Your condition may cause constipation. To prevent or treat constipation, you may need to:  · Drink enough fluid to keep your urine pale yellow.  · Take over-the-counter or prescription medicines.  · Eat foods that are high in fiber, such as beans, whole grains, and fresh fruits and vegetables.  · Limit foods that are high in fat and processed sugars, such as fried or sweet foods.    General instructions  · Try not to strain when you have a bowel movement.  · Keep all follow-up visits as told by your health  care provider. This is important.  Contact a health care provider if you:  · Have pain in your abdomen.  · Have bloating.  · Have cramps.  · Have not had a bowel movement in 3 days.  Get help right away if:  · Your pain gets worse.  · Your bloating becomes very bad.  · You have a fever or chills, and your symptoms suddenly get worse.  · You vomit.  · You have bowel movements that are bloody or black.  · You have bleeding from your rectum.  Summary  · Diverticulosis is a condition that develops when small pouches (diverticula) form in the wall of the large intestine (colon).  · You may have a few pouches or many of them.  · This condition is most often diagnosed during an exam for other colon problems.  · Treatment may include increasing the fiber in your diet, taking supplements, or taking medicines.  This information is not intended to replace advice given to you by your health care provider. Make sure you discuss any questions you have with your health care provider.  Document Revised: 07/16/2020 Document Reviewed: 07/16/2020  [employer] Patient Education © 2021 [employer]

## 2021-09-13 ENCOUNTER — OFFICE VISIT (OUTPATIENT)
Dept: PODIATRY | Facility: CLINIC | Age: 51
End: 2021-09-13

## 2021-09-13 VITALS
DIASTOLIC BLOOD PRESSURE: 104 MMHG | BODY MASS INDEX: 41.28 KG/M2 | WEIGHT: 263 LBS | HEIGHT: 67 IN | OXYGEN SATURATION: 96 % | HEART RATE: 91 BPM | SYSTOLIC BLOOD PRESSURE: 135 MMHG

## 2021-09-13 DIAGNOSIS — M79.675 CHRONIC TOE PAIN, BILATERAL: ICD-10-CM

## 2021-09-13 DIAGNOSIS — L84 CORNS: ICD-10-CM

## 2021-09-13 DIAGNOSIS — M79.674 CHRONIC TOE PAIN, BILATERAL: ICD-10-CM

## 2021-09-13 DIAGNOSIS — E11.42 TYPE 2 DIABETES MELLITUS WITH PERIPHERAL NEUROPATHY (HCC): Primary | ICD-10-CM

## 2021-09-13 DIAGNOSIS — G89.29 CHRONIC TOE PAIN, BILATERAL: ICD-10-CM

## 2021-09-13 DIAGNOSIS — B35.1 ONYCHOMYCOSIS: ICD-10-CM

## 2021-09-13 PROCEDURE — 11721 DEBRIDE NAIL 6 OR MORE: CPT | Performed by: PODIATRIST

## 2021-09-13 PROCEDURE — 11056 PARNG/CUTG B9 HYPRKR LES 2-4: CPT | Performed by: PODIATRIST

## 2021-09-13 RX ORDER — PALIPERIDONE PALMITATE 117 MG/.75ML
INJECTION INTRAMUSCULAR
COMMUNITY
Start: 2021-08-25

## 2021-09-13 NOTE — PROGRESS NOTES
"Kingsley Littlejohn  1970  51 y.o. male  SHELBY Doan - 8/12/2021  BS - 202 per patient     Diabetic foot care     09/13/2021     Chief Complaint   Patient presents with   • Left Foot - diabetic foot care   • Right Foot - diabetic foot care       History of Present Illness    Patient presents for routine diabetic foot care.         Past Medical History:   Diagnosis Date   • Abdominal pain    • Allergic rhinitis    • Diabetes mellitus (CMS/[facility])     Prev HbA1c 14.0 2014, states \"I'm cured!\"   • Essential hypertension    • Fracture closed, fibula, shaft     tib-fib   • Onychomycosis    • Sleep apnea          Past Surgical History:   Procedure Laterality Date   • ANKLE SURGERY      right and left   • COLONOSCOPY N/A 6/4/2018    Procedure: COLONOSCOPY;  Surgeon: Jaylon Castro MD;  Location: [facility] ENDOSCOPY;  Service: Gastroenterology   • COLONOSCOPY N/A 8/13/2021    Procedure: COLONOSCOPY;  Surgeon: Jaylon Castro MD;  Location: [facility] ENDOSCOPY;  Service: General;  Laterality: N/A;   • CYST REMOVAL      x 3, from hand, thigh, and buttocks   • ENDOSCOPY      20 years ago   • TIBIA FRACTURE SURGERY Bilateral 2011    tib-fib         Family History   Problem Relation Age of Onset   • Diabetes Other    • Hypertension Other    • Cancer Other    • Lung disease Other    • Thyroid disease Other    • Bleeding Disorder Other    • Colon cancer Paternal Grandmother    • Cancer Paternal Grandfather    • Crohn's disease Mother    • Diabetes Mother    • Hypertension Father    • Lung disease Father    • Hypertension Brother    • Hypertension Brother    • Hypertension Brother        Allergies   Allergen Reactions   • Latex Irritability   • Codeine Nausea Only       Social History     Socioeconomic History   • Marital status: Single     Spouse name: Not on file   • Number of children: Not on file   • Years of education: Not on file   • Highest education level: Not on file   Tobacco Use   • Smoking status: Former Smoker     " Packs/day: 1.00     Years: 25.00     Pack years: 25.00     Types: Cigarettes     Start date:      Quit date: 2018     Years since quittin.7   • Smokeless tobacco: Never Used   Vaping Use   • Vaping Use: Never used   Substance and Sexual Activity   • Alcohol use: Defer     Comment: FORMER pint of whiskey daily, states quit 1 year ago   • Drug use: Defer     Comment: patient reports prior drug abuse, but sober for 8 months now.   • Sexual activity: Defer         Current Outpatient Medications   Medication Sig Dispense Refill   • aspirin 325 MG tablet Take 325 mg by mouth Daily.     • atorvastatin (LIPITOR) 40 MG tablet Take 1 tablet by mouth Daily. 90 tablet 3   • BD Pen Needle Nola U/F 32G X 4 MM misc INJECT ONCE WALTON     • Blood Glucose Monitoring Suppl (ONE TOUCH ULTRA 2) w/Device kit Use 1 daily to test blood glucose level. 1 each 5   • Continuous Blood Gluc  (FreeStyle Melonie 2 [place]) device 1 each Continuous. 1 each 1   • Continuous Blood Gluc Sensor (FreeStyle Melonie 2 Sensor) misc 1 each Every 14 (Fourteen) Days. 3 each 11   • Empagliflozin (Jardiance) 25 MG tablet Take 25 mg by mouth Daily. 90 tablet 1   • fluticasone (FLONASE) 50 MCG/ACT nasal spray SPRAY 2 SPRAYS INTO EACH NOSTRIL EVERY DAY 48 mL 0   • glucose blood test strip USE TO TEST BLOOD SUGAR 1 TIME DAILY E11.9 100 each 12   • glucose monitor monitoring kit 1 each As Needed (Diabetes). USE TO TEST 1 TIME DAILY 1 each 0   • hydrOXYzine (ATARAX) 50 MG tablet Take 50 mg by mouth 4 (Four) Times a Day.     • ibuprofen (ADVIL,MOTRIN) 600 MG tablet Take 1 tablet by mouth Every 6 (Six) Hours As Needed for Mild Pain . 15 tablet 0   • Insulin Glargine, 2 Unit Dial, (Toujeo Philipp SoloStar) 300 UNIT/ML solution pen-injector injection Inject 15 up to 30 units daily (Patient taking differently: 40 Units Daily.) 3 pen 11   • Insulin Pen Needle (CareTouch Pen Needles) 32G X 5 MM misc Inject once walton 30 each 11   • Insulin Regular Human 4 & 8 & 12  "units powder Inhale 4-32 Units 3 (Three) Times a Day With Meals. 4-32 units TID , max dose 96 units daily 360 each 11   • Invega Sustenna 117 MG/0.75ML suspension prefilled syringe IM injection INJECT ONE HUNDRED SEVENTEEN (117) MILLIGRAMS INTO THE MUSCLE EVERY 4 WEEKS     • Lancets (ACCU-CHEK SOFT TOUCH) lancets Use TO TEST BLOOD SUGAR 1 TIME DAILY. 100 each 12   • lisinopril (PRINIVIL,ZESTRIL) 40 MG tablet Take 1 tablet by mouth Daily. 90 tablet 3   • metFORMIN (GLUCOPHAGE) 1000 MG tablet TAKE 1 TABLET BY MOUTH TWICE A DAY WITH MEALS 180 tablet 2   • metoprolol tartrate (LOPRESSOR) 100 MG tablet TAKE 1 TAB(S) 2 TIMES A DAY     • nitroglycerin (NITROSTAT) 0.4 MG SL tablet Place 1 tablet under the tongue Every 5 (Five) Minutes As Needed for Chest Pain. Take no more than 3 doses in 15 minutes. 30 tablet 12   • Ozempic, 1 MG/DOSE, 4 MG/3ML solution pen-injector Inject 1 mg under the skin into the appropriate area as directed Every 7 (Seven) Days.     • paliperidone palmitate (INVEGA SUSTENNA) 117 MG/0.75ML suspension IM injection Inject 117 mg into the shoulder, thigh, or buttocks Every 30 (Thirty) Days.     • sodium chloride (OCEAN NASAL SPRAY) 0.65 % nasal spray 1 spray into each nostril As Needed for Congestion. 60 mL 12   • traZODone (DESYREL) 150 MG tablet TAKE 1 TABLET BY MOUTH EVERY DAY AT BEDTIME AS NEEDED  2   • traZODone (DESYREL) 50 MG tablet        No current facility-administered medications for this visit.       Review of Systems   Constitutional: Negative.    HENT: Negative.    Eyes: Negative.    Respiratory: Negative.    Cardiovascular: Positive for leg swelling.   Gastrointestinal: Negative.    Musculoskeletal:        Toe pain    Allergic/Immunologic: Negative.    Neurological: Positive for numbness.   Hematological: Negative.    Psychiatric/Behavioral: Negative.          OBJECTIVE    BP (!) 135/104   Pulse 91   Ht 170.2 cm (67\")   Wt 119 kg (263 lb)   SpO2 96%   BMI 41.19 kg/m²       Physical " Exam   Constitutional: He is oriented to person, place, and time. He appears well-developed.   HENT:   Head: Normocephalic and atraumatic.   Pulmonary/Chest: Effort normal. No respiratory distress.   Musculoskeletal: Normal range of motion. No deformity.   Neurological: He is alert and oriented to person, place, and time.   Skin: Skin is warm and dry. Capillary refill takes less than 2 seconds. He is not diaphoretic. No erythema.   Psychiatric: His behavior is normal.   Vitals reviewed.      Gait: Normal     Assistive Device: None    Lower Extremity    Cardiovascular:    DP/PT pulses palpable bilateral  CFT brisk  to all digits  Skin temp is warm to warm from proximal tibia to distal digits bilateral  Pedal hair growth present.   Musculoskeletal:  Muscle strength is 5/5 for all muscle groups tested   ROM of the 1st MTP is full without pain or crepitus bilateral  ROM of the ankle joint is full without pain or crepitus  bilateral  Dermatological:   Bilateral hallux nails absent.  Remaining toenails are thickened, discolored, elongated with subungually.  Pain on palpation of the nail plates.  Skin is warm, dry and intact bilateral  Webspaces 1-4 bilateral are clean, dry and intact.   Hyperkeratotic tissue noted to medial IPJ of bilateral hallux.  Neurological:   Protective sensation decreased   Sensation intact to light touch        Procedures        ASSESSMENT AND PLAN    Diagnoses and all orders for this visit:    1. Type 2 diabetes mellitus with peripheral neuropathy (CMS/HCC) (Primary)    2. Onychomycosis    3. Chronic toe pain, bilateral    4. Corns        - Nails 2-5 bilateral were debrided in length and thickness with nail nipper and electric  to decrease fungal load and risk of infection.   - Trimmed hyperkeratotic lesions noted in objective with a #15 blade without incident.   -Rx for diabetic shoes  -All questions were answered  -Recheck 3 months          This document has been electronically signed by  Abhijeet Thomason DPM on September 13, 2021 13:35 CDT     9/13/2021  13:35 CDT

## 2021-09-23 ENCOUNTER — DOCUMENTATION (OUTPATIENT)
Dept: ENDOCRINOLOGY | Facility: CLINIC | Age: 51
End: 2021-09-23

## 2021-11-01 ENCOUNTER — TELEPHONE (OUTPATIENT)
Dept: ENDOCRINOLOGY | Facility: CLINIC | Age: 51
End: 2021-11-01

## 2021-11-01 NOTE — TELEPHONE ENCOUNTER
[facility] called Al back about documentation on DM shoes        Call Brittnee [phone]

## 2021-11-08 ENCOUNTER — LAB (OUTPATIENT)
Dept: LAB | Facility: HOSPITAL | Age: 51
End: 2021-11-08

## 2021-11-08 DIAGNOSIS — E78.49 OTHER HYPERLIPIDEMIA: ICD-10-CM

## 2021-11-08 DIAGNOSIS — E55.9 VITAMIN D DEFICIENCY: ICD-10-CM

## 2021-11-08 DIAGNOSIS — E11.65 TYPE 2 DIABETES MELLITUS WITH HYPERGLYCEMIA, WITHOUT LONG-TERM CURRENT USE OF INSULIN (HCC): ICD-10-CM

## 2021-11-08 DIAGNOSIS — I10 ESSENTIAL HYPERTENSION: ICD-10-CM

## 2021-11-08 PROCEDURE — 84156 ASSAY OF PROTEIN URINE: CPT

## 2021-11-08 PROCEDURE — 82570 ASSAY OF URINE CREATININE: CPT

## 2021-11-08 PROCEDURE — 82306 VITAMIN D 25 HYDROXY: CPT

## 2021-11-08 PROCEDURE — 82043 UR ALBUMIN QUANTITATIVE: CPT

## 2021-11-08 PROCEDURE — 80061 LIPID PANEL: CPT

## 2021-11-08 PROCEDURE — 80053 COMPREHEN METABOLIC PANEL: CPT

## 2021-11-08 PROCEDURE — 83036 HEMOGLOBIN GLYCOSYLATED A1C: CPT

## 2021-11-08 PROCEDURE — 85025 COMPLETE CBC W/AUTO DIFF WBC: CPT

## 2021-11-08 PROCEDURE — 36415 COLL VENOUS BLD VENIPUNCTURE: CPT

## 2021-11-08 PROCEDURE — 84443 ASSAY THYROID STIM HORMONE: CPT

## 2021-11-09 LAB
25(OH)D3 SERPL-MCNC: 31.6 NG/ML (ref 30–100)
ALBUMIN SERPL-MCNC: 4.2 G/DL (ref 3.5–5.2)
ALBUMIN UR-MCNC: <1.2 MG/DL
ALBUMIN/GLOB SERPL: 1.5 G/DL
ALP SERPL-CCNC: 63 U/L (ref 39–117)
ALT SERPL W P-5'-P-CCNC: 45 U/L (ref 1–41)
ANION GAP SERPL CALCULATED.3IONS-SCNC: 13.2 MMOL/L (ref 5–15)
AST SERPL-CCNC: 24 U/L (ref 1–40)
BASOPHILS # BLD AUTO: 0.11 10*3/MM3 (ref 0–0.2)
BASOPHILS NFR BLD AUTO: 1.3 % (ref 0–1.5)
BILIRUB SERPL-MCNC: 0.5 MG/DL (ref 0–1.2)
BUN SERPL-MCNC: 16 MG/DL (ref 6–20)
BUN/CREAT SERPL: 22.5 (ref 7–25)
CALCIUM SPEC-SCNC: 8.8 MG/DL (ref 8.6–10.5)
CHLORIDE SERPL-SCNC: 101 MMOL/L (ref 98–107)
CHOLEST SERPL-MCNC: 89 MG/DL (ref 0–200)
CO2 SERPL-SCNC: 22.8 MMOL/L (ref 22–29)
CREAT SERPL-MCNC: 0.71 MG/DL (ref 0.76–1.27)
CREAT UR-MCNC: 44.9 MG/DL
CREAT UR-MCNC: 45.8 MG/DL
DEPRECATED RDW RBC AUTO: 41.7 FL (ref 37–54)
EOSINOPHIL # BLD AUTO: 0.28 10*3/MM3 (ref 0–0.4)
EOSINOPHIL NFR BLD AUTO: 3.3 % (ref 0.3–6.2)
ERYTHROCYTE [DISTWIDTH] IN BLOOD BY AUTOMATED COUNT: 15.2 % (ref 12.3–15.4)
GFR SERPL CREATININE-BSD FRML MDRD: 116 ML/MIN/1.73
GLOBULIN UR ELPH-MCNC: 2.8 GM/DL
GLUCOSE SERPL-MCNC: 139 MG/DL (ref 65–99)
HBA1C MFR BLD: 9 % (ref 4.8–5.6)
HCT VFR BLD AUTO: 45.8 % (ref 37.5–51)
HDLC SERPL-MCNC: 32 MG/DL (ref 40–60)
HGB BLD-MCNC: 15 G/DL (ref 13–17.7)
IMM GRANULOCYTES # BLD AUTO: 0.02 10*3/MM3 (ref 0–0.05)
IMM GRANULOCYTES NFR BLD AUTO: 0.2 % (ref 0–0.5)
LDLC SERPL CALC-MCNC: 19 MG/DL (ref 0–100)
LDLC/HDLC SERPL: 0.2 {RATIO}
LYMPHOCYTES # BLD AUTO: 2.65 10*3/MM3 (ref 0.7–3.1)
LYMPHOCYTES NFR BLD AUTO: 31.3 % (ref 19.6–45.3)
MCH RBC QN AUTO: 25.7 PG (ref 26.6–33)
MCHC RBC AUTO-ENTMCNC: 32.8 G/DL (ref 31.5–35.7)
MCV RBC AUTO: 78.6 FL (ref 79–97)
MICROALBUMIN/CREAT UR: NORMAL MG/G{CREAT}
MONOCYTES # BLD AUTO: 1 10*3/MM3 (ref 0.1–0.9)
MONOCYTES NFR BLD AUTO: 11.8 % (ref 5–12)
NEUTROPHILS NFR BLD AUTO: 4.42 10*3/MM3 (ref 1.7–7)
NEUTROPHILS NFR BLD AUTO: 52.1 % (ref 42.7–76)
NRBC BLD AUTO-RTO: 0 /100 WBC (ref 0–0.2)
PLATELET # BLD AUTO: 223 10*3/MM3 (ref 140–450)
PMV BLD AUTO: 10.7 FL (ref 6–12)
POTASSIUM SERPL-SCNC: 4.1 MMOL/L (ref 3.5–5.2)
PROT SERPL-MCNC: 7 G/DL (ref 6–8.5)
PROT UR-MCNC: 5 MG/DL
PROT/CREAT UR: 109.2 MG/G CREA (ref 0–200)
RBC # BLD AUTO: 5.83 10*6/MM3 (ref 4.14–5.8)
SODIUM SERPL-SCNC: 137 MMOL/L (ref 136–145)
TRIGL SERPL-MCNC: 253 MG/DL (ref 0–150)
TSH SERPL DL<=0.05 MIU/L-ACNC: 2.01 UIU/ML (ref 0.27–4.2)
VLDLC SERPL-MCNC: 38 MG/DL (ref 5–40)
WBC # BLD AUTO: 8.48 10*3/MM3 (ref 3.4–10.8)

## 2021-11-11 ENCOUNTER — OFFICE VISIT (OUTPATIENT)
Dept: ENDOCRINOLOGY | Facility: CLINIC | Age: 51
End: 2021-11-11

## 2021-11-11 VITALS
DIASTOLIC BLOOD PRESSURE: 98 MMHG | SYSTOLIC BLOOD PRESSURE: 142 MMHG | HEART RATE: 96 BPM | WEIGHT: 270.9 LBS | BODY MASS INDEX: 42.52 KG/M2 | HEIGHT: 67 IN | OXYGEN SATURATION: 95 %

## 2021-11-11 DIAGNOSIS — E55.9 VITAMIN D DEFICIENCY: ICD-10-CM

## 2021-11-11 DIAGNOSIS — I10 ESSENTIAL HYPERTENSION: ICD-10-CM

## 2021-11-11 DIAGNOSIS — E78.49 OTHER HYPERLIPIDEMIA: ICD-10-CM

## 2021-11-11 DIAGNOSIS — E11.65 TYPE 2 DIABETES MELLITUS WITH HYPERGLYCEMIA, WITHOUT LONG-TERM CURRENT USE OF INSULIN (HCC): Primary | ICD-10-CM

## 2021-11-11 PROCEDURE — 95251 CONT GLUC MNTR ANALYSIS I&R: CPT | Performed by: NURSE PRACTITIONER

## 2021-11-11 PROCEDURE — 99214 OFFICE O/P EST MOD 30 MIN: CPT | Performed by: NURSE PRACTITIONER

## 2021-11-11 NOTE — PROGRESS NOTES
"Chief Complaint  Diabetes    Subjective          Kingsley Littlejohn presents to [facility] ENDOCRINOLOGY  History of Present Illness        In office visit     Primary provider Bonilla Stanton MD      51 year old male presents for follow up      Reason diabetes mellitus type 2      Timing constant      Quality improved control        Lab Results   Component Value Date    HGBA1C 9.00 (H) 11/08/2021          Duration diagnosed in 2014     Severity is high     Macrovascular complications CAD, MI in the past     Microvascular complications neuropathy     Current diabetes regimen insulin and GLP-1     Taking basal and mealtime insulin total 4 shots daily     Current glucose monitoring     Checks 4-10 times daily     Uses a [employer] personal system     See below                  He was experiencing low blood sugars but decrease his insulin and states he is no longer having lows           Review of Systems - General ROS: negative        Objective   Vital Signs:   /98   Pulse 96   Ht 170.2 cm (67\")   Wt 123 kg (270 lb 14.4 oz)   SpO2 95%   BMI 42.43 kg/m²     Physical Exam  Constitutional:       Appearance: Normal appearance.   Cardiovascular:      Rate and Rhythm: Regular rhythm.      Heart sounds: Normal heart sounds.   Pulmonary:      Breath sounds: Normal breath sounds.   Musculoskeletal:      Cervical back: Normal range of motion.   Neurological:      Mental Status: He is alert.        Result Review :   The following data was reviewed by: RAJAT Doyle on 11/11/2021:  Common labs    Common Labsle 5/11/21 5/11/21 5/11/21 5/11/21 8/10/21 8/10/21 8/10/21 8/10/21 8/10/21 11/8/21 11/8/21 11/8/21 11/8/21 11/8/21    1031 1031 1031 1031 1414 1414 1414 1414 1552 1527 1527 1527 1527 1635   Glucose    191 (A)    128 (A)     139 (A)    BUN    13    16     16    Creatinine    0.72 (A)    0.80     0.71 (A)    eGFR Non African Am    114    101     116    Sodium    137    135 (A)     137 "    Potassium    4.2    4.2     4.1    Chloride    99    101     101    Calcium    9.2    9.2     8.8    Albumin    4.00    4.10     4.20    Total Bilirubin    0.6    0.5     0.5    Alkaline Phosphatase    78    65     63    AST (SGOT)    15    15     24    ALT (SGPT)    28    35     45 (A)    WBC       8.17   8.48       Hemoglobin       15.4   15.0       Hematocrit       46.0   45.8       Platelets       237   223       Total Cholesterol   109  103       89     Triglycerides   597 (A)  339 (A)       253 (A)     HDL Cholesterol   32 (A)  39 (A)       32 (A)     LDL Cholesterol    <5  16       19     Hemoglobin A1C 8.00 (A)     7.70 (A)     9.00 (A)      Microalbumin, Urine  <1.2       <1.2     <1.2   (A) Abnormal value                      Assessment and Plan    Diagnoses and all orders for this visit:    1. Type 2 diabetes mellitus with hyperglycemia, without long-term current use of insulin (HCC) (Primary)  -     CBC & Differential; Future  -     Comprehensive Metabolic Panel; Future  -     Hemoglobin A1c; Future  -     Lipid Panel; Future  -     TSH; Future  -     Vitamin D 25 Hydroxy; Future    2. Essential hypertension  -     CBC & Differential; Future  -     Comprehensive Metabolic Panel; Future  -     Hemoglobin A1c; Future  -     Lipid Panel; Future  -     TSH; Future  -     Vitamin D 25 Hydroxy; Future    3. Other hyperlipidemia  -     CBC & Differential; Future  -     Comprehensive Metabolic Panel; Future  -     Hemoglobin A1c; Future  -     Lipid Panel; Future  -     TSH; Future  -     Vitamin D 25 Hydroxy; Future    4. Vitamin D deficiency  -     CBC & Differential; Future  -     Comprehensive Metabolic Panel; Future  -     Hemoglobin A1c; Future  -     Lipid Panel; Future  -     TSH; Future  -     Vitamin D 25 Hydroxy; Future             Glycemic Management     Type 2 diabetes not controlled with hyperglycemia        Lab Results   Component Value Date    HGBA1C 9.00 (H) 11/08/2021                Melonie  personal system    Downloaded and reviewed    Dated from October 17 to October 30, 2021    Average glucose 219    Time in target range 12%    High 72%    Very high 16%    Patient states that he has not been following his diet eating higher carbs than normal    We will adjust medication if he is not able to correct his diet if he is able to correct his diet his dosages have worked in the past    Patient does use his CGM data to help determine what dose he is going to give for mealtime                 Taking Metformin 1000 mg po BID--- keep        Taking Jardiance 25 mg one daily -keep        Januvia 100 mg daily ---- stop not effective             taking Ozempic 1mg once weekly --keep     Side effects discussed, try to eat half your plate not a full plate     If nauseated eat less     If vomiting or abdominal pain stop medication            taking Toujeo 40 units once daily-        We discussed how to self titrate the Toujeo dose using his CGM     Mealtime insulin--- uses 3 times a day before meals     Patient is using Afrezza-- missing doses      He uses anywhere from 12 up to 24 units 3 times a day before each meal     Plus sliding scale               Patient uses CGM data and his food intake to determine what dose of insulin he needs to use for meals           Melonie has allowed the patient to minimize hypoglycemia as alarms have predicted and avoided them     He also uses the arrows on the melonie as follows:     If arrow is horizontal , he uses the predicted dosage     If the arrow is slanted up or down--he increase or decrease by 4 units     If the arrow is vertical up or down -he increases or decreases by 4 units                       Lipid Management        Taking Lipitor 40 mg one at night         Lab Results   Component Value Date    LDL 19 11/08/2021             Vascepa 2 gm po BID--could not take         Blood Pressure Management     Taking Lisinopril 40 mg once daily      Taking Metoprolol 50 mg  daily            Microvascular Complication Monitoring        Last eye exam -- Jan. 2020 no DR               Component      Latest Ref Rng & Units 8/10/2021 8/10/2021           3:52 PM  3:52 PM   Protein/Creatinine Ratio             Creatinine, Urine      mg/dL 44.9 43.8   Total Protein, Urine      mg/dL <4.0     Microalbumin/Creatinine Ratio             Microalbumin, Urine      mg/dL   <1.2                  Neuropathy mild      Prefers no RX at this time         Bone Health      vitamin d def.         Start otc vitamin d daily      Component      Latest Ref Rng & Units 8/10/2021   25 Hydroxy, Vitamin D      30.0 - 100.0 ng/ml 24.9 (L)            Other Diabetes Related Aspects                        Lab Results   Component Value Date     [id number] 454 08/10/2021                  Thyroid Health              Lab Results   Component Value Date     TSH 1.940 08/10/2021                  Weight Management:     Obesity       Patient's Body mass index is 42.43 kg/m². indicating that he is obese (BMI >30). Obesity-related health conditions include the following: diabetes mellitus. Obesity is unchanged. BMI is is above average; no BMI management plan is appropriate. We discussed portion control and increasing exercise..                           Follow Up   Return in about 3 months (around 2/11/2022) for Recheck.  Patient was given instructions and counseling regarding his condition or for health maintenance advice. Please see specific information pulled into the AVS if appropriate.         This document has been electronically signed by RAJAT Doyle on November 11, 2021 16:37 CST.

## 2021-11-21 DIAGNOSIS — I10 ESSENTIAL HYPERTENSION: ICD-10-CM

## 2021-11-21 DIAGNOSIS — E78.49 OTHER HYPERLIPIDEMIA: ICD-10-CM

## 2021-11-22 RX ORDER — ATORVASTATIN CALCIUM 40 MG/1
TABLET, FILM COATED ORAL
Qty: 90 TABLET | Refills: 3 | Status: SHIPPED | OUTPATIENT
Start: 2021-11-22 | End: 2023-01-18

## 2021-11-22 RX ORDER — LISINOPRIL 40 MG/1
TABLET ORAL
Qty: 90 TABLET | Refills: 3 | Status: SHIPPED | OUTPATIENT
Start: 2021-11-22 | End: 2023-02-01 | Stop reason: SDUPTHER

## 2021-12-10 RX ORDER — EMPAGLIFLOZIN 25 MG/1
TABLET, FILM COATED ORAL DAILY
Qty: 90 TABLET | Refills: 1 | Status: SHIPPED | OUTPATIENT
Start: 2021-12-10 | End: 2022-07-05 | Stop reason: SDUPTHER

## 2021-12-14 ENCOUNTER — OFFICE VISIT (OUTPATIENT)
Dept: PODIATRY | Facility: CLINIC | Age: 51
End: 2021-12-14

## 2021-12-14 VITALS — HEART RATE: 98 BPM | OXYGEN SATURATION: 100 % | BODY MASS INDEX: 42.38 KG/M2 | WEIGHT: 270 LBS | HEIGHT: 67 IN

## 2021-12-14 DIAGNOSIS — M79.674 CHRONIC TOE PAIN, BILATERAL: ICD-10-CM

## 2021-12-14 DIAGNOSIS — L84 CORNS: ICD-10-CM

## 2021-12-14 DIAGNOSIS — B35.1 ONYCHOMYCOSIS: ICD-10-CM

## 2021-12-14 DIAGNOSIS — M79.675 CHRONIC TOE PAIN, BILATERAL: ICD-10-CM

## 2021-12-14 DIAGNOSIS — G89.29 CHRONIC TOE PAIN, BILATERAL: ICD-10-CM

## 2021-12-14 DIAGNOSIS — E11.42 TYPE 2 DIABETES MELLITUS WITH PERIPHERAL NEUROPATHY (HCC): Primary | ICD-10-CM

## 2021-12-14 PROCEDURE — 11721 DEBRIDE NAIL 6 OR MORE: CPT | Performed by: PODIATRIST

## 2021-12-14 PROCEDURE — 11056 PARNG/CUTG B9 HYPRKR LES 2-4: CPT | Performed by: PODIATRIST

## 2021-12-14 NOTE — PROGRESS NOTES
"Kingsley Littlejohn  1970  51 y.o. male  PCP - Bonilla Stanton MD:  - 4/27/21  BS - 115 per patient     Diabetic foot care     12/14/2021     Chief Complaint   Patient presents with   • Right Foot - Follow-up, Diabetic foot care   • Left Foot - Diabetic foot care, Follow-up       History of Present Illness    Patient presents for routine diabetic foot care.         Past Medical History:   Diagnosis Date   • Abdominal pain    • Allergic rhinitis    • Diabetes mellitus (HCC)     Prev HbA1c 14.0 2014, states \"I'm cured!\"   • Essential hypertension    • Fracture closed, fibula, shaft     tib-fib   • Onychomycosis    • Sleep apnea          Past Surgical History:   Procedure Laterality Date   • ANKLE SURGERY      right and left   • COLONOSCOPY N/A 6/4/2018    Procedure: COLONOSCOPY;  Surgeon: Jaylon Castro MD;  Location: [facility] ENDOSCOPY;  Service: Gastroenterology   • COLONOSCOPY N/A 8/13/2021    Procedure: COLONOSCOPY;  Surgeon: Jaylon Castro MD;  Location: [facility] ENDOSCOPY;  Service: General;  Laterality: N/A;   • CYST REMOVAL      x 3, from hand, thigh, and buttocks   • ENDOSCOPY      20 years ago   • TIBIA FRACTURE SURGERY Bilateral 2011    tib-fib         Family History   Problem Relation Age of Onset   • Diabetes Other    • Hypertension Other    • Cancer Other    • Lung disease Other    • Thyroid disease Other    • Bleeding Disorder Other    • Colon cancer Paternal Grandmother    • Cancer Paternal Grandfather    • Crohn's disease Mother    • Diabetes Mother    • Hypertension Father    • Lung disease Father    • Hypertension Brother    • Hypertension Brother    • Hypertension Brother        Allergies   Allergen Reactions   • Latex Irritability   • Codeine Nausea Only       Social History     Socioeconomic History   • Marital status: Single   Tobacco Use   • Smoking status: Former Smoker     Packs/day: 1.00     Years: 25.00     Pack years: 25.00     Types: Cigarettes     Start date: 1992     Quit date: " 12/2018     Years since quitting: 3.0   • Smokeless tobacco: Never Used   Vaping Use   • Vaping Use: Never used   Substance and Sexual Activity   • Alcohol use: Defer     Comment: FORMER pint of whiskey daily, states quit 1 year ago   • Drug use: Defer     Comment: patient reports prior drug abuse, but sober for 8 months now.   • Sexual activity: Defer         Current Outpatient Medications   Medication Sig Dispense Refill   • aspirin 325 MG tablet Take 325 mg by mouth Daily.     • atorvastatin (LIPITOR) 40 MG tablet TAKE 1 TABLET BY MOUTH EVERY DAY 90 tablet 3   • BD Pen Needle Nola U/F 32G X 4 MM misc INJECT ONCE WALTON     • Blood Glucose Monitoring Suppl (ONE TOUCH ULTRA 2) w/Device kit Use 1 daily to test blood glucose level. 1 each 5   • Continuous Blood Gluc  (FreeStyle Melonie 2 [place]) device 1 each Continuous. 1 each 1   • Continuous Blood Gluc Sensor (FreeStyle Melonie 2 Sensor) misc 1 each Every 14 (Fourteen) Days. 3 each 11   • fluticasone (FLONASE) 50 MCG/ACT nasal spray SPRAY 2 SPRAYS INTO EACH NOSTRIL EVERY DAY 48 mL 0   • glucose blood test strip USE TO TEST BLOOD SUGAR 1 TIME DAILY E11.9 100 each 12   • glucose monitor monitoring kit 1 each As Needed (Diabetes). USE TO TEST 1 TIME DAILY 1 each 0   • hydrOXYzine (ATARAX) 50 MG tablet Take 50 mg by mouth 4 (Four) Times a Day.     • ibuprofen (ADVIL,MOTRIN) 600 MG tablet Take 1 tablet by mouth Every 6 (Six) Hours As Needed for Mild Pain . 15 tablet 0   • Insulin Glargine, 2 Unit Dial, (Toujeo Max SoloStar) 300 UNIT/ML solution pen-injector injection Inject 15 up to 30 units daily (Patient taking differently: 40 Units Daily.) 3 pen 11   • Insulin Pen Needle (CareTouch Pen Needles) 32G X 5 MM misc Inject once walton 30 each 11   • Insulin Regular Human 4 & 8 & 12 units powder Inhale 4-32 Units 3 (Three) Times a Day With Meals. 4-32 units TID , max dose 96 units daily 360 each 11   • Invega Sustenna 117 MG/0.75ML suspension prefilled syringe IM injection  "INJECT ONE HUNDRED SEVENTEEN (117) MILLIGRAMS INTO THE MUSCLE EVERY 4 WEEKS     • Jardiance 25 MG tablet tablet TAKE 25 MG BY MOUTH DAILY. 90 tablet 1   • Lancets (ACCU-CHEK SOFT TOUCH) lancets Use TO TEST BLOOD SUGAR 1 TIME DAILY. 100 each 12   • lisinopril (PRINIVIL,ZESTRIL) 40 MG tablet TAKE 1 TABLET BY MOUTH EVERY DAY 90 tablet 3   • metFORMIN (GLUCOPHAGE) 1000 MG tablet TAKE 1 TABLET BY MOUTH TWICE A DAY WITH MEALS 180 tablet 2   • metoprolol tartrate (LOPRESSOR) 100 MG tablet TAKE 1 TAB(S) 2 TIMES A DAY     • nitroglycerin (NITROSTAT) 0.4 MG SL tablet Place 1 tablet under the tongue Every 5 (Five) Minutes As Needed for Chest Pain. Take no more than 3 doses in 15 minutes. 30 tablet 12   • Ozempic, 1 MG/DOSE, 4 MG/3ML solution pen-injector Inject 1 mg under the skin into the appropriate area as directed Every 7 (Seven) Days.     • paliperidone palmitate (INVEGA SUSTENNA) 117 MG/0.75ML suspension IM injection Inject 117 mg into the shoulder, thigh, or buttocks Every 30 (Thirty) Days.     • sodium chloride (OCEAN NASAL SPRAY) 0.65 % nasal spray 1 spray into each nostril As Needed for Congestion. 60 mL 12   • traZODone (DESYREL) 150 MG tablet TAKE 1 TABLET BY MOUTH EVERY DAY AT BEDTIME AS NEEDED  2   • traZODone (DESYREL) 50 MG tablet        No current facility-administered medications for this visit.       Review of Systems   Constitutional: Negative.    HENT: Negative.    Eyes: Negative.    Respiratory: Negative.    Cardiovascular: Positive for leg swelling.   Gastrointestinal: Negative.    Musculoskeletal:        Toe pain    Allergic/Immunologic: Negative.    Neurological: Positive for numbness.   Hematological: Negative.    Psychiatric/Behavioral: Negative.          OBJECTIVE    Pulse 98   Ht 170.2 cm (67\")   Wt 122 kg (270 lb)   SpO2 100%   BMI 42.29 kg/m²       Physical Exam   Constitutional: He is oriented to person, place, and time. He appears well-developed.   HENT:   Head: Normocephalic and " atraumatic.   Pulmonary/Chest: Effort normal. No respiratory distress.   Musculoskeletal: Normal range of motion. No deformity.   Neurological: He is alert and oriented to person, place, and time.   Skin: Skin is warm and dry. Capillary refill takes less than 2 seconds. He is not diaphoretic. No erythema.   Psychiatric: His behavior is normal.   Vitals reviewed.      Gait: Normal     Assistive Device: None    Lower Extremity    Cardiovascular:    DP/PT pulses palpable bilateral  CFT brisk  to all digits  Skin temp is warm to warm from proximal tibia to distal digits bilateral  Pedal hair growth present.   Musculoskeletal:  Muscle strength is 5/5 for all muscle groups tested   ROM of the 1st MTP is full without pain or crepitus bilateral  ROM of the ankle joint is full without pain or crepitus  bilateral  Dermatological:   Bilateral hallux nails absent.  Remaining toenails are thickened, discolored, elongated with subungal debris.  Pain on palpation of the nail plates.  Skin is warm, dry and intact bilateral  Webspaces 1-4 bilateral are clean, dry and intact.   Hyperkeratotic tissue noted to medial IPJ of bilateral hallux.  Neurological:   Protective sensation decreased   Sensation intact to light touch        Procedures        ASSESSMENT AND PLAN    Diagnoses and all orders for this visit:    1. Type 2 diabetes mellitus with peripheral neuropathy (HCC) (Primary)    2. Onychomycosis    3. Chronic toe pain, bilateral    4. Corns        - Nails 2-5 bilateral were debrided in length and thickness with nail nipper and electric  to decrease fungal load and risk of infection.   - Trimmed hyperkeratotic lesions noted in objective with a #15 blade without incident.   -All questions were answered  -Recheck 3 months          This document has been electronically signed by Abhijeet Thomason DPM on December 14, 2021 13:44 CST     12/14/2021  13:44 CST

## 2021-12-16 RX ORDER — PEN NEEDLE, DIABETIC 32GX 5/32"
NEEDLE, DISPOSABLE MISCELLANEOUS
Qty: 100 EACH | Refills: 11 | Status: SHIPPED | OUTPATIENT
Start: 2021-12-16 | End: 2023-02-15

## 2022-01-20 ENCOUNTER — OFFICE VISIT (OUTPATIENT)
Dept: FAMILY MEDICINE CLINIC | Facility: CLINIC | Age: 52
End: 2022-01-20

## 2022-01-20 VITALS
OXYGEN SATURATION: 98 % | WEIGHT: 265 LBS | BODY MASS INDEX: 41.59 KG/M2 | HEART RATE: 93 BPM | DIASTOLIC BLOOD PRESSURE: 72 MMHG | HEIGHT: 67 IN | TEMPERATURE: 97.3 F | SYSTOLIC BLOOD PRESSURE: 140 MMHG

## 2022-01-20 DIAGNOSIS — J30.9 ALLERGIC RHINITIS, UNSPECIFIED SEASONALITY, UNSPECIFIED TRIGGER: Primary | ICD-10-CM

## 2022-01-20 DIAGNOSIS — L08.9 SKIN INFECTION: ICD-10-CM

## 2022-01-20 PROCEDURE — 99213 OFFICE O/P EST LOW 20 MIN: CPT | Performed by: STUDENT IN AN ORGANIZED HEALTH CARE EDUCATION/TRAINING PROGRAM

## 2022-01-20 RX ORDER — DOXYCYCLINE HYCLATE 100 MG/1
100 CAPSULE ORAL 2 TIMES DAILY
Qty: 14 CAPSULE | Refills: 0 | Status: SHIPPED | OUTPATIENT
Start: 2022-01-20 | End: 2022-01-27

## 2022-01-20 RX ORDER — FLUTICASONE PROPIONATE 50 MCG
2 SPRAY, SUSPENSION (ML) NASAL DAILY
Qty: 48 ML | Refills: 0 | Status: SHIPPED | OUTPATIENT
Start: 2022-01-20 | End: 2022-08-22 | Stop reason: SDUPTHER

## 2022-01-20 NOTE — PATIENT INSTRUCTIONS
Have your labs done for endocrine.    If your toe starts hurting,  Or gets worse, call clinic for same day appointment. Start taking doxycycline twice a day.     Call us If you have any questions or concerns.

## 2022-01-20 NOTE — PROGRESS NOTES
"  Family Medicine Residency  Saumya Bonilla MD    Subjective:     Kingsley Littlejohn is a 51 y.o. male who presents for follow-up for allergic rhinitis and new left great toe pain.    Left great toe leaking some purulent drainage from the lateral aspect of his left great toe by the nailbed.  He says he was able to express a little bit of purulent drainage out and it does dry and crust on his sock after some time.  He said the skin does not look as red as it did previously.      He would like a flonase refill for his allergies.        The following portions of the patient's history were reviewed and updated as appropriate: allergies, current medications, past family history, past medical history, past social history, past surgical history and problem list.    Past Medical Hx:  Past Medical History:   Diagnosis Date   • Abdominal pain    • Allergic rhinitis    • Diabetes mellitus (HCC)     Prev HbA1c 14.0 2014, states \"I'm cured!\"   • Essential hypertension    • Fracture closed, fibula, shaft     tib-fib   • Onychomycosis    • Sleep apnea        Past Surgical Hx:  Past Surgical History:   Procedure Laterality Date   • ANKLE SURGERY      right and left   • COLONOSCOPY N/A 6/4/2018    Procedure: COLONOSCOPY;  Surgeon: Jaylon Castro MD;  Location: [facility] ENDOSCOPY;  Service: Gastroenterology   • COLONOSCOPY N/A 8/13/2021    Procedure: COLONOSCOPY;  Surgeon: Jaylon Castro MD;  Location: [facility] ENDOSCOPY;  Service: General;  Laterality: N/A;   • CYST REMOVAL      x 3, from hand, thigh, and buttocks   • ENDOSCOPY      20 years ago   • TIBIA FRACTURE SURGERY Bilateral 2011    tib-fib       Current Meds:    Current Outpatient Medications:   •  aspirin 325 MG tablet, Take 325 mg by mouth Daily., Disp: , Rfl:   •  atorvastatin (LIPITOR) 40 MG tablet, TAKE 1 TABLET BY MOUTH EVERY DAY, Disp: 90 tablet, Rfl: 3  •  BD Pen Needle Nola 2nd Gen 32G X 4 MM misc, INJECT ONCE WALTON, Disp: 100 each, Rfl: 11  •  BD Pen Needle Nola " U/F 32G X 4 MM misc, INJECT ONCE WALTON, Disp: , Rfl:   •  Blood Glucose Monitoring Suppl (ONE TOUCH ULTRA 2) w/Device kit, Use 1 daily to test blood glucose level., Disp: 1 each, Rfl: 5  •  Continuous Blood Gluc  (FreeStyle Melonie 2 [place]) device, 1 each Continuous., Disp: 1 each, Rfl: 1  •  Continuous Blood Gluc Sensor (FreeStyle Melonie 2 Sensor) misc, 1 each Every 14 (Fourteen) Days., Disp: 3 each, Rfl: 11  •  fluticasone (FLONASE) 50 MCG/ACT nasal spray, 2 sprays by Each Nare route Daily., Disp: 48 mL, Rfl: 0  •  glucose blood test strip, USE TO TEST BLOOD SUGAR 1 TIME DAILY E11.9, Disp: 100 each, Rfl: 12  •  glucose monitor monitoring kit, 1 each As Needed (Diabetes). USE TO TEST 1 TIME DAILY, Disp: 1 each, Rfl: 0  •  hydrOXYzine (ATARAX) 50 MG tablet, Take 50 mg by mouth 4 (Four) Times a Day., Disp: , Rfl:   •  ibuprofen (ADVIL,MOTRIN) 600 MG tablet, Take 1 tablet by mouth Every 6 (Six) Hours As Needed for Mild Pain ., Disp: 15 tablet, Rfl: 0  •  Insulin Glargine, 2 Unit Dial, (Toujeo Max SoloStar) 300 UNIT/ML solution pen-injector injection, Inject 15 up to 30 units daily (Patient taking differently: 40 Units Daily.), Disp: 3 pen, Rfl: 11  •  Insulin Regular Human 4 & 8 & 12 units powder, Inhale 4-32 Units 3 (Three) Times a Day With Meals. 4-32 units TID , max dose 96 units daily, Disp: 360 each, Rfl: 11  •  Invega Sustenna 117 MG/0.75ML suspension prefilled syringe IM injection, INJECT ONE HUNDRED SEVENTEEN (117) MILLIGRAMS INTO THE MUSCLE EVERY 4 WEEKS, Disp: , Rfl:   •  Jardiance 25 MG tablet tablet, TAKE 25 MG BY MOUTH DAILY., Disp: 90 tablet, Rfl: 1  •  Lancets (ACCU-CHEK SOFT TOUCH) lancets, Use TO TEST BLOOD SUGAR 1 TIME DAILY., Disp: 100 each, Rfl: 12  •  lisinopril (PRINIVIL,ZESTRIL) 40 MG tablet, TAKE 1 TABLET BY MOUTH EVERY DAY, Disp: 90 tablet, Rfl: 3  •  metFORMIN (GLUCOPHAGE) 1000 MG tablet, TAKE 1 TABLET BY MOUTH TWICE A DAY WITH MEALS, Disp: 180 tablet, Rfl: 2  •  metoprolol tartrate  (LOPRESSOR) 100 MG tablet, TAKE 1 TAB(S) 2 TIMES A DAY, Disp: , Rfl:   •  nitroglycerin (NITROSTAT) 0.4 MG SL tablet, Place 1 tablet under the tongue Every 5 (Five) Minutes As Needed for Chest Pain. Take no more than 3 doses in 15 minutes., Disp: 30 tablet, Rfl: 12  •  Ozempic, 1 MG/DOSE, 4 MG/3ML solution pen-injector, Inject 1 mg under the skin into the appropriate area as directed Every 7 (Seven) Days., Disp: , Rfl:   •  paliperidone palmitate (INVEGA SUSTENNA) 117 MG/0.75ML suspension IM injection, Inject 117 mg into the shoulder, thigh, or buttocks Every 30 (Thirty) Days., Disp: , Rfl:   •  sodium chloride (OCEAN NASAL SPRAY) 0.65 % nasal spray, 1 spray into each nostril As Needed for Congestion., Disp: 60 mL, Rfl: 12  •  traZODone (DESYREL) 150 MG tablet, TAKE 1 TABLET BY MOUTH EVERY DAY AT BEDTIME AS NEEDED, Disp: , Rfl: 2  •  traZODone (DESYREL) 50 MG tablet, , Disp: , Rfl:   •  doxycycline (VIBRAMYCIN) 100 MG capsule, Take 1 capsule by mouth 2 (Two) Times a Day for 7 days., Disp: 14 capsule, Rfl: 0    Allergies:  Allergies   Allergen Reactions   • Latex Irritability   • Codeine Nausea Only       Family Hx:  Family History   Problem Relation Age of Onset   • Diabetes Other    • Hypertension Other    • Cancer Other    • Lung disease Other    • Thyroid disease Other    • Bleeding Disorder Other    • Colon cancer Paternal Grandmother    • Cancer Paternal Grandfather    • Crohn's disease Mother    • Diabetes Mother    • Hypertension Father    • Lung disease Father    • Hypertension Brother    • Hypertension Brother    • Hypertension Brother         Social History:  Social History     Socioeconomic History   • Marital status: Single   Tobacco Use   • Smoking status: Former Smoker     Packs/day: 1.00     Years: 25.00     Pack years: 25.00     Types: Cigarettes     Start date: 1992     Quit date: 12/2018     Years since quitting: 3.1   • Smokeless tobacco: Never Used   Vaping Use   • Vaping Use: Never used  "  Substance and Sexual Activity   • Alcohol use: Defer     Comment: FORMER pint of whiskey daily, states quit 1 year ago   • Drug use: Defer     Comment: patient reports prior drug abuse, but sober for 8 months now.   • Sexual activity: Defer       Review of Systems  Review of Systems   Constitutional: Negative for activity change and appetite change.   HENT: Positive for rhinorrhea. Negative for congestion and ear pain.    Eyes: Negative for pain and discharge.   Respiratory: Negative for chest tightness and shortness of breath.    Cardiovascular: Negative for chest pain and palpitations.   Gastrointestinal: Negative for abdominal distention and abdominal pain.   Endocrine: Negative for cold intolerance and heat intolerance.   Genitourinary: Negative for difficulty urinating and dysuria.   Musculoskeletal: Negative for arthralgias and back pain.   Skin: Negative for color change and rash.        Great toe pain with purulent drainage   Allergic/Immunologic: Negative for environmental allergies and food allergies.   Neurological: Negative for dizziness and headaches.   Hematological: Negative for adenopathy. Does not bruise/bleed easily.   Psychiatric/Behavioral: Negative for agitation and confusion.       Objective:     /72   Pulse 93   Temp 97.3 °F (36.3 °C)   Ht 170.2 cm (67\")   Wt 120 kg (265 lb)   SpO2 98%   BMI 41.50 kg/m²   Physical Exam  Vitals and nursing note reviewed.   Constitutional:       Appearance: He is well-developed.   HENT:      Head: Normocephalic and atraumatic.      Nose: Nose normal.      Mouth/Throat:      Mouth: Mucous membranes are moist.   Eyes:      Pupils: Pupils are equal, round, and reactive to light.   Neck:      Thyroid: No thyromegaly.      Trachea: No tracheal deviation.   Cardiovascular:      Rate and Rhythm: Normal rate.      Pulses:           Radial pulses are 2+ on the left side.        Dorsalis pedis pulses are 2+ on the right side and 2+ on the left side.      " Heart sounds: Normal heart sounds, S1 normal and S2 normal.   Pulmonary:      Effort: Pulmonary effort is normal.      Breath sounds: Normal breath sounds.   Abdominal:      Palpations: Abdomen is soft.   Musculoskeletal:         General: Normal range of motion.      Cervical back: Neck supple.   Skin:     General: Skin is warm and dry.      Capillary Refill: Capillary refill takes 2 to 3 seconds.      Comments: Left great toe with dried skin, mild erythema to tip of toe greater at lateral border of the nailbed.  Unable to express purulent drainage.  Toe not edematous or painful.   Neurological:      Mental Status: He is alert and oriented to person, place, and time.      GCS: GCS eye subscore is 4. GCS verbal subscore is 5. GCS motor subscore is 6.   Psychiatric:         Speech: Speech normal.         Behavior: Behavior normal.         Thought Content: Thought content normal.          Assessment/Plan:     Diagnoses and all orders for this visit:    1. Allergic rhinitis, unspecified seasonality, unspecified trigger (Primary)  -     fluticasone (FLONASE) 50 MCG/ACT nasal spray; 2 sprays by Each Nare route Daily.  Dispense: 48 mL; Refill: 0    2. Skin infection  -     doxycycline (VIBRAMYCIN) 100 MG capsule; Take 1 capsule by mouth 2 (Two) Times a Day for 7 days.  Dispense: 14 capsule; Refill: 0    Refill for Flonase given.  Given patient's history of diabetes as well as purulent drainage from toe, will treat with 7 days of doxycycline.  Instructed patient if his toe does not get better or fully resolve, to call prior to antibiotics ending for a refill or change of antibiotics.  Care gaps addressed during this visit.    · Rx changes: Add doxycycline  · Patient Education: See above  · Compliance at present is estimated to be good.   · Efforts to improve compliance (if necessary) will be directed at Monitoring of toe.    Depression screening: Patient screened positive for depression based on a PHQ-9 score of 2 on  7/27/2021. Follow-up recommendations include: Follow-up as needed.     Follow-up:     Return in about 6 months (around 7/20/2022) for Recheck DM.    Preventative:  Health Maintenance   Topic Date Due   • Pneumococcal Vaccine 0-64 (1 of 2 - PPSV23) Never done   • Hepatitis B (1 of 3 - Risk 3-dose series) Never done   • HEPATITIS C SCREENING  Never done   • ZOSTER VACCINE (1 of 2) Never done   • LUNG CANCER SCREENING  08/28/2020   • TDAP/TD VACCINES (2 - Td or Tdap) 01/01/2021   • ANNUAL WELLNESS VISIT  07/17/2021   • HEMOGLOBIN A1C  05/08/2022   • DIABETIC FOOT EXAM  09/13/2022   • DIABETIC EYE EXAM  10/14/2022   • LIPID PANEL  11/08/2022   • COLORECTAL CANCER SCREENING  08/13/2024   • COVID-19 Vaccine  Completed   • INFLUENZA VACCINE  Discontinued   • URINE MICROALBUMIN  Discontinued     Male Preventative: Unable to order low-dose CT scan given patient's insurance  Recommended: none  Vaccine Counseling: N/A    Weight  -Class: Obese Class III extreme obesity: > or equal to 40kg/m2  -Patient's Body mass index is 41.5 kg/m². indicating that he is morbidly obese (BMI > 40 or > 35 with obesity - related health condition).   Alcohol use: Alcohol use questions deferred to the physician.  Nicotine status  reports that he quit smoking about 3 years ago. His smoking use included cigarettes. He started smoking about 30 years ago. He has a 25.00 pack-year smoking history. He has never used smokeless tobacco.    Goals     • Blood Pressure < 140/90      Barriers:  Sporadic follow-up      • Lower Blood Sugar      Barriers:  Questionable insight            RISK SCORE: 4      This document has been electronically signed by Saumya Bonilla MD on January 21, 2022 07:45 CST    Saumya Bonilla MD PGY-3  Part of this note may be an electronic transcription/translation of spoken language to printed text using the Dragon Dictation System.

## 2022-02-07 ENCOUNTER — TELEPHONE (OUTPATIENT)
Dept: PODIATRY | Facility: CLINIC | Age: 52
End: 2022-02-07

## 2022-02-08 ENCOUNTER — OFFICE VISIT (OUTPATIENT)
Dept: FAMILY MEDICINE CLINIC | Facility: CLINIC | Age: 52
End: 2022-02-08

## 2022-02-08 ENCOUNTER — LAB (OUTPATIENT)
Dept: LAB | Facility: HOSPITAL | Age: 52
End: 2022-02-08

## 2022-02-08 VITALS
OXYGEN SATURATION: 99 % | HEART RATE: 87 BPM | WEIGHT: 266.6 LBS | HEIGHT: 67 IN | TEMPERATURE: 96.8 F | BODY MASS INDEX: 41.84 KG/M2 | DIASTOLIC BLOOD PRESSURE: 90 MMHG | SYSTOLIC BLOOD PRESSURE: 140 MMHG

## 2022-02-08 DIAGNOSIS — L03.031 CELLULITIS OF TOE OF RIGHT FOOT: Primary | ICD-10-CM

## 2022-02-08 DIAGNOSIS — I10 ESSENTIAL HYPERTENSION: ICD-10-CM

## 2022-02-08 DIAGNOSIS — E78.49 OTHER HYPERLIPIDEMIA: ICD-10-CM

## 2022-02-08 DIAGNOSIS — E55.9 VITAMIN D DEFICIENCY: ICD-10-CM

## 2022-02-08 DIAGNOSIS — E11.65 TYPE 2 DIABETES MELLITUS WITH HYPERGLYCEMIA, WITHOUT LONG-TERM CURRENT USE OF INSULIN: ICD-10-CM

## 2022-02-08 LAB
ALBUMIN SERPL-MCNC: 4.1 G/DL (ref 3.5–5.2)
ALBUMIN/GLOB SERPL: 1.5 G/DL
ALP SERPL-CCNC: 75 U/L (ref 39–117)
ALT SERPL W P-5'-P-CCNC: 28 U/L (ref 1–41)
ANION GAP SERPL CALCULATED.3IONS-SCNC: 12 MMOL/L (ref 5–15)
AST SERPL-CCNC: 16 U/L (ref 1–40)
BASOPHILS # BLD AUTO: 0.1 10*3/MM3 (ref 0–0.2)
BASOPHILS NFR BLD AUTO: 1.1 % (ref 0–1.5)
BILIRUB SERPL-MCNC: 0.5 MG/DL (ref 0–1.2)
BUN SERPL-MCNC: 16 MG/DL (ref 6–20)
BUN/CREAT SERPL: 16.3 (ref 7–25)
CALCIUM SPEC-SCNC: 8.7 MG/DL (ref 8.6–10.5)
CHLORIDE SERPL-SCNC: 101 MMOL/L (ref 98–107)
CHOLEST SERPL-MCNC: 116 MG/DL (ref 0–200)
CO2 SERPL-SCNC: 22 MMOL/L (ref 22–29)
CREAT SERPL-MCNC: 0.98 MG/DL (ref 0.76–1.27)
DEPRECATED RDW RBC AUTO: 41.3 FL (ref 37–54)
EOSINOPHIL # BLD AUTO: 0.3 10*3/MM3 (ref 0–0.4)
EOSINOPHIL NFR BLD AUTO: 3.4 % (ref 0.3–6.2)
ERYTHROCYTE [DISTWIDTH] IN BLOOD BY AUTOMATED COUNT: 14.9 % (ref 12.3–15.4)
GFR SERPL CREATININE-BSD FRML MDRD: 80 ML/MIN/1.73
GLOBULIN UR ELPH-MCNC: 2.8 GM/DL
GLUCOSE SERPL-MCNC: 126 MG/DL (ref 65–99)
HBA1C MFR BLD: 8.9 % (ref 4.8–5.6)
HCT VFR BLD AUTO: 45.4 % (ref 37.5–51)
HDLC SERPL-MCNC: 32 MG/DL (ref 40–60)
HGB BLD-MCNC: 14.9 G/DL (ref 13–17.7)
IMM GRANULOCYTES # BLD AUTO: 0.04 10*3/MM3 (ref 0–0.05)
IMM GRANULOCYTES NFR BLD AUTO: 0.5 % (ref 0–0.5)
LDLC SERPL CALC-MCNC: 29 MG/DL (ref 0–100)
LDLC/HDLC SERPL: 0.21 {RATIO}
LYMPHOCYTES # BLD AUTO: 2.53 10*3/MM3 (ref 0.7–3.1)
LYMPHOCYTES NFR BLD AUTO: 28.5 % (ref 19.6–45.3)
MCH RBC QN AUTO: 25.6 PG (ref 26.6–33)
MCHC RBC AUTO-ENTMCNC: 32.8 G/DL (ref 31.5–35.7)
MCV RBC AUTO: 77.9 FL (ref 79–97)
MONOCYTES # BLD AUTO: 0.94 10*3/MM3 (ref 0.1–0.9)
MONOCYTES NFR BLD AUTO: 10.6 % (ref 5–12)
NEUTROPHILS NFR BLD AUTO: 4.97 10*3/MM3 (ref 1.7–7)
NEUTROPHILS NFR BLD AUTO: 55.9 % (ref 42.7–76)
NRBC BLD AUTO-RTO: 0 /100 WBC (ref 0–0.2)
PLATELET # BLD AUTO: 213 10*3/MM3 (ref 140–450)
PMV BLD AUTO: 10.2 FL (ref 6–12)
POTASSIUM SERPL-SCNC: 3.8 MMOL/L (ref 3.5–5.2)
PROT SERPL-MCNC: 6.9 G/DL (ref 6–8.5)
RBC # BLD AUTO: 5.83 10*6/MM3 (ref 4.14–5.8)
SODIUM SERPL-SCNC: 135 MMOL/L (ref 136–145)
TRIGL SERPL-MCNC: 387 MG/DL (ref 0–150)
TSH SERPL DL<=0.05 MIU/L-ACNC: 1.2 UIU/ML (ref 0.27–4.2)
VLDLC SERPL-MCNC: 55 MG/DL (ref 5–40)
WBC NRBC COR # BLD: 8.88 10*3/MM3 (ref 3.4–10.8)

## 2022-02-08 PROCEDURE — 85025 COMPLETE CBC W/AUTO DIFF WBC: CPT

## 2022-02-08 PROCEDURE — 80061 LIPID PANEL: CPT

## 2022-02-08 PROCEDURE — 84443 ASSAY THYROID STIM HORMONE: CPT

## 2022-02-08 PROCEDURE — 36415 COLL VENOUS BLD VENIPUNCTURE: CPT

## 2022-02-08 PROCEDURE — 83036 HEMOGLOBIN GLYCOSYLATED A1C: CPT

## 2022-02-08 PROCEDURE — 82306 VITAMIN D 25 HYDROXY: CPT

## 2022-02-08 PROCEDURE — 99213 OFFICE O/P EST LOW 20 MIN: CPT | Performed by: STUDENT IN AN ORGANIZED HEALTH CARE EDUCATION/TRAINING PROGRAM

## 2022-02-08 PROCEDURE — 80053 COMPREHEN METABOLIC PANEL: CPT

## 2022-02-08 RX ORDER — CEFUROXIME AXETIL 500 MG/1
500 TABLET ORAL 2 TIMES DAILY
Qty: 20 TABLET | Refills: 0 | Status: SHIPPED | OUTPATIENT
Start: 2022-02-08 | End: 2022-02-18

## 2022-02-08 NOTE — PATIENT INSTRUCTIONS
You can cancel this next appointment in 2 weeks with us if all is going well.    If your toe is not getting better by Friday am, please call clinic and see if you can be seen. [phone] around [phone] for a same day spot.

## 2022-02-08 NOTE — PROGRESS NOTES
"  Family Medicine Residency  Saumya Bonilla MD    Subjective:     Kingsley Littlejohn is a 51 y.o. male who presents for follow-up for cellulitis of the toe.    Patient was recently treated with doxycycline for left toe cellulitis.  Patient is going to have his toenail removed and his right toe became red and swollen and sore.  He has an appointment next Wednesday with podiatry to have his toenails removed.    The following portions of the patient's history were reviewed and updated as appropriate: allergies, current medications, past family history, past medical history, past social history, past surgical history and problem list.    Past Medical Hx:  Past Medical History:   Diagnosis Date   • Abdominal pain    • Allergic rhinitis    • Diabetes mellitus (HCC)     Prev HbA1c 14.0 2014, states \"I'm cured!\"   • Essential hypertension    • Fracture closed, fibula, shaft     tib-fib   • Onychomycosis    • Sleep apnea        Past Surgical Hx:  Past Surgical History:   Procedure Laterality Date   • ANKLE SURGERY      right and left   • COLONOSCOPY N/A 6/4/2018    Procedure: COLONOSCOPY;  Surgeon: Jaylon Castro MD;  Location: [facility] ENDOSCOPY;  Service: Gastroenterology   • COLONOSCOPY N/A 8/13/2021    Procedure: COLONOSCOPY;  Surgeon: Jaylon Castro MD;  Location: [facility] ENDOSCOPY;  Service: General;  Laterality: N/A;   • CYST REMOVAL      x 3, from hand, thigh, and buttocks   • ENDOSCOPY      20 years ago   • TIBIA FRACTURE SURGERY Bilateral 2011    tib-fib       Current Meds:    Current Outpatient Medications:   •  aspirin 325 MG tablet, Take 325 mg by mouth Daily., Disp: , Rfl:   •  atorvastatin (LIPITOR) 40 MG tablet, TAKE 1 TABLET BY MOUTH EVERY DAY, Disp: 90 tablet, Rfl: 3  •  BD Pen Needle Nola 2nd Gen 32G X 4 MM misc, INJECT ONCE WALTON, Disp: 100 each, Rfl: 11  •  BD Pen Needle Nola U/F 32G X 4 MM misc, INJECT ONCE WALTON, Disp: , Rfl:   •  Blood Glucose Monitoring Suppl (ONE TOUCH ULTRA 2) w/Device kit, Use " 1 daily to test blood glucose level., Disp: 1 each, Rfl: 5  •  Continuous Blood Gluc  (FreeStyle Melonie 2 [place]) device, 1 each Continuous., Disp: 1 each, Rfl: 1  •  Continuous Blood Gluc Sensor (FreeStyle Melonie 2 Sensor) misc, 1 each Every 14 (Fourteen) Days., Disp: 3 each, Rfl: 11  •  fluticasone (FLONASE) 50 MCG/ACT nasal spray, 2 sprays by Each Nare route Daily., Disp: 48 mL, Rfl: 0  •  glucose blood test strip, USE TO TEST BLOOD SUGAR 1 TIME DAILY E11.9, Disp: 100 each, Rfl: 12  •  glucose monitor monitoring kit, 1 each As Needed (Diabetes). USE TO TEST 1 TIME DAILY, Disp: 1 each, Rfl: 0  •  hydrOXYzine (ATARAX) 50 MG tablet, Take 50 mg by mouth 4 (Four) Times a Day., Disp: , Rfl:   •  ibuprofen (ADVIL,MOTRIN) 600 MG tablet, Take 1 tablet by mouth Every 6 (Six) Hours As Needed for Mild Pain ., Disp: 15 tablet, Rfl: 0  •  Insulin Glargine, 2 Unit Dial, (Toujeo Max SoloStar) 300 UNIT/ML solution pen-injector injection, Inject 15 up to 30 units daily (Patient taking differently: 40 Units Daily.), Disp: 3 pen, Rfl: 11  •  Insulin Regular Human 4 & 8 & 12 units powder, Inhale 4-32 Units 3 (Three) Times a Day With Meals. 4-32 units TID , max dose 96 units daily, Disp: 360 each, Rfl: 11  •  Invega Sustenna 117 MG/0.75ML suspension prefilled syringe IM injection, INJECT ONE HUNDRED SEVENTEEN (117) MILLIGRAMS INTO THE MUSCLE EVERY 4 WEEKS, Disp: , Rfl:   •  Jardiance 25 MG tablet tablet, TAKE 25 MG BY MOUTH DAILY., Disp: 90 tablet, Rfl: 1  •  Lancets (ACCU-CHEK SOFT TOUCH) lancets, Use TO TEST BLOOD SUGAR 1 TIME DAILY., Disp: 100 each, Rfl: 12  •  lisinopril (PRINIVIL,ZESTRIL) 40 MG tablet, TAKE 1 TABLET BY MOUTH EVERY DAY, Disp: 90 tablet, Rfl: 3  •  metFORMIN (GLUCOPHAGE) 1000 MG tablet, TAKE 1 TABLET BY MOUTH TWICE A DAY WITH MEALS, Disp: 180 tablet, Rfl: 2  •  metoprolol tartrate (LOPRESSOR) 100 MG tablet, TAKE 1 TAB(S) 2 TIMES A DAY, Disp: , Rfl:   •  nitroglycerin (NITROSTAT) 0.4 MG SL tablet, Place 1  tablet under the tongue Every 5 (Five) Minutes As Needed for Chest Pain. Take no more than 3 doses in 15 minutes., Disp: 30 tablet, Rfl: 12  •  Ozempic, 1 MG/DOSE, 4 MG/3ML solution pen-injector, Inject 1 mg under the skin into the appropriate area as directed Every 7 (Seven) Days., Disp: , Rfl:   •  paliperidone palmitate (INVEGA SUSTENNA) 117 MG/0.75ML suspension IM injection, Inject 117 mg into the shoulder, thigh, or buttocks Every 30 (Thirty) Days., Disp: , Rfl:   •  sodium chloride (OCEAN NASAL SPRAY) 0.65 % nasal spray, 1 spray into each nostril As Needed for Congestion., Disp: 60 mL, Rfl: 12  •  traZODone (DESYREL) 150 MG tablet, TAKE 1 TABLET BY MOUTH EVERY DAY AT BEDTIME AS NEEDED, Disp: , Rfl: 2  •  traZODone (DESYREL) 50 MG tablet, , Disp: , Rfl:   •  cefuroxime (CEFTIN) 500 MG tablet, Take 1 tablet by mouth 2 (Two) Times a Day for 10 days., Disp: 20 tablet, Rfl: 0    Allergies:  Allergies   Allergen Reactions   • Latex Irritability   • Codeine Nausea Only       Family Hx:  Family History   Problem Relation Age of Onset   • Diabetes Other    • Hypertension Other    • Cancer Other    • Lung disease Other    • Thyroid disease Other    • Bleeding Disorder Other    • Colon cancer Paternal Grandmother    • Cancer Paternal Grandfather    • Crohn's disease Mother    • Diabetes Mother    • Hypertension Father    • Lung disease Father    • Hypertension Brother    • Hypertension Brother    • Hypertension Brother         Social History:  Social History     Socioeconomic History   • Marital status: Single   Tobacco Use   • Smoking status: Former Smoker     Packs/day: 1.00     Years: 25.00     Pack years: 25.00     Types: Cigarettes     Start date: 1992     Quit date: 12/2018     Years since quitting: 3.1   • Smokeless tobacco: Never Used   Vaping Use   • Vaping Use: Never used   Substance and Sexual Activity   • Alcohol use: Defer     Comment: FORMER pint of whiskey daily, states quit 1 year ago   • Drug use: Defer  "    Comment: patient reports prior drug abuse, but sober for 8 months now.   • Sexual activity: Defer       Review of Systems  Review of Systems   Constitutional: Negative for activity change and appetite change.   HENT: Negative for congestion and ear pain.    Eyes: Negative for pain and discharge.   Respiratory: Negative for chest tightness and shortness of breath.    Cardiovascular: Negative for chest pain and palpitations.   Gastrointestinal: Negative for abdominal distention and abdominal pain.   Endocrine: Negative for cold intolerance and heat intolerance.   Genitourinary: Negative for difficulty urinating and dysuria.   Musculoskeletal: Negative for arthralgias and back pain.   Skin: Negative for color change and rash.        Right tip of great toe redness, edema and pain.  Drained purulent drainage earlier today.   Allergic/Immunologic: Negative for environmental allergies and food allergies.   Neurological: Negative for dizziness and headaches.   Hematological: Negative for adenopathy. Does not bruise/bleed easily.   Psychiatric/Behavioral: Negative for agitation and confusion.       Objective:     /90   Pulse 87   Temp 96.8 °F (36 °C)   Ht 170.2 cm (67\")   Wt 121 kg (266 lb 9.6 oz)   SpO2 99%   BMI 41.76 kg/m²   Physical Exam  Vitals and nursing note reviewed.   Constitutional:       Appearance: He is well-developed.   HENT:      Head: Normocephalic and atraumatic.   Eyes:      Pupils: Pupils are equal, round, and reactive to light.   Neck:      Thyroid: No thyromegaly.      Trachea: No tracheal deviation.   Cardiovascular:      Rate and Rhythm: Normal rate.      Pulses:           Radial pulses are 2+ on the left side.        Dorsalis pedis pulses are 2+ on the right side and 2+ on the left side.      Heart sounds: Normal heart sounds, S1 normal and S2 normal.   Pulmonary:      Effort: Pulmonary effort is normal.      Breath sounds: Normal breath sounds.   Abdominal:      Palpations: Abdomen " is soft.   Musculoskeletal:         General: Normal range of motion.      Cervical back: Neck supple.   Skin:     General: Skin is warm and dry.      Capillary Refill: Capillary refill takes 2 to 3 seconds.      Comments: Right great toe distal tip with erythema, edema,   Neurological:      Mental Status: He is alert and oriented to person, place, and time.      GCS: GCS eye subscore is 4. GCS verbal subscore is 5. GCS motor subscore is 6.   Psychiatric:         Speech: Speech normal.         Behavior: Behavior normal.         Thought Content: Thought content normal.          Assessment/Plan:     Diagnoses and all orders for this visit:    1. Cellulitis of toe of right foot (Primary)  -     cefuroxime (CEFTIN) 500 MG tablet; Take 1 tablet by mouth 2 (Two) Times a Day for 10 days.  Dispense: 20 tablet; Refill: 0    Patient given 10 days of doxycycline starting on 1/20/2022.  Now with cellulitis on right great toe.  Will change to Ceftin.  Trying to stay away from clindamycin as this could cause C. difficile and avoid Bactrim as therapy would be 10 days and could affect his kidneys.  Patient instructed to call Friday morning if his toe is not improved.    · Rx changes: Add Ceftin  · Patient Education: See above  · Compliance at present is estimated to be good.   · Efforts to improve compliance (if necessary) will be directed at Regular monitoring of progress of foot.    Depression screening: Patient screened positive for depression based on a PHQ-9 score of 2 on 7/27/2021. Follow-up recommendations include: Follow-up as needed.     Follow-up:     Return in about 2 weeks (around 2/22/2022).    Preventative:  Health Maintenance   Topic Date Due   • Pneumococcal Vaccine 0-64 (1 of 2 - PPSV23) Never done   • Hepatitis B (1 of 3 - Risk 3-dose series) Never done   • HEPATITIS C SCREENING  Never done   • ZOSTER VACCINE (1 of 2) Never done   • LUNG CANCER SCREENING  08/28/2020   • TDAP/TD VACCINES (2 - Td or Tdap) 01/01/2021    • ANNUAL WELLNESS VISIT  07/17/2021   • HEMOGLOBIN A1C  05/08/2022   • DIABETIC FOOT EXAM  09/13/2022   • DIABETIC EYE EXAM  10/14/2022   • LIPID PANEL  11/08/2022   • COLORECTAL CANCER SCREENING  08/13/2024   • COVID-19 Vaccine  Completed   • INFLUENZA VACCINE  Discontinued   • URINE MICROALBUMIN  Discontinued         Weight  -Class: Obese Class III extreme obesity: > or equal to 40kg/m2  -Patient's Body mass index is 41.76 kg/m². indicating that he is morbidly obese (BMI > 40 or > 35 with obesity - related health condition).   Alcohol use: Alcohol use questions deferred to the physician.  Nicotine status  reports that he quit smoking about 3 years ago. His smoking use included cigarettes. He started smoking about 30 years ago. He has a 25.00 pack-year smoking history. He has never used smokeless tobacco.    Goals     • Blood Pressure < 140/90      Barriers:  Sporadic follow-up      • Lower Blood Sugar      Barriers:  Questionable insight            RISK SCORE: 4      This document has been electronically signed by Saumya Bonilla MD on February 8, 2022 14:36 CST    Saumya Bonilla MD PGY-3  Part of this note may be an electronic transcription/translation of spoken language to printed text using the Dragon Dictation System.

## 2022-02-09 LAB — 25(OH)D3 SERPL-MCNC: 28 NG/ML

## 2022-02-09 NOTE — PROGRESS NOTES
I have reviewed the notes, assessments, and/or procedures performed by Dr. Bonilla, I concur with her/his documentation and assessment and plan for Kingsley Littlejohn.                This document has been electronically signed by Favio Muro MD on February 9, 2022 12:59 CST

## 2022-02-10 RX ORDER — INSULIN GLARGINE 300 U/ML
40 INJECTION, SOLUTION SUBCUTANEOUS DAILY
Qty: 8 PEN | Refills: 11 | Status: SHIPPED | OUTPATIENT
Start: 2022-02-10 | End: 2022-05-24 | Stop reason: SDUPTHER

## 2022-02-14 ENCOUNTER — OFFICE VISIT (OUTPATIENT)
Dept: ENDOCRINOLOGY | Facility: CLINIC | Age: 52
End: 2022-02-14

## 2022-02-14 VITALS
HEART RATE: 94 BPM | HEIGHT: 67 IN | DIASTOLIC BLOOD PRESSURE: 90 MMHG | BODY MASS INDEX: 42.28 KG/M2 | OXYGEN SATURATION: 95 % | SYSTOLIC BLOOD PRESSURE: 140 MMHG | WEIGHT: 269.4 LBS

## 2022-02-14 DIAGNOSIS — E78.49 OTHER HYPERLIPIDEMIA: ICD-10-CM

## 2022-02-14 DIAGNOSIS — I10 ESSENTIAL HYPERTENSION: ICD-10-CM

## 2022-02-14 DIAGNOSIS — E55.9 VITAMIN D DEFICIENCY: ICD-10-CM

## 2022-02-14 DIAGNOSIS — E11.65 TYPE 2 DIABETES MELLITUS WITH HYPERGLYCEMIA, WITHOUT LONG-TERM CURRENT USE OF INSULIN: Primary | ICD-10-CM

## 2022-02-14 PROCEDURE — 95251 CONT GLUC MNTR ANALYSIS I&R: CPT | Performed by: NURSE PRACTITIONER

## 2022-02-14 PROCEDURE — 99214 OFFICE O/P EST MOD 30 MIN: CPT | Performed by: NURSE PRACTITIONER

## 2022-02-14 RX ORDER — FENOFIBRATE 145 MG/1
145 TABLET, COATED ORAL DAILY
Qty: 30 TABLET | Refills: 11 | Status: SHIPPED | OUTPATIENT
Start: 2022-02-14 | End: 2023-02-16

## 2022-02-14 NOTE — PROGRESS NOTES
"Chief Complaint  Diabetes    Subjective          Kingsley Littlejohn presents to [facility] ENDOCRINOLOGY  History of Present Illness     Primary provider Bonilla Stanton MD      51 year old male presents for follow up      Reason diabetes mellitus type 2      Timing constant      Quality improved control        Lab Results   Component Value Date    HGBA1C 8.90 (H) 02/08/2022             Duration diagnosed in 2014     Severity is high     Macrovascular complications CAD, MI in the past     Microvascular complications neuropathy     Current diabetes regimen insulin and GLP-1     Taking basal and mealtime insulin total 4 shots daily     Current glucose monitoring     Checks 4-10 times daily     Uses a [employer] personal system     See below       Review of Systems - General ROS: negative                Objective   Vital Signs:   /90   Pulse 94   Ht 170.2 cm (67\")   Wt 122 kg (269 lb 6.4 oz)   SpO2 95%   BMI 42.19 kg/m²     Physical Exam  Constitutional:       Appearance: Normal appearance.   Cardiovascular:      Rate and Rhythm: Regular rhythm.      Heart sounds: Normal heart sounds.   Pulmonary:      Breath sounds: Normal breath sounds.   Musculoskeletal:      Cervical back: Normal range of motion.   Neurological:      General: No focal deficit present.      Mental Status: He is alert.   Psychiatric:         Mood and Affect: Mood normal.         Thought Content: Thought content normal.         Judgment: Judgment normal.        Result Review :   The following data was reviewed by: RAJAT Doyle on 02/14/2022:  Common labs    Common Labsle 8/10/21 8/10/21 8/10/21 8/10/21 8/10/21 11/8/21 11/8/21 11/8/21 11/8/21 11/8/21 2/8/22 2/8/22 2/8/22 2/8/22    1414 1414 1414 1414 1552 1527 1527 1527 1527 1635 1427 1427 1427 1427   Glucose    128 (A)     139 (A)    126 (A)    BUN    16     16    16    Creatinine    0.80     0.71 (A)    0.98    eGFR Non African Am    101     116    80  "   Sodium    135 (A)     137    135 (A)    Potassium    4.2     4.1    3.8    Chloride    101     101    101    Calcium    9.2     8.8    8.7    Albumin    4.10     4.20    4.10    Total Bilirubin    0.5     0.5    0.5    Alkaline Phosphatase    65     63    75    AST (SGOT)    15     24    16    ALT (SGPT)    35     45 (A)    28    WBC   8.17   8.48     8.88      Hemoglobin   15.4   15.0     14.9      Hematocrit   46.0   45.8     45.4      Platelets   237   223     213      Total Cholesterol 103       89    116     Triglycerides 339 (A)       253 (A)    387 (A)     HDL Cholesterol 39 (A)       32 (A)    32 (A)     LDL Cholesterol  16       19    29     Hemoglobin A1C  7.70 (A)     9.00 (A)       8.90 (A)   Microalbumin, Urine     <1.2     <1.2       (A) Abnormal value                      Assessment and Plan    Diagnoses and all orders for this visit:    1. Type 2 diabetes mellitus with hyperglycemia, without long-term current use of insulin (HCC) (Primary)  -     CBC & Differential; Future  -     Hemoglobin A1c; Future  -     Lipid Panel; Future  -     Microalbumin / Creatinine Urine Ratio - Urine, Clean Catch; Future  -     TSH; Future  -     Vitamin D 25 Hydroxy; Future    2. Essential hypertension  -     CBC & Differential; Future  -     Hemoglobin A1c; Future  -     Lipid Panel; Future  -     Microalbumin / Creatinine Urine Ratio - Urine, Clean Catch; Future  -     TSH; Future  -     Vitamin D 25 Hydroxy; Future    3. Other hyperlipidemia  -     CBC & Differential; Future  -     Hemoglobin A1c; Future  -     Lipid Panel; Future  -     Microalbumin / Creatinine Urine Ratio - Urine, Clean Catch; Future  -     TSH; Future  -     Vitamin D 25 Hydroxy; Future    4. Vitamin D deficiency  -     CBC & Differential; Future  -     Hemoglobin A1c; Future  -     Lipid Panel; Future  -     Microalbumin / Creatinine Urine Ratio - Urine, Clean Catch; Future  -     TSH; Future  -     Vitamin D 25 Hydroxy; Future    Other  orders  -     fenofibrate (Tricor) 145 MG tablet; Take 1 tablet by mouth Daily.  Dispense: 30 tablet; Refill: 11           Glycemic Management     Type 2 diabetes not controlled with hyperglycemia         Lab Results   Component Value Date    HGBA1C 8.90 (H) 02/08/2022                [employer] personal system     Downloaded and reviewed    Dated Jan. 8 to Jan. 21, 2022       Average bg 192    Time in target 40 %     High 51%     Very high 9%     Low 0 %     Very low 0%       No changes has occassional hyperglycemia with high carbs               Taking Metformin 1000 mg po BID--- keep        Taking Jardiance 25 mg one daily -keep        Januvia 100 mg daily ---- stop not effective             taking Ozempic 1mg once weekly --keep     Side effects discussed, try to eat half your plate not a full plate     If nauseated eat less     If vomiting or abdominal pain stop medication            taking Toujeo 40 units once daily-increase  to 44 units         We discussed how to self titrate the Toujeo dose using his CGM     Mealtime insulin--- uses 3 times a day before meals     Patient is using Afrezza-- 24 units before each meal             Plus sliding scale               Patient uses CGM data and his food intake to determine what dose of insulin he needs to use for meals               Melonie has allowed the patient to minimize hypoglycemia as alarms have predicted and avoided them     He also uses the arrows on the melonie as follows:     If arrow is horizontal , he uses the predicted dosage     If the arrow is slanted up or down--he increase or decrease by 4 units     If the arrow is vertical up or down -he increases or decreases by 4 units                       Lipid Management        Taking Lipitor 40 mg one at night         Total Cholesterol   Date Value Ref Range Status   02/08/2022 116 0 - 200 mg/dL Final     Triglycerides   Date Value Ref Range Status   02/08/2022 387 (H) 0 - 150 mg/dL Final     HDL Cholesterol   Date  Value Ref Range Status   02/08/2022 32 (L) 40 - 60 mg/dL Final     LDL Cholesterol    Date Value Ref Range Status   02/08/2022 29 0 - 100 mg/dL Final     Vascepa 2 gm po BID--could not take     Will call in tricor         Blood Pressure Management     Taking Lisinopril 40 mg once daily      Taking Metoprolol 50 mg daily            Microvascular Complication Monitoring        Last eye exam -- Jan. 2020 no DR               Component      Latest Ref Rng & Units 8/10/2021 8/10/2021           3:52 PM  3:52 PM   Protein/Creatinine Ratio             Creatinine, Urine      mg/dL 44.9 43.8   Total Protein, Urine      mg/dL <4.0     Microalbumin/Creatinine Ratio             Microalbumin, Urine      mg/dL   <1.2                  Neuropathy mild      Prefers no RX at this time         Bone Health      vitamin d def.         Start otc vitamin d daily      Component      Latest Ref Rng & Units 2/8/2022   25 Hydroxy, Vitamin D      ng/ml 28.0           Other Diabetes Related Aspects     Lab Results   Component Value Date    JJHGWCCS30 454 08/10/2021                   Thyroid Health        Lab Results   Component Value Date    TSH 1.200 02/08/2022                  Weight Management:     Obesity     Patient's Body mass index is 42.19 kg/m². indicating that he is obese (BMI >30). Obesity-related health conditions include the following: diabetes mellitus. Obesity is unchanged. BMI is is above average; no BMI management plan is appropriate. We discussed portion control and increasing exercise..                               Follow Up   Return in about 3 months (around 5/14/2022) for Recheck.  Patient was given instructions and counseling regarding his condition or for health maintenance advice. Please see specific information pulled into the AVS if appropriate.         This document has been electronically signed by RAJAT Doyle on February 14, 2022 11:31 CST.

## 2022-02-16 ENCOUNTER — OFFICE VISIT (OUTPATIENT)
Dept: PODIATRY | Facility: CLINIC | Age: 52
End: 2022-02-16

## 2022-02-16 VITALS — HEART RATE: 91 BPM | HEIGHT: 67 IN | BODY MASS INDEX: 42.22 KG/M2 | WEIGHT: 269 LBS | OXYGEN SATURATION: 98 %

## 2022-02-16 DIAGNOSIS — L60.0 INGROWN TOENAIL: Primary | ICD-10-CM

## 2022-02-16 PROCEDURE — 99212 OFFICE O/P EST SF 10 MIN: CPT | Performed by: PODIATRIST

## 2022-02-16 NOTE — PROGRESS NOTES
"Kingsley Littlejohn  1970  52 y.o. male  PCP - Bonilla Stanton MD:  - 4/27/21  BS - 224 per patient     Bilateral hallux nail problem.     02/16/2022     Chief Complaint   Patient presents with   • Right Foot - Follow-up, Nail Problem   • Left Foot - Follow-up, Nail Problem       History of Present Illness    Patient presents with concern for right and left great ingrowing toenails.  Patient states he developed blisters about 4 weeks ago to both great toes.  When they did not heal he was evaluated and placed on antibiotics.  He has been soaking and dressing and taking antibiotics.  There have been significant improvement to the toes per patient. No other complaints.     Past Medical History:   Diagnosis Date   • Abdominal pain    • Allergic rhinitis    • Diabetes mellitus (HCC)     Prev HbA1c 14.0 2014, states \"I'm cured!\"   • Essential hypertension    • Fracture closed, fibula, shaft     tib-fib   • Onychomycosis    • Sleep apnea          Past Surgical History:   Procedure Laterality Date   • ANKLE SURGERY      right and left   • COLONOSCOPY N/A 6/4/2018    Procedure: COLONOSCOPY;  Surgeon: Jaylon Castro MD;  Location: [facility] ENDOSCOPY;  Service: Gastroenterology   • COLONOSCOPY N/A 8/13/2021    Procedure: COLONOSCOPY;  Surgeon: Jaylon Castro MD;  Location: [facility] ENDOSCOPY;  Service: General;  Laterality: N/A;   • CYST REMOVAL      x 3, from hand, thigh, and buttocks   • ENDOSCOPY      20 years ago   • TIBIA FRACTURE SURGERY Bilateral 2011    tib-fib         Family History   Problem Relation Age of Onset   • Diabetes Other    • Hypertension Other    • Cancer Other    • Lung disease Other    • Thyroid disease Other    • Bleeding Disorder Other    • Colon cancer Paternal Grandmother    • Cancer Paternal Grandfather    • Crohn's disease Mother    • Diabetes Mother    • Hypertension Father    • Lung disease Father    • Hypertension Brother    • Hypertension Brother    • Hypertension Brother        Allergies "   Allergen Reactions   • Latex Irritability   • Codeine Nausea Only       Social History     Socioeconomic History   • Marital status: Single   Tobacco Use   • Smoking status: Former Smoker     Packs/day: 1.00     Years: 25.00     Pack years: 25.00     Types: Cigarettes     Start date: 1992     Quit date: 12/2018     Years since quitting: 3.2   • Smokeless tobacco: Never Used   Vaping Use   • Vaping Use: Never used   Substance and Sexual Activity   • Alcohol use: Defer     Comment: FORMER pint of whiskey daily, states quit 1 year ago   • Drug use: Defer     Comment: patient reports prior drug abuse, but sober for 8 months now.   • Sexual activity: Defer         Current Outpatient Medications   Medication Sig Dispense Refill   • aspirin 325 MG tablet Take 325 mg by mouth Daily.     • atorvastatin (LIPITOR) 40 MG tablet TAKE 1 TABLET BY MOUTH EVERY DAY 90 tablet 3   • BD Pen Needle Nola 2nd Gen 32G X 4 MM misc INJECT ONCE WALTON 100 each 11   • BD Pen Needle Nola U/F 32G X 4 MM misc INJECT ONCE WALTON     • Blood Glucose Monitoring Suppl (ONE TOUCH ULTRA 2) w/Device kit Use 1 daily to test blood glucose level. 1 each 5   • Continuous Blood Gluc  (FreeStyle Melonie 2 [place]) device 1 each Continuous. 1 each 1   • Continuous Blood Gluc Sensor (FreeStyle Melonie 2 Sensor) misc 1 each Every 14 (Fourteen) Days. 3 each 11   • fenofibrate (Tricor) 145 MG tablet Take 1 tablet by mouth Daily. 30 tablet 11   • fluticasone (FLONASE) 50 MCG/ACT nasal spray 2 sprays by Each Nare route Daily. 48 mL 0   • glucose blood test strip USE TO TEST BLOOD SUGAR 1 TIME DAILY E11.9 100 each 12   • glucose monitor monitoring kit 1 each As Needed (Diabetes). USE TO TEST 1 TIME DAILY 1 each 0   • hydrOXYzine (ATARAX) 50 MG tablet Take 50 mg by mouth 4 (Four) Times a Day.     • ibuprofen (ADVIL,MOTRIN) 600 MG tablet Take 1 tablet by mouth Every 6 (Six) Hours As Needed for Mild Pain . 15 tablet 0   • Insulin Glargine, 2 Unit Dial, (Toujeo Max  SoloStar) 300 UNIT/ML solution pen-injector injection Inject 40 Units under the skin into the appropriate area as directed Daily. 8 pen 11   • Insulin Regular Human 4 & 8 & 12 units powder Inhale 4-32 Units 3 (Three) Times a Day With Meals. 4-32 units TID , max dose 96 units daily 360 each 11   • Invega Sustenna 117 MG/0.75ML suspension prefilled syringe IM injection INJECT ONE HUNDRED SEVENTEEN (117) MILLIGRAMS INTO THE MUSCLE EVERY 4 WEEKS     • Jardiance 25 MG tablet tablet TAKE 25 MG BY MOUTH DAILY. 90 tablet 1   • Lancets (ACCU-CHEK SOFT TOUCH) lancets Use TO TEST BLOOD SUGAR 1 TIME DAILY. 100 each 12   • lisinopril (PRINIVIL,ZESTRIL) 40 MG tablet TAKE 1 TABLET BY MOUTH EVERY DAY 90 tablet 3   • metFORMIN (GLUCOPHAGE) 1000 MG tablet TAKE 1 TABLET BY MOUTH TWICE A DAY WITH MEALS 180 tablet 2   • metoprolol tartrate (LOPRESSOR) 100 MG tablet TAKE 1 TAB(S) 2 TIMES A DAY     • nitroglycerin (NITROSTAT) 0.4 MG SL tablet Place 1 tablet under the tongue Every 5 (Five) Minutes As Needed for Chest Pain. Take no more than 3 doses in 15 minutes. 30 tablet 12   • Ozempic, 1 MG/DOSE, 4 MG/3ML solution pen-injector Inject 1 mg under the skin into the appropriate area as directed Every 7 (Seven) Days.     • paliperidone palmitate (INVEGA SUSTENNA) 117 MG/0.75ML suspension IM injection Inject 117 mg into the shoulder, thigh, or buttocks Every 30 (Thirty) Days.     • sodium chloride (OCEAN NASAL SPRAY) 0.65 % nasal spray 1 spray into each nostril As Needed for Congestion. 60 mL 12   • traZODone (DESYREL) 150 MG tablet TAKE 1 TABLET BY MOUTH EVERY DAY AT BEDTIME AS NEEDED  2   • traZODone (DESYREL) 50 MG tablet        No current facility-administered medications for this visit.       Review of Systems   Constitutional: Negative.    HENT: Negative.    Eyes: Negative.    Respiratory: Negative.    Cardiovascular: Positive for leg swelling.   Gastrointestinal: Negative.    Musculoskeletal:        Toe pain    Skin: Negative for color  "change.   Allergic/Immunologic: Negative.    Neurological: Positive for numbness.   Hematological: Negative.    Psychiatric/Behavioral: Negative.          OBJECTIVE    Pulse 91   Ht 170.2 cm (67\")   Wt 122 kg (269 lb)   SpO2 98%   BMI 42.13 kg/m²       Physical Exam   Constitutional: He is oriented to person, place, and time. He appears well-developed.   HENT:   Head: Normocephalic and atraumatic.   Pulmonary/Chest: Effort normal. No respiratory distress.   Musculoskeletal: Normal range of motion. No deformity.   Neurological: He is alert and oriented to person, place, and time.   Skin: Skin is warm and dry. Capillary refill takes less than 2 seconds. He is not diaphoretic. No erythema.   Psychiatric: His behavior is normal.   Vitals reviewed.      Gait: Normal     Assistive Device: None    Lower Extremity    Cardiovascular:    DP/PT pulses palpable bilateral  CFT brisk  to all digits  Skin temp is warm to warm from proximal tibia to distal digits bilateral  Pedal hair growth present.   Musculoskeletal:  Muscle strength is 5/5 for all muscle groups tested   ROM of the 1st MTP is full without pain or crepitus bilateral  ROM of the ankle joint is full without pain or crepitus  bilateral  Dermatological:   Skin is warm, dry and intact bilateral  Webspaces 1-4 bilateral are clean, dry and intact.   Hyperkeratotic tissue noted to medial IPJ of bilateral hallux.  Neurological:   Protective sensation decreased   Sensation intact to light touch        Procedures        ASSESSMENT AND PLAN    Diagnoses and all orders for this visit:    1. Ingrown toenail (Primary)        -Hallux nails appear normal, healthy without signs of infection at this time.  No treatment necessary at this time.  -All questions answered  -Recheck at next scheduled follow-up          This document has been electronically signed by Abhijeet Thomason DPM on February 20, 2022 11:29 CST     2/20/2022  11:29 CST    "

## 2022-02-24 ENCOUNTER — DOCUMENTATION (OUTPATIENT)
Dept: ENDOCRINOLOGY | Facility: CLINIC | Age: 52
End: 2022-02-24

## 2022-03-14 ENCOUNTER — OFFICE VISIT (OUTPATIENT)
Dept: PODIATRY | Facility: CLINIC | Age: 52
End: 2022-03-14

## 2022-03-14 VITALS — HEIGHT: 67 IN | BODY MASS INDEX: 42.22 KG/M2 | WEIGHT: 269 LBS | HEART RATE: 85 BPM | OXYGEN SATURATION: 100 %

## 2022-03-14 DIAGNOSIS — M79.675 CHRONIC TOE PAIN, BILATERAL: ICD-10-CM

## 2022-03-14 DIAGNOSIS — B35.1 ONYCHOMYCOSIS: ICD-10-CM

## 2022-03-14 DIAGNOSIS — G89.29 CHRONIC TOE PAIN, BILATERAL: ICD-10-CM

## 2022-03-14 DIAGNOSIS — E11.42 TYPE 2 DIABETES MELLITUS WITH PERIPHERAL NEUROPATHY: Primary | ICD-10-CM

## 2022-03-14 DIAGNOSIS — M79.674 CHRONIC TOE PAIN, BILATERAL: ICD-10-CM

## 2022-03-14 DIAGNOSIS — L84 CORNS: ICD-10-CM

## 2022-03-14 PROCEDURE — 11721 DEBRIDE NAIL 6 OR MORE: CPT | Performed by: PODIATRIST

## 2022-03-14 PROCEDURE — 11055 PARING/CUTG B9 HYPRKER LES 1: CPT | Performed by: PODIATRIST

## 2022-03-14 NOTE — PROGRESS NOTES
"Kingsley Littlejohn  1970  52 y.o. male  PCP - Bonilla Stanton MD:  - 4/27/21  BS - 190 per patient     Diabetic foot care    03/14/2022     Chief Complaint   Patient presents with   • Left Foot - Follow-up     Diabetic nail care   • Right Foot - Follow-up     Diabetic nail care       History of Present Illness    Patient presents for routine diabetic foot care.    Past Medical History:   Diagnosis Date   • Abdominal pain    • Allergic rhinitis    • Diabetes mellitus (HCC)     Prev HbA1c 14.0 2014, states \"I'm cured!\"   • Essential hypertension    • Fracture closed, fibula, shaft     tib-fib   • Onychomycosis    • Sleep apnea          Past Surgical History:   Procedure Laterality Date   • ANKLE SURGERY      right and left   • COLONOSCOPY N/A 6/4/2018    Procedure: COLONOSCOPY;  Surgeon: Jaylon Castro MD;  Location: [facility] ENDOSCOPY;  Service: Gastroenterology   • COLONOSCOPY N/A 8/13/2021    Procedure: COLONOSCOPY;  Surgeon: Jaylon Castro MD;  Location: [facility] ENDOSCOPY;  Service: General;  Laterality: N/A;   • CYST REMOVAL      x 3, from hand, thigh, and buttocks   • ENDOSCOPY      20 years ago   • TIBIA FRACTURE SURGERY Bilateral 2011    tib-fib         Family History   Problem Relation Age of Onset   • Diabetes Other    • Hypertension Other    • Cancer Other    • Lung disease Other    • Thyroid disease Other    • Bleeding Disorder Other    • Colon cancer Paternal Grandmother    • Cancer Paternal Grandfather    • Crohn's disease Mother    • Diabetes Mother    • Hypertension Father    • Lung disease Father    • Hypertension Brother    • Hypertension Brother    • Hypertension Brother        Allergies   Allergen Reactions   • Latex Irritability   • Codeine Nausea Only       Social History     Socioeconomic History   • Marital status: Single   Tobacco Use   • Smoking status: Former Smoker     Packs/day: 1.00     Years: 25.00     Pack years: 25.00     Types: Cigarettes     Start date: 1992     Quit date: " 12/2018     Years since quitting: 3.2   • Smokeless tobacco: Never Used   Vaping Use   • Vaping Use: Never used   Substance and Sexual Activity   • Alcohol use: Defer     Comment: FORMER pint of whiskey daily, states quit 1 year ago   • Drug use: Defer     Comment: patient reports prior drug abuse, but sober for 8 months now.   • Sexual activity: Defer         Current Outpatient Medications   Medication Sig Dispense Refill   • aspirin 325 MG tablet Take 325 mg by mouth Daily.     • atorvastatin (LIPITOR) 40 MG tablet TAKE 1 TABLET BY MOUTH EVERY DAY 90 tablet 3   • BD Pen Needle Nola 2nd Gen 32G X 4 MM misc INJECT ONCE WALTON 100 each 11   • BD Pen Needle Nola U/F 32G X 4 MM misc INJECT ONCE WALTON     • Blood Glucose Monitoring Suppl (ONE TOUCH ULTRA 2) w/Device kit Use 1 daily to test blood glucose level. 1 each 5   • Continuous Blood Gluc  (FreeStyle Melonie 2 [place]) device 1 each Continuous. 1 each 1   • Continuous Blood Gluc Sensor (FreeStyle Melonie 2 Sensor) misc 1 each Every 14 (Fourteen) Days. 3 each 11   • fenofibrate (Tricor) 145 MG tablet Take 1 tablet by mouth Daily. 30 tablet 11   • fluticasone (FLONASE) 50 MCG/ACT nasal spray 2 sprays by Each Nare route Daily. 48 mL 0   • glucose blood test strip USE TO TEST BLOOD SUGAR 1 TIME DAILY E11.9 100 each 12   • glucose monitor monitoring kit 1 each As Needed (Diabetes). USE TO TEST 1 TIME DAILY 1 each 0   • hydrOXYzine (ATARAX) 50 MG tablet Take 50 mg by mouth 4 (Four) Times a Day.     • ibuprofen (ADVIL,MOTRIN) 600 MG tablet Take 1 tablet by mouth Every 6 (Six) Hours As Needed for Mild Pain . 15 tablet 0   • Insulin Glargine, 2 Unit Dial, (Toujeo Max SoloStar) 300 UNIT/ML solution pen-injector injection Inject 40 Units under the skin into the appropriate area as directed Daily. 8 pen 11   • Insulin Regular Human 4 & 8 & 12 units powder Inhale 4-32 Units 3 (Three) Times a Day With Meals. 4-32 units TID , max dose 96 units daily 360 each 11   • Invega  "Sustenna 117 MG/0.75ML suspension prefilled syringe IM injection INJECT ONE HUNDRED SEVENTEEN (117) MILLIGRAMS INTO THE MUSCLE EVERY 4 WEEKS     • Jardiance 25 MG tablet tablet TAKE 25 MG BY MOUTH DAILY. 90 tablet 1   • Lancets (ACCU-CHEK SOFT TOUCH) lancets Use TO TEST BLOOD SUGAR 1 TIME DAILY. 100 each 12   • lisinopril (PRINIVIL,ZESTRIL) 40 MG tablet TAKE 1 TABLET BY MOUTH EVERY DAY 90 tablet 3   • metFORMIN (GLUCOPHAGE) 1000 MG tablet TAKE 1 TABLET BY MOUTH TWICE A DAY WITH MEALS 180 tablet 2   • metoprolol tartrate (LOPRESSOR) 100 MG tablet TAKE 1 TAB(S) 2 TIMES A DAY     • nitroglycerin (NITROSTAT) 0.4 MG SL tablet Place 1 tablet under the tongue Every 5 (Five) Minutes As Needed for Chest Pain. Take no more than 3 doses in 15 minutes. 30 tablet 12   • Ozempic, 1 MG/DOSE, 4 MG/3ML solution pen-injector Inject 1 mg under the skin into the appropriate area as directed Every 7 (Seven) Days.     • paliperidone palmitate (INVEGA SUSTENNA) 117 MG/0.75ML suspension IM injection Inject 117 mg into the shoulder, thigh, or buttocks Every 30 (Thirty) Days.     • sodium chloride (OCEAN NASAL SPRAY) 0.65 % nasal spray 1 spray into each nostril As Needed for Congestion. 60 mL 12   • traZODone (DESYREL) 150 MG tablet TAKE 1 TABLET BY MOUTH EVERY DAY AT BEDTIME AS NEEDED  2   • traZODone (DESYREL) 50 MG tablet        No current facility-administered medications for this visit.       Review of Systems   Constitutional: Negative.    HENT: Negative.    Eyes: Negative.    Respiratory: Negative.    Cardiovascular: Positive for leg swelling.   Gastrointestinal: Negative.    Musculoskeletal:        Toe pain    Skin: Negative for color change.   Allergic/Immunologic: Negative.    Neurological: Positive for numbness.   Hematological: Negative.    Psychiatric/Behavioral: Negative.          OBJECTIVE    Pulse 85   Ht 170.2 cm (67\")   Wt 122 kg (269 lb)   SpO2 100%   BMI 42.13 kg/m²       Physical Exam   Constitutional: He is oriented " to person, place, and time. He appears well-developed.   HENT:   Head: Normocephalic and atraumatic.   Pulmonary/Chest: Effort normal. No respiratory distress.   Musculoskeletal: Normal range of motion. No deformity.   Neurological: He is alert and oriented to person, place, and time.   Skin: Skin is warm and dry. Capillary refill takes less than 2 seconds. He is not diaphoretic. No erythema.   Psychiatric: His behavior is normal.   Vitals reviewed.      Gait: Normal     Assistive Device: None    Lower Extremity    Cardiovascular:    DP/PT pulses palpable bilateral  CFT brisk  to all digits  Skin temp is warm to warm from proximal tibia to distal digits bilateral  Pedal hair growth present.   Musculoskeletal:  Muscle strength is 5/5 for all muscle groups tested   ROM of the 1st MTP is full without pain or crepitus bilateral  ROM of the ankle joint is full without pain or crepitus  bilateral  Dermatological:   Skin is warm, dry and intact bilateral  Webspaces 1-4 bilateral are clean, dry and intact.   Hyperkeratotic tissue noted to medial IPJ of right hallux.  Toenails 1 through 5 bilateral are thickened, discolored, elongated with subungual.  Pain on palpation to the nail plates.  Neurological:   Protective sensation absent 3 out of 10 sites bilateral  Sensation intact to light touch        Procedures        ASSESSMENT AND PLAN    Diagnoses and all orders for this visit:    1. Type 2 diabetes mellitus with peripheral neuropathy (HCC) (Primary)    2. Onychomycosis    3. Chronic toe pain, bilateral    4. Corns        - Nails 1-5 bilateral were debrided in length and thickness with nail nipper and electric  to decrease fungal load and risk of infection.  An ABN was signed prior to nail debridement.   - Trimmed hyperkeratotic lesions noted in objective with a #15 blade without incident.   - all questions answered  - gallo 3 months           This document has been electronically signed by Abhijeet Thomason DPM on  March 14, 2022 14:46 CDT     3/14/2022  14:46 CDT

## 2022-04-22 NOTE — TELEPHONE ENCOUNTER
Pt needs refills on Afrezza powder sent to [facility] in [facility].      Thanks

## 2022-05-13 ENCOUNTER — LAB (OUTPATIENT)
Dept: LAB | Facility: HOSPITAL | Age: 52
End: 2022-05-13

## 2022-05-13 DIAGNOSIS — E55.9 VITAMIN D DEFICIENCY: ICD-10-CM

## 2022-05-13 DIAGNOSIS — E11.65 TYPE 2 DIABETES MELLITUS WITH HYPERGLYCEMIA, WITHOUT LONG-TERM CURRENT USE OF INSULIN: ICD-10-CM

## 2022-05-13 DIAGNOSIS — I10 ESSENTIAL HYPERTENSION: ICD-10-CM

## 2022-05-13 DIAGNOSIS — E78.49 OTHER HYPERLIPIDEMIA: ICD-10-CM

## 2022-05-13 LAB
25(OH)D3 SERPL-MCNC: 22.7 NG/ML (ref 30–100)
ALBUMIN UR-MCNC: <1.2 MG/DL
BASOPHILS # BLD AUTO: 0.09 10*3/MM3 (ref 0–0.2)
BASOPHILS NFR BLD AUTO: 1.3 % (ref 0–1.5)
CHOLEST SERPL-MCNC: 141 MG/DL (ref 0–200)
CREAT UR-MCNC: 23.8 MG/DL
DEPRECATED RDW RBC AUTO: 42.1 FL (ref 37–54)
EOSINOPHIL # BLD AUTO: 0.14 10*3/MM3 (ref 0–0.4)
EOSINOPHIL NFR BLD AUTO: 2 % (ref 0.3–6.2)
ERYTHROCYTE [DISTWIDTH] IN BLOOD BY AUTOMATED COUNT: 16.1 % (ref 12.3–15.4)
HBA1C MFR BLD: 10.5 % (ref 4.8–5.6)
HCT VFR BLD AUTO: 44 % (ref 37.5–51)
HDLC SERPL-MCNC: 32 MG/DL (ref 40–60)
HGB BLD-MCNC: 14.3 G/DL (ref 13–17.7)
IMM GRANULOCYTES # BLD AUTO: 0.06 10*3/MM3 (ref 0–0.05)
IMM GRANULOCYTES NFR BLD AUTO: 0.9 % (ref 0–0.5)
LDL/HDL RATIO NULL: ABNORMAL
LDLC SERPL CALC-MCNC: 26 MG/DL (ref 0–100)
LYMPHOCYTES # BLD AUTO: 1.68 10*3/MM3 (ref 0.7–3.1)
LYMPHOCYTES NFR BLD AUTO: 24.4 % (ref 19.6–45.3)
MCH RBC QN AUTO: 24.2 PG (ref 26.6–33)
MCHC RBC AUTO-ENTMCNC: 32.5 G/DL (ref 31.5–35.7)
MCV RBC AUTO: 74.6 FL (ref 79–97)
MICROALBUMIN/CREAT UR: NORMAL MG/G{CREAT}
MONOCYTES # BLD AUTO: 0.59 10*3/MM3 (ref 0.1–0.9)
MONOCYTES NFR BLD AUTO: 8.6 % (ref 5–12)
NEUTROPHILS NFR BLD AUTO: 4.32 10*3/MM3 (ref 1.7–7)
NEUTROPHILS NFR BLD AUTO: 62.8 % (ref 42.7–76)
NRBC BLD AUTO-RTO: 0 /100 WBC (ref 0–0.2)
PLATELET # BLD AUTO: 245 10*3/MM3 (ref 140–450)
PMV BLD AUTO: 10.6 FL (ref 6–12)
RBC # BLD AUTO: 5.9 10*6/MM3 (ref 4.14–5.8)
TRIGL SERPL-MCNC: 623 MG/DL (ref 0–150)
TSH SERPL DL<=0.05 MIU/L-ACNC: 1.49 UIU/ML (ref 0.27–4.2)
VLDLC SERPL-MCNC: 83 MG/DL (ref 5–40)
WBC NRBC COR # BLD: 6.88 10*3/MM3 (ref 3.4–10.8)

## 2022-05-13 PROCEDURE — 84443 ASSAY THYROID STIM HORMONE: CPT

## 2022-05-13 PROCEDURE — 82043 UR ALBUMIN QUANTITATIVE: CPT

## 2022-05-13 PROCEDURE — 82570 ASSAY OF URINE CREATININE: CPT

## 2022-05-13 PROCEDURE — 82306 VITAMIN D 25 HYDROXY: CPT

## 2022-05-13 PROCEDURE — 83036 HEMOGLOBIN GLYCOSYLATED A1C: CPT

## 2022-05-13 PROCEDURE — 36415 COLL VENOUS BLD VENIPUNCTURE: CPT

## 2022-05-13 PROCEDURE — 85025 COMPLETE CBC W/AUTO DIFF WBC: CPT

## 2022-05-13 PROCEDURE — 80061 LIPID PANEL: CPT

## 2022-05-17 ENCOUNTER — OFFICE VISIT (OUTPATIENT)
Dept: ENDOCRINOLOGY | Facility: CLINIC | Age: 52
End: 2022-05-17

## 2022-05-17 VITALS
HEIGHT: 67 IN | OXYGEN SATURATION: 97 % | WEIGHT: 268.6 LBS | BODY MASS INDEX: 42.16 KG/M2 | HEART RATE: 102 BPM | DIASTOLIC BLOOD PRESSURE: 88 MMHG | SYSTOLIC BLOOD PRESSURE: 146 MMHG

## 2022-05-17 DIAGNOSIS — I10 ESSENTIAL HYPERTENSION: ICD-10-CM

## 2022-05-17 DIAGNOSIS — E11.65 TYPE 2 DIABETES MELLITUS WITH HYPERGLYCEMIA, WITHOUT LONG-TERM CURRENT USE OF INSULIN: Primary | ICD-10-CM

## 2022-05-17 DIAGNOSIS — E55.9 VITAMIN D DEFICIENCY: ICD-10-CM

## 2022-05-17 DIAGNOSIS — E78.49 OTHER HYPERLIPIDEMIA: ICD-10-CM

## 2022-05-17 PROCEDURE — 99214 OFFICE O/P EST MOD 30 MIN: CPT | Performed by: NURSE PRACTITIONER

## 2022-05-17 PROCEDURE — 95251 CONT GLUC MNTR ANALYSIS I&R: CPT | Performed by: NURSE PRACTITIONER

## 2022-05-17 RX ORDER — SEMAGLUTIDE 2.68 MG/ML
2 INJECTION, SOLUTION SUBCUTANEOUS WEEKLY
Qty: 12 PEN | Refills: 3 | Status: SHIPPED | OUTPATIENT
Start: 2022-05-17 | End: 2022-10-20 | Stop reason: SDUPTHER

## 2022-05-17 NOTE — PROGRESS NOTES
"Chief Complaint  Diabetes    Subjective          Kingsley Littlejohn presents to [facility] ENDOCRINOLOGY  History of Present Illness     In office visit    Primary provider Bonilla Stanton MD      51 year old male presents for follow up      Reason diabetes mellitus type 2     Diagnosed in 2014      Timing constant      Quality improved control         Severity is high     Macrovascular complications CAD, MI in the past     Microvascular complications neuropathy     Current diabetes regimen insulin and GLP-1     Taking basal and mealtime insulin total 4 shots daily     Current glucose monitoring     Checks 4-10 times daily     Uses a oumar personal system     See below       Diet    Very high carb     Does not always take his medications, states only uses the afreeza if sugar is high           Objective   Vital Signs:   /88   Pulse 102   Ht 170.2 cm (67\")   Wt 122 kg (268 lb 9.6 oz)   SpO2 97%   BMI 42.07 kg/m²     Physical Exam  Constitutional:       Appearance: Normal appearance.   Cardiovascular:      Rate and Rhythm: Regular rhythm.      Heart sounds: Normal heart sounds.   Pulmonary:      Breath sounds: Normal breath sounds.   Musculoskeletal:         General: Normal range of motion.      Cervical back: Normal range of motion.   Neurological:      Mental Status: He is alert.        Result Review :   The following data was reviewed by: RAJAT Doyle on 05/17/2022:  Common labs    Common Labsle 11/8/21 11/8/21 11/8/21 11/8/21 11/8/21 2/8/22 2/8/22 2/8/22 2/8/22 5/13/22 5/13/22 5/13/22 5/13/22    1527 1527 1527 1527 1635 1427 1427 1427 1427 1235 1235 1235 1337   Glucose    139 (A)    126 (A)        BUN    16    16        Creatinine    0.71 (A)    0.98        eGFR Non  Am    116    80        Sodium    137    135 (A)        Potassium    4.1    3.8        Chloride    101    101        Calcium    8.8    8.7        Albumin    4.20    4.10        Total Bilirubin  "   0.5    0.5        Alkaline Phosphatase    63    75        AST (SGOT)    24    16        ALT (SGPT)    45 (A)    28        WBC 8.48     8.88    6.88      Hemoglobin 15.0     14.9    14.3      Hematocrit 45.8     45.4    44.0      Platelets 223     213    245      Total Cholesterol   89    116    141     Triglycerides   253 (A)    387 (A)    623 (A)     HDL Cholesterol   32 (A)    32 (A)    32 (A)     LDL Cholesterol    19    29    26     Hemoglobin A1C  9.00 (A)       8.90 (A)   10.50 (A)    Microalbumin, Urine     <1.2        <1.2   (A) Abnormal value                      Assessment and Plan    Diagnoses and all orders for this visit:    1. Type 2 diabetes mellitus with hyperglycemia, without long-term current use of insulin (HCC) (Primary)  -     CBC & Differential; Future  -     Comprehensive Metabolic Panel; Future  -     Hemoglobin A1c; Future  -     Lipid Panel; Future  -     Microalbumin / Creatinine Urine Ratio - Urine, Clean Catch; Future  -     TSH; Future  -     Vitamin D 25 Hydroxy; Future  -     Vitamin B12; Future    2. Essential hypertension  -     CBC & Differential; Future  -     Comprehensive Metabolic Panel; Future  -     Hemoglobin A1c; Future  -     Lipid Panel; Future  -     Microalbumin / Creatinine Urine Ratio - Urine, Clean Catch; Future  -     TSH; Future  -     Vitamin D 25 Hydroxy; Future  -     Vitamin B12; Future    3. Other hyperlipidemia  -     CBC & Differential; Future  -     Comprehensive Metabolic Panel; Future  -     Hemoglobin A1c; Future  -     Lipid Panel; Future  -     Microalbumin / Creatinine Urine Ratio - Urine, Clean Catch; Future  -     TSH; Future  -     Vitamin D 25 Hydroxy; Future  -     Vitamin B12; Future    4. Vitamin D deficiency  -     CBC & Differential; Future  -     Comprehensive Metabolic Panel; Future  -     Hemoglobin A1c; Future  -     Lipid Panel; Future  -     Microalbumin / Creatinine Urine Ratio - Urine, Clean Catch; Future  -     TSH; Future  -      Vitamin D 25 Hydroxy; Future  -     Vitamin B12; Future    Other orders  -     Semaglutide, 2 MG/DOSE, (Ozempic, 2 MG/DOSE,) 8 MG/3ML solution pen-injector; Inject 2 mg under the skin into the appropriate area as directed 1 (One) Time Per Week.  Dispense: 12 pen; Refill: 3             Glycemic Management     Type 2 diabetes not controlled with hyperglycemia      Lab Results   Component Value Date    HGBA1C 10.50 (H) 05/13/2022             Educents personal system     Downloaded and reviewed     Dated from May 4 to may 17, 2022      average 311       Target range 0 %     High 15%     Very high 85%     Low 0 %     Very low 0 %       He high all the time     Not giving mealtime eating high carb     Increase Ozempic     Give mealtime before all meals     We discussed decreasing his carb intake     He must take his medications           Taking Metformin 1000 mg po BID--- keep        Taking Jardiance 25 mg one daily -keep                      taking Ozempic 1mg once weekly - increase to 2 mg once weekly      Side effects discussed, try to eat half your plate not a full plate     If nauseated eat less     If vomiting or abdominal pain stop medication            taking Toujeo 40 units once daily-increase  to 44 units         We discussed how to self titrate the Toujeo dose using his CGM     Mealtime insulin--- uses 3 times a day before meals     using Afrezza-- 24 units before each meal     Give for all meals               Plus sliding scale          Januvia 100 mg daily ---- stop not effective           Patient uses CGM data and his food intake to determine what dose of insulin he needs to use for meals                 Melonie has allowed the patient to minimize hypoglycemia as alarms have predicted and avoided them     He also uses the arrows on the melonie as follows:     If arrow is horizontal , he uses the predicted dosage     If the arrow is slanted up or down--he increase or decrease by 4 units     If the arrow is vertical  up or down -he increases or decreases by 4 units                       Lipid Management        Taking Lipitor 40 mg one at night               Total Cholesterol   Date Value Ref Range Status   02/08/2022 116 0 - 200 mg/dL Final            Triglycerides   Date Value Ref Range Status   02/08/2022 387 (H) 0 - 150 mg/dL Final            HDL Cholesterol   Date Value Ref Range Status   02/08/2022 32 (L) 40 - 60 mg/dL Final            LDL Cholesterol    Date Value Ref Range Status   02/08/2022 29 0 - 100 mg/dL Final      Vascepa 2 gm po BID--could not take      Will call in tricor         Blood Pressure Management     Taking Lisinopril 40 mg once daily      Taking Metoprolol 50 mg daily            Microvascular Complication Monitoring        Last eye exam -- Jan. 2020 no DR                            Neuropathy mild      Prefers no RX at this time         Bone Health      vitamin d def.          vitamin d daily           Component      Latest Ref Rng & Units 5/13/2022   25 Hydroxy, Vitamin D      30.0 - 100.0 ng/ml 22.7 (L)        Other Diabetes Related Aspects       Lab Results   Component Value Date    LAGDGOPR70 454 08/10/2021                   Thyroid Health        Lab Results   Component Value Date    TSH 1.490 05/13/2022                   Weight Management:     Obesity        Body mass index is 42.07 kg/m².       has sleep apnea but is not using cpap at this time           Dietary changes discussed         Follow Up   Return in about 3 months (around 8/17/2022) for Recheck.  Patient was given instructions and counseling regarding his condition or for health maintenance advice. Please see specific information pulled into the AVS if appropriate.         This document has been electronically signed by RAJAT Doyle on May 17, 2022 14:31 CDT.

## 2022-05-24 ENCOUNTER — TELEPHONE (OUTPATIENT)
Dept: ENDOCRINOLOGY | Facility: CLINIC | Age: 52
End: 2022-05-24

## 2022-05-24 ENCOUNTER — OFFICE VISIT (OUTPATIENT)
Dept: ENDOCRINOLOGY | Facility: CLINIC | Age: 52
End: 2022-05-24

## 2022-05-24 VITALS — WEIGHT: 269.2 LBS | BODY MASS INDEX: 42.16 KG/M2

## 2022-05-24 DIAGNOSIS — E11.65 TYPE 2 DIABETES MELLITUS WITH HYPERGLYCEMIA, WITHOUT LONG-TERM CURRENT USE OF INSULIN: ICD-10-CM

## 2022-05-24 PROCEDURE — G0108 DIAB MANAGE TRN  PER INDIV: HCPCS | Performed by: DIETITIAN, REGISTERED

## 2022-05-24 RX ORDER — INSULIN GLARGINE 300 U/ML
40 INJECTION, SOLUTION SUBCUTANEOUS DAILY
Qty: 8 PEN | Refills: 11
Start: 2022-05-24 | End: 2022-12-12 | Stop reason: SDUPTHER

## 2022-05-24 NOTE — PROGRESS NOTES
"Kingsley Littlejohn is a 52 y.o. male referred for outpatient diabetes education by RAJAT Cruz. Patient attended individual education for 1 hour on 05/24/2022. Topics covered included:     1. Healthy Food Choices   i. Provided patient with detailed carbohydrate counting guide.    ii. Instructed patient to eat 45-60 60-75 grams of carbohydrate with each meal (3-4 4-5 exchange choices) and 15-30 grams of carb for snacks (1-2 exchange choices).   iii. Provided patient with list of non-starchy vegetables and foods that are low in carbohydrate for snacks and to incorporate with meals (Planning Healthy Meals Packet).    iv. Choose fruits, vegetables, whole grains, legumes, low-fat milk, fiber-rich foods, minimal saturated fats, and watch cholesterol and sodium intake.    v. Reviewed carbohydrate-containing foods, standard serving sizes, and measuring foods.   vi. Reviewed the difference between simple and complex carbohydrate. Encouraged patient to choose complex carbohydrates more often.      2. Pathophysiology of Diabetes   i. Explained pre-diabetes and diabetes. Reviewed disease process of type 2 diabetes.    ii. Explained common conditions associated with uncontrolled diabetes (diabetic neuropathy, retinopathy, nephropathy, heart disease, skin infections, and kidney disease)     3. Hyperglycemia/Hypoglycemia   i. Discussed signs, symptoms, and difference between hypo/hyperglycemia - and the proper treatment guidelines for both.    ii. Explained A1C and the average blood sugar that goes along with the A1C level.    iii. Discussed how to check blood sugar. Stated when checking blood sugar to check different times of the day to see if there is a pattern. For example: Check fasting blood glucose in the morning, check before lunch, check 2 hours after a meal, and at bedtime. Reminded to check blood sugar if ever feels jittery to know if blood sugar is high or low.        Provided handout \"Health Meal Planning\"  " Provided patient with Diabetes and You book, and Carb Counting and Meal Planning book. Provided handout of sample menu with carbs listed.      Thank you RAJAT Cruz for this referral.        Micah Perez< DALILA GAR, LD

## 2022-05-31 RX ORDER — SEMAGLUTIDE 1.34 MG/ML
INJECTION, SOLUTION SUBCUTANEOUS
Qty: 3 PEN | Refills: 3 | Status: SHIPPED | OUTPATIENT
Start: 2022-05-31 | End: 2022-09-21

## 2022-06-03 ENCOUNTER — TELEPHONE (OUTPATIENT)
Dept: ENDOCRINOLOGY | Facility: CLINIC | Age: 52
End: 2022-06-03

## 2022-06-03 NOTE — TELEPHONE ENCOUNTER
Pt was told by pharmacy that Ozempic 2 MG was not covered by insurance. Pt has enough samples of the 2 MG to last a month and would like to know if he needs to continue the samples and go back to 1 MG or what the next steps are?        Thanks

## 2022-06-06 ENCOUNTER — TELEPHONE (OUTPATIENT)
Dept: ENDOCRINOLOGY | Facility: CLINIC | Age: 52
End: 2022-06-06

## 2022-06-06 NOTE — TELEPHONE ENCOUNTER
PATIENT CALLED AND SAID HE SPOKE TO HIS PHARMACY IN REGARDS TO HIS MEDICATION Semaglutide, 2 MG/DOSE, (Ozempic, 2 MG/DOSE,) 8 MG/3ML solution pen-injector NEEDING A PRIOR AUTH AND HE SAID THAT THE PHARMACY HAS SENT THIS TO [facility]. IF QUESTIONS HIS NUMBER -[phone].        THANK YOU,      SAYRA

## 2022-06-15 ENCOUNTER — OFFICE VISIT (OUTPATIENT)
Dept: PODIATRY | Facility: CLINIC | Age: 52
End: 2022-06-15

## 2022-06-15 VITALS — HEART RATE: 96 BPM | HEIGHT: 67 IN | OXYGEN SATURATION: 98 % | BODY MASS INDEX: 42.22 KG/M2 | WEIGHT: 269 LBS

## 2022-06-15 DIAGNOSIS — M79.675 CHRONIC TOE PAIN, BILATERAL: ICD-10-CM

## 2022-06-15 DIAGNOSIS — E11.42 TYPE 2 DIABETES MELLITUS WITH PERIPHERAL NEUROPATHY: Primary | ICD-10-CM

## 2022-06-15 DIAGNOSIS — L84 CORNS: ICD-10-CM

## 2022-06-15 DIAGNOSIS — G89.29 CHRONIC TOE PAIN, BILATERAL: ICD-10-CM

## 2022-06-15 DIAGNOSIS — B35.1 ONYCHOMYCOSIS: ICD-10-CM

## 2022-06-15 DIAGNOSIS — M79.674 CHRONIC TOE PAIN, BILATERAL: ICD-10-CM

## 2022-06-15 PROCEDURE — 11721 DEBRIDE NAIL 6 OR MORE: CPT | Performed by: PODIATRIST

## 2022-06-15 PROCEDURE — 11055 PARING/CUTG B9 HYPRKER LES 1: CPT | Performed by: PODIATRIST

## 2022-06-15 NOTE — PROGRESS NOTES
"Kingsley Littlejohn  1970  52 y.o. male  PCP - Bonilla Stanton MD:  - 4/17/2022  BS - 190 per patient         6/15/2022    Chief Complaint   Patient presents with   • Left Foot - Follow-up     Diabetic foot care    • Right Foot - Follow-up     Diabetic foot care        History of Present Illness    Patient presents for routine diabetic foot care.    Past Medical History:   Diagnosis Date   • Abdominal pain    • Allergic rhinitis    • Diabetes mellitus (HCC)     Prev HbA1c 14.0 2014, states \"I'm cured!\"   • Essential hypertension    • Fracture closed, fibula, shaft     tib-fib   • Onychomycosis    • Sleep apnea          Past Surgical History:   Procedure Laterality Date   • ANKLE SURGERY      right and left   • COLONOSCOPY N/A 6/4/2018    Procedure: COLONOSCOPY;  Surgeon: Jaylon Castro MD;  Location: [facility] [place] ENDOSCOPY;  Service: Gastroenterology   • COLONOSCOPY N/A 8/13/2021    Procedure: COLONOSCOPY;  Surgeon: Jaylon Castro MD;  Location: [facility] [place] ENDOSCOPY;  Service: General;  Laterality: N/A;   • CYST REMOVAL      x 3, from hand, thigh, and buttocks   • ENDOSCOPY      20 years ago   • TIBIA FRACTURE SURGERY Bilateral 2011    tib-fib         Family History   Problem Relation Age of Onset   • Diabetes Other    • Hypertension Other    • Cancer Other    • Lung disease Other    • Thyroid disease Other    • Bleeding Disorder Other    • Colon cancer Paternal Grandmother    • Cancer Paternal Grandfather    • Crohn's disease Mother    • Diabetes Mother    • Hypertension Father    • Lung disease Father    • Hypertension Brother    • Hypertension Brother    • Hypertension Brother        Allergies   Allergen Reactions   • Latex Irritability     Other reaction(s): Unknown   • Codeine Nausea Only     Other reaction(s): Unknown       Social History     Socioeconomic History   • Marital status: Single   Tobacco Use   • Smoking status: Former Smoker     Packs/day: 1.00     Years: 25.00     Pack years: 25.00     Types: " Cigarettes     Start date: 1992     Quit date: 12/2018     Years since quitting: 3.5   • Smokeless tobacco: Never Used   Vaping Use   • Vaping Use: Never used   Substance and Sexual Activity   • Alcohol use: Defer     Comment: FORMER pint of whiskey daily, states quit 1 year ago   • Drug use: Defer     Comment: patient reports prior drug abuse, but sober for 8 months now.   • Sexual activity: Defer         Current Outpatient Medications   Medication Sig Dispense Refill   • Afrezza 4 & 8 & 12 units powder INHALE 4-32 UNITS 3 (THREE) TIMES A DAY WITH MEALS. 4-32 UNITS THREE TIMES DAILY , MAX DOSE 96 UNITS DAILY 360 each 10   • aspirin 325 MG tablet Take 325 mg by mouth Daily.     • atorvastatin (LIPITOR) 40 MG tablet TAKE 1 TABLET BY MOUTH EVERY DAY 90 tablet 3   • BD Pen Needle Nola 2nd Gen 32G X 4 MM misc INJECT ONCE WALTON 100 each 11   • BD Pen Needle Nola U/F 32G X 4 MM misc INJECT ONCE WALTON     • Blood Glucose Monitoring Suppl (ONE TOUCH ULTRA 2) w/Device kit Use 1 daily to test blood glucose level. 1 each 5   • Continuous Blood Gluc  (FreeStyle Melonie 2 [place]) device 1 each Continuous. 1 each 1   • Continuous Blood Gluc Sensor (FreeStyle Melonie 2 Sensor) misc 1 each Every 14 (Fourteen) Days. 3 each 11   • fenofibrate (Tricor) 145 MG tablet Take 1 tablet by mouth Daily. 30 tablet 11   • fluticasone (FLONASE) 50 MCG/ACT nasal spray 2 sprays by Each Nare route Daily. 48 mL 0   • glucose blood test strip USE TO TEST BLOOD SUGAR 1 TIME DAILY E11.9 100 each 12   • glucose monitor monitoring kit 1 each As Needed (Diabetes). USE TO TEST 1 TIME DAILY 1 each 0   • hydrOXYzine (ATARAX) 50 MG tablet Take 50 mg by mouth 4 (Four) Times a Day.     • ibuprofen (ADVIL,MOTRIN) 600 MG tablet Take 1 tablet by mouth Every 6 (Six) Hours As Needed for Mild Pain . 15 tablet 0   • Insulin Glargine, 2 Unit Dial, (Toujeo Max SoloStar) 300 UNIT/ML solution pen-injector injection Inject 40 Units under the skin into the appropriate  area as directed Daily. Inject 55 units under the skin into the appropriate area as directed daily 8 pen 11   • Invega Sustenna 117 MG/0.75ML suspension prefilled syringe IM injection INJECT ONE HUNDRED SEVENTEEN (117) MILLIGRAMS INTO THE MUSCLE EVERY 4 WEEKS     • Jardiance 25 MG tablet tablet TAKE 25 MG BY MOUTH DAILY. 90 tablet 1   • Lancets (ACCU-CHEK SOFT TOUCH) lancets Use TO TEST BLOOD SUGAR 1 TIME DAILY. 100 each 12   • lisinopril (PRINIVIL,ZESTRIL) 40 MG tablet TAKE 1 TABLET BY MOUTH EVERY DAY 90 tablet 3   • metFORMIN (GLUCOPHAGE) 1000 MG tablet TAKE 1 TABLET BY MOUTH TWICE A DAY WITH MEALS 180 tablet 2   • metoprolol tartrate (LOPRESSOR) 100 MG tablet TAKE 1 TAB(S) 2 TIMES A DAY     • nitroglycerin (NITROSTAT) 0.4 MG SL tablet Place 1 tablet under the tongue Every 5 (Five) Minutes As Needed for Chest Pain. Take no more than 3 doses in 15 minutes. 30 tablet 12   • Ozempic, 1 MG/DOSE, 4 MG/3ML solution pen-injector INJECT 1 MG UNDER THE SKIN INTO THE APPROPRIATE AREA AS DIRECTED 1 (ONE) TIME PER WEEK. 3 pen 3   • paliperidone palmitate (INVEGA SUSTENNA) 117 MG/0.75ML suspension IM injection Inject 117 mg into the shoulder, thigh, or buttocks Every 30 (Thirty) Days.     • Semaglutide, 2 MG/DOSE, (Ozempic, 2 MG/DOSE,) 8 MG/3ML solution pen-injector Inject 2 mg under the skin into the appropriate area as directed 1 (One) Time Per Week. 12 pen 3   • sodium chloride (OCEAN NASAL SPRAY) 0.65 % nasal spray 1 spray into each nostril As Needed for Congestion. 60 mL 12   • traZODone (DESYREL) 150 MG tablet TAKE 1 TABLET BY MOUTH EVERY DAY AT BEDTIME AS NEEDED  2   • traZODone (DESYREL) 50 MG tablet        No current facility-administered medications for this visit.       Review of Systems   Constitutional: Negative.    HENT: Negative.    Eyes: Negative.    Respiratory: Negative.    Cardiovascular: Positive for leg swelling.   Gastrointestinal: Negative.    Musculoskeletal:        Toe pain    Skin: Negative for color  "change.   Allergic/Immunologic: Negative.    Neurological: Positive for numbness.   Hematological: Negative.    Psychiatric/Behavioral: Negative.          OBJECTIVE    Pulse 96   Ht 170.2 cm (67\")   Wt 122 kg (269 lb)   SpO2 98%   BMI 42.13 kg/m²       Physical Exam   Constitutional: He is oriented to person, place, and time. He appears well-developed.   HENT:   Head: Normocephalic and atraumatic.   Pulmonary/Chest: Effort normal. No respiratory distress.   Musculoskeletal: Normal range of motion. No deformity.   Neurological: He is alert and oriented to person, place, and time.   Skin: Skin is warm and dry. Capillary refill takes less than 2 seconds. He is not diaphoretic. No erythema.   Psychiatric: His behavior is normal.   Vitals reviewed.      Gait: Normal     Assistive Device: None    Lower Extremity    Cardiovascular:    DP/PT pulses palpable bilateral  CFT brisk  to all digits  Skin temp is warm to warm from proximal tibia to distal digits bilateral  Pedal hair growth present.   Musculoskeletal:  Muscle strength is 5/5 for all muscle groups tested   ROM of the 1st MTP is full without pain or crepitus bilateral  ROM of the ankle joint is full without pain or crepitus  bilateral  Dermatological:   Skin is warm, dry and intact bilateral  Webspaces 1-4 bilateral are clean, dry and intact.   Hyperkeratotic tissue noted to medial IPJ of right hallux.  Toenails 1 through 5 bilateral are thickened, discolored, elongated with subungual.  Pain on palpation to the nail plates.  Neurological:   Protective sensation absent 3 out of 10 sites bilateral  Sensation intact to light touch        Procedures        ASSESSMENT AND PLAN    Diagnoses and all orders for this visit:    1. Type 2 diabetes mellitus with peripheral neuropathy (HCC) (Primary)    2. Onychomycosis    3. Chronic toe pain, bilateral    4. Corns        - Nails 1-5 bilateral were debrided in length and thickness with nail nipper and electric  to " decrease fungal load and risk of infection.  An ABN was signed prior to nail debridement.   - Trimmed hyperkeratotic lesions noted in objective with a #15 blade without incident.   - all questions answered  - gallo 3 months           This document has been electronically signed by Abhijeet Thomason DPM on June 17, 2022 07:56 CDT     6/17/2022  07:56 CDT

## 2022-07-05 DIAGNOSIS — E11.9 TYPE 2 DIABETES MELLITUS WITHOUT COMPLICATION, WITHOUT LONG-TERM CURRENT USE OF INSULIN: ICD-10-CM

## 2022-07-05 NOTE — TELEPHONE ENCOUNTER
Incoming Refill Request      Medication requested (name and dose): metFORMIN (GLUCOPHAGE) 1000 MG Jardiance 25 MG tablet tablet    Pharmacy where request should be sent:  [facility]    Additional details provided by patient:     Best call back number: [phone]    Does the patient have less than a 3 day supply:  [] Yes  [] No    Felisha Werner  07/05/22, 09:56 CDT

## 2022-08-05 ENCOUNTER — OFFICE VISIT (OUTPATIENT)
Dept: FAMILY MEDICINE CLINIC | Facility: CLINIC | Age: 52
End: 2022-08-05

## 2022-08-05 VITALS
TEMPERATURE: 97.1 F | WEIGHT: 270.7 LBS | DIASTOLIC BLOOD PRESSURE: 100 MMHG | SYSTOLIC BLOOD PRESSURE: 140 MMHG | HEART RATE: 94 BPM | HEIGHT: 67 IN | BODY MASS INDEX: 42.49 KG/M2 | OXYGEN SATURATION: 98 %

## 2022-08-05 DIAGNOSIS — Z00.00 INITIAL MEDICARE ANNUAL WELLNESS VISIT: Primary | ICD-10-CM

## 2022-08-05 PROCEDURE — G0438 PPPS, INITIAL VISIT: HCPCS | Performed by: STUDENT IN AN ORGANIZED HEALTH CARE EDUCATION/TRAINING PROGRAM

## 2022-08-05 PROCEDURE — 1170F FXNL STATUS ASSESSED: CPT | Performed by: STUDENT IN AN ORGANIZED HEALTH CARE EDUCATION/TRAINING PROGRAM

## 2022-08-05 PROCEDURE — 1159F MED LIST DOCD IN RCRD: CPT | Performed by: STUDENT IN AN ORGANIZED HEALTH CARE EDUCATION/TRAINING PROGRAM

## 2022-08-05 NOTE — PROGRESS NOTES
The ABCs of the Annual Wellness Visit  Initial Medicare Wellness Visit    No chief complaint on file.    Subjective   History of Present Illness:  Kingsley Littlejohn is a 52 y.o. male who presents for an Initial Medicare Wellness Visit. Patient is doing well overall. Patient did not take his anti-hypertensive medication prior to arrival. Patient also had elevated BP today, reports did not take his medication and has been snacking all night. His psychiatry discontinued Trazodone and started her on Ambien for sleep.       The following portions of the patient's history were reviewed and   updated as appropriate: allergies, current medications, past family history, past medical history, past social history, past surgical history and problem list.     Compared to one year ago, the patient feels his physical   health is better.    Compared to one year ago, the patient feels his mental   health is the same.    Recent Hospitalizations:  He was not admitted to the hospital during the last year.       Current Medical Providers:  Patient Care Team:  Bonilla Stanton MD as PCP - General (Family Medicine)  Lori Rivers MD as Resident (Family Medicine)  Ottoniel Rivers MD (Family Medicine)  Gabriella Whyte MD (Family Medicine)  Jair Mcbride MD as Resident (Family Medicine)  Eric Vargas MD as Resident (Family Medicine)    Outpatient Medications Prior to Visit   Medication Sig Dispense Refill   • Afrezza 4 & 8 & 12 units powder INHALE 4-32 UNITS 3 (THREE) TIMES A DAY WITH MEALS. 4-32 UNITS THREE TIMES DAILY , MAX DOSE 96 UNITS DAILY 360 each 10   • aspirin 325 MG tablet Take 325 mg by mouth Daily.     • atorvastatin (LIPITOR) 40 MG tablet TAKE 1 TABLET BY MOUTH EVERY DAY 90 tablet 3   • BD Pen Needle Nola 2nd Gen 32G X 4 MM misc INJECT ONCE WALTON 100 each 11   • BD Pen Needle Nola U/F 32G X 4 MM misc INJECT ONCE WALTON     • Blood Glucose Monitoring Suppl (ONE TOUCH ULTRA 2) w/Device kit Use 1 daily to  test blood glucose level. 1 each 5   • Continuous Blood Gluc  (FreeStyle Melonie 2 Flint) device 1 each Continuous. 1 each 1   • Continuous Blood Gluc Sensor (FreeStyle Melonie 2 Sensor) misc 1 each Every 14 (Fourteen) Days. 3 each 11   • empagliflozin (Jardiance) 25 MG tablet tablet Take 1 tablet by mouth Daily. 90 tablet 1   • fenofibrate (Tricor) 145 MG tablet Take 1 tablet by mouth Daily. 30 tablet 11   • fluticasone (FLONASE) 50 MCG/ACT nasal spray 2 sprays by Each Nare route Daily. 48 mL 0   • glucose blood test strip USE TO TEST BLOOD SUGAR 1 TIME DAILY E11.9 100 each 12   • glucose monitor monitoring kit 1 each As Needed (Diabetes). USE TO TEST 1 TIME DAILY 1 each 0   • hydrOXYzine (ATARAX) 50 MG tablet Take 50 mg by mouth 4 (Four) Times a Day.     • ibuprofen (ADVIL,MOTRIN) 600 MG tablet Take 1 tablet by mouth Every 6 (Six) Hours As Needed for Mild Pain . 15 tablet 0   • Insulin Glargine, 2 Unit Dial, (Toujeo Max SoloStar) 300 UNIT/ML solution pen-injector injection Inject 40 Units under the skin into the appropriate area as directed Daily. Inject 55 units under the skin into the appropriate area as directed daily 8 pen 11   • Invega Sustenna 117 MG/0.75ML suspension prefilled syringe IM injection INJECT ONE HUNDRED SEVENTEEN (117) MILLIGRAMS INTO THE MUSCLE EVERY 4 WEEKS     • Lancets (ACCU-CHEK SOFT TOUCH) lancets Use TO TEST BLOOD SUGAR 1 TIME DAILY. 100 each 12   • lisinopril (PRINIVIL,ZESTRIL) 40 MG tablet TAKE 1 TABLET BY MOUTH EVERY DAY 90 tablet 3   • metFORMIN (GLUCOPHAGE) 1000 MG tablet Take 1 tablet by mouth 2 (Two) Times a Day With Meals. 180 tablet 2   • metoprolol tartrate (LOPRESSOR) 100 MG tablet TAKE 1 TAB(S) 2 TIMES A DAY     • nitroglycerin (NITROSTAT) 0.4 MG SL tablet Place 1 tablet under the tongue Every 5 (Five) Minutes As Needed for Chest Pain. Take no more than 3 doses in 15 minutes. 30 tablet 12   • Ozempic, 1 MG/DOSE, 4 MG/3ML solution pen-injector INJECT 1 MG UNDER THE SKIN  INTO THE APPROPRIATE AREA AS DIRECTED 1 (ONE) TIME PER WEEK. 3 pen 3   • paliperidone palmitate (INVEGA SUSTENNA) 117 MG/0.75ML suspension IM injection Inject 117 mg into the shoulder, thigh, or buttocks Every 30 (Thirty) Days.     • Semaglutide, 2 MG/DOSE, (Ozempic, 2 MG/DOSE,) 8 MG/3ML solution pen-injector Inject 2 mg under the skin into the appropriate area as directed 1 (One) Time Per Week. 12 pen 3   • sodium chloride (OCEAN NASAL SPRAY) 0.65 % nasal spray 1 spray into each nostril As Needed for Congestion. 60 mL 12   • traZODone (DESYREL) 150 MG tablet TAKE 1 TABLET BY MOUTH EVERY DAY AT BEDTIME AS NEEDED  2   • traZODone (DESYREL) 50 MG tablet        No facility-administered medications prior to visit.       No opioid medication identified on active medication list. I have reviewed chart for other potential  high risk medication/s and harmful drug interactions in the elderly.          Aspirin is on active medication list. Aspirin use is indicated based on review of current medical condition/s. Pros and cons of this therapy have been discussed today. Benefits of this medication outweigh potential harm.  Patient has been encouraged to continue taking this medication.  .      Patient Active Problem List   Diagnosis   • Essential hypertension   • Anxiety   • Tobacco dependence   • Polysubstance (excluding opioids) dependence ([facility])   • Non morbid obesity due to excess calories   • Type 2 diabetes mellitus with hyperglycemia, without long-term current use of insulin ([facility])   • Other hyperlipidemia   • Psychotic mood disorder ([facility])   • Anxiety disorder   • Medicare annual wellness visit, subsequent   • Other chest pain   • Obstructive sleep apnea syndrome   • Cyst of skin and subcutaneous tissue   • Hx of adenomatous colonic polyps   • Perianal abscess   • Scrotal abscess   • Chest pain with high risk for cardiac etiology   • Obesity, Class III, BMI 40-49.9 (morbid obesity) ([facility])     Advance Care Planning  Advance  "Directive is not on file.  ACP discussion was held with the patient during this visit. Patient does not have an advance directive, information provided.    Review of Systems   Constitutional: Negative for activity change, appetite change, diaphoresis, fatigue and fever.   HENT: Negative for congestion, trouble swallowing and voice change.    Respiratory: Negative for cough, chest tightness, shortness of breath and wheezing.    Cardiovascular: Negative for chest pain, palpitations and leg swelling.   Gastrointestinal: Negative for abdominal pain, diarrhea, nausea and vomiting.   Genitourinary: Negative for difficulty urinating, flank pain, frequency and urgency.   Musculoskeletal: Negative for arthralgias and myalgias.   Skin: Negative for color change and rash.   Neurological: Negative for dizziness, weakness and light-headedness.   Psychiatric/Behavioral: Positive for sleep disturbance. Negative for behavioral problems and decreased concentration. The patient is not nervous/anxious.         Objective     There were no vitals filed for this visit.  Estimated body mass index is 42.13 kg/m² as calculated from the following:    Height as of 6/15/22: 170.2 cm (67\").    Weight as of 6/15/22: 122 kg (269 lb).    Class 3 Severe Obesity (BMI >=40). Obesity-related health conditions include the following: obstructive sleep apnea, hypertension, coronary heart disease, diabetes mellitus, dyslipidemias, GERD, peripheral vascular disease, osteoarthritis and peripheral vascular disease. Obesity is worsening. BMI is 42.40. We discussed low calorie, low carb based diet program, portion control, increasing exercise and joining a fitness center or start home based exercise program.      Does the patient have evidence of cognitive impairment? No    Physical Exam  Vitals and nursing note reviewed.   Constitutional:       General: He is not in acute distress.     Appearance: Normal appearance. He is obese. He is not ill-appearing, " toxic-appearing or diaphoretic.   HENT:      Head: Normocephalic and atraumatic.      Mouth/Throat:      Mouth: Mucous membranes are moist.      Pharynx: Oropharynx is clear.   Eyes:      Extraocular Movements: Extraocular movements intact.      Conjunctiva/sclera: Conjunctivae normal.      Pupils: Pupils are equal, round, and reactive to light.   Cardiovascular:      Rate and Rhythm: Normal rate and regular rhythm.      Pulses: Normal pulses.      Heart sounds: Normal heart sounds.   Pulmonary:      Effort: Pulmonary effort is normal. No respiratory distress.      Breath sounds: Normal breath sounds. No wheezing.   Abdominal:      General: Bowel sounds are normal.      Palpations: Abdomen is soft.      Tenderness: There is no abdominal tenderness. There is no guarding.   Musculoskeletal:         General: Normal range of motion.      Right lower leg: No edema.      Left lower leg: No edema.   Skin:     General: Skin is warm and dry.      Capillary Refill: Capillary refill takes less than 2 seconds.   Neurological:      General: No focal deficit present.      Mental Status: He is alert and oriented to person, place, and time.      Cranial Nerves: No cranial nerve deficit.      Sensory: No sensory deficit.      Motor: No weakness.      Gait: Gait normal.   Psychiatric:         Mood and Affect: Mood normal.         Behavior: Behavior normal.       Lab Results   Component Value Date    TRIG 623 (H) 05/13/2022    HDL 32 (L) 05/13/2022    LDL 26 05/13/2022    VLDL 83 (H) 05/13/2022    HGBA1C 10.50 (H) 05/13/2022          HEALTH RISK ASSESSMENT    Smoking Status:  Social History     Tobacco Use   Smoking Status Former Smoker   • Packs/day: 1.00   • Years: 25.00   • Pack years: 25.00   • Types: Cigarettes   • Start date: 1992   • Quit date: 12/2018   • Years since quitting: 3.6   Smokeless Tobacco Never Used     Alcohol Consumption:  Social History     Substance and Sexual Activity   Alcohol Use Defer    Comment: FORMER  pinluis m of [place] daily, states quit 1 year ago     Fall Risk Screen:    SEVERINO Fall Risk Assessment has not been completed.    Depression Screen:   PHQ-2/PHQ-9 Depression Screening 7/27/2021   Retired PHQ-9 Total Score 2   Retired Total Score 2       Health Habits and Functional and Cognitive Screening:  Functional & Cognitive Status 7/17/2020   Do you have difficulty preparing food and eating? No   Do you have difficulty bathing yourself, getting dressed or grooming yourself? No   Do you have difficulty using the toilet? No   Do you have difficulty moving around from place to place? No   Do you have trouble with steps or getting out of a bed or a chair? No   Current Diet Well Balanced Diet   Dental Exam Not up to date   Eye Exam Up to date   Exercise (times per week) 0 times per week   Current Exercise Activities Include None   Do you need help using the phone?  No   Are you deaf or do you have serious difficulty hearing?  No   Do you need help with transportation? No   Do you need help shopping? No   Do you need help preparing meals?  No   Do you need help with housework?  No   Do you need help with laundry? No   Do you need help taking your medications? No   Do you need help managing money? No   Do you ever drive or ride in a car without wearing a seat belt? No   Have you felt unusual stress, anger or loneliness in the last month? No   Who do you live with? Alone   If you need help, do you have trouble finding someone available to you? No   Have you been bothered in the last four weeks by sexual problems? No   Do you have difficulty concentrating, remembering or making decisions? No       Age-appropriate Screening Schedule:  Refer to the list below for future screening recommendations based on patient's age, sex and/or medical conditions. Orders for these recommended tests are listed in the plan section. The patient has been provided with a written plan.    Health Maintenance   Topic Date Due   • ZOSTER VACCINE (1  of 2) Never done   • TDAP/TD VACCINES (2 - Td or Tdap) 01/01/2021   • DIABETIC FOOT EXAM  09/13/2022   • DIABETIC EYE EXAM  10/14/2022   • HEMOGLOBIN A1C  11/13/2022   • LIPID PANEL  05/13/2023   • INFLUENZA VACCINE  Discontinued   • URINE MICROALBUMIN  Discontinued            Assessment & Plan   CMS Preventative Services Quick Reference  Risk Factors Identified During Encounter  Depression/Dysphoria  Inactivity/Sedentary  Obesity/Overweight   The above risks/problems have been discussed with the patient.  Follow up actions/plans if indicated are seen below in the Assessment/Plan Section.  Pertinent information has been shared with the patient in the After Visit Summary.    There are no diagnoses linked to this encounter.    Follow Up:  No follow-ups on file.     An After Visit Summary and PPPS were made available to the patient.        I spent 30 minutes caring for Kingsley on this date of service. This time includes time spent by me in the following activities:preparing for the visit, obtaining and/or reviewing a separately obtained history, performing a medically appropriate examination and/or evaluation , counseling and educating the patient/family/caregiver, documenting information in the medical record and care coordination             Bonilla Stanton MD PGY-3  [facility]   This document has been electronically signed by Bonilla Stanton MD on August 5, 2022 09:15 CDT

## 2022-08-05 NOTE — PROGRESS NOTES
I have reviewed the notes, assessments, and/or procedures performed by Bonilla Stanton MD during office visit. I concur with her/his documentation and assessment and plan for Kingsley Littlejohn.          This document has been electronically signed by Joann Vargas MD on August 5, 2022 09:54 CDT

## 2022-08-17 ENCOUNTER — DOCUMENTATION (OUTPATIENT)
Dept: ENDOCRINOLOGY | Facility: CLINIC | Age: 52
End: 2022-08-17

## 2022-08-22 ENCOUNTER — TELEPHONE (OUTPATIENT)
Dept: FAMILY MEDICINE CLINIC | Facility: CLINIC | Age: 52
End: 2022-08-22

## 2022-08-22 DIAGNOSIS — J30.9 ALLERGIC RHINITIS, UNSPECIFIED SEASONALITY, UNSPECIFIED TRIGGER: ICD-10-CM

## 2022-08-22 RX ORDER — FLUTICASONE PROPIONATE 50 MCG
2 SPRAY, SUSPENSION (ML) NASAL DAILY
Qty: 48 ML | Refills: 0 | Status: SHIPPED | OUTPATIENT
Start: 2022-08-22 | End: 2022-09-01 | Stop reason: SDUPTHER

## 2022-08-22 NOTE — TELEPHONE ENCOUNTER
Incoming Refill Request      Medication requested (name and dose): fluticasone (FLONASE) 50 MCG/ACT nasal spray    Pharmacy where request should be sent: [facility] [place]    Additional details provided by patient:     Best call back number: [phone]    Does the patient have less than a 3 day supply:  [x] Yes  [] No    Arline Dougherty  08/22/22, 14:52 CDT

## 2022-08-25 ENCOUNTER — TELEPHONE (OUTPATIENT)
Dept: FAMILY MEDICINE CLINIC | Facility: CLINIC | Age: 52
End: 2022-08-25

## 2022-08-25 ENCOUNTER — LAB (OUTPATIENT)
Dept: LAB | Facility: HOSPITAL | Age: 52
End: 2022-08-25

## 2022-08-25 DIAGNOSIS — E78.49 OTHER HYPERLIPIDEMIA: ICD-10-CM

## 2022-08-25 DIAGNOSIS — I10 ESSENTIAL HYPERTENSION: ICD-10-CM

## 2022-08-25 DIAGNOSIS — E55.9 VITAMIN D DEFICIENCY: ICD-10-CM

## 2022-08-25 DIAGNOSIS — E11.65 TYPE 2 DIABETES MELLITUS WITH HYPERGLYCEMIA, WITHOUT LONG-TERM CURRENT USE OF INSULIN: ICD-10-CM

## 2022-08-25 LAB
ALBUMIN SERPL-MCNC: 4.5 G/DL (ref 3.5–5.2)
ALBUMIN/GLOB SERPL: 1.6 G/DL
ALP SERPL-CCNC: 63 U/L (ref 39–117)
ALT SERPL W P-5'-P-CCNC: 29 U/L (ref 1–41)
ANION GAP SERPL CALCULATED.3IONS-SCNC: 12 MMOL/L (ref 5–15)
AST SERPL-CCNC: 16 U/L (ref 1–40)
BASOPHILS # BLD AUTO: 0.11 10*3/MM3 (ref 0–0.2)
BASOPHILS NFR BLD AUTO: 1.3 % (ref 0–1.5)
BILIRUB SERPL-MCNC: 0.4 MG/DL (ref 0–1.2)
BUN SERPL-MCNC: 18 MG/DL (ref 6–20)
BUN/CREAT SERPL: 19.8 (ref 7–25)
CALCIUM SPEC-SCNC: 8.9 MG/DL (ref 8.6–10.5)
CHLORIDE SERPL-SCNC: 98 MMOL/L (ref 98–107)
CO2 SERPL-SCNC: 25 MMOL/L (ref 22–29)
CREAT SERPL-MCNC: 0.91 MG/DL (ref 0.76–1.27)
DEPRECATED RDW RBC AUTO: 42.7 FL (ref 37–54)
EGFRCR SERPLBLD CKD-EPI 2021: 100.2 ML/MIN/1.73
EOSINOPHIL # BLD AUTO: 0.2 10*3/MM3 (ref 0–0.4)
EOSINOPHIL NFR BLD AUTO: 2.4 % (ref 0.3–6.2)
ERYTHROCYTE [DISTWIDTH] IN BLOOD BY AUTOMATED COUNT: 15.7 % (ref 12.3–15.4)
GLOBULIN UR ELPH-MCNC: 2.8 GM/DL
GLUCOSE SERPL-MCNC: 208 MG/DL (ref 65–99)
HCT VFR BLD AUTO: 45.9 % (ref 37.5–51)
HGB BLD-MCNC: 15.3 G/DL (ref 13–17.7)
IMM GRANULOCYTES # BLD AUTO: 0.06 10*3/MM3 (ref 0–0.05)
IMM GRANULOCYTES NFR BLD AUTO: 0.7 % (ref 0–0.5)
LYMPHOCYTES # BLD AUTO: 2.41 10*3/MM3 (ref 0.7–3.1)
LYMPHOCYTES NFR BLD AUTO: 29 % (ref 19.6–45.3)
MCH RBC QN AUTO: 25.7 PG (ref 26.6–33)
MCHC RBC AUTO-ENTMCNC: 33.3 G/DL (ref 31.5–35.7)
MCV RBC AUTO: 77.1 FL (ref 79–97)
MONOCYTES # BLD AUTO: 0.77 10*3/MM3 (ref 0.1–0.9)
MONOCYTES NFR BLD AUTO: 9.3 % (ref 5–12)
NEUTROPHILS NFR BLD AUTO: 4.77 10*3/MM3 (ref 1.7–7)
NEUTROPHILS NFR BLD AUTO: 57.3 % (ref 42.7–76)
NRBC BLD AUTO-RTO: 0 /100 WBC (ref 0–0.2)
PLATELET # BLD AUTO: 239 10*3/MM3 (ref 140–450)
PMV BLD AUTO: 10.8 FL (ref 6–12)
POTASSIUM SERPL-SCNC: 4.2 MMOL/L (ref 3.5–5.2)
PROT SERPL-MCNC: 7.3 G/DL (ref 6–8.5)
RBC # BLD AUTO: 5.95 10*6/MM3 (ref 4.14–5.8)
SODIUM SERPL-SCNC: 135 MMOL/L (ref 136–145)
WBC NRBC COR # BLD: 8.32 10*3/MM3 (ref 3.4–10.8)

## 2022-08-25 PROCEDURE — 84443 ASSAY THYROID STIM HORMONE: CPT

## 2022-08-25 PROCEDURE — 82306 VITAMIN D 25 HYDROXY: CPT

## 2022-08-25 PROCEDURE — 82043 UR ALBUMIN QUANTITATIVE: CPT

## 2022-08-25 PROCEDURE — 80053 COMPREHEN METABOLIC PANEL: CPT

## 2022-08-25 PROCEDURE — 85025 COMPLETE CBC W/AUTO DIFF WBC: CPT

## 2022-08-25 PROCEDURE — 83036 HEMOGLOBIN GLYCOSYLATED A1C: CPT

## 2022-08-25 PROCEDURE — 80061 LIPID PANEL: CPT

## 2022-08-25 PROCEDURE — 82570 ASSAY OF URINE CREATININE: CPT

## 2022-08-25 PROCEDURE — 82607 VITAMIN B-12: CPT

## 2022-08-25 PROCEDURE — 36415 COLL VENOUS BLD VENIPUNCTURE: CPT

## 2022-08-25 NOTE — TELEPHONE ENCOUNTER
Incoming Refill Request      Medication requested (name and dose): fluticasone (FLONASE) 50 MCG/ACT nasal spray    Pharmacy where request should be sent: [facility]    Additional details provided by patient:     Best call back number: [phone]  Does the patient have less than a 3 day supply:  [x] Yes  [] No    Felisha Werner  08/25/22, 16:12 CDT

## 2022-08-26 LAB
25(OH)D3 SERPL-MCNC: 26.9 NG/ML (ref 30–100)
ALBUMIN UR-MCNC: 1.2 MG/DL
CHOLEST SERPL-MCNC: 120 MG/DL (ref 0–200)
CREAT UR-MCNC: 35.1 MG/DL
HBA1C MFR BLD: 10.1 % (ref 4.8–5.6)
HDLC SERPL-MCNC: 30 MG/DL (ref 40–60)
LDL/HDL RATIO NULL: ABNORMAL
LDLC SERPL CALC-MCNC: 27 MG/DL (ref 0–100)
MICROALBUMIN/CREAT UR: 34.2 MG/G
TRIGL SERPL-MCNC: 457 MG/DL (ref 0–150)
TSH SERPL DL<=0.05 MIU/L-ACNC: 1.77 UIU/ML (ref 0.27–4.2)
VIT B12 BLD-MCNC: 501 PG/ML (ref 211–946)
VLDLC SERPL-MCNC: 63 MG/DL (ref 5–40)

## 2022-08-29 ENCOUNTER — OFFICE VISIT (OUTPATIENT)
Dept: ENDOCRINOLOGY | Facility: CLINIC | Age: 52
End: 2022-08-29

## 2022-08-29 VITALS
OXYGEN SATURATION: 95 % | SYSTOLIC BLOOD PRESSURE: 124 MMHG | HEART RATE: 98 BPM | DIASTOLIC BLOOD PRESSURE: 100 MMHG | WEIGHT: 273.6 LBS | BODY MASS INDEX: 42.94 KG/M2 | HEIGHT: 67 IN

## 2022-08-29 DIAGNOSIS — E78.49 OTHER HYPERLIPIDEMIA: ICD-10-CM

## 2022-08-29 DIAGNOSIS — R71.8 ABNORMAL RBC: ICD-10-CM

## 2022-08-29 DIAGNOSIS — I10 ESSENTIAL HYPERTENSION: ICD-10-CM

## 2022-08-29 DIAGNOSIS — E11.65 TYPE 2 DIABETES MELLITUS WITH HYPERGLYCEMIA, WITHOUT LONG-TERM CURRENT USE OF INSULIN: Primary | ICD-10-CM

## 2022-08-29 PROCEDURE — 99214 OFFICE O/P EST MOD 30 MIN: CPT | Performed by: NURSE PRACTITIONER

## 2022-08-29 PROCEDURE — 95251 CONT GLUC MNTR ANALYSIS I&R: CPT | Performed by: NURSE PRACTITIONER

## 2022-08-29 RX ORDER — ZOLPIDEM TARTRATE 10 MG/1
10 TABLET ORAL
COMMUNITY
Start: 2022-08-17

## 2022-08-29 NOTE — PROGRESS NOTES
"Chief Complaint  Diabetes    Subjective          Kingsley Littlejohn presents to [facility] ENDOCRINOLOGY  History of Present Illness       In office visit    Primary provider Bonilla Stanton MD      52 year old female presents for follow up      Reason diabetes mellitus type 2     Diagnosed in 2014      Timing constant      Quality improved control         Severity is high     Macrovascular complications CAD, MI in the past     Microvascular complications neuropathy     Current diabetes regimen insulin and GLP-1     Taking basal and mealtime insulin total 4 shots daily     Current glucose monitoring     Checks 4-10 times daily     Uses a [email or web address] personal system     See below       Diet    Very high carb , drinking sweet teas    Review of Systems - General ROS: negative                Objective   Vital Signs:   /100   Pulse 98   Ht 170.2 cm (67\")   Wt 124 kg (273 lb 9.6 oz)   SpO2 95%   BMI 42.85 kg/m²     Physical Exam  Constitutional:       Appearance: Normal appearance.   Cardiovascular:      Rate and Rhythm: Regular rhythm.      Heart sounds: Normal heart sounds.   Pulmonary:      Breath sounds: Normal breath sounds.   Musculoskeletal:         General: Normal range of motion.      Cervical back: Normal range of motion.   Neurological:      Mental Status: He is alert.        Result Review :   The following data was reviewed by: RAJAT Doyle on 05/17/2022:  Common labs    Common Labsle 2/8/22 2/8/22 2/8/22 2/8/22 5/13/22 5/13/22 5/13/22 5/13/22 8/25/22 8/25/22 8/25/22 8/25/22 8/25/22    1427 1427 1427 1427 1235 1235 1235 1337 1338 1338 1338 1338 1403   Glucose   126 (A)       208 (A)      BUN   16       18      Creatinine   0.98       0.91      eGFR Non African Am   80             Sodium   135 (A)       135 (A)      Potassium   3.8       4.2      Chloride   101       98      Calcium   8.7       8.9      Albumin   4.10       4.50      Total Bilirubin   0.5       0.4  "     Alkaline Phosphatase   75       63      AST (SGOT)   16       16      ALT (SGPT)   28       29      WBC 8.88    6.88    8.32       Hemoglobin 14.9    14.3    15.3       Hematocrit 45.4    44.0    45.9       Platelets 213    245    239       Total Cholesterol  116    141      120    Triglycerides  387 (A)    623 (A)      457 (A)    HDL Cholesterol  32 (A)    32 (A)      30 (A)    LDL Cholesterol   29    26      27    Hemoglobin A1C    8.90 (A)   10.50 (A)    10.10 (A)     Microalbumin, Urine        <1.2     1.2   (A) Abnormal value                        Assessment and Plan    Diagnoses and all orders for this visit:    1. Type 2 diabetes mellitus with hyperglycemia, without long-term current use of insulin (HCC) (Primary)  -     CBC & Differential; Future  -     Comprehensive Metabolic Panel; Future  -     Hemoglobin A1c; Future  -     Lipid Panel; Future  -     Microalbumin / Creatinine Urine Ratio - Urine, Clean Catch; Future  -     TSH; Future    2. Essential hypertension  -     CBC & Differential; Future  -     Comprehensive Metabolic Panel; Future  -     Hemoglobin A1c; Future  -     Lipid Panel; Future  -     Microalbumin / Creatinine Urine Ratio - Urine, Clean Catch; Future  -     TSH; Future    3. Other hyperlipidemia  -     CBC & Differential; Future  -     Comprehensive Metabolic Panel; Future  -     Hemoglobin A1c; Future  -     Lipid Panel; Future  -     Microalbumin / Creatinine Urine Ratio - Urine, Clean Catch; Future  -     TSH; Future    4. Abnormal RBC  -     Ambulatory Referral to Hematology / Oncology             Glycemic Management     Type 2 diabetes not controlled with hyperglycemia      Lab Results   Component Value Date    HGBA1C 10.10 (H) 08/25/2022             Melonie personal system     Downloaded and reviewed     Dated from August 15 to August 29, 2022     Average bg 202     Target range 40%     High 45%     Very high 15%     Low 0 %     Very low 0 %       hyperglycemia during the day      He is eating high carb and back to sweet tea     When eating normal carbs     He will improve diet           Taking Metformin 1000 mg po BID--- keep        Taking Jardiance 25 mg one daily -keep              taking Ozempic 2 mg once weekly      Side effects discussed, try to eat half your plate not a full plate     If nauseated eat less     If vomiting or abdominal pain stop medication            taking Toujeo 54 units daily         We discussed how to self titrate the Toujeo dose using his CGM     Mealtime insulin--- uses 3 times a day before meals     using Afrezza-- 24 units before each meal                   Plus sliding scale          Januvia 100 mg daily ---- stop not effective           Patient uses CGM data and his food intake to determine what dose of insulin he needs to use for meals                 Melonie has allowed the patient to minimize hypoglycemia as alarms have predicted and avoided them     He also uses the arrows on the melonie as follows:     If arrow is horizontal , he uses the predicted dosage     If the arrow is slanted up or down--he increase or decrease by 4 units     If the arrow is vertical up or down -he increases or decreases by 4 units                       Lipid Management        Taking Lipitor 40 mg one at night         Total Cholesterol   Date Value Ref Range Status   08/25/2022 120 0 - 200 mg/dL Final     Triglycerides   Date Value Ref Range Status   08/25/2022 457 (H) 0 - 150 mg/dL Final   03/02/2018 263 (H) 0 - 149 MG/DL Final     HDL Cholesterol   Date Value Ref Range Status   08/25/2022 30 (L) 40 - 60 mg/dL Final   03/02/2018 39 (L) >40 MG/DL Final     LDL Cholesterol    Date Value Ref Range Status   08/25/2022 27 0 - 100 mg/dL Final   03/02/2018 80 <130 MG/DL Final        Vascepa 2 gm po BID--could not take      Will call in tricor         Blood Pressure Management     Taking Lisinopril 40 mg once daily      Taking Metoprolol 50 mg daily            Microvascular Complication  Monitoring        Last eye exam -- Jan. 2020 no DR           Component      Latest Ref Rng & Units 8/25/2022   Microalbumin/Creatinine Ratio      mg/g 34.2   Creatinine, Urine      mg/dL 35.1   Microalbumin, Urine      mg/dL 1.2                    Neuropathy mild      Prefers no RX at this time         Bone Health      vitamin d def.          vitamin d daily               Other Diabetes Related Aspects       Lab Results   Component Value Date    [id number] 501 08/25/2022                   Thyroid Health        Lab Results   Component Value Date    TSH 1.770 08/25/2022                   Weight Management:     Obesity        Body mass index is 42.85 kg/m².    Dietary changes discussed       Elevated rbc           Possible relation to jardiance but referral to hematology           Follow Up   Return in about 3 months (around 11/29/2022) for Recheck.  Patient was given instructions and counseling regarding his condition or for health maintenance advice. Please see specific information pulled into the AVS if appropriate.         This document has been electronically signed by RAJAT Doyle on August 29, 2022 14:45 CDT.

## 2022-09-01 ENCOUNTER — TELEPHONE (OUTPATIENT)
Dept: FAMILY MEDICINE CLINIC | Facility: CLINIC | Age: 52
End: 2022-09-01

## 2022-09-01 DIAGNOSIS — J30.9 ALLERGIC RHINITIS, UNSPECIFIED SEASONALITY, UNSPECIFIED TRIGGER: ICD-10-CM

## 2022-09-01 RX ORDER — FLUTICASONE PROPIONATE 50 MCG
2 SPRAY, SUSPENSION (ML) NASAL DAILY
Qty: 48 ML | Refills: 0 | Status: SHIPPED | OUTPATIENT
Start: 2022-09-01 | End: 2023-02-06

## 2022-09-01 NOTE — TELEPHONE ENCOUNTER
Incoming Refill Request      Medication requested (name and dose):   fluticasone (FLONASE) 50 MCG/ACT nasal spray    Pharmacy where request should be sent: [facility] [place]    Additional details provided by patient: PATIENT HAS ASKED FOR PRIMARY PHARMACY TO BE CHANGED TO [facility]    Best call back number: [phone]    Does the patient have less than a 3 day supply:  [x] Yes  [] No    Isabela Aviles  09/01/22, 10:57 CDT

## 2022-09-13 ENCOUNTER — OFFICE VISIT (OUTPATIENT)
Dept: FAMILY MEDICINE CLINIC | Facility: CLINIC | Age: 52
End: 2022-09-13

## 2022-09-13 VITALS
SYSTOLIC BLOOD PRESSURE: 126 MMHG | OXYGEN SATURATION: 98 % | DIASTOLIC BLOOD PRESSURE: 82 MMHG | WEIGHT: 270.8 LBS | TEMPERATURE: 97.6 F | BODY MASS INDEX: 42.5 KG/M2 | HEIGHT: 67 IN | HEART RATE: 95 BPM

## 2022-09-13 DIAGNOSIS — H91.93 BILATERAL HEARING LOSS, UNSPECIFIED HEARING LOSS TYPE: ICD-10-CM

## 2022-09-13 DIAGNOSIS — H72.92 PERFORATION OF LEFT TYMPANIC MEMBRANE: Primary | ICD-10-CM

## 2022-09-13 DIAGNOSIS — H61.23 BILATERAL IMPACTED CERUMEN: ICD-10-CM

## 2022-09-13 PROCEDURE — 99213 OFFICE O/P EST LOW 20 MIN: CPT | Performed by: STUDENT IN AN ORGANIZED HEALTH CARE EDUCATION/TRAINING PROGRAM

## 2022-09-13 PROCEDURE — 69210 REMOVE IMPACTED EAR WAX UNI: CPT | Performed by: STUDENT IN AN ORGANIZED HEALTH CARE EDUCATION/TRAINING PROGRAM

## 2022-09-13 NOTE — PROGRESS NOTES
"  Family Medicine Residency  Bonilla Stanton MD    Subjective:     Kingsley Littlejohn is a 52 y.o. male with concurrent medical history of Type 2 DM, hypertension, hypertriglyceridemia presents today with bilateral hearing loss and cerumen impaction. Patient reports over the last 2-3 weeks he has been having left > right ear pain with some hearing loss. Patient reports he has been using q-tips to clean his ears and also uses ear plugs for extended periods of time especially at night to while sleeping. Patient reports he had some discomfort and tried to pop his ears and felt sudden pop and pain in his left ear. Denies any drainage from ear. Patient reports he had prior similar episode when the ears were cleaned in office and was found to have ruptured ear drum in his left ear.           The following portions of the patient's history were reviewed and updated as appropriate: allergies, current medications, past family history, past medical history, past social history, past surgical history and problem list.    Past Medical Hx:  Past Medical History:   Diagnosis Date   • Abdominal pain    • Allergic rhinitis    • Diabetes mellitus (HCC)     Prev HbA1c 14.0 2014, states \"I'm cured!\"   • Essential hypertension    • Fracture closed, fibula, shaft     tib-fib   • Onychomycosis    • Sleep apnea        Past Surgical Hx:  Past Surgical History:   Procedure Laterality Date   • ANKLE SURGERY      right and left   • COLONOSCOPY N/A 6/4/2018    Procedure: COLONOSCOPY;  Surgeon: Jaylon Castro MD;  Location: [facility] ENDOSCOPY;  Service: Gastroenterology   • COLONOSCOPY N/A 8/13/2021    Procedure: COLONOSCOPY;  Surgeon: Jaylon Castro MD;  Location: [facility] ENDOSCOPY;  Service: General;  Laterality: N/A;   • CYST REMOVAL      x 3, from hand, thigh, and buttocks   • ENDOSCOPY      20 years ago   • TIBIA FRACTURE SURGERY Bilateral 2011    tib-fib       Current Meds:    Current Outpatient Medications:   •  Afrezza 4 & 8 & 12 units " powder, INHALE 4-32 UNITS 3 (THREE) TIMES A DAY WITH MEALS. 4-32 UNITS THREE TIMES DAILY , MAX DOSE 96 UNITS DAILY, Disp: 360 each, Rfl: 10  •  aspirin 325 MG tablet, Take 325 mg by mouth Daily., Disp: , Rfl:   •  atorvastatin (LIPITOR) 40 MG tablet, TAKE 1 TABLET BY MOUTH EVERY DAY, Disp: 90 tablet, Rfl: 3  •  BD Pen Needle Nola 2nd Gen 32G X 4 MM misc, INJECT ONCE WALTON, Disp: 100 each, Rfl: 11  •  BD Pen Needle Nola U/F 32G X 4 MM misc, INJECT ONCE WALTON, Disp: , Rfl:   •  Blood Glucose Monitoring Suppl (ONE TOUCH ULTRA 2) w/Device kit, Use 1 daily to test blood glucose level., Disp: 1 each, Rfl: 5  •  Continuous Blood Gluc  (FreeStyle Melonie 2 Penfield) device, 1 each Continuous., Disp: 1 each, Rfl: 1  •  Continuous Blood Gluc Sensor (FreeStyle Melonie 2 Sensor) misc, 1 each Every 14 (Fourteen) Days., Disp: 3 each, Rfl: 11  •  empagliflozin (Jardiance) 25 MG tablet tablet, Take 1 tablet by mouth Daily., Disp: 90 tablet, Rfl: 1  •  fenofibrate (Tricor) 145 MG tablet, Take 1 tablet by mouth Daily., Disp: 30 tablet, Rfl: 11  •  fluticasone (FLONASE) 50 MCG/ACT nasal spray, 2 sprays by Each Nare route Daily., Disp: 48 mL, Rfl: 0  •  glucose blood test strip, USE TO TEST BLOOD SUGAR 1 TIME DAILY E11.9, Disp: 100 each, Rfl: 12  •  glucose monitor monitoring kit, 1 each As Needed (Diabetes). USE TO TEST 1 TIME DAILY, Disp: 1 each, Rfl: 0  •  hydrOXYzine (ATARAX) 50 MG tablet, Take 50 mg by mouth 4 (Four) Times a Day., Disp: , Rfl:   •  ibuprofen (ADVIL,MOTRIN) 600 MG tablet, Take 1 tablet by mouth Every 6 (Six) Hours As Needed for Mild Pain ., Disp: 15 tablet, Rfl: 0  •  Insulin Glargine, 2 Unit Dial, (Toujeo Max SoloStar) 300 UNIT/ML solution pen-injector injection, Inject 40 Units under the skin into the appropriate area as directed Daily. Inject 55 units under the skin into the appropriate area as directed daily (Patient taking differently: Inject 54 Units under the skin into the appropriate area as directed Daily.  Inject 54 units under the skin into the appropriate area as directed daily), Disp: 8 pen, Rfl: 11  •  Invega Sustenna 117 MG/0.75ML suspension prefilled syringe IM injection, INJECT ONE HUNDRED SEVENTEEN (117) MILLIGRAMS INTO THE MUSCLE EVERY 4 WEEKS, Disp: , Rfl:   •  Lancets (ACCU-CHEK SOFT TOUCH) lancets, Use TO TEST BLOOD SUGAR 1 TIME DAILY., Disp: 100 each, Rfl: 12  •  lisinopril (PRINIVIL,ZESTRIL) 40 MG tablet, TAKE 1 TABLET BY MOUTH EVERY DAY, Disp: 90 tablet, Rfl: 3  •  metFORMIN (GLUCOPHAGE) 1000 MG tablet, Take 1 tablet by mouth 2 (Two) Times a Day With Meals., Disp: 180 tablet, Rfl: 2  •  metoprolol tartrate (LOPRESSOR) 100 MG tablet, TAKE 1 TAB(S) 2 TIMES A DAY, Disp: , Rfl:   •  nitroglycerin (NITROSTAT) 0.4 MG SL tablet, Place 1 tablet under the tongue Every 5 (Five) Minutes As Needed for Chest Pain. Take no more than 3 doses in 15 minutes., Disp: 30 tablet, Rfl: 12  •  Ozempic, 1 MG/DOSE, 4 MG/3ML solution pen-injector, INJECT 1 MG UNDER THE SKIN INTO THE APPROPRIATE AREA AS DIRECTED 1 (ONE) TIME PER WEEK. (Patient taking differently: Inject 2 mg under the skin into the appropriate area as directed 1 (One) Time Per Week.), Disp: 3 pen, Rfl: 3  •  paliperidone palmitate (INVEGA SUSTENNA) 117 MG/0.75ML suspension IM injection, Inject 117 mg into the shoulder, thigh, or buttocks Every 30 (Thirty) Days., Disp: , Rfl:   •  Semaglutide, 2 MG/DOSE, (Ozempic, 2 MG/DOSE,) 8 MG/3ML solution pen-injector, Inject 2 mg under the skin into the appropriate area as directed 1 (One) Time Per Week., Disp: 12 pen, Rfl: 3  •  sodium chloride (OCEAN NASAL SPRAY) 0.65 % nasal spray, 1 spray into each nostril As Needed for Congestion., Disp: 60 mL, Rfl: 12  •  traZODone (DESYREL) 150 MG tablet, TAKE 1 TABLET BY MOUTH EVERY DAY AT BEDTIME AS NEEDED, Disp: , Rfl: 2  •  traZODone (DESYREL) 50 MG tablet, , Disp: , Rfl:   •  zolpidem (AMBIEN) 10 MG tablet, Take 10 mg by mouth every night at bedtime., Disp: , Rfl:  "    Allergies:  Allergies   Allergen Reactions   • Latex Irritability     Other reaction(s): Unknown   • Codeine Nausea Only     Other reaction(s): Unknown       Family Hx:  Family History   Problem Relation Age of Onset   • Diabetes Other    • Hypertension Other    • Cancer Other    • Lung disease Other    • Thyroid disease Other    • Bleeding Disorder Other    • Colon cancer Paternal Grandmother    • Cancer Paternal Grandfather    • Crohn's disease Mother    • Diabetes Mother    • Hypertension Father    • Lung disease Father    • Hypertension Brother    • Hypertension Brother    • Hypertension Brother         Social History:  Social History     Socioeconomic History   • Marital status: Single   Tobacco Use   • Smoking status: Former Smoker     Packs/day: 1.00     Years: 25.00     Pack years: 25.00     Types: Cigarettes     Start date: 1992     Quit date: 12/2018     Years since quitting: 3.7   • Smokeless tobacco: Never Used   Vaping Use   • Vaping Use: Never used   Substance and Sexual Activity   • Alcohol use: Defer     Comment: FORMER pint of whiskey daily, states quit 1 year ago   • Drug use: Defer     Comment: patient reports prior drug abuse, but sober for 8 months now.   • Sexual activity: Defer       Review of Systems  Review of Systems   Constitutional: Negative for fever.   HENT: Positive for congestion, ear pain, hearing loss and sneezing. Negative for ear discharge.    Skin: Negative for color change and rash.       Objective:     /82   Pulse 95   Temp 97.6 °F (36.4 °C)   Ht 170.2 cm (67\")   Wt 123 kg (270 lb 12.8 oz)   SpO2 98%   BMI 42.41 kg/m²   Physical Exam  Vitals and nursing note reviewed.   Constitutional:       General: He is not in acute distress.     Appearance: Normal appearance. He is obese. He is not ill-appearing, toxic-appearing or diaphoretic.   HENT:      Right Ear: Decreased hearing noted. Tenderness present. No middle ear effusion. There is impacted cerumen. No foreign " body. No mastoid tenderness.      Left Ear: Decreased hearing noted. Tenderness present. No drainage.  No middle ear effusion. There is impacted cerumen. No foreign body. No mastoid tenderness. Tympanic membrane is perforated.      Ears:        Comments: Left ear was noted to have impacted cerumen, gently removed the impacted wax superficially with curette, avoided any deep irritation with curette. Noted bleeding. Wax was mixed with old dried blood. Gently cleaned with saline pressure wash, remaining cerumen came out. Noted perforated TM noted around 11' o'clock position. Pt tolerated well.     Right ear - cerumen impaction noted with some old dried blood. Did not attempt to clean the ear as it was tender and concern for bleeding.   Neurological:      Mental Status: He is alert.          Assessment/Plan:     Kingsley Littlejohn is a 51 y.o. male with concurrent medical history of Type 2 DM, hypertension, hypertriglyceridemia presents today with b/l left>right  ear pain and hearing loss. Patient tolerated the above procedure well. Did not attempt to clean the right ear canal as it was painful and concern for further irritation. Patient was strongly advised to follow up with ENT so that the ear can be examined under microscope and cleaned in more controled fashion. Patient was advised to call office if he develops any fevers/chills, worsening ear pain, drainage or bleeding from ear, worsening headaches. Patient did not have improvement in hearing after cleaning, with perforated TM on left ear, patient will need specialist as I assume it will be an ongoing issue. Patient did not see an ENT before. Strongly advised patient to refrain from putting anything in his ear including ear plugs, Q-tips etc.     Diagnoses and all orders for this visit:    1. Perforation of left tympanic membrane (Primary)  -     Ambulatory Referral to ENT (Otolaryngology)    2. Bilateral impacted cerumen  -     Ambulatory Referral to ENT  (Otolaryngology)    3. Bilateral hearing loss, unspecified hearing loss type  -     Ambulatory Referral to ENT (Otolaryngology)        · Rx changes: none   · Patient Education: none  · Compliance at present is estimated to be good.   · Efforts to improve compliance (if necessary) will be directed at dietary modifications: low sugar and carb diet  and increased exercise.       Follow-up:     Return in about 4 weeks (around 10/11/2022) for Recheck.    Preventative:  Health Maintenance   Topic Date Due   • Pneumococcal Vaccine 0-64 (1 - PCV) Never done   • Hepatitis B (1 of 3 - Risk 3-dose series) Never done   • HEPATITIS C SCREENING  Never done   • ZOSTER VACCINE (1 of 2) Never done   • LUNG CANCER SCREENING  08/28/2020   • TDAP/TD VACCINES (2 - Td or Tdap) 01/01/2021   • DIABETIC FOOT EXAM  09/13/2022   • DIABETIC EYE EXAM  10/14/2022   • HEMOGLOBIN A1C  02/25/2023   • ANNUAL WELLNESS VISIT  08/05/2023   • LIPID PANEL  08/25/2023   • COLORECTAL CANCER SCREENING  08/13/2024   • COVID-19 Vaccine  Completed   • INFLUENZA VACCINE  Discontinued   • URINE MICROALBUMIN  Discontinued         Alcohol use: Alcohol use questions deferred to the physician.  Nicotine status  reports that he quit smoking about 3 years ago. His smoking use included cigarettes. He started smoking about 30 years ago. He has a 25.00 pack-year smoking history. He has never used smokeless tobacco.     Goals     • Blood Pressure < 140/90      Barriers:  Sporadic follow-up      • Lower Blood Sugar      Barriers:  Questionable insight            RISK SCORE: 3        Bonilla Stanton MD PGY-3  [facility] Residency   This document has been electronically signed by Bonilla Stanton MD on September 13, 2022 14:53 CDT

## 2022-09-14 ENCOUNTER — TELEPHONE (OUTPATIENT)
Dept: ONCOLOGY | Facility: HOSPITAL | Age: 52
End: 2022-09-14

## 2022-09-14 ENCOUNTER — CONSULT (OUTPATIENT)
Dept: ONCOLOGY | Facility: CLINIC | Age: 52
End: 2022-09-14

## 2022-09-14 ENCOUNTER — LAB (OUTPATIENT)
Dept: ONCOLOGY | Facility: HOSPITAL | Age: 52
End: 2022-09-14

## 2022-09-14 VITALS
BODY MASS INDEX: 42.55 KG/M2 | RESPIRATION RATE: 18 BRPM | TEMPERATURE: 97.1 F | HEART RATE: 95 BPM | WEIGHT: 271.7 LBS | SYSTOLIC BLOOD PRESSURE: 180 MMHG | DIASTOLIC BLOOD PRESSURE: 100 MMHG | OXYGEN SATURATION: 95 %

## 2022-09-14 DIAGNOSIS — E11.65 TYPE 2 DIABETES MELLITUS WITH HYPERGLYCEMIA, WITHOUT LONG-TERM CURRENT USE OF INSULIN: ICD-10-CM

## 2022-09-14 DIAGNOSIS — I10 ESSENTIAL HYPERTENSION: ICD-10-CM

## 2022-09-14 DIAGNOSIS — E78.49 OTHER HYPERLIPIDEMIA: ICD-10-CM

## 2022-09-14 DIAGNOSIS — R71.8 MICROCYTOSIS: ICD-10-CM

## 2022-09-14 DIAGNOSIS — D75.1 POLYCYTHEMIA: Primary | ICD-10-CM

## 2022-09-14 DIAGNOSIS — D75.1 POLYCYTHEMIA: ICD-10-CM

## 2022-09-14 LAB
BASOPHILS # BLD AUTO: 0.08 10*3/MM3 (ref 0–0.2)
BASOPHILS NFR BLD AUTO: 1.1 % (ref 0–1.5)
DEPRECATED RDW RBC AUTO: 40.8 FL (ref 37–54)
EOSINOPHIL # BLD AUTO: 0.22 10*3/MM3 (ref 0–0.4)
EOSINOPHIL NFR BLD AUTO: 3.1 % (ref 0.3–6.2)
ERYTHROCYTE [DISTWIDTH] IN BLOOD BY AUTOMATED COUNT: 14.8 % (ref 12.3–15.4)
FERRITIN SERPL-MCNC: 145.4 NG/ML (ref 30–400)
HCT VFR BLD AUTO: 44 % (ref 37.5–51)
HGB BLD-MCNC: 14.4 G/DL (ref 13–17.7)
HOLD SPECIMEN: NORMAL
IMM GRANULOCYTES # BLD AUTO: 0.02 10*3/MM3 (ref 0–0.05)
IMM GRANULOCYTES NFR BLD AUTO: 0.3 % (ref 0–0.5)
IRON 24H UR-MRATE: 120 MCG/DL (ref 59–158)
IRON SATN MFR SERPL: 25 % (ref 20–50)
LYMPHOCYTES # BLD AUTO: 1.92 10*3/MM3 (ref 0.7–3.1)
LYMPHOCYTES NFR BLD AUTO: 27 % (ref 19.6–45.3)
MCH RBC QN AUTO: 25.3 PG (ref 26.6–33)
MCHC RBC AUTO-ENTMCNC: 32.7 G/DL (ref 31.5–35.7)
MCV RBC AUTO: 77.2 FL (ref 79–97)
MONOCYTES # BLD AUTO: 0.7 10*3/MM3 (ref 0.1–0.9)
MONOCYTES NFR BLD AUTO: 9.9 % (ref 5–12)
NEUTROPHILS NFR BLD AUTO: 4.16 10*3/MM3 (ref 1.7–7)
NEUTROPHILS NFR BLD AUTO: 58.6 % (ref 42.7–76)
NRBC BLD AUTO-RTO: 0 /100 WBC (ref 0–0.2)
PLATELET # BLD AUTO: 260 10*3/MM3 (ref 140–450)
PMV BLD AUTO: 10.8 FL (ref 6–12)
RBC # BLD AUTO: 5.7 10*6/MM3 (ref 4.14–5.8)
TIBC SERPL-MCNC: 475 MCG/DL (ref 298–536)
TRANSFERRIN SERPL-MCNC: 319 MG/DL (ref 200–360)
TSH SERPL DL<=0.05 MIU/L-ACNC: 1.68 UIU/ML (ref 0.27–4.2)
WBC NRBC COR # BLD: 7.1 10*3/MM3 (ref 3.4–10.8)

## 2022-09-14 PROCEDURE — G0463 HOSPITAL OUTPT CLINIC VISIT: HCPCS | Performed by: INTERNAL MEDICINE

## 2022-09-14 PROCEDURE — 83540 ASSAY OF IRON: CPT

## 2022-09-14 PROCEDURE — 84466 ASSAY OF TRANSFERRIN: CPT

## 2022-09-14 PROCEDURE — 99204 OFFICE O/P NEW MOD 45 MIN: CPT | Performed by: INTERNAL MEDICINE

## 2022-09-14 PROCEDURE — 82728 ASSAY OF FERRITIN: CPT

## 2022-09-14 PROCEDURE — 84443 ASSAY THYROID STIM HORMONE: CPT

## 2022-09-14 PROCEDURE — 85025 COMPLETE CBC W/AUTO DIFF WBC: CPT

## 2022-09-14 NOTE — TELEPHONE ENCOUNTER
----- Message from Lukas Beltran MD sent at 9/14/2022  4:31 PM CDT -----  Let patient know his RBC is normal when checked today. Iron is normal as well. Recheck in 4 months

## 2022-09-16 NOTE — PROGRESS NOTES
I have reviewed the notes, assessments, and/or procedures performed by Bonilla Stanton MD during office visit. I concur with her/his documentation and assessment and plan for Kingsley Littlejohn.          This document has been electronically signed by Joann Vargas MD on September 16, 2022 10:52 CDT

## 2022-09-18 NOTE — PROGRESS NOTES
"Chief Complaint  Consult -erythrocytosis    Subjective        Kingsley Littlejohn presents to [facility]  History of Present Illness     This is a pleasant 52-year-old female who was seen in consultation at the request of Bonilla Stanton MD for evaluation of erythrocytosis.  Patient had routine laboratory testing performed as outpatient on August 25, 2022 which showed erythrocytosis with red blood cell of 5.95.  Her hemoglobin was normal at 15.3.  Her white blood cell count and platelet counts were within normal limit.  She denies any prior history of any hematological disorder.  Denies any prior history of thrombosis.  I been asked to assist with evaluation and management of her erythrocytosis.      Objective   Vital Signs:  /100 Comment: MANUALLY  Pulse 95   Temp 97.1 °F (36.2 °C)   Resp 18   Wt 123 kg (271 lb 11.2 oz)   SpO2 95%   BMI 42.55 kg/m²   Estimated body mass index is 42.55 kg/m² as calculated from the following:    Height as of 9/13/22: 170.2 cm (67\").    Weight as of this encounter: 123 kg (271 lb 11.2 oz).          Physical Exam  Vitals and nursing note reviewed.   Constitutional:       Appearance: Normal appearance.   Neurological:      General: No focal deficit present.      Mental Status: He is alert and oriented to person, place, and time. Mental status is at baseline.   Psychiatric:         Mood and Affect: Mood normal.         Behavior: Behavior normal.         Thought Content: Thought content normal.        Result Review :  The following data was reviewed by: Lukas Beltran MD on 09/14/2022:  Common labs    Common Labsle 5/13/22 5/13/22 5/13/22 5/13/22 8/25/22 8/25/22 8/25/22 8/25/22 8/25/22 9/14/22    1235 1235 1235 1337 1338 1338 1338 1338 1403    Glucose      208 (A)       BUN      18       Creatinine      0.91       Sodium      135 (A)       Potassium      4.2       Chloride      98       Calcium      8.9       Albumin      " 4.50       Total Bilirubin      0.4       Alkaline Phosphatase      63       AST (SGOT)      16       ALT (SGPT)      29       WBC 6.88    8.32     7.10   Hemoglobin 14.3    15.3     14.4   Hematocrit 44.0    45.9     44.0   Platelets 245    239     260   Total Cholesterol  141      120     Triglycerides  623 (A)      457 (A)     HDL Cholesterol  32 (A)      30 (A)     LDL Cholesterol   26      27     Hemoglobin A1C   10.50 (A)    10.10 (A)      Microalbumin, Urine    <1.2     1.2    (A) Abnormal value            CMP    CMP 11/8/21 2/8/22 8/25/22   Glucose 139 (A) 126 (A) 208 (A)   BUN 16 16 18   Creatinine 0.71 (A) 0.98 0.91   eGFR Non African Am 116 80    Sodium 137 135 (A) 135 (A)   Potassium 4.1 3.8 4.2   Chloride 101 101 98   Calcium 8.8 8.7 8.9   Albumin 4.20 4.10 4.50   Total Bilirubin 0.5 0.5 0.4   Alkaline Phosphatase 63 75 63   AST (SGOT) 24 16 16   ALT (SGPT) 45 (A) 28 29   (A) Abnormal value            CBC    CBC 5/13/22 8/25/22 9/14/22   WBC 6.88 8.32 7.10   RBC 5.90 (A) 5.95 (A) 5.70   Hemoglobin 14.3 15.3 14.4   Hematocrit 44.0 45.9 44.0   MCV 74.6 (A) 77.1 (A) 77.2 (A)   MCH 24.2 (A) 25.7 (A) 25.3 (A)   MCHC 32.5 33.3 32.7   RDW 16.1 (A) 15.7 (A) 14.8   Platelets 245 239 260   (A) Abnormal value            CBC w/diff    CBC w/Diff 5/13/22 8/25/22 9/14/22   WBC 6.88 8.32 7.10   RBC 5.90 (A) 5.95 (A) 5.70   Hemoglobin 14.3 15.3 14.4   Hematocrit 44.0 45.9 44.0   MCV 74.6 (A) 77.1 (A) 77.2 (A)   MCH 24.2 (A) 25.7 (A) 25.3 (A)   MCHC 32.5 33.3 32.7   RDW 16.1 (A) 15.7 (A) 14.8   Platelets 245 239 260   Neutrophil Rel % 62.8 57.3 58.6   Immature Granulocyte Rel % 0.9 (A) 0.7 (A) 0.3   Lymphocyte Rel % 24.4 29.0 27.0   Monocyte Rel % 8.6 9.3 9.9   Eosinophil Rel % 2.0 2.4 3.1   Basophil Rel % 1.3 1.3 1.1   (A) Abnormal value            Kingsley Littlejohn reports a pain score of 0.  Given his pain assessment as noted, treatment options were discussed and the following options were decided upon as a  follow-up plan to address the patient's pain: continuation of current treatment plan for pain.    Patient screened negative for depression based on a PHQ-9 score of 0 on 9/14/2022.    Assessment and Plan   Diagnoses and all orders for this visit:    1. Polycythemia (Primary)  -     CBC & Differential; Future    This is a new diagnosis for me.  Patient recently had routine laboratory testing performed which showed mild erythrocytosis.  Her hemoglobin was normal however she was noted to have elevated red blood cells.  Her white blood cell count was normal.  She denies any prior history of thrombosis.  I rechecked her CBC today and appears that her RBC is within normal range.  Her hemoglobin is normal as well.  Overall low suspicious for hematological disorder.  I will recheck her CBC in 4 months.  If she continues to have worsening erythrocytosis will consider additional testing.    2. Microcytosis  -     Ferritin; Future  -     Iron Profile; Future    New diagnosis.  Patient with mild microcytosis.  I checked a ferritin and iron profile today to rule out underlying iron deficiency.           Follow Up   No follow-ups on file.  Patient was given instructions and counseling regarding his condition or for health maintenance advice. Please see specific information pulled into the AVS if appropriate.

## 2022-09-21 ENCOUNTER — OFFICE VISIT (OUTPATIENT)
Dept: SLEEP MEDICINE | Facility: HOSPITAL | Age: 52
End: 2022-09-21

## 2022-09-21 ENCOUNTER — OFFICE VISIT (OUTPATIENT)
Dept: PODIATRY | Facility: CLINIC | Age: 52
End: 2022-09-21

## 2022-09-21 VITALS
DIASTOLIC BLOOD PRESSURE: 101 MMHG | HEART RATE: 91 BPM | SYSTOLIC BLOOD PRESSURE: 176 MMHG | WEIGHT: 270.8 LBS | HEIGHT: 67 IN | BODY MASS INDEX: 42.5 KG/M2 | OXYGEN SATURATION: 95 %

## 2022-09-21 VITALS — HEIGHT: 67 IN | BODY MASS INDEX: 42.53 KG/M2 | WEIGHT: 271 LBS | OXYGEN SATURATION: 94 % | HEART RATE: 73 BPM

## 2022-09-21 DIAGNOSIS — L84 CORNS: ICD-10-CM

## 2022-09-21 DIAGNOSIS — E11.42 TYPE 2 DIABETES MELLITUS WITH PERIPHERAL NEUROPATHY: Primary | ICD-10-CM

## 2022-09-21 DIAGNOSIS — G89.29 CHRONIC TOE PAIN, BILATERAL: ICD-10-CM

## 2022-09-21 DIAGNOSIS — M79.674 CHRONIC TOE PAIN, BILATERAL: ICD-10-CM

## 2022-09-21 DIAGNOSIS — B35.1 ONYCHOMYCOSIS: ICD-10-CM

## 2022-09-21 DIAGNOSIS — G47.33 OSA (OBSTRUCTIVE SLEEP APNEA): Primary | ICD-10-CM

## 2022-09-21 DIAGNOSIS — M79.675 CHRONIC TOE PAIN, BILATERAL: ICD-10-CM

## 2022-09-21 PROCEDURE — 99213 OFFICE O/P EST LOW 20 MIN: CPT | Performed by: NURSE PRACTITIONER

## 2022-09-21 PROCEDURE — 11721 DEBRIDE NAIL 6 OR MORE: CPT | Performed by: PODIATRIST

## 2022-09-21 PROCEDURE — 11055 PARING/CUTG B9 HYPRKER LES 1: CPT | Performed by: PODIATRIST

## 2022-09-21 NOTE — PROGRESS NOTES
"Kingsley Littlejohn  1970  52 y.o. male  PCP - Bonilla Stanton MD:  - 4/17/2022  BS - 300 per patient       09/21/2022    Chief Complaint   Patient presents with   • Left Foot - Follow-up     Diabetic foot care   • Right Foot - Follow-up     Diabetic foot care       History of Present Illness    Patient presents for routine diabetic foot care.    Past Medical History:   Diagnosis Date   • Abdominal pain    • Allergic rhinitis    • Diabetes mellitus (HCC)     Prev HbA1c 14.0 2014, states \"I'm cured!\"   • Essential hypertension    • Fracture closed, fibula, shaft     tib-fib   • Onychomycosis    • Sleep apnea          Past Surgical History:   Procedure Laterality Date   • ANKLE SURGERY      right and left   • COLONOSCOPY N/A 6/4/2018    Procedure: COLONOSCOPY;  Surgeon: Jaylon Castro MD;  Location: [facility] ENDOSCOPY;  Service: Gastroenterology   • COLONOSCOPY N/A 8/13/2021    Procedure: COLONOSCOPY;  Surgeon: Jaylon Castro MD;  Location: [facility] ENDOSCOPY;  Service: General;  Laterality: N/A;   • CYST REMOVAL      x 3, from hand, thigh, and buttocks   • ENDOSCOPY      20 years ago   • TIBIA FRACTURE SURGERY Bilateral 2011    tib-fib         Family History   Problem Relation Age of Onset   • Diabetes Other    • Hypertension Other    • Cancer Other    • Lung disease Other    • Thyroid disease Other    • Bleeding Disorder Other    • Colon cancer Paternal Grandmother    • Cancer Paternal Grandfather    • Crohn's disease Mother    • Diabetes Mother    • Hypertension Father    • Lung disease Father    • Hypertension Brother    • Hypertension Brother    • Hypertension Brother        Allergies   Allergen Reactions   • Latex Irritability     Other reaction(s): Unknown   • Codeine Nausea Only     Other reaction(s): Unknown       Social History     Socioeconomic History   • Marital status: Single   Tobacco Use   • Smoking status: Former Smoker     Packs/day: 1.00     Years: 25.00     Pack years: 25.00     Types: " Cigarettes     Start date: 1992     Quit date: 12/2018     Years since quitting: 3.8   • Smokeless tobacco: Never Used   Vaping Use   • Vaping Use: Never used   Substance and Sexual Activity   • Alcohol use: Defer     Comment: FORMER pint of whiskey daily, states quit 1 year ago   • Drug use: Defer     Comment: patient reports prior drug abuse, but sober for 8 months now.   • Sexual activity: Defer         Current Outpatient Medications   Medication Sig Dispense Refill   • Afrezza 4 & 8 & 12 units powder INHALE 4-32 UNITS 3 (THREE) TIMES A DAY WITH MEALS. 4-32 UNITS THREE TIMES DAILY , MAX DOSE 96 UNITS DAILY 360 each 10   • aspirin 325 MG tablet Take 325 mg by mouth Daily.     • atorvastatin (LIPITOR) 40 MG tablet TAKE 1 TABLET BY MOUTH EVERY DAY 90 tablet 3   • BD Pen Needle Nola 2nd Gen 32G X 4 MM misc INJECT ONCE WALTON 100 each 11   • BD Pen Needle Nola U/F 32G X 4 MM misc INJECT ONCE WALTON     • Blood Glucose Monitoring Suppl (ONE TOUCH ULTRA 2) w/Device kit Use 1 daily to test blood glucose level. 1 each 5   • Continuous Blood Gluc  (FreeStyle Melonie 2 [place]) device 1 each Continuous. 1 each 1   • Continuous Blood Gluc Sensor (FreeStyle Melonie 2 Sensor) misc 1 each Every 14 (Fourteen) Days. 3 each 11   • empagliflozin (Jardiance) 25 MG tablet tablet Take 1 tablet by mouth Daily. 90 tablet 1   • fenofibrate (Tricor) 145 MG tablet Take 1 tablet by mouth Daily. 30 tablet 11   • fluticasone (FLONASE) 50 MCG/ACT nasal spray 2 sprays by Each Nare route Daily. 48 mL 0   • glucose blood test strip USE TO TEST BLOOD SUGAR 1 TIME DAILY E11.9 100 each 12   • glucose monitor monitoring kit 1 each As Needed (Diabetes). USE TO TEST 1 TIME DAILY 1 each 0   • hydrOXYzine (ATARAX) 50 MG tablet Take 50 mg by mouth 4 (Four) Times a Day.     • ibuprofen (ADVIL,MOTRIN) 600 MG tablet Take 1 tablet by mouth Every 6 (Six) Hours As Needed for Mild Pain . 15 tablet 0   • Insulin Glargine, 2 Unit Dial, (Toujeo Max SoloStar) 300  UNIT/ML solution pen-injector injection Inject 40 Units under the skin into the appropriate area as directed Daily. Inject 55 units under the skin into the appropriate area as directed daily (Patient taking differently: Inject 54 Units under the skin into the appropriate area as directed Daily. Inject 54 units under the skin into the appropriate area as directed daily) 8 pen 11   • Invega Sustenna 117 MG/0.75ML suspension prefilled syringe IM injection INJECT ONE HUNDRED SEVENTEEN (117) MILLIGRAMS INTO THE MUSCLE EVERY 4 WEEKS     • Lancets (ACCU-CHEK SOFT TOUCH) lancets Use TO TEST BLOOD SUGAR 1 TIME DAILY. 100 each 12   • lisinopril (PRINIVIL,ZESTRIL) 40 MG tablet TAKE 1 TABLET BY MOUTH EVERY DAY 90 tablet 3   • metFORMIN (GLUCOPHAGE) 1000 MG tablet Take 1 tablet by mouth 2 (Two) Times a Day With Meals. 180 tablet 2   • metoprolol tartrate (LOPRESSOR) 100 MG tablet TAKE 1 TAB(S) 2 TIMES A DAY     • nitroglycerin (NITROSTAT) 0.4 MG SL tablet Place 1 tablet under the tongue Every 5 (Five) Minutes As Needed for Chest Pain. Take no more than 3 doses in 15 minutes. 30 tablet 12   • Ozempic, 1 MG/DOSE, 4 MG/3ML solution pen-injector INJECT 1 MG UNDER THE SKIN INTO THE APPROPRIATE AREA AS DIRECTED 1 (ONE) TIME PER WEEK. (Patient taking differently: Inject 2 mg under the skin into the appropriate area as directed 1 (One) Time Per Week.) 3 pen 3   • paliperidone palmitate (INVEGA SUSTENNA) 117 MG/0.75ML suspension IM injection Inject 117 mg into the shoulder, thigh, or buttocks Every 30 (Thirty) Days.     • Semaglutide, 2 MG/DOSE, (Ozempic, 2 MG/DOSE,) 8 MG/3ML solution pen-injector Inject 2 mg under the skin into the appropriate area as directed 1 (One) Time Per Week. 12 pen 3   • zolpidem (AMBIEN) 10 MG tablet Take 10 mg by mouth every night at bedtime.       No current facility-administered medications for this visit.       Review of Systems   Constitutional: Negative.    HENT: Negative.    Eyes: Negative.   "  Respiratory: Negative.    Cardiovascular: Positive for leg swelling.   Gastrointestinal: Negative.    Musculoskeletal:        Toe pain    Skin: Negative for color change.   Allergic/Immunologic: Negative.    Neurological: Positive for numbness.   Hematological: Negative.    Psychiatric/Behavioral: Negative.          OBJECTIVE    Pulse 73   Ht 170.2 cm (67\")   Wt 123 kg (271 lb)   SpO2 94%   BMI 42.44 kg/m²       Physical Exam   Constitutional: He is oriented to person, place, and time. He appears well-developed.   HENT:   Head: Normocephalic and atraumatic.   Pulmonary/Chest: Effort normal. No respiratory distress.   Musculoskeletal: Normal range of motion. No deformity.   Neurological: He is alert and oriented to person, place, and time.   Skin: Skin is warm and dry. Capillary refill takes less than 2 seconds. He is not diaphoretic. No erythema.   Psychiatric: His behavior is normal.   Vitals reviewed.      Gait: Normal     Assistive Device: None    Lower Extremity    Cardiovascular:    DP/PT pulses palpable bilateral  CFT brisk  to all digits  Skin temp is warm to warm from proximal tibia to distal digits bilateral  Pedal hair growth present.   Musculoskeletal:  Muscle strength is 5/5 for all muscle groups tested   ROM of the 1st MTP is full without pain or crepitus bilateral  ROM of the ankle joint is full without pain or crepitus  bilateral  Dermatological:   Skin is warm, dry and intact bilateral  Webspaces 1-4 bilateral are clean, dry and intact.   Hyperkeratotic tissue noted to medial IPJ of right hallux.  Toenails 1 through 5 bilateral are thickened, discolored, elongated with subungual.  Pain on palpation to the nail plates.  Neurological:   Protective sensation absent 3 out of 10 sites bilateral  Sensation intact to light touch        Procedures        ASSESSMENT AND PLAN    Diagnoses and all orders for this visit:    1. Type 2 diabetes mellitus with peripheral neuropathy (HCC) (Primary)    2. " Onychomycosis    3. Chronic toe pain, bilateral    4. Corns        - Nails 1-5 bilateral were debrided in length and thickness with nail nipper and electric  to decrease fungal load and risk of infection.  An ABN was signed prior to nail debridement.   - Trimmed hyperkeratotic lesions noted in objective with a #15 blade without incident.   - all questions answered  - gallo 3 months           This document has been electronically signed by Abhijeet Thomason DPM on September 21, 2022 09:56 CDT     9/21/2022  09:56 CDT

## 2022-09-21 NOTE — PROGRESS NOTES
"New Patient Sleep Medicine Consultation    Encounter Date: 9/21/2022         Patient's PCP: Bonilla Stanton MD  Reason for consultation chief complaint: snoring, awakening gasping for breath, excessive daytime sleepiness, unrefreshing sleep and insomnia, known MAXIME     Kingsley Littlejohn is a 52 y.o. male whose bedtime is ~ 1930. He  falls asleep after 5 minutes, and is up 3-4 times per night. He wakes up ~ 0630. He endorses 8 hours of sleep. He drinks 0 cups of coffee, 0 teas, and 5-6 sodas per day. He drinks 0 alcoholic beverages per week.      Kingsley Littlejohn admits to snoring, unrestful sleep, High blod pressure, gasping during sleep, excessive daytime sleepiness, stop breathing during sleep, Disturbed or restless sleep, memory loss, choking duing sleep, Up to the bathroom at night, abnormal or violent dreams and takes medicine to help go to sleep for >5 years. He denies cataplexy, sleep paralysis, or hypnagogic hallucinations. He takes Ambien 10 mg for sleep. Also takes hydroxyzine at night.  He has no sleepiness with driving. He naps occasionally. He has  had a sleep study. He sleeps in a recliner most nights.     Has been on CPAP in the past but did not meet compliance and DME repossessed machine. Last used CPAP in 2019. Interested in [facility] or getting new CPAP.     Sleep study history:   1.  PSG in 2002 AHI of ?   2.  HST on 06/18/2018 AHI of 38    He used to smoke, but has not smoked for years. Smoking history: smoked 2 ppds from age 16 until 50      Marital status: single   Occupation: disability   Children: 0  FH of sleep disorders: mother has sleep apnea       Past Medical History:   Diagnosis Date   • Abdominal pain    • Allergic rhinitis    • Diabetes mellitus (HCC)     Prev HbA1c 14.0 2014, states \"I'm cured!\"   • Essential hypertension    • Fracture closed, fibula, shaft     tib-fib   • Onychomycosis    • Sleep apnea      Social History     Socioeconomic History   • Marital status: Single   Tobacco " Use   • Smoking status: Former Smoker     Packs/day: 1.00     Years: 25.00     Pack years: 25.00     Types: Cigarettes     Start date: 1992     Quit date: 12/2018     Years since quitting: 3.8   • Smokeless tobacco: Never Used   Vaping Use   • Vaping Use: Never used   Substance and Sexual Activity   • Alcohol use: Defer     Comment: FORMER pint of whiskey daily, states quit 1 year ago   • Drug use: Defer     Comment: patient reports prior drug abuse, but sober for 8 months now.   • Sexual activity: Defer     Family History   Problem Relation Age of Onset   • Diabetes Other    • Hypertension Other    • Cancer Other    • Lung disease Other    • Thyroid disease Other    • Bleeding Disorder Other    • Colon cancer Paternal Grandmother    • Cancer Paternal Grandfather    • Crohn's disease Mother    • Diabetes Mother    • Hypertension Father    • Lung disease Father    • Hypertension Brother    • Hypertension Brother    • Hypertension Brother          Review of Systems:  Constitutional: negative  Eyes: negative  Ears, nose, mouth, throat, and face: negative  Respiratory: negative  Cardiovascular: negative  Gastrointestinal: negative  Genitourinary:negative  Integument/breast: negative  Hematologic/lymphatic: negative  Musculoskeletal:negative  Neurological: negative  Behavioral/Psych: negative  Endocrine: negative  Allergic/Immunologic: negative Patient advised to discuss any positive ROS with PCP.      [place] - 4      Vitals:    09/21/22 1317   BP: (!) 176/101   Pulse: 91   SpO2: 95%           09/21/22  1317   Weight: 123 kg (270 lb 12.8 oz)       Body mass index is 42.4 kg/m². Class 3 Severe Obesity (BMI >=40). Obesity-related health conditions include the following: obstructive sleep apnea and hypertension. Obesity is unchanged. BMI is is above average; BMI management plan is completed. We discussed portion control and increasing exercise.               General: Alert. Cooperative. Well developed. No acute distress.              Head:  Normocephalic. Symmetrical. Atraumatic.              Eyes: Sclera clear. No icterus. Normal EOM.             Ears: No deformities. Normal hearing.             Nose: No septal deviation. No drainage.          Throat: No oral lesions. No thrush. Moist mucous membranes. Trachea midline    Tongue is normal    Dentition is fair       Pharynx: Posterior pharyngeal pillars are narrow    Mallampati score of IV (only hard palate visible)    Pharynx is nonerythematous   Chest Wall:  Normal shape. Symmetric expansion with respiration. No tenderness.          Lungs:  Clear to auscultation bilaterally. No wheezes. No rhonchi. No rales. Respirations regular, even and unlabored.            Heart:  Regular rhythm and normal rate. Normal S1 and S2. No murmur.  Extremities:  Moves all extremities well. No edema.               Skin: Dry. Intact. No bleeding. No rash.           Neuro: Moves all 4 extremities and cranial nerves grossly intact.  Psychiatric: Normal mood and affect.      Current Outpatient Medications:   •  Afrezza 4 & 8 & 12 units powder, INHALE 4-32 UNITS 3 (THREE) TIMES A DAY WITH MEALS. 4-32 UNITS THREE TIMES DAILY , MAX DOSE 96 UNITS DAILY, Disp: 360 each, Rfl: 10  •  aspirin 325 MG tablet, Take 325 mg by mouth Daily., Disp: , Rfl:   •  atorvastatin (LIPITOR) 40 MG tablet, TAKE 1 TABLET BY MOUTH EVERY DAY, Disp: 90 tablet, Rfl: 3  •  BD Pen Needle Nola 2nd Gen 32G X 4 MM misc, INJECT ONCE WALTON, Disp: 100 each, Rfl: 11  •  BD Pen Needle Nola U/F 32G X 4 MM misc, INJECT ONCE WALTON, Disp: , Rfl:   •  Blood Glucose Monitoring Suppl (ONE TOUCH ULTRA 2) w/Device kit, Use 1 daily to test blood glucose level., Disp: 1 each, Rfl: 5  •  Continuous Blood Gluc  (FreeStyle Melonie 2 [place]) device, 1 each Continuous., Disp: 1 each, Rfl: 1  •  Continuous Blood Gluc Sensor (FreeStyle Melonie 2 Sensor) misc, 1 each Every 14 (Fourteen) Days., Disp: 3 each, Rfl: 11  •  empagliflozin (Jardiance) 25 MG tablet tablet,  Take 1 tablet by mouth Daily., Disp: 90 tablet, Rfl: 1  •  fenofibrate (Tricor) 145 MG tablet, Take 1 tablet by mouth Daily., Disp: 30 tablet, Rfl: 11  •  fluticasone (FLONASE) 50 MCG/ACT nasal spray, 2 sprays by Each Nare route Daily., Disp: 48 mL, Rfl: 0  •  glucose blood test strip, USE TO TEST BLOOD SUGAR 1 TIME DAILY E11.9, Disp: 100 each, Rfl: 12  •  glucose monitor monitoring kit, 1 each As Needed (Diabetes). USE TO TEST 1 TIME DAILY, Disp: 1 each, Rfl: 0  •  hydrOXYzine (ATARAX) 50 MG tablet, Take 50 mg by mouth 4 (Four) Times a Day., Disp: , Rfl:   •  Insulin Glargine, 2 Unit Dial, (Toujeo Max SoloStar) 300 UNIT/ML solution pen-injector injection, Inject 40 Units under the skin into the appropriate area as directed Daily. Inject 55 units under the skin into the appropriate area as directed daily (Patient taking differently: Inject 54 Units under the skin into the appropriate area as directed Daily. Inject 54 units under the skin into the appropriate area as directed daily), Disp: 8 pen, Rfl: 11  •  Invega Sustenna 117 MG/0.75ML suspension prefilled syringe IM injection, INJECT ONE HUNDRED SEVENTEEN (117) MILLIGRAMS INTO THE MUSCLE EVERY 4 WEEKS, Disp: , Rfl:   •  Lancets (ACCU-CHEK SOFT TOUCH) lancets, Use TO TEST BLOOD SUGAR 1 TIME DAILY., Disp: 100 each, Rfl: 12  •  lisinopril (PRINIVIL,ZESTRIL) 40 MG tablet, TAKE 1 TABLET BY MOUTH EVERY DAY, Disp: 90 tablet, Rfl: 3  •  metFORMIN (GLUCOPHAGE) 1000 MG tablet, Take 1 tablet by mouth 2 (Two) Times a Day With Meals., Disp: 180 tablet, Rfl: 2  •  metoprolol tartrate (LOPRESSOR) 100 MG tablet, TAKE 1 TAB(S) 2 TIMES A DAY, Disp: , Rfl:   •  paliperidone palmitate (INVEGA SUSTENNA) 117 MG/0.75ML suspension IM injection, Inject 117 mg into the shoulder, thigh, or buttocks Every 30 (Thirty) Days., Disp: , Rfl:   •  Semaglutide, 2 MG/DOSE, (Ozempic, 2 MG/DOSE,) 8 MG/3ML solution pen-injector, Inject 2 mg under the skin into the appropriate area as directed 1 (One)  Time Per Week., Disp: 12 pen, Rfl: 3  •  zolpidem (AMBIEN) 10 MG tablet, Take 10 mg by mouth every night at bedtime., Disp: , Rfl:     Lab Results   Component Value Date    WBC 7.10 09/14/2022    HGB 14.4 09/14/2022    HCT 44.0 09/14/2022    MCV 77.2 (L) 09/14/2022     09/14/2022     Lab Results   Component Value Date    GLUCOSE 208 (H) 08/25/2022    CALCIUM 8.9 08/25/2022     (L) 08/25/2022    K 4.2 08/25/2022    CO2 25.0 08/25/2022    CL 98 08/25/2022    BUN 18 08/25/2022    CREATININE 0.91 08/25/2022    EGFRIFNONA 80 02/08/2022    BCR 19.8 08/25/2022    ANIONGAP 12.0 08/25/2022     Lab Results   Component Value Date    INR 0.86 08/28/2019    PROTIME 11.5 08/28/2019     Lab Results   Component Value Date    TROPONINI <0.012 04/24/2018    TROPONINT <0.010 08/28/2019       pH, Arterial   Date Value Ref Range Status   08/06/2018 7.386 7.350 - 7.450 pH units Final     pCO2, Arterial   Date Value Ref Range Status   08/06/2018 42.7 35.0 - 45.0 mm Hg Final     pO2, Arterial   Date Value Ref Range Status   08/06/2018 78.3 (L) 83.0 - 108.0 mm Hg Final   ]      Assessment and Plan:    1. Obstructive sleep apnea-   1. Start APAP 5-15 cm H2O with mask fitting per DME and PAP supplies   2. DME: [facility]   3. CPAP desensitization   4. Referral to sleep educator if difficulty adjusting   5. Monitor compliance report closely   6. Drowsy driving tips- do not drive if feeling sleepy   7. RTC in 6 weeks with compliance report or sooner if needed   2.   Snoring- New to me, additional work-up planned    1.   PAP therapy   3.   Daytime sleepiness   1. PAP therapy   4.   Insomnia - sleep onset and or maintenance    1.  On Ambien nightly    2   Good sleep hygiene   5.   Morbid obesity       I obtained a brief history from the patient, reviewed the medical problems and current medications, and made medical decisions regarding treatment based on that information.   I spent 24 minutes caring for Kingsley on this date of service.  This time includes time spent by me in the following activities: preparing for the visit, reviewing tests, obtaining and/or reviewing a separately obtained history, performing a medically appropriate examination and/or evaluation, counseling and educating the patient/family/caregiver, ordering medications, tests, or procedures and documenting information in the medical record. Discussed [facility]. He is not a candidate due to BMI of 42. Discussed health impact of sleep apnea and importance of being compliant with PAP.                   This document has been electronically signed by RAJAT Stuart on September 21, 2022 13:30 CDT          This document has been electronically signed by RAJAT Stuart on September 21, 2022         CC: Bonilla Stanton MD          No ref. provider found

## 2022-10-14 ENCOUNTER — TELEPHONE (OUTPATIENT)
Dept: ENDOCRINOLOGY | Facility: CLINIC | Age: 52
End: 2022-10-14

## 2022-10-14 NOTE — TELEPHONE ENCOUNTER
Pt called regarding his RX for Ozempic. It's on back order at his pharmacy and would like a call back to see what he needs to do.     Thanks

## 2022-10-19 NOTE — TELEPHONE ENCOUNTER
Spoke with patient and informed him to contact pharmacy's that he would like to use to see if they have Ozempic in stock. Patient will call back when he finds a pharmacy that has it available.

## 2022-10-20 ENCOUNTER — TELEPHONE (OUTPATIENT)
Dept: ENDOCRINOLOGY | Facility: CLINIC | Age: 52
End: 2022-10-20

## 2022-10-20 DIAGNOSIS — E11.65 TYPE 2 DIABETES MELLITUS WITH HYPERGLYCEMIA, WITHOUT LONG-TERM CURRENT USE OF INSULIN: ICD-10-CM

## 2022-10-20 DIAGNOSIS — E11.65 TYPE 2 DIABETES MELLITUS WITH HYPERGLYCEMIA, WITHOUT LONG-TERM CURRENT USE OF INSULIN: Primary | ICD-10-CM

## 2022-10-20 RX ORDER — SEMAGLUTIDE 2.68 MG/ML
2 INJECTION, SOLUTION SUBCUTANEOUS WEEKLY
Qty: 3 ML | Refills: 3 | Status: SHIPPED | OUTPATIENT
Start: 2022-10-20 | End: 2022-10-20 | Stop reason: SDUPTHER

## 2022-10-20 RX ORDER — SEMAGLUTIDE 2.68 MG/ML
2 INJECTION, SOLUTION SUBCUTANEOUS WEEKLY
Qty: 3 ML | Refills: 3 | Status: SHIPPED | OUTPATIENT
Start: 2022-10-20 | End: 2022-12-12 | Stop reason: SDUPTHER

## 2022-10-20 NOTE — TELEPHONE ENCOUNTER
Patient called said [facility] called him so he could call around to see where he could get his Ozempic 2 filled. He is asking for the prescription to sent to [facility] in [place].    Thank you

## 2022-10-24 ENCOUNTER — OFFICE VISIT (OUTPATIENT)
Dept: OTOLARYNGOLOGY | Facility: CLINIC | Age: 52
End: 2022-10-24

## 2022-10-24 VITALS — HEART RATE: 86 BPM | OXYGEN SATURATION: 95 % | WEIGHT: 271.6 LBS | BODY MASS INDEX: 42.63 KG/M2 | HEIGHT: 67 IN

## 2022-10-24 DIAGNOSIS — H61.23 BILATERAL IMPACTED CERUMEN: ICD-10-CM

## 2022-10-24 PROCEDURE — 99203 OFFICE O/P NEW LOW 30 MIN: CPT | Performed by: OTOLARYNGOLOGY

## 2022-10-24 PROCEDURE — 69210 REMOVE IMPACTED EAR WAX UNI: CPT | Performed by: OTOLARYNGOLOGY

## 2022-10-24 NOTE — PROGRESS NOTES
Chief complaint: Hearing loss    Assessment and Plan:  52M with PMH including IDDM, HLD, HTN, CAD with history of MI on , MAXIME and polycythemia now with bilateral hard cerumen impaction, no evidence of perforation    -We did discuss that using his earplugs over time may have contributed to the cerumen impaction, he can continue to use these but should also try using baby oil 2 to 3 drops in each ear 1-2 times per week to help thin out his cerumen and prevent impaction from building  -Even that he is not experiencing any hearing loss, I do not think he requires an audiometric evaluation  -Follow-up as needed    History of present illness:    Mr. Littlejohn is a 52M with PMH including IDDM, HLD, HTN, CAD with history of MI on , MAXIME and polycythemia presenting for evaluation of hearing loss.  He states that 1 to 2 months ago he felt he had hearing loss on the left, he was concerned that this may have been a tympanic membrane perforation, as he has been told about 5 years ago that he had 1 before, but this had apparently resolved on its own.  He denies a history of recurrent ear infections or any ear surgeries.  He states that he uses earplugs frequently and this causes him to have wax buildup.  He states that his hearing loss on the left has now resolved and been present and normal for the last several weeks, he denies tinnitus, vertigo, hearing loss, or other aberrations of sound perception.  He has no other acute concerns today.    Vital Signs   Vitals:    10/24/22 0806   Pulse: 86   SpO2: 95%     Physical Exam:  General: NAD, awake and alert  Head: normocephalic, atraumatic  Eyes: EOMI, sclerae white, conjunctivae pink  Ears: pinnae intact without masses or lesions   Right: TM intact without injection or effusion, 100% cerumen impaction   Left: TM intact without injection or effusion, 100% cerumen impaction  Nose: external straight without deformity  OC/OP: mucosa moist and pink, no masses or lesions,  tongue is midline and mobile.   Neck: Short and thick  Neuro: no focal deficits    Procedure: Removal of impacted cerumen, bilateral  Indication: impacted cerumen, bilateral  EBL: None  Anesthesia: None  Complications: None  Surgeon: Sarika Jung MD    Description:  After informed consent was verbally obtained from the patient, they are placed in a reclined position with the head turned to the contralateral side.  Using a binocular microscope and a 4 mm ear speculum The right canal was cleared of wax using suction and wire loop, and baby oil. The same was performed on the contralateral side.  The patient tolerated the procedure well and without known complications.  This concluded the procedure.      Results Review:  Review of most recent hematology physician , endocrinology APRN, and primary care physician notes demonstrate history of multiple medical comorbidities, and a complaint of hearing loss, suddenly 2 to 3 weeks prior to a 9/13/2022 PCP visit and he notes a history of prior left tympanic membrane rupture at that time.  Exam demonstrates bilateral cerumen impaction.    Review of Systems:  Positive ROS items: None noted  Otherwise, a 14 system ROS is negative except as pertinent positives are mentioned above.    Histories:  Allergies   Allergen Reactions   • Latex Irritability     Other reaction(s): Unknown   • Codeine Nausea Only     Other reaction(s): Unknown       Prior to Admission medications    Medication Sig Start Date End Date Taking? Authorizing Provider   Afrezza 4 & 8 & 12 units powder INHALE 4-32 UNITS 3 (THREE) TIMES A DAY WITH MEALS. 4-32 UNITS THREE TIMES DAILY , MAX DOSE 96 UNITS DAILY 4/22/22   Al Doan APRN   aspirin 325 MG tablet Take 325 mg by mouth Daily.    Provider, MD Evelia   atorvastatin (LIPITOR) 40 MG tablet TAKE 1 TABLET BY MOUTH EVERY DAY 11/22/21   Kg Kc MD   BD Pen Needle Nola 2nd Gen 32G X 4 MM misc INJECT ONCE WALTON 12/16/21   Al Doan  RAJAT Hdez   BD Pen Needle Nola U/F 32G X 4 MM misc INJECT ONCE WALTON 9/23/20   Evelia Perrin MD   Blood Glucose Monitoring Suppl (ONE TOUCH ULTRA 2) w/Device kit Use 1 daily to test blood glucose level. 9/6/19   Yang Veloz MD   Continuous Blood Gluc  (FreeStyle Melonie 2 [place]) device 1 each Continuous. 4/9/21   Al Doan APRN   Continuous Blood Gluc Sensor (FreeStyle Melonie 2 Sensor) misc 1 each Every 14 (Fourteen) Days. 4/9/21   Al Doan APRN   empagliflozin (Jardiance) 25 MG tablet tablet Take 1 tablet by mouth Daily. 7/5/22   Bonilla Stanton MD   fenofibrate (Tricor) 145 MG tablet Take 1 tablet by mouth Daily. 2/14/22 2/14/23  Al Doan APRN   fluticasone (FLONASE) 50 MCG/ACT nasal spray 2 sprays by Each Nare route Daily. 9/1/22   Bonilla Stanton MD   glucose blood test strip USE TO TEST BLOOD SUGAR 1 TIME DAILY E11.9 10/8/19   Yang Veloz MD   glucose monitor monitoring kit 1 each As Needed (Diabetes). USE TO TEST 1 TIME DAILY 10/8/19   Yang Veloz MD   hydrOXYzine (ATARAX) 50 MG tablet Take 50 mg by mouth 4 (Four) Times a Day. 1/31/20   Evelia Perrin MD   Insulin Glargine, 2 Unit Dial, (Toujeo Max SoloStar) 300 UNIT/ML solution pen-injector injection Inject 40 Units under the skin into the appropriate area as directed Daily. Inject 55 units under the skin into the appropriate area as directed daily  Patient taking differently: Inject 54 Units under the skin into the appropriate area as directed Daily. Inject 54 units under the skin into the appropriate area as directed daily 5/24/22   Al Doan APRN   Invega Sustenna 117 MG/0.75ML suspension prefilled syringe IM injection INJECT ONE HUNDRED SEVENTEEN (117) MILLIGRAMS INTO THE MUSCLE EVERY 4 WEEKS 8/25/21   Evelia Perrin MD   Lancets (ACCU-CHEK SOFT TOUCH) lancets Use TO TEST BLOOD SUGAR 1 TIME DAILY. 10/8/19   Yang Veloz MD   lisinopril  "(PRINIVIL,ZESTRIL) 40 MG tablet TAKE 1 TABLET BY MOUTH EVERY DAY 11/22/21   Kg Kc MD   metFORMIN (GLUCOPHAGE) 1000 MG tablet Take 1 tablet by mouth 2 (Two) Times a Day With Meals. 7/5/22   Bonilla Stanton MD   metoprolol tartrate (LOPRESSOR) 100 MG tablet TAKE 1 TAB(S) 2 TIMES A DAY 5/27/21   ProviderEvelia MD   paliperidone palmitate (INVEGA SUSTENNA) 117 MG/0.75ML suspension IM injection Inject 117 mg into the shoulder, thigh, or buttocks Every 30 (Thirty) Days.    ProviderEvelia MD   Semaglutide, 2 MG/DOSE, (Ozempic, 2 MG/DOSE,) 8 MG/3ML solution pen-injector Inject 2 mg under the skin into the appropriate area as directed 1 (One) Time Per Week. 10/20/22   Al Doan APRN   zolpidem (AMBIEN) 10 MG tablet Take 10 mg by mouth every night at bedtime. 8/17/22   ProviderEvelia MD       Past Medical History:   Diagnosis Date   • Abdominal pain    • Allergic rhinitis    • Diabetes mellitus (HCC)     Prev HbA1c 14.0 2014, states \"I'm cured!\"   • Essential hypertension    • Fracture closed, fibula, shaft     tib-fib   • Onychomycosis    • Sleep apnea        Past Surgical History:   Procedure Laterality Date   • ANKLE SURGERY      right and left   • COLONOSCOPY N/A 6/4/2018    Procedure: COLONOSCOPY;  Surgeon: Jaylon Castro MD;  Location: [facility] ENDOSCOPY;  Service: Gastroenterology   • COLONOSCOPY N/A 8/13/2021    Procedure: COLONOSCOPY;  Surgeon: Jaylon Castro MD;  Location: [facility] ENDOSCOPY;  Service: General;  Laterality: N/A;   • CYST REMOVAL      x 3, from hand, thigh, and buttocks   • ENDOSCOPY      20 years ago   • TIBIA FRACTURE SURGERY Bilateral 2011    tib-fib       Social History     Socioeconomic History   • Marital status: Single   Tobacco Use   • Smoking status: Former     Packs/day: 1.00     Years: 25.00     Pack years: 25.00     Types: Cigarettes     Start date: 1992     Quit date: 12/2018     Years since quitting: 3.8   • Smokeless tobacco: Never "   Vaping Use   • Vaping Use: Never used   Substance and Sexual Activity   • Alcohol use: Defer     Comment: FORMER pint of whiskey daily, states quit 1 year ago   • Drug use: Defer     Comment: patient reports prior drug abuse, but sober for 8 months now.   • Sexual activity: Defer       Family History   Problem Relation Age of Onset   • Diabetes Other    • Hypertension Other    • Cancer Other    • Lung disease Other    • Thyroid disease Other    • Bleeding Disorder Other    • Colon cancer Paternal Grandmother    • Cancer Paternal Grandfather    • Crohn's disease Mother    • Diabetes Mother    • Hypertension Father    • Lung disease Father    • Hypertension Brother    • Hypertension Brother    • Hypertension Brother              This document has been electronically signed by Sarika Jung MD on October 24, 2022 07:53 CDT

## 2022-11-14 ENCOUNTER — OFFICE VISIT (OUTPATIENT)
Dept: FAMILY MEDICINE CLINIC | Facility: CLINIC | Age: 52
End: 2022-11-14

## 2022-11-14 VITALS
WEIGHT: 274.5 LBS | HEIGHT: 67 IN | HEART RATE: 90 BPM | BODY MASS INDEX: 43.08 KG/M2 | OXYGEN SATURATION: 95 % | SYSTOLIC BLOOD PRESSURE: 160 MMHG | DIASTOLIC BLOOD PRESSURE: 92 MMHG

## 2022-11-14 DIAGNOSIS — K04.7 DENTAL ABSCESS: Primary | ICD-10-CM

## 2022-11-14 DIAGNOSIS — Z79.4 TYPE 2 DIABETES MELLITUS WITH HYPOGLYCEMIA WITHOUT COMA, WITH LONG-TERM CURRENT USE OF INSULIN: ICD-10-CM

## 2022-11-14 DIAGNOSIS — E11.649 TYPE 2 DIABETES MELLITUS WITH HYPOGLYCEMIA WITHOUT COMA, WITH LONG-TERM CURRENT USE OF INSULIN: ICD-10-CM

## 2022-11-14 PROCEDURE — 99213 OFFICE O/P EST LOW 20 MIN: CPT | Performed by: STUDENT IN AN ORGANIZED HEALTH CARE EDUCATION/TRAINING PROGRAM

## 2022-11-14 RX ORDER — FLUTICASONE PROPIONATE 50 MCG
2 SPRAY, SUSPENSION (ML) NASAL DAILY
COMMUNITY
Start: 2022-09-01 | End: 2022-11-22 | Stop reason: SDUPTHER

## 2022-11-14 RX ORDER — TRAMADOL HYDROCHLORIDE 50 MG/1
TABLET ORAL
COMMUNITY
Start: 2022-10-22 | End: 2022-11-22

## 2022-11-14 RX ORDER — AMOXICILLIN AND CLAVULANATE POTASSIUM 875; 125 MG/1; MG/1
1 TABLET, FILM COATED ORAL 2 TIMES DAILY
COMMUNITY
Start: 2022-11-10 | End: 2022-11-14

## 2022-11-14 RX ORDER — KETOROLAC TROMETHAMINE 10 MG/1
TABLET, FILM COATED ORAL
COMMUNITY
Start: 2022-11-11

## 2022-11-14 RX ORDER — CLINDAMYCIN HYDROCHLORIDE 300 MG/1
300 CAPSULE ORAL 4 TIMES DAILY
Qty: 28 CAPSULE | Refills: 0 | Status: SHIPPED | OUTPATIENT
Start: 2022-11-14 | End: 2022-11-21

## 2022-11-14 RX ORDER — HYDROCHLOROTHIAZIDE 12.5 MG/1
12.5 CAPSULE, GELATIN COATED ORAL DAILY
COMMUNITY
Start: 2022-09-26 | End: 2022-11-22

## 2022-11-14 NOTE — PROGRESS NOTES
"  Family Medicine Residency  Sravanthi Abraham MD    Subjective:     Kingsley Littlejohn is a 52 y.o. male who presents for a dental abscess. The patient states that he previously had a dental abscess on the left side and now has one on the right side. The patient states that he was started on Augmentin over the weekend which has mildly improved the dental abscess. He also states that his pain is well controlled  With Torodol that he is given. Otherwise the patient states that his blood glucose is elevated. The patient states that he is not taking the Afrezza but is compliant with metformin, jiardance, and Toujeo. He states that he is not compliant because sometimes he doesn't feel like taking his medication. Otherwise the patient does not have any other acute complaints today.     The following portions of the patient's history were reviewed and updated as appropriate: allergies, current medications, past family history, past medical history, past social history, past surgical history and problem list.    Past Medical Hx:  Past Medical History:   Diagnosis Date   • Abdominal pain    • Allergic rhinitis    • Diabetes mellitus (HCC)     Prev HbA1c 14.0 2014, states \"I'm cured!\"   • Essential hypertension    • Fracture closed, fibula, shaft     tib-fib   • Onychomycosis    • Sleep apnea        Past Surgical Hx:  Past Surgical History:   Procedure Laterality Date   • ANKLE SURGERY      right and left   • COLONOSCOPY N/A 6/4/2018    Procedure: COLONOSCOPY;  Surgeon: Jaylon Castro MD;  Location: [facility] ENDOSCOPY;  Service: Gastroenterology   • COLONOSCOPY N/A 8/13/2021    Procedure: COLONOSCOPY;  Surgeon: Jaylon Castro MD;  Location: [facility] ENDOSCOPY;  Service: General;  Laterality: N/A;   • CYST REMOVAL      x 3, from hand, thigh, and buttocks   • ENDOSCOPY      20 years ago   • TIBIA FRACTURE SURGERY Bilateral 2011    tib-fib       Current Meds:    Current Outpatient Medications:   •  Afrezza 4 & 8 & 12 units " powder, INHALE 4-32 UNITS 3 (THREE) TIMES A DAY WITH MEALS. 4-32 UNITS THREE TIMES DAILY , MAX DOSE 96 UNITS DAILY, Disp: 360 each, Rfl: 10  •  aspirin 325 MG tablet, Take 325 mg by mouth Daily., Disp: , Rfl:   •  atorvastatin (LIPITOR) 40 MG tablet, TAKE 1 TABLET BY MOUTH EVERY DAY, Disp: 90 tablet, Rfl: 3  •  BD Pen Needle Nola 2nd Gen 32G X 4 MM misc, INJECT ONCE WALTON, Disp: 100 each, Rfl: 11  •  BD Pen Needle Nola U/F 32G X 4 MM misc, INJECT ONCE W[place], Disp: , Rfl:   •  Blood Glucose Monitoring Suppl (ONE TOUCH ULTRA 2) w/Device kit, Use 1 daily to test blood glucose level., Disp: 1 each, Rfl: 5  •  Continuous Blood Gluc  (FreeStyle Melonie 2 [place]) device, 1 each Continuous., Disp: 1 each, Rfl: 1  •  Continuous Blood Gluc Sensor (FreeStyle Melonie 2 Sensor) misc, 1 each Every 14 (Fourteen) Days., Disp: 3 each, Rfl: 11  •  empagliflozin (Jardiance) 25 MG tablet tablet, Take 1 tablet by mouth Daily., Disp: 90 tablet, Rfl: 1  •  fenofibrate (Tricor) 145 MG tablet, Take 1 tablet by mouth Daily., Disp: 30 tablet, Rfl: 11  •  fluticasone (FLONASE) 50 MCG/ACT nasal spray, 2 sprays by Each Nare route Daily., Disp: 48 mL, Rfl: 0  •  fluticasone (FLONASE) 50 MCG/ACT nasal spray, 2 sprays into the nostril(s) as directed by provider Daily., Disp: , Rfl:   •  glucose blood test strip, USE TO TEST BLOOD SUGAR 1 TIME DAILY E11.9, Disp: 100 each, Rfl: 12  •  glucose monitor monitoring kit, 1 each As Needed (Diabetes). USE TO TEST 1 TIME DAILY, Disp: 1 each, Rfl: 0  •  hydrOXYzine (ATARAX) 50 MG tablet, Take 50 mg by mouth 4 (Four) Times a Day., Disp: , Rfl:   •  Insulin Glargine, 2 Unit Dial, (Toujeo Max SoloStar) 300 UNIT/ML solution pen-injector injection, Inject 40 Units under the skin into the appropriate area as directed Daily. Inject 55 units under the skin into the appropriate area as directed daily (Patient taking differently: Inject 54 Units under the skin into the appropriate area as directed Daily. Inject 54  units under the skin into the appropriate area as directed daily), Disp: 8 pen, Rfl: 11  •  Invega Sustenna 117 MG/0.75ML suspension prefilled syringe IM injection, INJECT ONE HUNDRED SEVENTEEN (117) MILLIGRAMS INTO THE MUSCLE EVERY 4 WEEKS, Disp: , Rfl:   •  ketorolac (TORADOL) 10 MG tablet, TAKE 1 TABLET (10 MG) BY MOUTH EVERY 6 (SIX) HOURS IF NEEDED FOR PAIN (MAXIMUM 4 PER DAY), Disp: , Rfl:   •  Lancets (ACCU-CHEK SOFT TOUCH) lancets, Use TO TEST BLOOD SUGAR 1 TIME DAILY., Disp: 100 each, Rfl: 12  •  lisinopril (PRINIVIL,ZESTRIL) 40 MG tablet, TAKE 1 TABLET BY MOUTH EVERY DAY, Disp: 90 tablet, Rfl: 3  •  metFORMIN (GLUCOPHAGE) 1000 MG tablet, Take 1 tablet by mouth 2 (Two) Times a Day With Meals., Disp: 180 tablet, Rfl: 2  •  metoprolol tartrate (LOPRESSOR) 100 MG tablet, TAKE 1 TAB(S) 2 TIMES A DAY, Disp: , Rfl:   •  paliperidone palmitate (INVEGA SUSTENNA) 117 MG/0.75ML suspension IM injection, Inject 117 mg into the shoulder, thigh, or buttocks Every 30 (Thirty) Days., Disp: , Rfl:   •  Semaglutide, 2 MG/DOSE, (Ozempic, 2 MG/DOSE,) 8 MG/3ML solution pen-injector, Inject 2 mg under the skin into the appropriate area as directed 1 (One) Time Per Week., Disp: 3 mL, Rfl: 3  •  zolpidem (AMBIEN) 10 MG tablet, Take 10 mg by mouth every night at bedtime., Disp: , Rfl:   •  clindamycin (Cleocin) 300 MG capsule, Take 1 capsule by mouth 4 (Four) Times a Day for 7 days., Disp: 28 capsule, Rfl: 0  •  hydroCHLOROthiazide (MICROZIDE) 12.5 MG capsule, Take 1 capsule by mouth Daily., Disp: , Rfl:   •  traMADol (ULTRAM) 50 MG tablet, , Disp: , Rfl:     Allergies:  Allergies   Allergen Reactions   • Latex Irritability     Other reaction(s): Unknown   • Codeine Nausea Only     Other reaction(s): Unknown       Family Hx:  Family History   Problem Relation Age of Onset   • Diabetes Other    • Hypertension Other    • Cancer Other    • Lung disease Other    • Thyroid disease Other    • Bleeding Disorder Other    • Colon cancer  "Paternal Grandmother    • Cancer Paternal Grandfather    • Crohn's disease Mother    • Diabetes Mother    • Hypertension Father    • Lung disease Father    • Hypertension Brother    • Hypertension Brother    • Hypertension Brother         Social History:  Social History     Socioeconomic History   • Marital status: Single   Tobacco Use   • Smoking status: Former     Packs/day: 1.00     Years: 25.00     Pack years: 25.00     Types: Cigarettes     Start date: 1992     Quit date: 12/2018     Years since quitting: 3.9   • Smokeless tobacco: Never   Vaping Use   • Vaping Use: Never used   Substance and Sexual Activity   • Alcohol use: Defer     Comment: FORMER pint of whiskey daily, states quit 1 year ago   • Drug use: Defer     Comment: patient reports prior drug abuse, but sober for 8 months now.   • Sexual activity: Defer       Review of Systems  Review of Systems   Constitutional: Negative.    HENT: Negative for congestion, dental problem, ear discharge, facial swelling, rhinorrhea and sore throat.         Dental abscess   Eyes: Negative for pain and visual disturbance.   Respiratory: Negative for apnea, cough, chest tightness, shortness of breath and wheezing.    Cardiovascular: Negative for chest pain, palpitations and leg swelling.   Gastrointestinal: Negative for abdominal pain, blood in stool, constipation, diarrhea, nausea and vomiting.   Endocrine: Negative for cold intolerance, heat intolerance, polydipsia and polyuria.   Genitourinary: Negative for dysuria, flank pain and hematuria.   Musculoskeletal: Negative for back pain and neck pain.   Skin: Negative for rash.   Neurological: Negative for dizziness, syncope, weakness and headaches.       Objective:     /92   Pulse 90   Ht 170.2 cm (67\")   Wt 125 kg (274 lb 8 oz)   SpO2 95%   BMI 42.99 kg/m²   Physical Exam  Vitals reviewed.   Constitutional:       Appearance: He is obese.   HENT:      Head: Normocephalic and atraumatic.      Mouth/Throat:     "  Pharynx: Oropharynx is clear.      Comments: Dental abscess on upper right side.   Eyes:      Pupils: Pupils are equal, round, and reactive to light.   Cardiovascular:      Rate and Rhythm: Normal rate and regular rhythm.      Pulses: Normal pulses.      Heart sounds: Normal heart sounds.   Pulmonary:      Effort: Pulmonary effort is normal.      Breath sounds: Normal breath sounds.   Abdominal:      General: Abdomen is flat. Bowel sounds are normal.      Palpations: Abdomen is soft.   Musculoskeletal:         General: Normal range of motion.      Cervical back: Normal range of motion and neck supple.   Skin:     General: Skin is warm and dry.      Capillary Refill: Capillary refill takes less than 2 seconds.   Neurological:      General: No focal deficit present.      Mental Status: He is alert and oriented to person, place, and time. Mental status is at baseline.   Psychiatric:         Mood and Affect: Mood normal.         Behavior: Behavior normal.          Assessment/Plan:     Diagnoses and all orders for this visit:    1. Dental abscess (Primary)  - The patient states that he was started on Augmentin two days ago but has found that the abscess has not improved. The patient was informed that he could switch antibiotics to Clindamycin. We discussed risk and benefits of starting Clindamycin including GI upset and diarrhea.  Patient has a follow up with Dentist tomorrow. Patient was given Toradol for pain control from urgent care.   -     clindamycin (Cleocin) 300 MG capsule; Take 1 capsule by mouth 4 (Four) Times a Day for 7 days.  Dispense: 28 capsule; Refill: 0    2. Type 2 diabetes mellitus with hypoglycemia without coma, with long-term current use of insulin (HCC)  - Patient states that he is not compliant with Afrezza because he doesn't feel like taking it. We discussed the importance of medication compliance and we discussed if the patient would like to be given an OMNIPOD and the patient is not interested  at this time. Patient follows with Endocrine for diabetes management and has an appointment on 12/12/22.       · Rx changes: as above  · Patient Education: n/a   · Compliance at present is estimated to be good.   · Efforts to improve compliance (if necessary) will be directed at increased exercise.    Depression screening: Depression screening performed today; result negative; no follow up needed     Follow-up:     Return in about 2 weeks (around 11/28/2022).    Preventative:  Health Maintenance   Topic Date Due   • Hepatitis B (1 of 3 - 3-dose series) Never done   • Pneumococcal Vaccine 0-64 (1 - PCV) Never done   • HEPATITIS C SCREENING  Never done   • ZOSTER VACCINE (1 of 2) Never done   • LUNG CANCER SCREENING  08/28/2020   • TDAP/TD VACCINES (2 - Td or Tdap) 01/01/2021   • COVID-19 Vaccine (4 - Booster) 01/12/2022   • DIABETIC FOOT EXAM  09/13/2022   • DIABETIC EYE EXAM  10/14/2022   • HEMOGLOBIN A1C  02/25/2023   • ANNUAL WELLNESS VISIT  08/05/2023   • LIPID PANEL  08/25/2023   • COLORECTAL CANCER SCREENING  08/13/2024   • INFLUENZA VACCINE  Discontinued   • URINE MICROALBUMIN  Discontinued         Alcohol use: Alcohol use questions deferred to the physician.  Nicotine status  reports that he quit smoking about 3 years ago. His smoking use included cigarettes. He started smoking about 30 years ago. He has a 25.00 pack-year smoking history. He has never used smokeless tobacco.     Goals     • Blood Pressure < 140/90      Barriers:  Sporadic follow-up      • Lower Blood Sugar      Barriers:  Questionable insight            RISK SCORE: 3         This document has been electronically signed by Sravanthi Abraham MD on November 14, 2022 14:35 CST

## 2022-11-14 NOTE — PROGRESS NOTES
I have reviewed the notes, assessments, and/or procedures performed by  during office visit. I concur with her/his documentation and assessment and plan for Kingsley Littlejohn.           This document has been electronically signed by Favio Muro MD on November 14, 2022 16:50 CST

## 2022-11-22 ENCOUNTER — OFFICE VISIT (OUTPATIENT)
Dept: SLEEP MEDICINE | Facility: HOSPITAL | Age: 52
End: 2022-11-22

## 2022-11-22 VITALS
WEIGHT: 264.1 LBS | BODY MASS INDEX: 41.45 KG/M2 | HEIGHT: 67 IN | OXYGEN SATURATION: 97 % | DIASTOLIC BLOOD PRESSURE: 71 MMHG | HEART RATE: 84 BPM | SYSTOLIC BLOOD PRESSURE: 115 MMHG

## 2022-11-22 DIAGNOSIS — F51.04 PSYCHOPHYSIOLOGICAL INSOMNIA: ICD-10-CM

## 2022-11-22 DIAGNOSIS — G47.33 OSA (OBSTRUCTIVE SLEEP APNEA): Primary | ICD-10-CM

## 2022-11-22 PROCEDURE — 99213 OFFICE O/P EST LOW 20 MIN: CPT | Performed by: NURSE PRACTITIONER

## 2022-11-22 NOTE — PROGRESS NOTES
Sleep Clinic Follow Up    Date: 2022  Primary Care Provider: Bonilla Stanton MD    Last office visit: 2022 (I reviewed this note)    CC: Follow up: MAXIME started on CPAP, 31-90 day       Interim History:  Since the last visit:    1) severe MAXIME -  Kingsley Littlejohn has remained compliant with CPAP. He denies mask and machine issues, dry mouth, headaches, or pressures intolerance. He denies sleep paralysis, nasal congestion, URI sx.  Has not been able to use last 2 weeks because he has two dental abscesses. Prior to that he was using nightly and doing well. Likes mask.     Since using CPAP he feels more rested and reports less daytime sleepiness.     Sleep Testin. PSG on , AHI of ?  2. HST on 2018 AHI of 38   3. Currently on 5-15 cm H2O    PAP Data:    Time frame: 10/06/2022-2022   Compliance: 94 %  Average use on days used: 5hrs 26 min  Percent of days with usage greater than or equal to 4 hours: 69%  PAP range: 5-15 cm H2O  Average 90% pressure: 6.5 cmH2O  Leak: 45 minutes  Average AHI: 1.4 events/hr  Mask type: Full face mask  DME: [facility]     Bed time: 8318-9457  Sleep latency: 30 minutes  Number of times awakens during the night: 4  Wake time: 9399-6695  Estimated total sleep time at night: 7 hours  Caffeine intake: 0 cups of coffee, 0 cups of tea, and 5-7 sodas per day  Alcohol intake: 0 drinks per week  Nap time: denies    Sleepiness with Driving: denies      [place] - 1        PMHx, FH, SH reviewed and pertinent changes are: currently on clindamycin for dental abscess       REVIEW OF SYSTEMS:   Negative for chest pain, SOA, fever, chills, cough, N/V/D, abdominal pain.    Smoking:none, former            Exam:  Vitals:    22   BP: 115/71   Pulse: 84   SpO2: 97%           22   Weight: 120 kg (264 lb 1.6 oz)     Body mass index is 41.35 kg/m². Class 3 Severe Obesity (BMI >=40). Obesity-related health conditions include the following: obstructive sleep apnea.  "Obesity is unchanged. BMI is is above average; BMI management plan is completed. We discussed portion control and increasing exercise.      Gen:                No distress, conversant, pleasant, appears stated age, alert, oriented  Eyes:               Anicteric sclera, moist conjunctiva, no lid lag                           EOMI   Lungs:             normal effort, non-labored breathing                          Clear to auscultation bilaterally          CV:                  Normal S1/S2, no murmur                          no lower extremity edema                 Psych:             Appropriate affect  Neuro:             CN 2-12 appear intact    Past Medical History:   Diagnosis Date   • Abdominal pain    • Allergic rhinitis    • Diabetes mellitus (HCC)     Prev HbA1c 14.0 2014, states \"I'm cured!\"   • Essential hypertension    • Fracture closed, fibula, shaft     tib-fib   • Onychomycosis    • Sleep apnea        Current Outpatient Medications:   •  Afrezza 4 & 8 & 12 units powder, INHALE 4-32 UNITS 3 (THREE) TIMES A DAY WITH MEALS. 4-32 UNITS THREE TIMES DAILY , MAX DOSE 96 UNITS DAILY, Disp: 360 each, Rfl: 10  •  aspirin 325 MG tablet, Take 325 mg by mouth Daily., Disp: , Rfl:   •  atorvastatin (LIPITOR) 40 MG tablet, TAKE 1 TABLET BY MOUTH EVERY DAY, Disp: 90 tablet, Rfl: 3  •  BD Pen Needle Nola 2nd Gen 32G X 4 MM misc, INJECT ONCE WALTON, Disp: 100 each, Rfl: 11  •  BD Pen Needle Nola U/F 32G X 4 MM misc, INJECT ONCE WALTON, Disp: , Rfl:   •  Blood Glucose Monitoring Suppl (ONE TOUCH ULTRA 2) w/Device kit, Use 1 daily to test blood glucose level., Disp: 1 each, Rfl: 5  •  Continuous Blood Gluc  (FreeStyle Melonie 2 Glendo) device, 1 each Continuous., Disp: 1 each, Rfl: 1  •  Continuous Blood Gluc Sensor (FreeStyle Melonie 2 Sensor) misc, 1 each Every 14 (Fourteen) Days., Disp: 3 each, Rfl: 11  •  empagliflozin (Jardiance) 25 MG tablet tablet, Take 1 tablet by mouth Daily., Disp: 90 tablet, Rfl: 1  •  fenofibrate " (Tricor) 145 MG tablet, Take 1 tablet by mouth Daily., Disp: 30 tablet, Rfl: 11  •  fluticasone (FLONASE) 50 MCG/ACT nasal spray, 2 sprays by Each Nare route Daily., Disp: 48 mL, Rfl: 0  •  hydrOXYzine (ATARAX) 50 MG tablet, Take 50 mg by mouth 4 (Four) Times a Day., Disp: , Rfl:   •  Insulin Glargine, 2 Unit Dial, (Toujeo Max SoloStar) 300 UNIT/ML solution pen-injector injection, Inject 40 Units under the skin into the appropriate area as directed Daily. Inject 55 units under the skin into the appropriate area as directed daily (Patient taking differently: Inject 54 Units under the skin into the appropriate area as directed Daily. Inject 54 units under the skin into the appropriate area as directed daily), Disp: 8 pen, Rfl: 11  •  Invega Sustenna 117 MG/0.75ML suspension prefilled syringe IM injection, INJECT ONE HUNDRED SEVENTEEN (117) MILLIGRAMS INTO THE MUSCLE EVERY 4 WEEKS, Disp: , Rfl:   •  ketorolac (TORADOL) 10 MG tablet, TAKE 1 TABLET (10 MG) BY MOUTH EVERY 6 (SIX) HOURS IF NEEDED FOR PAIN (MAXIMUM 4 PER DAY), Disp: , Rfl:   •  Lancets (ACCU-CHEK SOFT TOUCH) lancets, Use TO TEST BLOOD SUGAR 1 TIME DAILY., Disp: 100 each, Rfl: 12  •  lisinopril (PRINIVIL,ZESTRIL) 40 MG tablet, TAKE 1 TABLET BY MOUTH EVERY DAY, Disp: 90 tablet, Rfl: 3  •  metFORMIN (GLUCOPHAGE) 1000 MG tablet, Take 1 tablet by mouth 2 (Two) Times a Day With Meals., Disp: 180 tablet, Rfl: 2  •  metoprolol tartrate (LOPRESSOR) 100 MG tablet, TAKE 1 TAB(S) 2 TIMES A DAY, Disp: , Rfl:   •  paliperidone palmitate (INVEGA SUSTENNA) 117 MG/0.75ML suspension IM injection, Inject 117 mg into the shoulder, thigh, or buttocks Every 30 (Thirty) Days., Disp: , Rfl:   •  Semaglutide, 2 MG/DOSE, (Ozempic, 2 MG/DOSE,) 8 MG/3ML solution pen-injector, Inject 2 mg under the skin into the appropriate area as directed 1 (One) Time Per Week., Disp: 3 mL, Rfl: 3  •  zolpidem (AMBIEN) 10 MG tablet, Take 10 mg by mouth every night at bedtime., Disp: , Rfl:   •   glucose blood test strip, USE TO TEST BLOOD SUGAR 1 TIME DAILY E11.9, Disp: 100 each, Rfl: 12  •  glucose monitor monitoring kit, 1 each As Needed (Diabetes). USE TO TEST 1 TIME DAILY, Disp: 1 each, Rfl: 0      Assessment and Plan:    1. Obstructive sleep apnea , controlled and improved with CPAP  1. Compliant with PAP therapy- encouraged continued usage. Start back on machine when able to tolerate. He states going to start using again tonight. Discussed possibility of repossession of machine by DME if he does not start using again   2. Continue PAP as prescribed  3. Compliance report reviewed with patient   4. Script for PAP supplies  5. Drowsy driving tips- do not drive if feeling sleepy   6. Return to clinic in 3 months with compliance report unless change in symptoms in interim period    3. Insomnia - sleep onset and or maintenance -  1. On Ambien nightly- managed by PCP   2. PAP therapy   3. Good sleep hygiene   4. Morbid obesity           I spent 15 minutes caring for Kingsley on this date of service. This time includes time spent by me in the following activities: preparing for the visit, obtaining and/or reviewing a separately obtained history, performing a medically appropriate examination and/or evaluation, counseling and educating the patient/family/caregiver, ordering medications, tests, or procedures and documenting information in the medical record.           This document has been electronically signed by RAJAT Stuart on November 22, 2022 09:30 CST            CC: Bonilla Stanton MD          No ref. provider found

## 2022-11-29 ENCOUNTER — OFFICE VISIT (OUTPATIENT)
Dept: FAMILY MEDICINE CLINIC | Facility: CLINIC | Age: 52
End: 2022-11-29

## 2022-11-29 VITALS
OXYGEN SATURATION: 95 % | WEIGHT: 269.9 LBS | HEIGHT: 67 IN | SYSTOLIC BLOOD PRESSURE: 160 MMHG | HEART RATE: 103 BPM | BODY MASS INDEX: 42.36 KG/M2 | DIASTOLIC BLOOD PRESSURE: 110 MMHG

## 2022-11-29 DIAGNOSIS — L03.119 CELLULITIS OF LOWER EXTREMITY, UNSPECIFIED LATERALITY: Primary | ICD-10-CM

## 2022-11-29 PROCEDURE — 99213 OFFICE O/P EST LOW 20 MIN: CPT | Performed by: STUDENT IN AN ORGANIZED HEALTH CARE EDUCATION/TRAINING PROGRAM

## 2022-11-29 RX ORDER — AMOXICILLIN AND CLAVULANATE POTASSIUM 875; 125 MG/1; MG/1
1 TABLET, FILM COATED ORAL 2 TIMES DAILY
Qty: 14 TABLET | Refills: 0 | Status: SHIPPED | OUTPATIENT
Start: 2022-11-29 | End: 2022-12-06

## 2022-11-30 ENCOUNTER — LAB (OUTPATIENT)
Dept: LAB | Facility: HOSPITAL | Age: 52
End: 2022-11-30

## 2022-11-30 DIAGNOSIS — L03.119 CELLULITIS OF LOWER EXTREMITY, UNSPECIFIED LATERALITY: ICD-10-CM

## 2022-12-07 ENCOUNTER — TELEPHONE (OUTPATIENT)
Dept: FAMILY MEDICINE CLINIC | Facility: CLINIC | Age: 52
End: 2022-12-07

## 2022-12-07 NOTE — PROGRESS NOTES
"  Family Medicine Residency  Corbin Flor MD    Subjective:     Kingsley Littlejohn is a 52 y.o. male who presents for swollen ankles.    Patient states his ankles have been hurting for 3 days now.  Both are swollen and tender.  He was supposed to be taking lasix, but he stopped because he was having issues with urinary continence.  States he has been ok with his Afrezza.  Patient was also just on clindamycin as well as another course of antibiotics.  Has been having profound diarrhea.    The following portions of the patient's history were reviewed and updated as appropriate: allergies, current medications, past family history, past medical history, past social history, past surgical history and problem list.    Past Medical Hx:  Past Medical History:   Diagnosis Date   • Abdominal pain    • Allergic rhinitis    • Diabetes mellitus (HCC)     Prev HbA1c 14.0 2014, states \"I'm cured!\"   • Essential hypertension    • Fracture closed, fibula, shaft     tib-fib   • Onychomycosis    • Sleep apnea        Past Surgical Hx:  Past Surgical History:   Procedure Laterality Date   • ANKLE SURGERY      right and left   • COLONOSCOPY N/A 6/4/2018    Procedure: COLONOSCOPY;  Surgeon: Jaylon Castro MD;  Location: [facility] ENDOSCOPY;  Service: Gastroenterology   • COLONOSCOPY N/A 8/13/2021    Procedure: COLONOSCOPY;  Surgeon: Jaylon Castro MD;  Location: [facility] ENDOSCOPY;  Service: General;  Laterality: N/A;   • CYST REMOVAL      x 3, from hand, thigh, and buttocks   • ENDOSCOPY      20 years ago   • TIBIA FRACTURE SURGERY Bilateral 2011    tib-fib       Current Meds:    Current Outpatient Medications:   •  Afrezza 4 & 8 & 12 units powder, INHALE 4-32 UNITS 3 (THREE) TIMES A DAY WITH MEALS. 4-32 UNITS THREE TIMES DAILY , MAX DOSE 96 UNITS DAILY, Disp: 360 each, Rfl: 10  •  aspirin 325 MG tablet, Take 325 mg by mouth Daily., Disp: , Rfl:   •  atorvastatin (LIPITOR) 40 MG tablet, TAKE 1 TABLET BY MOUTH EVERY DAY, Disp: 90 tablet, " Rfl: 3  •  BD Pen Needle Nola 2nd Gen 32G X 4 MM misc, INJECT ONCE WALTON, Disp: 100 each, Rfl: 11  •  BD Pen Needle Nola U/F 32G X 4 MM misc, INJECT ONCE WALTON, Disp: , Rfl:   •  Blood Glucose Monitoring Suppl (ONE TOUCH ULTRA 2) w/Device kit, Use 1 daily to test blood glucose level., Disp: 1 each, Rfl: 5  •  Continuous Blood Gluc  (FreeStyle Melonie 2 Loving) device, 1 each Continuous., Disp: 1 each, Rfl: 1  •  Continuous Blood Gluc Sensor (FreeStyle Melonie 2 Sensor) misc, 1 each Every 14 (Fourteen) Days., Disp: 3 each, Rfl: 11  •  empagliflozin (Jardiance) 25 MG tablet tablet, Take 1 tablet by mouth Daily., Disp: 90 tablet, Rfl: 1  •  fenofibrate (Tricor) 145 MG tablet, Take 1 tablet by mouth Daily., Disp: 30 tablet, Rfl: 11  •  fluticasone (FLONASE) 50 MCG/ACT nasal spray, 2 sprays by Each Nare route Daily., Disp: 48 mL, Rfl: 0  •  glucose blood test strip, USE TO TEST BLOOD SUGAR 1 TIME DAILY E11.9, Disp: 100 each, Rfl: 12  •  glucose monitor monitoring kit, 1 each As Needed (Diabetes). USE TO TEST 1 TIME DAILY, Disp: 1 each, Rfl: 0  •  hydrOXYzine (ATARAX) 50 MG tablet, Take 50 mg by mouth 4 (Four) Times a Day., Disp: , Rfl:   •  Insulin Glargine, 2 Unit Dial, (Toujeo Max SoloStar) 300 UNIT/ML solution pen-injector injection, Inject 40 Units under the skin into the appropriate area as directed Daily. Inject 55 units under the skin into the appropriate area as directed daily (Patient taking differently: Inject 54 Units under the skin into the appropriate area as directed Daily. Inject 54 units under the skin into the appropriate area as directed daily), Disp: 8 pen, Rfl: 11  •  Invega Sustenna 117 MG/0.75ML suspension prefilled syringe IM injection, INJECT ONE HUNDRED SEVENTEEN (117) MILLIGRAMS INTO THE MUSCLE EVERY 4 WEEKS, Disp: , Rfl:   •  ketorolac (TORADOL) 10 MG tablet, TAKE 1 TABLET (10 MG) BY MOUTH EVERY 6 (SIX) HOURS IF NEEDED FOR PAIN (MAXIMUM 4 PER DAY), Disp: , Rfl:   •  Lancets (ACCU-CHEK SOFT  TOUCH) lancets, Use TO TEST BLOOD SUGAR 1 TIME DAILY., Disp: 100 each, Rfl: 12  •  lisinopril (PRINIVIL,ZESTRIL) 40 MG tablet, TAKE 1 TABLET BY MOUTH EVERY DAY, Disp: 90 tablet, Rfl: 3  •  metFORMIN (GLUCOPHAGE) 1000 MG tablet, Take 1 tablet by mouth 2 (Two) Times a Day With Meals., Disp: 180 tablet, Rfl: 2  •  metoprolol tartrate (LOPRESSOR) 100 MG tablet, TAKE 1 TAB(S) 2 TIMES A DAY, Disp: , Rfl:   •  paliperidone palmitate (INVEGA SUSTENNA) 117 MG/0.75ML suspension IM injection, Inject 117 mg into the shoulder, thigh, or buttocks Every 30 (Thirty) Days., Disp: , Rfl:   •  Semaglutide, 2 MG/DOSE, (Ozempic, 2 MG/DOSE,) 8 MG/3ML solution pen-injector, Inject 2 mg under the skin into the appropriate area as directed 1 (One) Time Per Week., Disp: 3 mL, Rfl: 3  •  zolpidem (AMBIEN) 10 MG tablet, Take 10 mg by mouth every night at bedtime., Disp: , Rfl:     Allergies:  Allergies   Allergen Reactions   • Latex Irritability     Other reaction(s): Unknown   • Codeine Nausea Only     Other reaction(s): Unknown       Family Hx:  Family History   Problem Relation Age of Onset   • Diabetes Other    • Hypertension Other    • Cancer Other    • Lung disease Other    • Thyroid disease Other    • Bleeding Disorder Other    • Colon cancer Paternal Grandmother    • Cancer Paternal Grandfather    • Crohn's disease Mother    • Diabetes Mother    • Hypertension Father    • Lung disease Father    • Hypertension Brother    • Hypertension Brother    • Hypertension Brother         Social History:  Social History     Socioeconomic History   • Marital status: Single   Tobacco Use   • Smoking status: Former     Packs/day: 1.00     Years: 25.00     Pack years: 25.00     Types: Cigarettes     Start date:      Quit date: 2018     Years since quittin.0   • Smokeless tobacco: Never   Vaping Use   • Vaping Use: Never used   Substance and Sexual Activity   • Alcohol use: Defer     Comment: FORMER pint of whiskey daily, states quit 1 year  "ago   • Drug use: Defer     Comment: patient reports prior drug abuse, but sober for 8 months now.   • Sexual activity: Defer       Review of Systems  Review of Systems   Cardiovascular: Positive for leg swelling.   Skin: Positive for color change and rash.       Objective:     BP (!) 160/110   Pulse 103   Ht 170.2 cm (67\")   Wt 122 kg (269 lb 14.4 oz)   SpO2 95%   BMI 42.27 kg/m²   Physical Exam  Musculoskeletal:         General: Swelling and tenderness present.      Right lower leg: Edema present.      Left lower leg: Edema present.   Skin:     General: Skin is warm.      Findings: Rash present.          Assessment/Plan:     Diagnoses and all orders for this visit:    1. Cellulitis of lower extremity, unspecified laterality (Primary)  At this time patient likely had some bacteria from his oral abscess seed the fluid in his legs.  Most oral erum susceptible to penecillins.  Augmenting will work for this patient.  However, diarrhea and recent course of clinda make C.Diff a real possibility.  Will prescribe course and have patient submit stool to verify need for C diff treament.  -     Clostridioides difficile Toxin, PCR - Stool, Per Rectum; Future  -     amoxicillin-clavulanate (Augmentin) 875-125 MG per tablet; Take 1 tablet by mouth 2 (Two) Times a Day for 7 days.  Dispense: 14 tablet; Refill: 0             Follow-up:     1 week    Preventative:  Health Maintenance   Topic Date Due   • Hepatitis B (1 of 3 - 3-dose series) Never done   • Pneumococcal Vaccine 0-64 (1 - PCV) Never done   • HEPATITIS C SCREENING  Never done   • ZOSTER VACCINE (1 of 2) Never done   • LUNG CANCER SCREENING  08/28/2020   • TDAP/TD VACCINES (2 - Td or Tdap) 01/01/2021   • DIABETIC FOOT EXAM  09/13/2022   • DIABETIC EYE EXAM  10/14/2022   • HEMOGLOBIN A1C  02/25/2023   • ANNUAL WELLNESS VISIT  08/05/2023   • LIPID PANEL  08/25/2023   • COLORECTAL CANCER SCREENING  08/13/2024   • COVID-19 Vaccine  Completed   • INFLUENZA VACCINE  " Discontinued   • URINE MICROALBUMIN  Discontinued       Alcohol use: Alcohol use questions deferred to the physician.  Nicotine status  reports that he quit smoking about 4 years ago. His smoking use included cigarettes. He started smoking about 30 years ago. He has a 25.00 pack-year smoking history. He has never used smokeless tobacco.    RISK SCORE: 2      This document has been electronically signed by Corbin Flor MD on December 7, 2022 10:10 CST

## 2022-12-07 NOTE — TELEPHONE ENCOUNTER
PATIENT REQUESTED DR LOPEZ CALL HIM BACK WITH LAB RESULTS FROM LAST WEEK. CALL BACK NUMBER [phone]

## 2022-12-08 NOTE — PROGRESS NOTES
I have reviewed the notes, assessments, and/or procedures performed by Corbin Flor MD during office visit. I concur with her/his documentation and assessment and plan for Kingsley Littlejohn.          This document has been electronically signed by Joann Vargas MD on December 8, 2022 10:39 CST     oral

## 2022-12-09 ENCOUNTER — LAB (OUTPATIENT)
Dept: LAB | Facility: HOSPITAL | Age: 52
End: 2022-12-09

## 2022-12-09 DIAGNOSIS — E78.49 OTHER HYPERLIPIDEMIA: ICD-10-CM

## 2022-12-09 DIAGNOSIS — I10 ESSENTIAL HYPERTENSION: ICD-10-CM

## 2022-12-09 DIAGNOSIS — E11.65 TYPE 2 DIABETES MELLITUS WITH HYPERGLYCEMIA, WITHOUT LONG-TERM CURRENT USE OF INSULIN: ICD-10-CM

## 2022-12-09 LAB
ALBUMIN SERPL-MCNC: 4.1 G/DL (ref 3.5–5.2)
ALBUMIN UR-MCNC: <1.2 MG/DL
ALBUMIN/GLOB SERPL: 1.5 G/DL
ALP SERPL-CCNC: 56 U/L (ref 39–117)
ALT SERPL W P-5'-P-CCNC: 31 U/L (ref 1–41)
ANION GAP SERPL CALCULATED.3IONS-SCNC: 15 MMOL/L (ref 5–15)
AST SERPL-CCNC: 18 U/L (ref 1–40)
BASOPHILS # BLD AUTO: 0.1 10*3/MM3 (ref 0–0.2)
BASOPHILS NFR BLD AUTO: 1.3 % (ref 0–1.5)
BILIRUB SERPL-MCNC: 0.3 MG/DL (ref 0–1.2)
BUN SERPL-MCNC: 16 MG/DL (ref 6–20)
BUN/CREAT SERPL: 17.6 (ref 7–25)
CALCIUM SPEC-SCNC: 9 MG/DL (ref 8.6–10.5)
CHLORIDE SERPL-SCNC: 99 MMOL/L (ref 98–107)
CHOLEST SERPL-MCNC: 127 MG/DL (ref 0–200)
CO2 SERPL-SCNC: 22 MMOL/L (ref 22–29)
CREAT SERPL-MCNC: 0.91 MG/DL (ref 0.76–1.27)
CREAT UR-MCNC: 37.8 MG/DL
DEPRECATED RDW RBC AUTO: 47.9 FL (ref 37–54)
EGFRCR SERPLBLD CKD-EPI 2021: 100.2 ML/MIN/1.73
EOSINOPHIL # BLD AUTO: 0.31 10*3/MM3 (ref 0–0.4)
EOSINOPHIL NFR BLD AUTO: 4.2 % (ref 0.3–6.2)
ERYTHROCYTE [DISTWIDTH] IN BLOOD BY AUTOMATED COUNT: 17.3 % (ref 12.3–15.4)
GLOBULIN UR ELPH-MCNC: 2.8 GM/DL
GLUCOSE SERPL-MCNC: 286 MG/DL (ref 65–99)
HBA1C MFR BLD: 13.2 % (ref 4.8–5.6)
HCT VFR BLD AUTO: 42.4 % (ref 37.5–51)
HDLC SERPL-MCNC: 30 MG/DL (ref 40–60)
HGB BLD-MCNC: 13.6 G/DL (ref 13–17.7)
IMM GRANULOCYTES # BLD AUTO: 0.08 10*3/MM3 (ref 0–0.05)
IMM GRANULOCYTES NFR BLD AUTO: 1.1 % (ref 0–0.5)
LDL/HDL RATIO NULL: ABNORMAL
LDLC SERPL CALC-MCNC: 20 MG/DL (ref 0–100)
LYMPHOCYTES # BLD AUTO: 1.87 10*3/MM3 (ref 0.7–3.1)
LYMPHOCYTES NFR BLD AUTO: 25.1 % (ref 19.6–45.3)
MCH RBC QN AUTO: 25.3 PG (ref 26.6–33)
MCHC RBC AUTO-ENTMCNC: 32.1 G/DL (ref 31.5–35.7)
MCV RBC AUTO: 79 FL (ref 79–97)
MICROALBUMIN/CREAT UR: NORMAL MG/G{CREAT}
MONOCYTES # BLD AUTO: 0.71 10*3/MM3 (ref 0.1–0.9)
MONOCYTES NFR BLD AUTO: 9.5 % (ref 5–12)
NEUTROPHILS NFR BLD AUTO: 4.39 10*3/MM3 (ref 1.7–7)
NEUTROPHILS NFR BLD AUTO: 58.8 % (ref 42.7–76)
NRBC BLD AUTO-RTO: 0 /100 WBC (ref 0–0.2)
PLATELET # BLD AUTO: 233 10*3/MM3 (ref 140–450)
PMV BLD AUTO: 10.1 FL (ref 6–12)
POTASSIUM SERPL-SCNC: 4 MMOL/L (ref 3.5–5.2)
PROT SERPL-MCNC: 6.9 G/DL (ref 6–8.5)
RBC # BLD AUTO: 5.37 10*6/MM3 (ref 4.14–5.8)
SODIUM SERPL-SCNC: 136 MMOL/L (ref 136–145)
TRIGL SERPL-MCNC: 588 MG/DL (ref 0–150)
VLDLC SERPL-MCNC: 77 MG/DL (ref 5–40)
WBC NRBC COR # BLD: 7.46 10*3/MM3 (ref 3.4–10.8)

## 2022-12-09 PROCEDURE — 80053 COMPREHEN METABOLIC PANEL: CPT

## 2022-12-09 PROCEDURE — 82043 UR ALBUMIN QUANTITATIVE: CPT

## 2022-12-09 PROCEDURE — 82570 ASSAY OF URINE CREATININE: CPT

## 2022-12-09 PROCEDURE — 36415 COLL VENOUS BLD VENIPUNCTURE: CPT

## 2022-12-09 PROCEDURE — 85025 COMPLETE CBC W/AUTO DIFF WBC: CPT

## 2022-12-09 PROCEDURE — 83036 HEMOGLOBIN GLYCOSYLATED A1C: CPT

## 2022-12-09 PROCEDURE — 80061 LIPID PANEL: CPT

## 2022-12-12 ENCOUNTER — OFFICE VISIT (OUTPATIENT)
Dept: ENDOCRINOLOGY | Facility: CLINIC | Age: 52
End: 2022-12-12

## 2022-12-12 VITALS
HEIGHT: 67 IN | DIASTOLIC BLOOD PRESSURE: 96 MMHG | BODY MASS INDEX: 42.06 KG/M2 | OXYGEN SATURATION: 97 % | WEIGHT: 268 LBS | HEART RATE: 100 BPM | SYSTOLIC BLOOD PRESSURE: 160 MMHG

## 2022-12-12 DIAGNOSIS — E11.65 TYPE 2 DIABETES MELLITUS WITH HYPERGLYCEMIA, WITHOUT LONG-TERM CURRENT USE OF INSULIN: ICD-10-CM

## 2022-12-12 DIAGNOSIS — I10 ESSENTIAL HYPERTENSION: Primary | ICD-10-CM

## 2022-12-12 DIAGNOSIS — E78.49 OTHER HYPERLIPIDEMIA: ICD-10-CM

## 2022-12-12 PROCEDURE — 95251 CONT GLUC MNTR ANALYSIS I&R: CPT | Performed by: NURSE PRACTITIONER

## 2022-12-12 PROCEDURE — 99214 OFFICE O/P EST MOD 30 MIN: CPT | Performed by: NURSE PRACTITIONER

## 2022-12-12 RX ORDER — SEMAGLUTIDE 2.68 MG/ML
2 INJECTION, SOLUTION SUBCUTANEOUS WEEKLY
Qty: 3 ML | Refills: 11 | Status: SHIPPED | OUTPATIENT
Start: 2022-12-12 | End: 2022-12-12 | Stop reason: SDUPTHER

## 2022-12-12 RX ORDER — INSULIN GLARGINE 300 U/ML
INJECTION, SOLUTION SUBCUTANEOUS
Qty: 90 ML | Refills: 11 | Status: SHIPPED | OUTPATIENT
Start: 2022-12-12 | End: 2023-03-24 | Stop reason: SDUPTHER

## 2022-12-12 RX ORDER — SEMAGLUTIDE 2.68 MG/ML
2 INJECTION, SOLUTION SUBCUTANEOUS WEEKLY
Qty: 3 ML | Refills: 11 | Status: SHIPPED | OUTPATIENT
Start: 2022-12-12

## 2022-12-12 NOTE — PROGRESS NOTES
"Chief Complaint  Diabetes    Subjective          Kingsley Littlejohn presents to [facility] ENDOCRINOLOGY  Diabetes         In office visit    Primary provider Bonilla Stanton MD      52 year old female presents for follow up      Reason diabetes mellitus type 2     Diagnosed in 2014      Timing constant      Quality improved control    Severity is high     Macrovascular complications CAD, MI in the past     Microvascular complications neuropathy     Current diabetes regimen insulin and GLP-1     Taking basal and mealtime insulin total 4 shots daily     Current glucose monitoring     Checks 4-10 times daily     Uses a [employer] personal system     See below     He has had abscess teeth, and cellulitis     States has been high and was not doing what he should       Review of Systems - General ROS: positive for  - weight gain              Objective   Vital Signs:   /96   Pulse 100   Ht 170.2 cm (67\")   Wt 122 kg (268 lb)   SpO2 97%   BMI 41.97 kg/m²     Physical Exam  Constitutional:       Appearance: Normal appearance.   Cardiovascular:      Rate and Rhythm: Regular rhythm.      Heart sounds: Normal heart sounds.   Pulmonary:      Breath sounds: Normal breath sounds.   Musculoskeletal:         General: Normal range of motion.      Cervical back: Normal range of motion.   Neurological:      Mental Status: He is alert.        Result Review :   The following data was reviewed by: RAJAT Doyle on 05/17/2022:  Common labs    Common Labs 8/25/22 8/25/22 8/25/22 8/25/22 8/25/22 9/14/22 12/9/22 12/9/22 12/9/22 12/9/22 12/9/22    1338 1338 1338 1338 1403  0938 0938 0938 0938 0952   Glucose  208 (A)      286 (A)      BUN  18      16      Creatinine  0.91      0.91      Sodium  135 (A)      136      Potassium  4.2      4.0      Chloride  98      99      Calcium  8.9      9.0      Albumin  4.50      4.10      Total Bilirubin  0.4      0.3      Alkaline Phosphatase  63      56    "   AST (SGOT)  16      18      ALT (SGPT)  29      31      WBC 8.32     7.10 7.46       Hemoglobin 15.3     14.4 13.6       Hematocrit 45.9     44.0 42.4       Platelets 239     260 233       Total Cholesterol    120     127     Triglycerides    457 (A)     588 (A)     HDL Cholesterol    30 (A)     30 (A)     LDL Cholesterol     27     20     Hemoglobin A1C   10.10 (A)       13.20 (A)    Microalbumin, Urine     1.2      <1.2   (A) Abnormal value                        Assessment and Plan    Diagnoses and all orders for this visit:    1. Essential hypertension (Primary)  -     CBC & Differential; Future  -     Comprehensive Metabolic Panel; Future  -     Hemoglobin A1c; Future  -     Lipid Panel; Future  -     Microalbumin / Creatinine Urine Ratio - Urine, Clean Catch; Future  -     TSH; Future    2. Type 2 diabetes mellitus with hyperglycemia, without long-term current use of insulin (HCC)  -     Discontinue: Semaglutide, 2 MG/DOSE, (Ozempic, 2 MG/DOSE,) 8 MG/3ML solution pen-injector; Inject 2 mg under the skin into the appropriate area as directed 1 (One) Time Per Week.  Dispense: 3 mL; Refill: 11  -     Discontinue: Semaglutide, 2 MG/DOSE, (Ozempic, 2 MG/DOSE,) 8 MG/3ML solution pen-injector; Inject 2 mg under the skin into the appropriate area as directed 1 (One) Time Per Week.  Dispense: 3 mL; Refill: 11  -     CBC & Differential; Future  -     Comprehensive Metabolic Panel; Future  -     Hemoglobin A1c; Future  -     Lipid Panel; Future  -     Microalbumin / Creatinine Urine Ratio - Urine, Clean Catch; Future  -     TSH; Future  -     Semaglutide, 2 MG/DOSE, (Ozempic, 2 MG/DOSE,) 8 MG/3ML solution pen-injector; Inject 2 mg under the skin into the appropriate area as directed 1 (One) Time Per Week.  Dispense: 3 mL; Refill: 11    3. Other hyperlipidemia  -     CBC & Differential; Future  -     Comprehensive Metabolic Panel; Future  -     Hemoglobin A1c; Future  -     Lipid Panel; Future  -     Microalbumin /  Creatinine Urine Ratio - Urine, Clean Catch; Future  -     TSH; Future    Other orders  -     Insulin Glargine, 2 Unit Dial, (Toujeo Max SoloStar) 300 UNIT/ML solution pen-injector injection; Inject 54 units under the skin into the appropriate area as directed daily  Dispense: 90 mL; Refill: 11             Glycemic Management     Type 2 diabetes not controlled with hyperglycemia      Lab Results   Component Value Date    HGBA1C 13.20 (H) 12/09/2022             Textbook [employer] personal system     Downloaded and reviewed     Dated from Nov. 29 to Dec. 12, 2022    Average bg 370     Very high 100%     Has had cellulitis and abscess in teeth     Hyperglycemia all the time     Has been out of Ozempic and now has it back           Taking Metformin 1000 mg po BID--- keep        Taking Jardiance 25 mg one daily -keep              taking Ozempic 2 mg once weekly      Side effects discussed, try to eat half your plate not a full plate     If nauseated eat less     If vomiting or abdominal pain stop medication            taking Toujeo 54 units daily         We discussed how to self titrate the Toujeo dose using his CGM     Mealtime insulin--- uses 3 times a day before meals     using Afrezza-- 24 units before each meal                   Plus sliding scale          Januvia 100 mg daily ---- stop not effective           Patient uses CGM data and his food intake to determine what dose of insulin he needs to use for meals                 Melonie has allowed the patient to minimize hypoglycemia as alarms have predicted and avoided them     He also uses the arrows on the melonie as follows:     If arrow is horizontal , he uses the predicted dosage     If the arrow is slanted up or down--he increase or decrease by 4 units     If the arrow is vertical up or down -he increases or decreases by 4 units                       Lipid Management        Taking Lipitor 40 mg one at night         Total Cholesterol   Date Value Ref Range Status   12/09/2022  127 0 - 200 mg/dL Final     Triglycerides   Date Value Ref Range Status   12/09/2022 588 (H) 0 - 150 mg/dL Final   03/02/2018 263 (H) 0 - 149 MG/DL Final     HDL Cholesterol   Date Value Ref Range Status   12/09/2022 30 (L) 40 - 60 mg/dL Final   03/02/2018 39 (L) >40 MG/DL Final     LDL Cholesterol    Date Value Ref Range Status   12/09/2022 20 0 - 100 mg/dL Final   03/02/2018 80 <130 MG/DL Final        Vascepa 2 gm po BID--could not take      Will call in tricor         Blood Pressure Management     Taking Lisinopril 40 mg once daily      Taking Metoprolol 50 mg daily            Microvascular Complication Monitoring        Last eye exam -- Jan. 2020 no DR           Component      Latest Ref Rng & Units 8/25/2022   Microalbumin/Creatinine Ratio      mg/g 34.2   Creatinine, Urine      mg/dL 35.1   Microalbumin, Urine      mg/dL 1.2                    Neuropathy mild      Prefers no RX at this time         Bone Health      vitamin d def.          vitamin d daily               Other Diabetes Related Aspects       Lab Results   Component Value Date    [id number] 08/25/2022                   Thyroid Health        Lab Results   Component Value Date    TSH 1.680 09/14/2022                   Weight Management:     Obesity        Body mass index is 41.97 kg/m².    Dietary changes discussed       Elevated rbc           Possible relation to jardiance but referral to hematology           Follow Up   Return in about 3 months (around 3/12/2023) for Recheck.  Patient was given instructions and counseling regarding his condition or for health maintenance advice. Please see specific information pulled into the AVS if appropriate.         This document has been electronically signed by RAJAT Doyle on December 12, 2022 14:28 CST.

## 2022-12-21 ENCOUNTER — OFFICE VISIT (OUTPATIENT)
Dept: PODIATRY | Facility: CLINIC | Age: 52
End: 2022-12-21

## 2022-12-21 VITALS — OXYGEN SATURATION: 98 % | WEIGHT: 268 LBS | HEIGHT: 67 IN | HEART RATE: 89 BPM | BODY MASS INDEX: 42.06 KG/M2

## 2022-12-21 DIAGNOSIS — G89.29 CHRONIC TOE PAIN, BILATERAL: ICD-10-CM

## 2022-12-21 DIAGNOSIS — E11.9 ENCOUNTER FOR DIABETIC FOOT EXAM: ICD-10-CM

## 2022-12-21 DIAGNOSIS — L60.0 INGROWN TOENAIL: ICD-10-CM

## 2022-12-21 DIAGNOSIS — M79.674 CHRONIC TOE PAIN, BILATERAL: ICD-10-CM

## 2022-12-21 DIAGNOSIS — M79.675 CHRONIC TOE PAIN, BILATERAL: ICD-10-CM

## 2022-12-21 DIAGNOSIS — E11.42 TYPE 2 DIABETES MELLITUS WITH PERIPHERAL NEUROPATHY: ICD-10-CM

## 2022-12-21 DIAGNOSIS — B35.1 ONYCHOMYCOSIS: Primary | ICD-10-CM

## 2022-12-21 PROCEDURE — 11721 DEBRIDE NAIL 6 OR MORE: CPT | Performed by: NURSE PRACTITIONER

## 2022-12-21 NOTE — PROGRESS NOTES
"Kingsley Littlejohn  1970  52 y.o. male  PCP: Bonilla Light 11/29/2022  BS: 176 per patient     12/21/2022    Chief Complaint   Patient presents with   • Left Foot - Follow-up     DIABETIC FOOT Care    • Right Foot - Follow-up     Diabetic foot care        History of Present Illness    Kingsley Littlejohn is a 52 y.o.male came to clinic for diabetic foot care.       Past Medical History:   Diagnosis Date   • Abdominal pain    • Allergic rhinitis    • Diabetes mellitus (HCC)     Prev HbA1c 14.0 2014, states \"I'm cured!\"   • Essential hypertension    • Fracture closed, fibula, shaft     tib-fib   • Onychomycosis    • Sleep apnea          Past Surgical History:   Procedure Laterality Date   • ANKLE SURGERY      right and left   • COLONOSCOPY N/A 6/4/2018    Procedure: COLONOSCOPY;  Surgeon: Jaylon Castro MD;  Location: [facility] ENDOSCOPY;  Service: Gastroenterology   • COLONOSCOPY N/A 8/13/2021    Procedure: COLONOSCOPY;  Surgeon: Jaylon Castro MD;  Location: [facility] ENDOSCOPY;  Service: General;  Laterality: N/A;   • CYST REMOVAL      x 3, from hand, thigh, and buttocks   • ENDOSCOPY      20 years ago   • TIBIA FRACTURE SURGERY Bilateral 2011    tib-fib         Family History   Problem Relation Age of Onset   • Diabetes Other    • Hypertension Other    • Cancer Other    • Lung disease Other    • Thyroid disease Other    • Bleeding Disorder Other    • Colon cancer Paternal Grandmother    • Cancer Paternal Grandfather    • Crohn's disease Mother    • Diabetes Mother    • Hypertension Father    • Lung disease Father    • Hypertension Brother    • Hypertension Brother    • Hypertension Brother        Allergies   Allergen Reactions   • Latex Irritability     Other reaction(s): Unknown   • Codeine Nausea Only     Other reaction(s): Unknown       Social History     Socioeconomic History   • Marital status: Single   Tobacco Use   • Smoking status: Former     Packs/day: 1.00     Years: 25.00     Pack years: 25.00     " Types: Cigarettes     Start date:      Quit date: 2018     Years since quittin.0   • Smokeless tobacco: Never   Vaping Use   • Vaping Use: Never used   Substance and Sexual Activity   • Alcohol use: Defer     Comment: FORMER pint of whiskey daily, states quit 1 year ago   • Drug use: Defer     Comment: patient reports prior drug abuse, but sober for 8 months now.   • Sexual activity: Defer         Current Outpatient Medications   Medication Sig Dispense Refill   • Afrezza 4 & 8 & 12 units powder INHALE 4-32 UNITS 3 (THREE) TIMES A DAY WITH MEALS. 4-32 UNITS THREE TIMES DAILY , MAX DOSE 96 UNITS DAILY 360 each 10   • aspirin 325 MG tablet Take 325 mg by mouth Daily.     • atorvastatin (LIPITOR) 40 MG tablet TAKE 1 TABLET BY MOUTH EVERY DAY 90 tablet 3   • BD Pen Needle Nola 2nd Gen 32G X 4 MM misc INJECT ONCE WALTON 100 each 11   • BD Pen Needle Nola U/F 32G X 4 MM misc INJECT ONCE WALTON     • Blood Glucose Monitoring Suppl (ONE TOUCH ULTRA 2) w/Device kit Use 1 daily to test blood glucose level. 1 each 5   • Continuous Blood Gluc  (FreeStyle Melonie 2 [place]) device 1 each Continuous. 1 each 1   • Continuous Blood Gluc Sensor (FreeStyle Melonie 2 Sensor) misc 1 each Every 14 (Fourteen) Days. 3 each 11   • empagliflozin (Jardiance) 25 MG tablet tablet Take 1 tablet by mouth Daily. 90 tablet 1   • fenofibrate (Tricor) 145 MG tablet Take 1 tablet by mouth Daily. 30 tablet 11   • fluticasone (FLONASE) 50 MCG/ACT nasal spray 2 sprays by Each Nare route Daily. 48 mL 0   • glucose blood test strip USE TO TEST BLOOD SUGAR 1 TIME DAILY E11.9 100 each 12   • glucose monitor monitoring kit 1 each As Needed (Diabetes). USE TO TEST 1 TIME DAILY 1 each 0   • hydrOXYzine (ATARAX) 50 MG tablet Take 50 mg by mouth 4 (Four) Times a Day.     • Insulin Glargine, 2 Unit Dial, (Toujeo Max SoloStar) 300 UNIT/ML solution pen-injector injection Inject 54 units under the skin into the appropriate area as directed daily 90 mL 11  "  • Invega Sustenna 117 MG/0.75ML suspension prefilled syringe IM injection INJECT ONE HUNDRED SEVENTEEN (117) MILLIGRAMS INTO THE MUSCLE EVERY 4 WEEKS     • ketorolac (TORADOL) 10 MG tablet TAKE 1 TABLET (10 MG) BY MOUTH EVERY 6 (SIX) HOURS IF NEEDED FOR PAIN (MAXIMUM 4 PER DAY)     • Lancets (ACCU-CHEK SOFT TOUCH) lancets Use TO TEST BLOOD SUGAR 1 TIME DAILY. 100 each 12   • lisinopril (PRINIVIL,ZESTRIL) 40 MG tablet TAKE 1 TABLET BY MOUTH EVERY DAY 90 tablet 3   • metFORMIN (GLUCOPHAGE) 1000 MG tablet Take 1 tablet by mouth 2 (Two) Times a Day With Meals. 180 tablet 2   • metoprolol tartrate (LOPRESSOR) 100 MG tablet TAKE 1 TAB(S) 2 TIMES A DAY     • paliperidone palmitate (INVEGA SUSTENNA) 117 MG/0.75ML suspension IM injection Inject 117 mg into the shoulder, thigh, or buttocks Every 30 (Thirty) Days.     • Semaglutide, 2 MG/DOSE, (Ozempic, 2 MG/DOSE,) 8 MG/3ML solution pen-injector Inject 2 mg under the skin into the appropriate area as directed 1 (One) Time Per Week. 3 mL 11   • zolpidem (AMBIEN) 10 MG tablet Take 10 mg by mouth every night at bedtime.       No current facility-administered medications for this visit.       Review of Systems   Constitutional: Negative.    HENT: Negative.    Eyes: Negative.    Respiratory: Negative.    Cardiovascular: Negative.    Gastrointestinal: Negative.    Endocrine: Negative.    Genitourinary: Negative.    Musculoskeletal:        Foot pain    Skin: Negative.    Allergic/Immunologic: Negative.    Neurological: Negative.    Hematological: Negative.    Psychiatric/Behavioral: Negative.          OBJECTIVE    Pulse 89   Ht 170.2 cm (67\")   Wt 122 kg (268 lb)   SpO2 98%   BMI 41.97 kg/m²     Physical Exam           Procedures        ASSESSMENT AND PLAN    Diagnoses and all orders for this visit:    1. Onychomycosis (Primary)    2. Chronic toe pain, bilateral    3. Encounter for diabetic foot exam (HCC)    4. Ingrown toenail    5. Type 2 diabetes mellitus with peripheral " neuropathy (HCC)        - A diabetic foot screening exam was performed and the patient was educated on the foot complications related to diabetes,  preventative foot care and what signs and symptoms to watch for.  Instructed to contact our office if any foot problems develop before next visit.  -Discussed treatments for  painful toenails. Treatment options discussed included nail removal versus debridement. Patient elected for routine nail debridement. An ABN has been signed by the patient.  - Toenails 1-5 bilateral were debrided in length and thickness with nail nipper and electric  to decrease fungal load and risk of infection.  - All the patients questions were answered.  - RTC 3 months or sooner if needed.           This document has been electronically signed by Maurizio EARL, FNP-C, ONP-C on December 21, 2022 15:37 CST

## 2022-12-28 ENCOUNTER — TELEPHONE (OUTPATIENT)
Dept: ENDOCRINOLOGY | Facility: CLINIC | Age: 52
End: 2022-12-28

## 2022-12-30 RX ORDER — EMPAGLIFLOZIN 25 MG/1
TABLET, FILM COATED ORAL
Qty: 90 TABLET | Refills: 1 | Status: SHIPPED | OUTPATIENT
Start: 2022-12-30

## 2023-01-09 DIAGNOSIS — D75.1 POLYCYTHEMIA: ICD-10-CM

## 2023-01-09 DIAGNOSIS — R71.8 MICROCYTOSIS: Primary | ICD-10-CM

## 2023-01-11 ENCOUNTER — OFFICE VISIT (OUTPATIENT)
Dept: ONCOLOGY | Facility: CLINIC | Age: 53
End: 2023-01-11
Payer: MEDICARE

## 2023-01-11 ENCOUNTER — LAB (OUTPATIENT)
Dept: ONCOLOGY | Facility: HOSPITAL | Age: 53
End: 2023-01-11
Payer: MEDICARE

## 2023-01-11 VITALS
WEIGHT: 271 LBS | OXYGEN SATURATION: 95 % | HEART RATE: 92 BPM | DIASTOLIC BLOOD PRESSURE: 83 MMHG | RESPIRATION RATE: 18 BRPM | BODY MASS INDEX: 42.44 KG/M2 | SYSTOLIC BLOOD PRESSURE: 148 MMHG

## 2023-01-11 DIAGNOSIS — R71.8 MICROCYTOSIS: ICD-10-CM

## 2023-01-11 DIAGNOSIS — D75.1 POLYCYTHEMIA: Primary | ICD-10-CM

## 2023-01-11 DIAGNOSIS — D75.1 POLYCYTHEMIA: ICD-10-CM

## 2023-01-11 LAB
BASOPHILS # BLD AUTO: 0.11 10*3/MM3 (ref 0–0.2)
BASOPHILS NFR BLD AUTO: 1.3 % (ref 0–1.5)
DEPRECATED RDW RBC AUTO: 43.1 FL (ref 37–54)
EOSINOPHIL # BLD AUTO: 0.31 10*3/MM3 (ref 0–0.4)
EOSINOPHIL NFR BLD AUTO: 3.7 % (ref 0.3–6.2)
ERYTHROCYTE [DISTWIDTH] IN BLOOD BY AUTOMATED COUNT: 15.7 % (ref 12.3–15.4)
FERRITIN SERPL-MCNC: 99.44 NG/ML (ref 30–400)
HCT VFR BLD AUTO: 43.7 % (ref 37.5–51)
HGB BLD-MCNC: 13.9 G/DL (ref 13–17.7)
HOLD SPECIMEN: NORMAL
IMM GRANULOCYTES # BLD AUTO: 0.04 10*3/MM3 (ref 0–0.05)
IMM GRANULOCYTES NFR BLD AUTO: 0.5 % (ref 0–0.5)
IRON 24H UR-MRATE: 90 MCG/DL (ref 59–158)
IRON SATN MFR SERPL: 19 % (ref 20–50)
LYMPHOCYTES # BLD AUTO: 2.27 10*3/MM3 (ref 0.7–3.1)
LYMPHOCYTES NFR BLD AUTO: 26.8 % (ref 19.6–45.3)
MCH RBC QN AUTO: 24.6 PG (ref 26.6–33)
MCHC RBC AUTO-ENTMCNC: 31.8 G/DL (ref 31.5–35.7)
MCV RBC AUTO: 77.5 FL (ref 79–97)
MONOCYTES # BLD AUTO: 0.84 10*3/MM3 (ref 0.1–0.9)
MONOCYTES NFR BLD AUTO: 9.9 % (ref 5–12)
NEUTROPHILS NFR BLD AUTO: 4.9 10*3/MM3 (ref 1.7–7)
NEUTROPHILS NFR BLD AUTO: 57.8 % (ref 42.7–76)
NRBC BLD AUTO-RTO: 0 /100 WBC (ref 0–0.2)
PLATELET # BLD AUTO: 239 10*3/MM3 (ref 140–450)
PMV BLD AUTO: 10.3 FL (ref 6–12)
RBC # BLD AUTO: 5.64 10*6/MM3 (ref 4.14–5.8)
TIBC SERPL-MCNC: 471 MCG/DL (ref 298–536)
TRANSFERRIN SERPL-MCNC: 316 MG/DL (ref 200–360)
WBC NRBC COR # BLD: 8.47 10*3/MM3 (ref 3.4–10.8)

## 2023-01-11 PROCEDURE — 85025 COMPLETE CBC W/AUTO DIFF WBC: CPT

## 2023-01-11 PROCEDURE — G0463 HOSPITAL OUTPT CLINIC VISIT: HCPCS | Performed by: INTERNAL MEDICINE

## 2023-01-11 PROCEDURE — 84466 ASSAY OF TRANSFERRIN: CPT

## 2023-01-11 PROCEDURE — 82728 ASSAY OF FERRITIN: CPT

## 2023-01-11 PROCEDURE — 83540 ASSAY OF IRON: CPT

## 2023-01-11 PROCEDURE — 99213 OFFICE O/P EST LOW 20 MIN: CPT | Performed by: INTERNAL MEDICINE

## 2023-01-16 ENCOUNTER — OFFICE VISIT (OUTPATIENT)
Dept: FAMILY MEDICINE CLINIC | Facility: CLINIC | Age: 53
End: 2023-01-16
Payer: MEDICARE

## 2023-01-16 VITALS
OXYGEN SATURATION: 93 % | WEIGHT: 265 LBS | HEART RATE: 101 BPM | DIASTOLIC BLOOD PRESSURE: 86 MMHG | HEIGHT: 67 IN | SYSTOLIC BLOOD PRESSURE: 140 MMHG | BODY MASS INDEX: 41.59 KG/M2

## 2023-01-16 DIAGNOSIS — H61.23 BILATERAL IMPACTED CERUMEN: ICD-10-CM

## 2023-01-16 DIAGNOSIS — Z12.2 ENCOUNTER FOR SCREENING FOR LUNG CANCER: Primary | ICD-10-CM

## 2023-01-16 DIAGNOSIS — F17.219 CIGARETTE NICOTINE DEPENDENCE WITH NICOTINE-INDUCED DISORDER: ICD-10-CM

## 2023-01-16 PROCEDURE — 69210 REMOVE IMPACTED EAR WAX UNI: CPT | Performed by: STUDENT IN AN ORGANIZED HEALTH CARE EDUCATION/TRAINING PROGRAM

## 2023-01-16 PROCEDURE — 99213 OFFICE O/P EST LOW 20 MIN: CPT | Performed by: STUDENT IN AN ORGANIZED HEALTH CARE EDUCATION/TRAINING PROGRAM

## 2023-01-16 RX ORDER — HYDROCHLOROTHIAZIDE 12.5 MG/1
12.5 CAPSULE, GELATIN COATED ORAL DAILY
COMMUNITY
Start: 2022-12-03

## 2023-01-16 NOTE — PROGRESS NOTES
"  Family Medicine Residency  Bonilla Stanton MD    Subjective:     Kingsley Littlejohn is a 52 y.o. male who presents today to address care gaps. Patient was recently seen in office for lower extermity cellulitis and tooth abscess, placed on antibiotics. After starting antibiotics patient started to experience diarrhea which has since resolved. Patient noted to have elevated hbA1c which is currently being managed by endocrinology. No recent diabetes eye exam., previous tests were negative.     Smoking history : Smoked since age 16. Smoked 2 packs a day. Quit smoking 5 yrs ago.     No new complaints today.   The following portions of the patient's history were reviewed and updated as appropriate: allergies, current medications, past family history, past medical history, past social history, past surgical history and problem list.    Past Medical Hx:  Past Medical History:   Diagnosis Date   • Abdominal pain    • Allergic rhinitis    • Diabetes mellitus (HCC)     Prev HbA1c 14.0 2014, states \"I'm cured!\"   • Essential hypertension    • Fracture closed, fibula, shaft     tib-fib   • Onychomycosis    • Sleep apnea        Past Surgical Hx:  Past Surgical History:   Procedure Laterality Date   • ANKLE SURGERY      right and left   • COLONOSCOPY N/A 6/4/2018    Procedure: COLONOSCOPY;  Surgeon: Jaylon Castro MD;  Location: [facility] ENDOSCOPY;  Service: Gastroenterology   • COLONOSCOPY N/A 8/13/2021    Procedure: COLONOSCOPY;  Surgeon: Jaylon Castro MD;  Location: [facility] ENDOSCOPY;  Service: General;  Laterality: N/A;   • CYST REMOVAL      x 3, from hand, thigh, and buttocks   • ENDOSCOPY      20 years ago   • TIBIA FRACTURE SURGERY Bilateral 2011    tib-fib       Current Meds:    Current Outpatient Medications:   •  Afrezza 4 & 8 & 12 units powder, INHALE 4-32 UNITS 3 (THREE) TIMES A DAY WITH MEALS. 4-32 UNITS THREE TIMES DAILY , MAX DOSE 96 UNITS DAILY, Disp: 360 each, Rfl: 10  •  aspirin 325 MG tablet, Take 325 mg by " mouth Daily., Disp: , Rfl:   •  atorvastatin (LIPITOR) 40 MG tablet, TAKE 1 TABLET BY MOUTH EVERY DAY, Disp: 90 tablet, Rfl: 3  •  BD Pen Needle Nola 2nd Gen 32G X 4 MM misc, INJECT ONCE WALTON, Disp: 100 each, Rfl: 11  •  BD Pen Needle Nola U/F 32G X 4 MM misc, INJECT ONCE W[place]TON, Disp: , Rfl:   •  Blood Glucose Monitoring Suppl (ONE TOUCH ULTRA 2) w/Device kit, Use 1 daily to test blood glucose level., Disp: 1 each, Rfl: 5  •  Continuous Blood Gluc  (FreeStyle Melonie 2 [place]) device, 1 each Continuous., Disp: 1 each, Rfl: 1  •  Continuous Blood Gluc Sensor (FreeStyle Melonie 2 Sensor) misc, 1 each Every 14 (Fourteen) Days., Disp: 3 each, Rfl: 11  •  fenofibrate (Tricor) 145 MG tablet, Take 1 tablet by mouth Daily., Disp: 30 tablet, Rfl: 11  •  fluticasone (FLONASE) 50 MCG/ACT nasal spray, 2 sprays by Each Nare route Daily., Disp: 48 mL, Rfl: 0  •  glucose blood test strip, USE TO TEST BLOOD SUGAR 1 TIME DAILY E11.9, Disp: 100 each, Rfl: 12  •  glucose monitor monitoring kit, 1 each As Needed (Diabetes). USE TO TEST 1 TIME DAILY, Disp: 1 each, Rfl: 0  •  hydroCHLOROthiazide (MICROZIDE) 12.5 MG capsule, Take 12.5 mg by mouth Daily., Disp: , Rfl:   •  hydrOXYzine (ATARAX) 50 MG tablet, Take 50 mg by mouth 4 (Four) Times a Day., Disp: , Rfl:   •  Insulin Glargine, 2 Unit Dial, (Toujeo Max SoloStar) 300 UNIT/ML solution pen-injector injection, Inject 54 units under the skin into the appropriate area as directed daily, Disp: 90 mL, Rfl: 11  •  Invega Sustenna 117 MG/0.75ML suspension prefilled syringe IM injection, INJECT ONE HUNDRED SEVENTEEN (117) MILLIGRAMS INTO THE MUSCLE EVERY 4 WEEKS, Disp: , Rfl:   •  Jardiance 25 MG tablet tablet, TAKE 1 TABLET BY MOUTH EVERY DAY, Disp: 90 tablet, Rfl: 1  •  ketorolac (TORADOL) 10 MG tablet, TAKE 1 TABLET (10 MG) BY MOUTH EVERY 6 (SIX) HOURS IF NEEDED FOR PAIN (MAXIMUM 4 PER DAY), Disp: , Rfl:   •  Lancets (ACCU-CHEK SOFT TOUCH) lancets, Use TO TEST BLOOD SUGAR 1 TIME DAILY.,  Disp: 100 each, Rfl: 12  •  lisinopril (PRINIVIL,ZESTRIL) 40 MG tablet, TAKE 1 TABLET BY MOUTH EVERY DAY, Disp: 90 tablet, Rfl: 3  •  metFORMIN (GLUCOPHAGE) 1000 MG tablet, Take 1 tablet by mouth 2 (Two) Times a Day With Meals., Disp: 180 tablet, Rfl: 2  •  metoprolol tartrate (LOPRESSOR) 100 MG tablet, TAKE 1 TAB(S) 2 TIMES A DAY, Disp: , Rfl:   •  paliperidone palmitate (INVEGA SUSTENNA) 117 MG/0.75ML suspension IM injection, Inject 117 mg into the shoulder, thigh, or buttocks Every 30 (Thirty) Days., Disp: , Rfl:   •  Semaglutide, 2 MG/DOSE, (Ozempic, 2 MG/DOSE,) 8 MG/3ML solution pen-injector, Inject 2 mg under the skin into the appropriate area as directed 1 (One) Time Per Week., Disp: 3 mL, Rfl: 11  •  zolpidem (AMBIEN) 10 MG tablet, Take 10 mg by mouth every night at bedtime., Disp: , Rfl:   •  SITagliptin (JANUVIA) 100 MG tablet, Daily., Disp: , Rfl:     Allergies:  Allergies   Allergen Reactions   • Latex Irritability     Other reaction(s): Unknown   • Codeine Nausea Only     Other reaction(s): Unknown       Family Hx:  Family History   Problem Relation Age of Onset   • Diabetes Other    • Hypertension Other    • Cancer Other    • Lung disease Other    • Thyroid disease Other    • Bleeding Disorder Other    • Colon cancer Paternal Grandmother    • Cancer Paternal Grandfather    • Crohn's disease Mother    • Diabetes Mother    • Hypertension Father    • Lung disease Father    • Hypertension Brother    • Hypertension Brother    • Hypertension Brother         Social History:  Social History     Socioeconomic History   • Marital status: Single   Tobacco Use   • Smoking status: Former     Packs/day: 1.00     Years: 25.00     Pack years: 25.00     Types: Cigarettes     Start date:      Quit date: 2018     Years since quittin.1   • Smokeless tobacco: Never   Vaping Use   • Vaping Use: Never used   Substance and Sexual Activity   • Alcohol use: Defer     Comment: FORMER pint of whiskey daily, states quit  "1 year ago   • Drug use: Defer     Comment: patient reports prior drug abuse, but sober for 8 months now.   • Sexual activity: Defer       Review of Systems  Review of Systems   Constitutional: Negative for activity change and appetite change.   HENT: Negative for congestion and hearing loss.    Respiratory: Negative for cough, chest tightness, shortness of breath and wheezing.    Cardiovascular: Negative for chest pain, palpitations and leg swelling.   Gastrointestinal: Negative for abdominal pain, diarrhea, nausea and vomiting.   Genitourinary: Negative for difficulty urinating, frequency and urgency.   Musculoskeletal: Negative for arthralgias and myalgias.   Skin: Negative for color change and rash.   Neurological: Negative for dizziness, weakness, light-headedness and headaches.   Psychiatric/Behavioral: Negative for behavioral problems.       Objective:     /86   Pulse 101   Ht 170.2 cm (67\")   Wt 120 kg (265 lb)   SpO2 93%   BMI 41.50 kg/m²   Physical Exam  Vitals and nursing note reviewed.   Constitutional:       General: He is not in acute distress.     Appearance: Normal appearance. He is obese. He is not ill-appearing, toxic-appearing or diaphoretic.   HENT:      Right Ear: Hearing and tympanic membrane normal. No laceration, drainage or swelling. No middle ear effusion. There is impacted cerumen.      Left Ear: Hearing and tympanic membrane normal. No laceration, drainage or swelling.  No middle ear effusion. There is impacted cerumen.      Ears:      Comments: Impacted cerumen was gently cleaned with curette. Patient tolerated it well   Cardiovascular:      Rate and Rhythm: Normal rate and regular rhythm.      Pulses: Normal pulses.      Heart sounds: Normal heart sounds. No murmur heard.  Pulmonary:      Effort: Pulmonary effort is normal. No respiratory distress.      Breath sounds: Normal breath sounds. No wheezing.   Musculoskeletal:      Right lower leg: No edema.      Left lower leg: No " edema.      Comments: Chronic skin changes seen on shin regions b/l   Neurological:      Mental Status: He is alert.          Assessment/Plan:   Kingsley Littlejohn is a 52 y.o. male who presents today to address care gaps. Offered pneumonia, shingles vaccine which patient declined and would like to come back next time for that. Patient has long history of smoking, will get low dose ct scan screening for lung ca. Bilateral ears were cleaned with currette, and strongly advised patient to avoid using ear plugs for extended periods of time. Also recommended against using Q-tips.   Diagnoses and all orders for this visit:    1. Encounter for screening for lung cancer (Primary)    2. Cigarette nicotine dependence with nicotine-induced disorder  -     CT Chest Low Dose Wo; Future    3. Bilateral impacted cerumen        · Rx changes: none  · Patient Education: diet and exercise   · Compliance at present is estimated to be good.   · Efforts to improve compliance (if necessary) will be directed at increased exercise.    Depression screening: Up to date; last screen 1/11/2023     Follow-up:     Return in about 3 months (around 4/16/2023) for Recheck.    Preventative:  Health Maintenance   Topic Date Due   • Hepatitis B (1 of 3 - 3-dose series) Never done   • Pneumococcal Vaccine 0-64 (1 - PCV) Never done   • HEPATITIS C SCREENING  Never done   • ZOSTER VACCINE (1 of 2) Never done   • LUNG CANCER SCREENING  08/28/2020   • TDAP/TD VACCINES (2 - Td or Tdap) 01/01/2021   • DIABETIC EYE EXAM  10/14/2022   • HEMOGLOBIN A1C  06/09/2023   • ANNUAL WELLNESS VISIT  08/05/2023   • LIPID PANEL  12/09/2023   • DIABETIC FOOT EXAM  12/21/2023   • COLORECTAL CANCER SCREENING  08/13/2024   • COVID-19 Vaccine  Completed   • INFLUENZA VACCINE  Discontinued   • URINE MICROALBUMIN  Discontinued         Alcohol use: Alcohol use questions deferred to the physician.  Nicotine status  reports that he quit smoking about 4 years ago. His smoking use  included cigarettes. He started smoking about 31 years ago. He has a 25.00 pack-year smoking history. He has never used smokeless tobacco.     Goals     • Blood Pressure < 140/90      Barriers:  Sporadic follow-up      • Lower Blood Sugar      Barriers:  Questionable insight            RISK SCORE: 3        Bonilla Stanton MD PGY-3  [facility] Medicine Residency   This document has been electronically signed by Bonilla Stanton MD on January 16, 2023 12:47 CST

## 2023-01-17 NOTE — PROGRESS NOTES
I have reviewed the notes, assessments, and/or procedures performed by Bonilla Stanton MD during office visit. I concur with her/his documentation and assessment and plan for Kingsley Littlejohn.           This document has been electronically signed by Favio Muro MD on January 17, 2023 11:30 CST

## 2023-01-18 ENCOUNTER — HOSPITAL ENCOUNTER (OUTPATIENT)
Dept: CT IMAGING | Facility: HOSPITAL | Age: 53
Discharge: HOME | End: 2023-01-18
Admitting: RADIOLOGY
Payer: MEDICARE

## 2023-01-18 DIAGNOSIS — E78.49 OTHER HYPERLIPIDEMIA: ICD-10-CM

## 2023-01-18 DIAGNOSIS — F17.219 CIGARETTE NICOTINE DEPENDENCE WITH NICOTINE-INDUCED DISORDER: ICD-10-CM

## 2023-01-18 PROCEDURE — 71271 CT THORAX LUNG CANCER SCR C-: CPT

## 2023-01-18 RX ORDER — ATORVASTATIN CALCIUM 40 MG/1
TABLET, FILM COATED ORAL
Qty: 90 TABLET | Refills: 2 | Status: SHIPPED | OUTPATIENT
Start: 2023-01-18

## 2023-01-26 ENCOUNTER — DOCUMENTATION (OUTPATIENT)
Dept: ENDOCRINOLOGY | Facility: CLINIC | Age: 53
End: 2023-01-26
Payer: MEDICARE

## 2023-02-01 ENCOUNTER — TELEPHONE (OUTPATIENT)
Dept: FAMILY MEDICINE CLINIC | Facility: CLINIC | Age: 53
End: 2023-02-01
Payer: MEDICARE

## 2023-02-01 DIAGNOSIS — I10 ESSENTIAL HYPERTENSION: ICD-10-CM

## 2023-02-01 RX ORDER — LISINOPRIL 40 MG/1
40 TABLET ORAL DAILY
Qty: 90 TABLET | Refills: 3 | Status: SHIPPED | OUTPATIENT
Start: 2023-02-01

## 2023-02-01 NOTE — TELEPHONE ENCOUNTER
Incoming Refill Request      Medication requested (name and dose): lisinopril (PRINIVIL,ZESTRIL) 40 MG tablet    Pharmacy where request should be sent: [facility] [place]    Additional details provided by patient:     Best call back number: [phone]    Does the patient have less than a 3 day supply:  [x] Yes  [] No    Felisha Werner  02/01/23, 08:35 CST

## 2023-02-06 DIAGNOSIS — J30.9 ALLERGIC RHINITIS, UNSPECIFIED SEASONALITY, UNSPECIFIED TRIGGER: ICD-10-CM

## 2023-02-06 RX ORDER — FLUTICASONE PROPIONATE 50 MCG
SPRAY, SUSPENSION (ML) NASAL
Qty: 48 ML | Refills: 0 | Status: SHIPPED | OUTPATIENT
Start: 2023-02-06

## 2023-02-15 RX ORDER — PEN NEEDLE, DIABETIC 32GX 5/32"
NEEDLE, DISPOSABLE MISCELLANEOUS
Qty: 100 EACH | Refills: 10 | Status: SHIPPED | OUTPATIENT
Start: 2023-02-15

## 2023-02-16 RX ORDER — FENOFIBRATE 145 MG/1
TABLET, COATED ORAL
Qty: 90 TABLET | Refills: 3 | Status: SHIPPED | OUTPATIENT
Start: 2023-02-16

## 2023-03-01 ENCOUNTER — DOCUMENTATION (OUTPATIENT)
Dept: ENDOCRINOLOGY | Facility: CLINIC | Age: 53
End: 2023-03-01
Payer: MEDICARE

## 2023-03-13 ENCOUNTER — DOCUMENTATION (OUTPATIENT)
Dept: ENDOCRINOLOGY | Facility: CLINIC | Age: 53
End: 2023-03-13
Payer: MEDICARE

## 2023-03-21 ENCOUNTER — LAB (OUTPATIENT)
Dept: LAB | Facility: HOSPITAL | Age: 53
End: 2023-03-21
Payer: MEDICARE

## 2023-03-21 ENCOUNTER — OFFICE VISIT (OUTPATIENT)
Dept: PODIATRY | Facility: CLINIC | Age: 53
End: 2023-03-21
Payer: MEDICARE

## 2023-03-21 VITALS
OXYGEN SATURATION: 94 % | HEIGHT: 67 IN | WEIGHT: 265 LBS | SYSTOLIC BLOOD PRESSURE: 138 MMHG | DIASTOLIC BLOOD PRESSURE: 97 MMHG | BODY MASS INDEX: 41.59 KG/M2 | HEART RATE: 101 BPM

## 2023-03-21 DIAGNOSIS — B35.1 ONYCHOMYCOSIS: Primary | ICD-10-CM

## 2023-03-21 DIAGNOSIS — M79.675 CHRONIC TOE PAIN, BILATERAL: ICD-10-CM

## 2023-03-21 DIAGNOSIS — E11.42 TYPE 2 DIABETES MELLITUS WITH PERIPHERAL NEUROPATHY: ICD-10-CM

## 2023-03-21 DIAGNOSIS — E11.65 TYPE 2 DIABETES MELLITUS WITH HYPERGLYCEMIA, WITHOUT LONG-TERM CURRENT USE OF INSULIN: ICD-10-CM

## 2023-03-21 DIAGNOSIS — I10 ESSENTIAL HYPERTENSION: ICD-10-CM

## 2023-03-21 DIAGNOSIS — G89.29 CHRONIC TOE PAIN, BILATERAL: ICD-10-CM

## 2023-03-21 DIAGNOSIS — M79.674 CHRONIC TOE PAIN, BILATERAL: ICD-10-CM

## 2023-03-21 DIAGNOSIS — E11.9 ENCOUNTER FOR DIABETIC FOOT EXAM: ICD-10-CM

## 2023-03-21 DIAGNOSIS — E78.49 OTHER HYPERLIPIDEMIA: ICD-10-CM

## 2023-03-21 LAB
ALBUMIN SERPL-MCNC: 4.2 G/DL (ref 3.5–5.2)
ALBUMIN/GLOB SERPL: 1.3 G/DL
ALP SERPL-CCNC: 54 U/L (ref 39–117)
ALT SERPL W P-5'-P-CCNC: 28 U/L (ref 1–41)
ANION GAP SERPL CALCULATED.3IONS-SCNC: 12 MMOL/L (ref 5–15)
AST SERPL-CCNC: 16 U/L (ref 1–40)
BASOPHILS # BLD AUTO: 0.09 10*3/MM3 (ref 0–0.2)
BASOPHILS NFR BLD AUTO: 1.2 % (ref 0–1.5)
BILIRUB SERPL-MCNC: 0.3 MG/DL (ref 0–1.2)
BUN SERPL-MCNC: 20 MG/DL (ref 6–20)
BUN/CREAT SERPL: 22.7 (ref 7–25)
CALCIUM SPEC-SCNC: 9.8 MG/DL (ref 8.6–10.5)
CHLORIDE SERPL-SCNC: 97 MMOL/L (ref 98–107)
CHOLEST SERPL-MCNC: 131 MG/DL (ref 0–200)
CO2 SERPL-SCNC: 24 MMOL/L (ref 22–29)
CREAT SERPL-MCNC: 0.88 MG/DL (ref 0.76–1.27)
DEPRECATED RDW RBC AUTO: 41.4 FL (ref 37–54)
EGFRCR SERPLBLD CKD-EPI 2021: 101.6 ML/MIN/1.73
EOSINOPHIL # BLD AUTO: 0.23 10*3/MM3 (ref 0–0.4)
EOSINOPHIL NFR BLD AUTO: 3 % (ref 0.3–6.2)
ERYTHROCYTE [DISTWIDTH] IN BLOOD BY AUTOMATED COUNT: 15.1 % (ref 12.3–15.4)
GLOBULIN UR ELPH-MCNC: 3.2 GM/DL
GLUCOSE SERPL-MCNC: 218 MG/DL (ref 65–99)
HBA1C MFR BLD: 12.2 % (ref 4.8–5.6)
HCT VFR BLD AUTO: 44.5 % (ref 37.5–51)
HDLC SERPL-MCNC: 32 MG/DL (ref 40–60)
HGB BLD-MCNC: 14 G/DL (ref 13–17.7)
IMM GRANULOCYTES # BLD AUTO: 0.03 10*3/MM3 (ref 0–0.05)
IMM GRANULOCYTES NFR BLD AUTO: 0.4 % (ref 0–0.5)
LDL/HDL RATIO NULL: ABNORMAL
LDLC SERPL CALC-MCNC: 23 MG/DL (ref 0–100)
LYMPHOCYTES # BLD AUTO: 1.97 10*3/MM3 (ref 0.7–3.1)
LYMPHOCYTES NFR BLD AUTO: 26 % (ref 19.6–45.3)
MCH RBC QN AUTO: 24.1 PG (ref 26.6–33)
MCHC RBC AUTO-ENTMCNC: 31.5 G/DL (ref 31.5–35.7)
MCV RBC AUTO: 76.7 FL (ref 79–97)
MONOCYTES # BLD AUTO: 0.75 10*3/MM3 (ref 0.1–0.9)
MONOCYTES NFR BLD AUTO: 9.9 % (ref 5–12)
NEUTROPHILS NFR BLD AUTO: 4.52 10*3/MM3 (ref 1.7–7)
NEUTROPHILS NFR BLD AUTO: 59.5 % (ref 42.7–76)
NRBC BLD AUTO-RTO: 0 /100 WBC (ref 0–0.2)
PLATELET # BLD AUTO: 240 10*3/MM3 (ref 140–450)
PMV BLD AUTO: 10.8 FL (ref 6–12)
POTASSIUM SERPL-SCNC: 3.8 MMOL/L (ref 3.5–5.2)
PROT SERPL-MCNC: 7.4 G/DL (ref 6–8.5)
RBC # BLD AUTO: 5.8 10*6/MM3 (ref 4.14–5.8)
SODIUM SERPL-SCNC: 133 MMOL/L (ref 136–145)
TRIGL SERPL-MCNC: 575 MG/DL (ref 0–150)
TSH SERPL DL<=0.05 MIU/L-ACNC: 1.72 UIU/ML (ref 0.27–4.2)
VLDLC SERPL-MCNC: 76 MG/DL (ref 5–40)
WBC NRBC COR # BLD: 7.59 10*3/MM3 (ref 3.4–10.8)

## 2023-03-21 PROCEDURE — 11721 DEBRIDE NAIL 6 OR MORE: CPT | Performed by: NURSE PRACTITIONER

## 2023-03-21 PROCEDURE — 80061 LIPID PANEL: CPT

## 2023-03-21 PROCEDURE — 99212 OFFICE O/P EST SF 10 MIN: CPT | Performed by: NURSE PRACTITIONER

## 2023-03-21 PROCEDURE — 3075F SYST BP GE 130 - 139MM HG: CPT | Performed by: NURSE PRACTITIONER

## 2023-03-21 PROCEDURE — 85025 COMPLETE CBC W/AUTO DIFF WBC: CPT

## 2023-03-21 PROCEDURE — 82570 ASSAY OF URINE CREATININE: CPT

## 2023-03-21 PROCEDURE — 82043 UR ALBUMIN QUANTITATIVE: CPT

## 2023-03-21 PROCEDURE — 83036 HEMOGLOBIN GLYCOSYLATED A1C: CPT

## 2023-03-21 PROCEDURE — 3080F DIAST BP >= 90 MM HG: CPT | Performed by: NURSE PRACTITIONER

## 2023-03-21 PROCEDURE — 36415 COLL VENOUS BLD VENIPUNCTURE: CPT

## 2023-03-21 PROCEDURE — 80053 COMPREHEN METABOLIC PANEL: CPT

## 2023-03-21 PROCEDURE — 84443 ASSAY THYROID STIM HORMONE: CPT

## 2023-03-21 PROCEDURE — 1159F MED LIST DOCD IN RCRD: CPT | Performed by: NURSE PRACTITIONER

## 2023-03-21 PROCEDURE — 1160F RVW MEDS BY RX/DR IN RCRD: CPT | Performed by: NURSE PRACTITIONER

## 2023-03-21 NOTE — PROGRESS NOTES
"Kingsley Littlejohn  1970  53 y.o. male  PCP: Bonilla Light 11/29/2022  BS: 291 per patient     3/21/2023    Chief Complaint   Patient presents with   • Left Foot - Follow-up     Diabetic foot care    • Right Foot - Follow-up     Diabetic foot care        History of Present Illness    Kingsley Littlejohn is a 53 y.o.male came to clinic for diabetic foot care.       Past Medical History:   Diagnosis Date   • Abdominal pain    • Allergic rhinitis    • Diabetes mellitus (HCC)     Prev HbA1c 14.0 2014, states \"I'm cured!\"   • Essential hypertension    • Fracture closed, fibula, shaft     tib-fib   • Onychomycosis    • Sleep apnea          Past Surgical History:   Procedure Laterality Date   • ANKLE SURGERY      right and left   • COLONOSCOPY N/A 6/4/2018    Procedure: COLONOSCOPY;  Surgeon: Jaylon Castro MD;  Location: [facility] ENDOSCOPY;  Service: Gastroenterology   • COLONOSCOPY N/A 8/13/2021    Procedure: COLONOSCOPY;  Surgeon: Jaylon Castro MD;  Location: [facility] ENDOSCOPY;  Service: General;  Laterality: N/A;   • CYST REMOVAL      x 3, from hand, thigh, and buttocks   • ENDOSCOPY      20 years ago   • TIBIA FRACTURE SURGERY Bilateral 2011    tib-fib         Family History   Problem Relation Age of Onset   • Diabetes Other    • Hypertension Other    • Cancer Other    • Lung disease Other    • Thyroid disease Other    • Bleeding Disorder Other    • Colon cancer Paternal Grandmother    • Cancer Paternal Grandfather    • Crohn's disease Mother    • Diabetes Mother    • Hypertension Father    • Lung disease Father    • Hypertension Brother    • Hypertension Brother    • Hypertension Brother        Allergies   Allergen Reactions   • Latex Irritability     Other reaction(s): Unknown   • Codeine Nausea Only     Other reaction(s): Unknown       Social History     Socioeconomic History   • Marital status: Single   Tobacco Use   • Smoking status: Former     Packs/day: 1.00     Years: 25.00     Pack years: 25.00     " Types: Cigarettes     Start date:      Quit date: 2018     Years since quittin.3   • Smokeless tobacco: Never   Vaping Use   • Vaping Use: Never used   Substance and Sexual Activity   • Alcohol use: Defer     Comment: FORMER pint of whiskey daily, states quit 1 year ago   • Drug use: Defer     Comment: patient reports prior drug abuse, but sober for 8 months now.   • Sexual activity: Defer         Current Outpatient Medications   Medication Sig Dispense Refill   • Afrezza 4 & 8 & 12 units powder INHALE 4-32 UNITS 3 (THREE) TIMES A DAY WITH MEALS. 4-32 UNITS THREE TIMES DAILY , MAX DOSE 96 UNITS DAILY 360 each 10   • aspirin 325 MG tablet Take 1 tablet by mouth Daily.     • atorvastatin (LIPITOR) 40 MG tablet TAKE 1 TABLET BY MOUTH EVERY DAY 90 tablet 2   • BD Pen Needle Nola 2nd Gen 32G X 4 MM misc INJECT ONCE WALTON 100 each 10   • BD Pen Needle Nola U/F 32G X 4 MM misc INJECT ONCE WALTON     • Blood Glucose Monitoring Suppl (ONE TOUCH ULTRA 2) w/Device kit Use 1 daily to test blood glucose level. 1 each 5   • Continuous Blood Gluc  (FreeStyle Melonie 2 [place]) device 1 each Continuous. 1 each 1   • Continuous Blood Gluc Sensor (FreeStyle Melonie 2 Sensor) misc 1 each Every 14 (Fourteen) Days. 3 each 11   • fenofibrate (TRICOR) 145 MG tablet TAKE 1 TABLET BY MOUTH EVERY DAY 90 tablet 3   • fluticasone (FLONASE) 50 MCG/ACT nasal spray SPRAY 2 SPRAYS INTO EACH NOSTRIL EVERY DAY 48 mL 0   • glucose blood test strip USE TO TEST BLOOD SUGAR 1 TIME DAILY E11.9 100 each 12   • glucose monitor monitoring kit 1 each As Needed (Diabetes). USE TO TEST 1 TIME DAILY 1 each 0   • hydroCHLOROthiazide (MICROZIDE) 12.5 MG capsule Take 1 capsule by mouth Daily.     • hydrOXYzine (ATARAX) 50 MG tablet Take 1 tablet by mouth 4 (Four) Times a Day.     • Insulin Glargine, 2 Unit Dial, (Toujeo Max SoloStar) 300 UNIT/ML solution pen-injector injection Inject 54 units under the skin into the appropriate area as directed daily 90  "mL 11   • Invega Sustenna 117 MG/0.75ML suspension prefilled syringe IM injection INJECT ONE HUNDRED SEVENTEEN (117) MILLIGRAMS INTO THE MUSCLE EVERY 4 WEEKS     • Jardiance 25 MG tablet tablet TAKE 1 TABLET BY MOUTH EVERY DAY 90 tablet 1   • ketorolac (TORADOL) 10 MG tablet TAKE 1 TABLET (10 MG) BY MOUTH EVERY 6 (SIX) HOURS IF NEEDED FOR PAIN (MAXIMUM 4 PER DAY)     • Lancets (ACCU-CHEK SOFT TOUCH) lancets Use TO TEST BLOOD SUGAR 1 TIME DAILY. 100 each 12   • lisinopril (PRINIVIL,ZESTRIL) 40 MG tablet Take 1 tablet by mouth Daily. 90 tablet 3   • metFORMIN (GLUCOPHAGE) 1000 MG tablet Take 1 tablet by mouth 2 (Two) Times a Day With Meals. 180 tablet 2   • metoprolol tartrate (LOPRESSOR) 100 MG tablet TAKE 1 TAB(S) 2 TIMES A DAY     • paliperidone palmitate (INVEGA SUSTENNA) 117 MG/0.75ML suspension IM injection Inject 117 mg into the shoulder, thigh, or buttocks Every 30 (Thirty) Days.     • Semaglutide, 2 MG/DOSE, (Ozempic, 2 MG/DOSE,) 8 MG/3ML solution pen-injector Inject 2 mg under the skin into the appropriate area as directed 1 (One) Time Per Week. 3 mL 11   • SITagliptin (JANUVIA) 100 MG tablet Daily.     • zolpidem (AMBIEN) 10 MG tablet Take 1 tablet by mouth every night at bedtime.       No current facility-administered medications for this visit.       Review of Systems   Constitutional: Negative.    HENT: Negative.    Eyes: Negative.    Respiratory: Negative.    Cardiovascular: Negative.    Gastrointestinal: Negative.    Endocrine: Negative.    Genitourinary: Negative.    Musculoskeletal:        Foot pain    Skin: Negative.    Allergic/Immunologic: Negative.    Neurological: Negative.    Hematological: Negative.    Psychiatric/Behavioral: Negative.          OBJECTIVE    /97   Pulse 101   Ht 170.2 cm (67\")   Wt 120 kg (265 lb)   SpO2 94%   BMI 41.50 kg/m²     Physical Exam  Vitals reviewed.   Constitutional:       Appearance: Normal appearance. He is well-developed.   HENT:      Head: " Normocephalic and atraumatic.   Neck:      Trachea: Trachea and phonation normal.   Pulmonary:      Effort: Pulmonary effort is normal. No respiratory distress.   Abdominal:      General: There is no distension.      Palpations: Abdomen is soft.   Skin:     General: Skin is warm and dry.   Neurological:      Mental Status: He is alert and oriented to person, place, and time.      GCS: GCS eye subscore is 4. GCS verbal subscore is 5. GCS motor subscore is 6.   Psychiatric:         Speech: Speech normal.         Behavior: Behavior normal. Behavior is cooperative.         Thought Content: Thought content normal.         Judgment: Judgment normal.                Procedures        ASSESSMENT AND PLAN    Diagnoses and all orders for this visit:    1. Onychomycosis (Primary)    2. Chronic toe pain, bilateral    3. Encounter for diabetic foot exam (HCC)    4. Type 2 diabetes mellitus with peripheral neuropathy (HCC)        - A diabetic foot screening exam was performed and the patient was educated on the foot complications related to diabetes,  preventative foot care and what signs and symptoms to watch for.  Instructed to contact our office if any foot problems develop before next visit.  -Discussed treatments for  painful toenails. Treatment options discussed included nail removal versus debridement. Patient elected for routine nail debridement. An ABN has been signed by the patient.  - Toenails 1-5 bilateral were debrided in length and thickness with nail nipper and electric  to decrease fungal load and risk of infection.  - All the patients questions were answered.  - RTC 3 months or sooner if needed.           This document has been electronically signed by Maurizio EARL, FNPPaigeC, ONP-C on March 21, 2023 15:09 CDT

## 2023-03-22 LAB
ALBUMIN UR-MCNC: <1.2 MG/DL
CREAT UR-MCNC: 29.2 MG/DL
MICROALBUMIN/CREAT UR: NORMAL MG/G{CREAT}

## 2023-03-24 ENCOUNTER — OFFICE VISIT (OUTPATIENT)
Dept: ENDOCRINOLOGY | Facility: CLINIC | Age: 53
End: 2023-03-24
Payer: MEDICARE

## 2023-03-24 ENCOUNTER — DOCUMENTATION (OUTPATIENT)
Dept: ENDOCRINOLOGY | Facility: CLINIC | Age: 53
End: 2023-03-24
Payer: MEDICARE

## 2023-03-24 VITALS
HEIGHT: 67 IN | OXYGEN SATURATION: 97 % | WEIGHT: 263 LBS | HEART RATE: 92 BPM | SYSTOLIC BLOOD PRESSURE: 140 MMHG | BODY MASS INDEX: 41.28 KG/M2 | DIASTOLIC BLOOD PRESSURE: 90 MMHG

## 2023-03-24 DIAGNOSIS — E78.49 OTHER HYPERLIPIDEMIA: ICD-10-CM

## 2023-03-24 DIAGNOSIS — E11.65 TYPE 2 DIABETES MELLITUS WITH HYPERGLYCEMIA, WITHOUT LONG-TERM CURRENT USE OF INSULIN: Primary | ICD-10-CM

## 2023-03-24 DIAGNOSIS — E55.9 VITAMIN D DEFICIENCY: ICD-10-CM

## 2023-03-24 DIAGNOSIS — I10 ESSENTIAL HYPERTENSION: ICD-10-CM

## 2023-03-24 PROCEDURE — 3077F SYST BP >= 140 MM HG: CPT | Performed by: NURSE PRACTITIONER

## 2023-03-24 PROCEDURE — 3080F DIAST BP >= 90 MM HG: CPT | Performed by: NURSE PRACTITIONER

## 2023-03-24 PROCEDURE — 99214 OFFICE O/P EST MOD 30 MIN: CPT | Performed by: NURSE PRACTITIONER

## 2023-03-24 RX ORDER — INSULIN GLARGINE 300 U/ML
INJECTION, SOLUTION SUBCUTANEOUS
Qty: 90 ML | Refills: 11 | Status: SHIPPED | OUTPATIENT
Start: 2023-03-24

## 2023-03-24 NOTE — PROGRESS NOTES
"Chief Complaint  Diabetes    Subjective          Kingsley Littlejohn presents to [facility] ENDOCRINOLOGY  Diabetes         In office visit    Primary provider Bonilla Stanton MD     53 year old male presents for follow up      Diabetes mellitus type 2     Duration diagnosed in 2014     Timing constant    Quality not controlled    severity moderate           Macrovascular complications CAD, MI in the past         Microvascular complications neuropathy     Current diabetes regimen       insulin and GLP-1     Taking basal and mealtime insulin total 4 shots daily           Current glucose monitoring     Checks 4-10 times daily     Uses a [employer] personal system-- forgot reader            Review of Systems - General ROS: negative            Objective   Vital Signs:   /90   Pulse 92   Ht 170.2 cm (67\")   Wt 119 kg (263 lb)   SpO2 97%   BMI 41.19 kg/m²     Physical Exam  Constitutional:       Appearance: Normal appearance.   Cardiovascular:      Rate and Rhythm: Regular rhythm.      Heart sounds: Normal heart sounds.   Pulmonary:      Breath sounds: Normal breath sounds.   Musculoskeletal:         General: Normal range of motion.      Cervical back: Normal range of motion.   Neurological:      Mental Status: He is alert.        Result Review :   The following data was reviewed by: RAJAT Doyle on 05/17/2022:  Common labs    Common Labs 12/9/22 12/9/22 12/9/22 12/9/22 12/9/22 1/11/23 3/21/23 3/21/23 3/21/23 3/21/23 3/21/23    0938 0938 0938 0938 0952  1305 1305 1305 1305 1441   Glucose  286 (A)      218 (A)      BUN  16      20      Creatinine  0.91      0.88      Sodium  136      133 (A)      Potassium  4.0      3.8      Chloride  99      97 (A)      Calcium  9.0      9.8      Albumin  4.10      4.2      Total Bilirubin  0.3      0.3      Alkaline Phosphatase  56      54      AST (SGOT)  18      16      ALT (SGPT)  31      28      WBC 7.46     8.47 7.59       Hemoglobin " 13.6     13.9 14.0       Hematocrit 42.4     43.7 44.5       Platelets 233     239 240       Total Cholesterol   127      131     Triglycerides   588 (A)      575 (A)     HDL Cholesterol   30 (A)      32 (A)     LDL Cholesterol    20      23     Hemoglobin A1C    13.20 (A)      12.20 (A)    Microalbumin, Urine     <1.2      <1.2   (A) Abnormal value                        Assessment and Plan    Diagnoses and all orders for this visit:    1. Type 2 diabetes mellitus with hyperglycemia, without long-term current use of insulin (HCC) (Primary)  -     CBC & Differential; Future  -     Comprehensive Metabolic Panel; Future  -     Hemoglobin A1c; Future  -     Lipid Panel; Future  -     Microalbumin / Creatinine Urine Ratio - Urine, Clean Catch; Future  -     TSH; Future  -     Vitamin B12; Future  -     Vitamin D,25-Hydroxy; Future    2. Essential hypertension  -     CBC & Differential; Future  -     Comprehensive Metabolic Panel; Future  -     Hemoglobin A1c; Future  -     Lipid Panel; Future  -     Microalbumin / Creatinine Urine Ratio - Urine, Clean Catch; Future  -     TSH; Future  -     Vitamin B12; Future  -     Vitamin D,25-Hydroxy; Future    3. Other hyperlipidemia  -     CBC & Differential; Future  -     Comprehensive Metabolic Panel; Future  -     Hemoglobin A1c; Future  -     Lipid Panel; Future  -     Microalbumin / Creatinine Urine Ratio - Urine, Clean Catch; Future  -     TSH; Future  -     Vitamin B12; Future  -     Vitamin D,25-Hydroxy; Future    4. Vitamin D deficiency  -     CBC & Differential; Future  -     Comprehensive Metabolic Panel; Future  -     Hemoglobin A1c; Future  -     Lipid Panel; Future  -     Microalbumin / Creatinine Urine Ratio - Urine, Clean Catch; Future  -     TSH; Future  -     Vitamin B12; Future  -     Vitamin D,25-Hydroxy; Future    Other orders  -     Insulin Glargine, 2 Unit Dial, (Toujeo Max SoloStar) 300 UNIT/ML solution pen-injector injection; Inject 80 units under the skin  into the appropriate area as directed daily  Dispense: 90 mL; Refill: 11             Glycemic Management     Type 2 diabetes not controlled with hyperglycemia      Lab Results   Component Value Date    HGBA1C 12.20 (H) 03/21/2023             Done In :[employer] personal system--  Using but forgot reader            Taking Metformin 1000 mg po BID--- keep        Taking Jardiance 25 mg one daily -keep              taking Ozempic 2 mg once weekly      Side effects discussed, try to eat half your plate not a full plate     If nauseated eat less     If vomiting or abdominal pain stop medication            taking Toujeo 76 units daily -- increase to 80 units         We discussed how to self titrate the Toujeo dose using his CGM     Mealtime insulin--- uses 3 times a day before meals     using Afrezza-- 24 units before each meal --increase up to 36 units TID for each meal                   Plus sliding scale          Januvia 100 mg daily ---- stop not effective           Patient uses CGM data and his food intake to determine what dose of insulin he needs to use for meals                 Melonie has allowed the patient to minimize hypoglycemia as alarms have predicted and avoided them     He also uses the arrows on the melonie as follows:     If arrow is horizontal , he uses the predicted dosage     If the arrow is slanted up or down--he increase or decrease by 4 units     If the arrow is vertical up or down -he increases or decreases by 4 units                       Lipid Management        Taking Lipitor 40 mg one at night         Total Cholesterol   Date Value Ref Range Status   03/21/2023 131 0 - 200 mg/dL Final     Triglycerides   Date Value Ref Range Status   03/21/2023 575 (H) 0 - 150 mg/dL Final   03/02/2018 263 (H) 0 - 149 MG/DL Final     HDL Cholesterol   Date Value Ref Range Status   03/21/2023 32 (L) 40 - 60 mg/dL Final   03/02/2018 39 (L) >40 MG/DL Final     LDL Cholesterol    Date Value Ref Range Status   03/21/2023 23 0 - 100  mg/dL Final   03/02/2018 80 <130 MG/DL Final        Vascepa 2 gm po BID--could not take      Taking tricor 145 mg daily         Blood Pressure Management         Taking Lisinopril 40 mg once daily      Taking Metoprolol 50 mg daily            Microvascular Complication Monitoring        Last eye exam -- Jan. 2022,  no DR           Component      Latest Ref Rng & Units 3/21/2023   Microalbumin/Creatinine Ratio          Creatinine, Urine      mg/dL 29.2   Microalbumin, Urine      mg/dL <1.2              Neuropathy mild      Prefers no RX at this time         Bone Health      vitamin d def.          vitamin d daily               Other Diabetes Related Aspects       Lab Results   Component Value Date    HDAQPKQT30 501 08/25/2022                   Thyroid Health        Lab Results   Component Value Date    TSH 1.720 03/21/2023                   Weight Management:     Obesity        Body mass index is 41.19 kg/m².    Dietary changes discussed               Follow Up   No follow-ups on file.  Patient was given instructions and counseling regarding his condition or for health maintenance advice. Please see specific information pulled into the AVS if appropriate.         This document has been electronically signed by RAJAT Doyle on March 24, 2023 14:37 CDT.

## 2023-03-31 RX ORDER — INSULIN HUMAN 4-8-12(60)
KIT INHALATION
Qty: 360 EACH | Refills: 9 | Status: SHIPPED | OUTPATIENT
Start: 2023-03-31

## 2023-04-03 ENCOUNTER — TELEPHONE (OUTPATIENT)
Dept: ENDOCRINOLOGY | Facility: CLINIC | Age: 53
End: 2023-04-03
Payer: MEDICARE

## 2023-04-03 ENCOUNTER — DOCUMENTATION (OUTPATIENT)
Dept: ENDOCRINOLOGY | Facility: CLINIC | Age: 53
End: 2023-04-03
Payer: MEDICARE

## 2023-04-03 NOTE — TELEPHONE ENCOUNTER
Patient called and wants to talk to someone about going on omnipod 5 and dexcom 6 and to see if his insurance will cover it.    Phone [phone]    Thank you

## 2023-04-07 DIAGNOSIS — E11.9 TYPE 2 DIABETES MELLITUS WITHOUT COMPLICATION, WITHOUT LONG-TERM CURRENT USE OF INSULIN: ICD-10-CM

## 2023-04-14 ENCOUNTER — DOCUMENTATION (OUTPATIENT)
Dept: ENDOCRINOLOGY | Facility: CLINIC | Age: 53
End: 2023-04-14
Payer: MEDICARE

## 2023-04-17 ENCOUNTER — DOCUMENTATION (OUTPATIENT)
Dept: ENDOCRINOLOGY | Facility: CLINIC | Age: 53
End: 2023-04-17
Payer: MEDICARE

## 2023-04-17 NOTE — PROGRESS NOTES
OMNIPOD 5 INTRO KIT APPROVED FROM 3/15/23 UNTIL FURTHER NOTICE.    SENT TO [facility] REC

## 2023-04-18 ENCOUNTER — TELEPHONE (OUTPATIENT)
Dept: ENDOCRINOLOGY | Facility: CLINIC | Age: 53
End: 2023-04-18
Payer: MEDICARE

## 2023-04-18 ENCOUNTER — DOCUMENTATION (OUTPATIENT)
Dept: ENDOCRINOLOGY | Facility: CLINIC | Age: 53
End: 2023-04-18
Payer: MEDICARE

## 2023-04-18 NOTE — TELEPHONE ENCOUNTER
Pt called, asking for a Dexcom G6 to [facility] please    Pt callback number [phone]

## 2023-04-18 NOTE — PROGRESS NOTES
ORDER FOR [facility]EX[facility] G[facility]

## 2023-05-01 ENCOUNTER — OFFICE VISIT (OUTPATIENT)
Dept: SLEEP MEDICINE | Facility: HOSPITAL | Age: 53
End: 2023-05-01
Payer: MEDICARE

## 2023-05-01 ENCOUNTER — TELEPHONE (OUTPATIENT)
Dept: ENDOCRINOLOGY | Facility: CLINIC | Age: 53
End: 2023-05-01
Payer: MEDICARE

## 2023-05-01 VITALS
OXYGEN SATURATION: 95 % | DIASTOLIC BLOOD PRESSURE: 96 MMHG | BODY MASS INDEX: 41.03 KG/M2 | HEIGHT: 67 IN | HEART RATE: 95 BPM | SYSTOLIC BLOOD PRESSURE: 136 MMHG | WEIGHT: 261.4 LBS

## 2023-05-01 DIAGNOSIS — E66.01 MORBID (SEVERE) OBESITY DUE TO EXCESS CALORIES: ICD-10-CM

## 2023-05-01 DIAGNOSIS — G47.33 OBSTRUCTIVE SLEEP APNEA SYNDROME: Primary | ICD-10-CM

## 2023-05-01 NOTE — PROGRESS NOTES
"Sleep Clinic Follow Up    Date: 2023  Primary Care Provider: Bonilla Stanton MD    Last office visit: 2022 (I reviewed this note)    CC: Follow up: MAXIME started on CPAP, 6 month       Interim History:  Since the last visit:    1) severe MAXIME -  Kingsley Littlejohn has mostly remained compliant with CPAP. He denies mask and machine issues, dry mouth, headaches, or pressures intolerance. He denies abnormal dreams, sleep paralysis, nasal congestion, URI sx.  Some nights he falls asleep without putting mask on.     Sleep Testin. PSG on , AHI of ?  2. HST on 2018 AHI of 38   3. Currently on 5-15 cm H2O    PAP Data:    Time frame: 2022-2023   Compliance: 89 %  Average use on days used: 5hrs 1 min  Percent of days with usage greater than or equal to 4 hours: 58%  PAP range: 5-15 cm H2O  Average 90% pressure: 6 cmH2O  Leak: 17 minutes  Average AHI: 0.9 events/hr  Machine: 3B Berenice with modem   Mask type: Full face mask  DME: [facility]     Bed time: 1900  Sleep latency: 30 minutes  Number of times awakens during the night: 2  Wake time: 0500  Estimated total sleep time at night: 7 hours  Caffeine intake: 0 cups of coffee,  0 cups of tea, and \"a lot \" sodas per day  Alcohol intake: 0 drinks per week  Nap time: denies    Sleepiness with Driving: denies        [place] Sleepiness Scale Score: 1    How likely are you to doze off or fall asleep in the following situation, in contrast to feeling just tired?     Use the following scale to choose the most appropriate number for each situation:    0 = would never doze  1 = slight chance of dozing   2 = moderate chance of dozing   3 = high chance of dozing    It is important that you answer each question as best you can.      Situation       Chance of Dozing (0-3)    Sitting and reading            ___0____    Watching TV          ___0____    Sitting, inactive in a public place (e.g. a theatre or meeting)   ___0____    As a passenger in a car for an hour " "without break    ___1____    Lying down to rest in the afternoon, when circumstances permit  ___0____    Sitting and talking to someone      ___0____    Sitting quietly after a lunch without alcohol     ___0____    In a car, while stopped for a few minutes in traffic    ___0____         PMHx, FH, SH reviewed and pertinent changes are: reports unchanged from last office visit with us       REVIEW OF SYSTEMS:   Negative for chest pain, SOA, fever, chills, cough, N/V/D, abdominal pain.    Smoking:none, former     Kingsley Littlejohn  reports that he quit smoking about 4 years ago. His smoking use included cigarettes. He started smoking about 31 years ago. He has a 25.00 pack-year smoking history. He has never used smokeless tobacco.. I have educated him on the risk of diseases from using tobacco products such as cancer, COPD and heart disease.            Exam:  Vitals:    05/01/23 1359   BP: 136/96   Pulse: 95   SpO2: 95%           05/01/23  1359   Weight: 119 kg (261 lb 6.4 oz)     Body mass index is 40.93 kg/m². Class 3 Severe Obesity (BMI >=40). Obesity-related health conditions include the following: obstructive sleep apnea. Obesity is unchanged. BMI is is above average; BMI management plan is completed. We discussed portion control and increasing exercise.      Gen:                No distress, conversant, pleasant, appears stated age, alert, oriented  Eyes:               Anicteric sclera, moist conjunctiva, no lid lag                           EOMI   Lungs:             normal effort, non-labored breathing                          Clear to auscultation bilaterally          CV:                  Normal S1/S2, no murmur                          no lower extremity edema                 Psych:             Appropriate affect  Neuro:             CN 2-12 appear intact    Past Medical History:   Diagnosis Date   • Abdominal pain    • Allergic rhinitis    • Diabetes mellitus     Prev HbA1c 14.0 2014, states \"I'm cured!\"   • " Essential hypertension    • Fracture closed, fibula, shaft     tib-fib   • Onychomycosis    • Sleep apnea        Current Outpatient Medications:   •  Afrezza 60x4 &60x8 & 60x12 UNIT powder, INHALE 4-32 UNITS 3 (THREE) TIMES A DAY WITH MEALS. 4-32 UNITS THREE TIMES DAILY , MAX DOSE 96 UNITS DAILY, Disp: 360 each, Rfl: 9  •  aspirin 325 MG tablet, Take 1 tablet by mouth Daily., Disp: , Rfl:   •  atorvastatin (LIPITOR) 40 MG tablet, TAKE 1 TABLET BY MOUTH EVERY DAY, Disp: 90 tablet, Rfl: 2  •  BD Pen Needle Nola 2nd Gen 32G X 4 MM misc, INJECT ONCE WALTON, Disp: 100 each, Rfl: 10  •  BD Pen Needle Nola U/F 32G X 4 MM misc, INJECT ONCE WALTON, Disp: , Rfl:   •  Blood Glucose Monitoring Suppl (ONE TOUCH ULTRA 2) w/Device kit, Use 1 daily to test blood glucose level., Disp: 1 each, Rfl: 5  •  Continuous Blood Gluc  (FreeStyle Melonie 2 [place]) device, 1 each Continuous., Disp: 1 each, Rfl: 1  •  Continuous Blood Gluc Sensor (FreeStyle Melonie 2 Sensor) misc, 1 each Every 14 (Fourteen) Days., Disp: 3 each, Rfl: 11  •  fenofibrate (TRICOR) 145 MG tablet, TAKE 1 TABLET BY MOUTH EVERY DAY, Disp: 90 tablet, Rfl: 3  •  fluticasone (FLONASE) 50 MCG/ACT nasal spray, SPRAY 2 SPRAYS INTO EACH NOSTRIL EVERY DAY, Disp: 48 mL, Rfl: 0  •  glucose blood test strip, USE TO TEST BLOOD SUGAR 1 TIME DAILY E11.9, Disp: 100 each, Rfl: 12  •  glucose monitor monitoring kit, 1 each As Needed (Diabetes). USE TO TEST 1 TIME DAILY, Disp: 1 each, Rfl: 0  •  hydroCHLOROthiazide (MICROZIDE) 12.5 MG capsule, Take 1 capsule by mouth Daily., Disp: , Rfl:   •  hydrOXYzine (ATARAX) 50 MG tablet, Take 1 tablet by mouth 4 (Four) Times a Day., Disp: , Rfl:   •  Insulin Disposable Pump (Omnipod 5 G6 Intro, Gen 5,) kit, , Disp: , Rfl:   •  Insulin Glargine, 2 Unit Dial, (Toujeo Max SoloStar) 300 UNIT/ML solution pen-injector injection, Inject 80 units under the skin into the appropriate area as directed daily, Disp: 90 mL, Rfl: 11  •  Invega Sustenna 117  MG/0.75ML suspension prefilled syringe IM injection, INJECT ONE HUNDRED SEVENTEEN (117) MILLIGRAMS INTO THE MUSCLE EVERY 4 WEEKS, Disp: , Rfl:   •  Jardiance 25 MG tablet tablet, TAKE 1 TABLET BY MOUTH EVERY DAY, Disp: 90 tablet, Rfl: 1  •  Lancets (ACCU-CHEK SOFT TOUCH) lancets, Use TO TEST BLOOD SUGAR 1 TIME DAILY., Disp: 100 each, Rfl: 12  •  lisinopril (PRINIVIL,ZESTRIL) 40 MG tablet, Take 1 tablet by mouth Daily., Disp: 90 tablet, Rfl: 3  •  metFORMIN (GLUCOPHAGE) 1000 MG tablet, Take 1 tablet by mouth 2 (Two) Times a Day With Meals., Disp: 180 tablet, Rfl: 3  •  metoprolol tartrate (LOPRESSOR) 100 MG tablet, TAKE 1 TAB(S) 2 TIMES A DAY, Disp: , Rfl:   •  paliperidone palmitate (INVEGA SUSTENNA) 117 MG/0.75ML suspension IM injection, Inject 117 mg into the shoulder, thigh, or buttocks Every 30 (Thirty) Days., Disp: , Rfl:   •  Semaglutide, 2 MG/DOSE, (Ozempic, 2 MG/DOSE,) 8 MG/3ML solution pen-injector, Inject 2 mg under the skin into the appropriate area as directed 1 (One) Time Per Week., Disp: 3 mL, Rfl: 11  •  zolpidem (AMBIEN) 10 MG tablet, Take 1 tablet by mouth every night at bedtime., Disp: , Rfl:   WBC   Date Value Ref Range Status   03/21/2023 7.59 3.40 - 10.80 10*3/mm3 Final     RBC   Date Value Ref Range Status   03/21/2023 5.80 4.14 - 5.80 10*6/mm3 Final     Hemoglobin   Date Value Ref Range Status   03/21/2023 14.0 13.0 - 17.7 g/dL Final     Hematocrit   Date Value Ref Range Status   03/21/2023 44.5 37.5 - 51.0 % Final     MCV   Date Value Ref Range Status   03/21/2023 76.7 (L) 79.0 - 97.0 fL Final     MCH   Date Value Ref Range Status   03/21/2023 24.1 (L) 26.6 - 33.0 pg Final     MCHC   Date Value Ref Range Status   03/21/2023 31.5 31.5 - 35.7 g/dL Final     RDW   Date Value Ref Range Status   03/21/2023 15.1 12.3 - 15.4 % Final     RDW-SD   Date Value Ref Range Status   03/21/2023 41.4 37.0 - 54.0 fl Final     MPV   Date Value Ref Range Status   03/21/2023 10.8 6.0 - 12.0 fL Final     Platelets    Date Value Ref Range Status   03/21/2023 240 140 - 450 10*3/mm3 Final     Neutrophil %   Date Value Ref Range Status   03/21/2023 59.5 42.7 - 76.0 % Final     Lymphocyte %   Date Value Ref Range Status   03/21/2023 26.0 19.6 - 45.3 % Final     Monocyte %   Date Value Ref Range Status   03/21/2023 9.9 5.0 - 12.0 % Final     Eosinophil %   Date Value Ref Range Status   03/21/2023 3.0 0.3 - 6.2 % Final     Basophil %   Date Value Ref Range Status   03/21/2023 1.2 0.0 - 1.5 % Final     Immature Grans %   Date Value Ref Range Status   03/21/2023 0.4 0.0 - 0.5 % Final     Neutrophils, Absolute   Date Value Ref Range Status   03/21/2023 4.52 1.70 - 7.00 10*3/mm3 Final     Lymphocytes, Absolute   Date Value Ref Range Status   03/21/2023 1.97 0.70 - 3.10 10*3/mm3 Final     Monocytes, Absolute   Date Value Ref Range Status   03/21/2023 0.75 0.10 - 0.90 10*3/mm3 Final     Eosinophils, Absolute   Date Value Ref Range Status   03/21/2023 0.23 0.00 - 0.40 10*3/mm3 Final     Basophils, Absolute   Date Value Ref Range Status   03/21/2023 0.09 0.00 - 0.20 10*3/mm3 Final     Immature Grans, Absolute   Date Value Ref Range Status   03/21/2023 0.03 0.00 - 0.05 10*3/mm3 Final     nRBC   Date Value Ref Range Status   03/21/2023 0.0 0.0 - 0.2 /100 WBC Final       Lab Results   Component Value Date    GLUCOSE 218 (H) 03/21/2023    BUN 20 03/21/2023    CREATININE 0.88 03/21/2023    EGFR 101.6 03/21/2023    BCR 22.7 03/21/2023    K 3.8 03/21/2023    CO2 24.0 03/21/2023    CALCIUM 9.8 03/21/2023    ALBUMIN 4.2 03/21/2023    BILITOT 0.3 03/21/2023    AST 16 03/21/2023    ALT 28 03/21/2023         Assessment and Plan:    1. Obstructive sleep apnea- Established, stable   1. Mostly compliant with PAP therapy- compliance report reviewed with patient. Encouraged increased compliance.   2. Set CPAP reminder alarm nightly   3. Continue PAP as prescribed  4. Script for PAP supplies  5. Pertinent labs reviewed   6. Drowsy driving tips- do not drive  if feeling sleepy   7. Return to clinic in 12 months with compliance report unless change in symptoms in interim period  2. Morbid obesity - chronic, stable   3. Insomnia - sleep onset and or maintenance - chronic, stable   1. On Ambien nightly per PCP   2. Good sleep hygiene           I spent 15 minutes caring for Kingsley on this date of service. This time includes time spent by me in the following activities: preparing for the visit, obtaining and/or reviewing a separately obtained history, performing a medically appropriate examination and/or evaluation, counseling and educating the patient/family/caregiver, ordering medications, tests, or procedures and documenting information in the medical record. Educated on PAP management, maintenance, and compliance.           This document has been electronically signed by RAJAT Stuart on May 1, 2023 14:13 CDT            CC: Bonilla Stanton MD          No ref. provider found

## 2023-05-01 NOTE — TELEPHONE ENCOUNTER
Pt called stating he has omni pod training set up for his new pump and his , Tejal has asked him to call the office to call in RX for Humilog so he will have this for his training. He uses [facility] in [place].    Please advise.    Thanks

## 2023-05-08 RX ORDER — EMPAGLIFLOZIN 25 MG/1
TABLET, FILM COATED ORAL
Qty: 90 TABLET | Refills: 1 | Status: SHIPPED | OUTPATIENT
Start: 2023-05-08

## 2023-05-09 ENCOUNTER — TELEPHONE (OUTPATIENT)
Dept: ENDOCRINOLOGY | Facility: CLINIC | Age: 53
End: 2023-05-09
Payer: MEDICARE

## 2023-05-09 NOTE — TELEPHONE ENCOUNTER
Pt called, stating he needs insulin for his Omnipod pods so he can do the pump training on 05/16. Pharmacy is [facility] in [place]    Pt callback number [phone]

## 2023-05-15 ENCOUNTER — TELEPHONE (OUTPATIENT)
Dept: ENDOCRINOLOGY | Facility: CLINIC | Age: 53
End: 2023-05-15
Payer: MEDICARE

## 2023-05-15 DIAGNOSIS — J30.9 ALLERGIC RHINITIS, UNSPECIFIED SEASONALITY, UNSPECIFIED TRIGGER: ICD-10-CM

## 2023-05-15 RX ORDER — FLUTICASONE PROPIONATE 50 MCG
SPRAY, SUSPENSION (ML) NASAL
Qty: 48 ML | Refills: 0 | Status: SHIPPED | OUTPATIENT
Start: 2023-05-15

## 2023-05-15 RX ORDER — INSULIN HUMAN 500 [IU]/ML
INJECTION, SOLUTION SUBCUTANEOUS
Qty: 30 ML | Refills: 11 | Status: SHIPPED | OUTPATIENT
Start: 2023-05-15

## 2023-05-15 NOTE — TELEPHONE ENCOUNTER
Pt called, stating he needs insulin called in for his Omnipod 5 G6. He has training tomorrow for the pump, and needs the insulin please    Pharmacy is [facility]     Pt callback number [phone]

## 2023-05-22 ENCOUNTER — OFFICE VISIT (OUTPATIENT)
Dept: FAMILY MEDICINE CLINIC | Facility: CLINIC | Age: 53
End: 2023-05-22
Payer: MEDICARE

## 2023-05-22 VITALS
BODY MASS INDEX: 42.63 KG/M2 | HEART RATE: 68 BPM | TEMPERATURE: 97.1 F | DIASTOLIC BLOOD PRESSURE: 74 MMHG | SYSTOLIC BLOOD PRESSURE: 134 MMHG | OXYGEN SATURATION: 98 % | WEIGHT: 271.6 LBS | HEIGHT: 67 IN

## 2023-05-22 DIAGNOSIS — Z23 IMMUNIZATION DUE: Primary | ICD-10-CM

## 2023-05-22 NOTE — PROGRESS NOTES
I have reviewed the notes, assessments, and/or procedures performed by Dr. Bonilla Stanton, I concur with her  documentation and assessment and plan for Kingsley Littlejohn        This document has been electronically signed by Kg Kc MD on May 22, 2023 15:26 CDT

## 2023-05-22 NOTE — PROGRESS NOTES
"  Family Medicine Residency  Bonilla Stanton MD    Subjective:     Kingsley Littlejohn is a 53 y.o. male who presents today for pneumonia vaccine. No new complaints today. At his last visit, noted to cerumen impaction in bilateral ears, saw ENT since then.     Type 2 DM  Patient has been on Omnipod since last 2 weeks now and doing better now with sugars.       Smoking history : Smoked since age 16. Smoked 2 packs a day. Quit smoking 5 yrs ago.      The following portions of the patient's history were reviewed and updated as appropriate: allergies, current medications, past family history, past medical history, past social history, past surgical history and problem list.    Past Medical Hx:  Past Medical History:   Diagnosis Date   • Abdominal pain    • Allergic rhinitis    • Diabetes mellitus     Prev HbA1c 14.0 2014, states \"I'm cured!\"   • Essential hypertension    • Fracture closed, fibula, shaft     tib-fib   • Onychomycosis    • Sleep apnea        Past Surgical Hx:  Past Surgical History:   Procedure Laterality Date   • ANKLE SURGERY      right and left   • COLONOSCOPY N/A 6/4/2018    Procedure: COLONOSCOPY;  Surgeon: Jaylon Castro MD;  Location: [facility] ENDOSCOPY;  Service: Gastroenterology   • COLONOSCOPY N/A 8/13/2021    Procedure: COLONOSCOPY;  Surgeon: Jaylon Castro MD;  Location: [facility] ENDOSCOPY;  Service: General;  Laterality: N/A;   • CYST REMOVAL      x 3, from hand, thigh, and buttocks   • ENDOSCOPY      20 years ago   • TIBIA FRACTURE SURGERY Bilateral 2011    tib-fib       Current Meds:    Current Outpatient Medications:   •  Afrezza 60x4 &60x8 & 60x12 UNIT powder, INHALE 4-32 UNITS 3 (THREE) TIMES A DAY WITH MEALS. 4-32 UNITS THREE TIMES DAILY , MAX DOSE 96 UNITS DAILY, Disp: 360 each, Rfl: 9  •  aspirin 325 MG tablet, Take 1 tablet by mouth Daily., Disp: , Rfl:   •  atorvastatin (LIPITOR) 40 MG tablet, TAKE 1 TABLET BY MOUTH EVERY DAY, Disp: 90 tablet, Rfl: 2  •  BD Pen Needle Nola 2nd Gen " 32G X 4 MM misc, INJECT ONCE WALTON, Disp: 100 each, Rfl: 10  •  BD Pen Needle Nola U/F 32G X 4 MM misc, INJECT ONCE WALTON, Disp: , Rfl:   •  Continuous Blood Gluc  (FreeStyle Melonie 2 [place]) device, 1 each Continuous., Disp: 1 each, Rfl: 1  •  Continuous Blood Gluc Sensor (FreeStyle Melonie 2 Sensor) misc, 1 each Every 14 (Fourteen) Days., Disp: 3 each, Rfl: 11  •  fenofibrate (TRICOR) 145 MG tablet, TAKE 1 TABLET BY MOUTH EVERY DAY, Disp: 90 tablet, Rfl: 3  •  fluticasone (FLONASE) 50 MCG/ACT nasal spray, SPRAY 2 SPRAYS INTO EACH NOSTRIL EVERY DAY, Disp: 48 mL, Rfl: 0  •  glucose blood test strip, USE TO TEST BLOOD SUGAR 1 TIME DAILY E11.9, Disp: 100 each, Rfl: 12  •  glucose monitor monitoring kit, 1 each As Needed (Diabetes). USE TO TEST 1 TIME DAILY, Disp: 1 each, Rfl: 0  •  hydroCHLOROthiazide (MICROZIDE) 12.5 MG capsule, Take 1 capsule by mouth Daily., Disp: , Rfl:   •  hydrOXYzine (ATARAX) 50 MG tablet, Take 1 tablet by mouth 4 (Four) Times a Day., Disp: , Rfl:   •  Insulin Disposable Pump (Omnipod 5 G6 Intro, Gen 5,) kit, , Disp: , Rfl:   •  insulin regular (HUMULIN R) 500 UNIT/ML CONCENTRATED injection, Inject  under the skin into the appropriate area as directed 3 (Three) Times a Day Before Meals. 0.9 ml daily, Disp: 30 mL, Rfl: 11  •  Invega Sustenna 117 MG/0.75ML suspension prefilled syringe IM injection, INJECT ONE HUNDRED SEVENTEEN (117) MILLIGRAMS INTO THE MUSCLE EVERY 4 WEEKS, Disp: , Rfl:   •  Jardiance 25 MG tablet tablet, TAKE 1 TABLET BY MOUTH EVERY DAY, Disp: 90 tablet, Rfl: 1  •  Lancets (ACCU-CHEK SOFT TOUCH) lancets, Use TO TEST BLOOD SUGAR 1 TIME DAILY., Disp: 100 each, Rfl: 12  •  lisinopril (PRINIVIL,ZESTRIL) 40 MG tablet, Take 1 tablet by mouth Daily., Disp: 90 tablet, Rfl: 3  •  metFORMIN (GLUCOPHAGE) 1000 MG tablet, Take 1 tablet by mouth 2 (Two) Times a Day With Meals., Disp: 180 tablet, Rfl: 3  •  metoprolol tartrate (LOPRESSOR) 100 MG tablet, TAKE 1 TAB(S) 2 TIMES A DAY, Disp: ,  Rfl:   •  paliperidone palmitate (INVEGA SUSTENNA) 117 MG/0.75ML suspension IM injection, Inject 117 mg into the shoulder, thigh, or buttocks Every 30 (Thirty) Days., Disp: , Rfl:   •  Semaglutide, 2 MG/DOSE, (Ozempic, 2 MG/DOSE,) 8 MG/3ML solution pen-injector, Inject 2 mg under the skin into the appropriate area as directed 1 (One) Time Per Week., Disp: 3 mL, Rfl: 11  •  zolpidem (AMBIEN) 10 MG tablet, Take 1 tablet by mouth every night at bedtime., Disp: , Rfl:   •  Blood Glucose Monitoring Suppl (ONE TOUCH ULTRA 2) w/Device kit, Use 1 daily to test blood glucose level. (Patient not taking: Reported on 2023), Disp: 1 each, Rfl: 5  •  Insulin Glargine, 2 Unit Dial, (Toujeo Max SoloStar) 300 UNIT/ML solution pen-injector injection, Inject 80 units under the skin into the appropriate area as directed daily (Patient not taking: Reported on 2023), Disp: 90 mL, Rfl: 11    Allergies:  Allergies   Allergen Reactions   • Latex Irritability     Other reaction(s): Unknown   • Codeine Nausea Only     Other reaction(s): Unknown       Family Hx:  Family History   Problem Relation Age of Onset   • Diabetes Other    • Hypertension Other    • Cancer Other    • Lung disease Other    • Thyroid disease Other    • Bleeding Disorder Other    • Colon cancer Paternal Grandmother    • Cancer Paternal Grandfather    • Crohn's disease Mother    • Diabetes Mother    • Hypertension Father    • Lung disease Father    • Hypertension Brother    • Hypertension Brother    • Hypertension Brother         Social History:  Social History     Socioeconomic History   • Marital status: Single   Tobacco Use   • Smoking status: Former     Packs/day: 1.00     Years: 25.00     Pack years: 25.00     Types: Cigarettes     Start date:      Quit date: 2018     Years since quittin.4   • Smokeless tobacco: Never   Vaping Use   • Vaping Use: Never used   Substance and Sexual Activity   • Alcohol use: Defer     Comment: FORMER pint of Enpocket  "daily, states quit 1 year ago   • Drug use: Defer     Comment: patient reports prior drug abuse, but sober for 8 months now.   • Sexual activity: Defer       Review of Systems  Review of Systems   Constitutional: Negative for activity change and appetite change.   HENT: Negative for congestion and hearing loss.    Respiratory: Negative for cough, chest tightness, shortness of breath and wheezing.    Cardiovascular: Negative for chest pain, palpitations and leg swelling.   Gastrointestinal: Negative for abdominal pain, diarrhea, nausea and vomiting.   Genitourinary: Negative for difficulty urinating, frequency and urgency.   Musculoskeletal: Negative for arthralgias and myalgias.   Skin: Negative for color change and rash.   Neurological: Negative for dizziness, weakness, light-headedness and headaches.   Psychiatric/Behavioral: Negative for behavioral problems.       Objective:     /74   Pulse 68   Temp 97.1 °F (36.2 °C)   Ht 170.2 cm (67.01\")   Wt 123 kg (271 lb 9.6 oz)   SpO2 98%   BMI 42.53 kg/m²   Physical Exam  Vitals and nursing note reviewed.   Constitutional:       General: He is not in acute distress.     Appearance: Normal appearance. He is obese. He is not ill-appearing, toxic-appearing or diaphoretic.   HENT:      Right Ear: Hearing, tympanic membrane, ear canal and external ear normal. No laceration, drainage or swelling. No middle ear effusion.      Left Ear: Hearing, tympanic membrane, ear canal and external ear normal. No laceration, drainage or swelling.  No middle ear effusion.   Cardiovascular:      Rate and Rhythm: Normal rate and regular rhythm.      Pulses: Normal pulses.      Heart sounds: Normal heart sounds. No murmur heard.  Pulmonary:      Effort: Pulmonary effort is normal. No respiratory distress.      Breath sounds: Normal breath sounds. No wheezing.   Musculoskeletal:      Right lower leg: No edema.      Left lower leg: No edema.      Comments: Chronic skin changes seen on " shin regions b/l   Neurological:      Mental Status: He is alert.          Assessment/Plan:   Kingsley Littlejohn is a 52 y.o. male who presents today for pneuomnia vaccine which was administered today. Patient would like to come back for tdap and zoster vaccine. Advised to follow up with endocrinology for management of diabetes.   Diagnoses and all orders for this visit:    1. Immunization due (Primary)  -     Pneumococcal Conjugate Vaccine 20-Valent (PCV20)      · Rx changes: none  · Patient Education: diet and exercise   · Compliance at present is estimated to be good.   · Efforts to improve compliance (if necessary) will be directed at increased exercise.    Depression screening: Up to date; last screen 1/11/2023     Follow-up:     Return in about 4 weeks (around 6/19/2023) for Recheck.    Preventative:  Health Maintenance   Topic Date Due   • Hepatitis B (1 of 3 - 3-dose series) Never done   • HEPATITIS C SCREENING  Never done   • ZOSTER VACCINE (1 of 2) Never done   • TDAP/TD VACCINES (2 - Td or Tdap) 01/01/2021   • DIABETIC EYE EXAM  10/14/2022   • ANNUAL WELLNESS VISIT  08/05/2023   • HEMOGLOBIN A1C  09/21/2023   • LUNG CANCER SCREENING  01/18/2024   • DIABETIC FOOT EXAM  03/21/2024   • LIPID PANEL  03/21/2024   • COLORECTAL CANCER SCREENING  08/13/2024   • COVID-19 Vaccine  Completed   • Pneumococcal Vaccine 0-64  Completed   • INFLUENZA VACCINE  Discontinued   • URINE MICROALBUMIN  Discontinued         Alcohol use: Alcohol use questions deferred to the physician.  Nicotine status  reports that he quit smoking about 4 years ago. His smoking use included cigarettes. He started smoking about 31 years ago. He has a 25.00 pack-year smoking history. He has never used smokeless tobacco.     Goals     • Blood Pressure < 140/90      Barriers:  Sporadic follow-up      • Lower Blood Sugar      Barriers:  Questionable insight            RISK SCORE: 3        Bonilla Stanton MD PGY-3  [facility]  Medicine Residency   This document has been electronically signed by Bonilla Stanton MD on May 22, 2023 14:47 CDT

## 2023-06-02 ENCOUNTER — DOCUMENTATION (OUTPATIENT)
Dept: ENDOCRINOLOGY | Facility: CLINIC | Age: 53
End: 2023-06-02

## 2023-06-02 ENCOUNTER — TELEPHONE (OUTPATIENT)
Dept: ENDOCRINOLOGY | Facility: CLINIC | Age: 53
End: 2023-06-02

## 2023-06-02 NOTE — TELEPHONE ENCOUNTER
"Pt requesting a \"Dexcom G6 or 7\" sent to [facility] please    Pt callback [phone]  "

## 2023-07-17 PROBLEM — E55.9 VITAMIN D DEFICIENCY: Status: ACTIVE | Noted: 2023-07-17

## 2023-07-17 PROBLEM — Z79.4 TYPE 2 DIABETES MELLITUS WITH HYPOGLYCEMIA WITHOUT COMA, WITH LONG-TERM CURRENT USE OF INSULIN: Status: ACTIVE | Noted: 2017-06-26

## 2023-07-17 PROBLEM — E11.43 DIABETIC AUTONOMIC NEUROPATHY ASSOCIATED WITH TYPE 2 DIABETES MELLITUS: Status: ACTIVE | Noted: 2023-07-17

## 2023-07-17 PROBLEM — E11.649 TYPE 2 DIABETES MELLITUS WITH HYPOGLYCEMIA WITHOUT COMA, WITH LONG-TERM CURRENT USE OF INSULIN: Status: ACTIVE | Noted: 2017-06-26

## 2023-08-16 PROCEDURE — 87205 SMEAR GRAM STAIN: CPT | Performed by: NURSE PRACTITIONER

## 2023-08-16 PROCEDURE — 87070 CULTURE OTHR SPECIMN AEROBIC: CPT | Performed by: NURSE PRACTITIONER

## 2023-08-16 PROCEDURE — 87186 SC STD MICRODIL/AGAR DIL: CPT | Performed by: NURSE PRACTITIONER

## 2023-08-27 PROCEDURE — 87493 C DIFF AMPLIFIED PROBE: CPT | Performed by: NURSE PRACTITIONER

## 2023-08-29 ENCOUNTER — HOSPITAL ENCOUNTER (EMERGENCY)
Facility: HOSPITAL | Age: 53
Discharge: HOME | End: 2023-08-29
Attending: EMERGENCY MEDICINE | Admitting: FAMILY MEDICINE
Payer: MEDICARE

## 2023-08-29 VITALS
BODY MASS INDEX: 44.73 KG/M2 | WEIGHT: 285 LBS | OXYGEN SATURATION: 95 % | RESPIRATION RATE: 20 BRPM | HEIGHT: 67 IN | TEMPERATURE: 98.6 F | SYSTOLIC BLOOD PRESSURE: 138 MMHG | DIASTOLIC BLOOD PRESSURE: 85 MMHG | HEART RATE: 87 BPM

## 2023-08-29 DIAGNOSIS — U07.1 COVID-19: Primary | ICD-10-CM

## 2023-08-29 LAB
FLUAV RNA RESP QL NAA+PROBE: NOT DETECTED
FLUBV RNA RESP QL NAA+PROBE: NOT DETECTED
S PYO AG THROAT QL: NEGATIVE
SARS-COV-2 RNA RESP QL NAA+PROBE: DETECTED

## 2023-08-29 PROCEDURE — 87636 SARSCOV2 & INF A&B AMP PRB: CPT | Performed by: EMERGENCY MEDICINE

## 2023-08-29 PROCEDURE — 87880 STREP A ASSAY W/OPTIC: CPT | Performed by: EMERGENCY MEDICINE

## 2023-08-29 PROCEDURE — 87081 CULTURE SCREEN ONLY: CPT | Performed by: EMERGENCY MEDICINE

## 2023-08-29 PROCEDURE — 99283 EMERGENCY DEPT VISIT LOW MDM: CPT

## 2023-08-29 RX ORDER — GUAIFENESIN 600 MG/1
600 TABLET, EXTENDED RELEASE ORAL ONCE
Status: COMPLETED | OUTPATIENT
Start: 2023-08-29 | End: 2023-08-29

## 2023-08-29 RX ORDER — GUAIFENESIN 600 MG/1
600 TABLET, EXTENDED RELEASE ORAL 2 TIMES DAILY
Qty: 10 TABLET | Refills: 0 | Status: SHIPPED | OUTPATIENT
Start: 2023-08-29 | End: 2023-09-03

## 2023-08-29 RX ORDER — IBUPROFEN 600 MG/1
600 TABLET ORAL ONCE
Status: COMPLETED | OUTPATIENT
Start: 2023-08-29 | End: 2023-08-29

## 2023-08-29 RX ORDER — IBUPROFEN 600 MG/1
600 TABLET ORAL EVERY 8 HOURS PRN
Qty: 15 TABLET | Refills: 0 | Status: SHIPPED | OUTPATIENT
Start: 2023-08-29 | End: 2023-09-03

## 2023-08-29 RX ADMIN — IBUPROFEN 600 MG: 600 TABLET, FILM COATED ORAL at 18:16

## 2023-08-29 RX ADMIN — GUAIFENESIN 600 MG: 600 TABLET, EXTENDED RELEASE ORAL at 18:16

## 2023-08-29 NOTE — ED PROVIDER NOTES
"Subjective   History of Present Illness  53 year old male patient presents to ER for complaint of sore throat, cough, congestion, body aches, headache, and sinus pain for past 4 days. His stepdad is positive for COVID and mother is ill with similar symptoms. He has been taking tylenol with some relief. He denies and vomiting, diarrhea, or known fever. He denies tobacco, ETOH, or illicit drug use.     Review of Systems   Constitutional: Negative.  Negative for fever.   HENT:  Positive for congestion, postnasal drip, sinus pain and sore throat.    Eyes: Negative.    Respiratory:  Positive for cough.    Cardiovascular: Negative.    Gastrointestinal: Negative.    Endocrine: Negative.    Genitourinary: Negative.    Musculoskeletal:  Positive for myalgias.   Skin: Negative.    Allergic/Immunologic: Negative.    Neurological:  Positive for headaches.   Hematological: Negative.    Psychiatric/Behavioral: Negative.       Past Medical History:   Diagnosis Date    Abdominal pain     Allergic rhinitis     Diabetes mellitus     Prev HbA1c 14.0 2014, states \"I'm cured!\"    Essential hypertension     Fracture closed, fibula, shaft     tib-fib    Onychomycosis     Sleep apnea        Allergies   Allergen Reactions    Latex Irritability     Other reaction(s): Unknown    Codeine Nausea Only     Other reaction(s): Unknown       Past Surgical History:   Procedure Laterality Date    ANKLE SURGERY      right and left    COLONOSCOPY N/A 6/4/2018    Procedure: COLONOSCOPY;  Surgeon: Jaylon Castro MD;  Location: [facility] ENDOSCOPY;  Service: Gastroenterology    COLONOSCOPY N/A 8/13/2021    Procedure: COLONOSCOPY;  Surgeon: Jaylon Castro MD;  Location: [facility] ENDOSCOPY;  Service: General;  Laterality: N/A;    CYST REMOVAL      x 3, from hand, thigh, and buttocks    ENDOSCOPY      20 years ago    TIBIA FRACTURE SURGERY Bilateral 2011    tib-fib       Family History   Problem Relation Age of Onset    Diabetes Other     Hypertension Other " "    Cancer Other     Lung disease Other     Thyroid disease Other     Bleeding Disorder Other     Colon cancer Paternal Grandmother     Cancer Paternal Grandfather     Crohn's disease Mother     Diabetes Mother     Hypertension Father     Lung disease Father     Hypertension Brother     Hypertension Brother     Hypertension Brother        Social History     Socioeconomic History    Marital status: Single   Tobacco Use    Smoking status: Former     Packs/day: 1.00     Years: 25.00     Pack years: 25.00     Types: Cigarettes     Start date:      Quit date: 2018     Years since quittin.7    Smokeless tobacco: Never   Vaping Use    Vaping Use: Never used   Substance and Sexual Activity    Alcohol use: Defer     Comment: FORMER pint of whiskey daily, states quit 1 year ago    Drug use: Defer     Comment: patient reports prior drug abuse, but sober for 8 months now.    Sexual activity: Defer           Objective   /85 (BP Location: Right arm, Patient Position: Sitting)   Pulse 87   Temp 98.6 øF (37 øC) (Oral)   Resp 20   Ht 170.2 cm (67\")   Wt 129 kg (285 lb)   SpO2 95%   BMI 44.64 kg/mý     Physical Exam  Vitals and nursing note reviewed.   Constitutional:       General: He is not in acute distress.     Appearance: Normal appearance. He is obese. He is not ill-appearing, toxic-appearing or diaphoretic.   HENT:      Head: Normocephalic.      Right Ear: There is impacted cerumen.      Left Ear: There is impacted cerumen.      Ears:      Comments: Unable to visualize TM bilaterally due to cerumen     Nose: Nose normal.      Right Sinus: No maxillary sinus tenderness or frontal sinus tenderness.      Left Sinus: No maxillary sinus tenderness or frontal sinus tenderness.      Mouth/Throat:      Mouth: Mucous membranes are moist.      Pharynx: Posterior oropharyngeal erythema present. No oropharyngeal exudate.   Eyes:      Pupils: Pupils are equal, round, and reactive to light.   Cardiovascular:      " Rate and Rhythm: Normal rate and regular rhythm.      Pulses: Normal pulses.      Heart sounds: Normal heart sounds.   Pulmonary:      Effort: Pulmonary effort is normal. No respiratory distress.      Breath sounds: Normal breath sounds. No stridor. No wheezing or rhonchi.   Abdominal:      General: Bowel sounds are normal. There is no distension.      Palpations: Abdomen is soft.      Tenderness: There is no abdominal tenderness.   Musculoskeletal:         General: Normal range of motion.      Cervical back: Normal range of motion.   Skin:     General: Skin is warm and dry.      Capillary Refill: Capillary refill takes less than 2 seconds.   Neurological:      Mental Status: He is alert and oriented to person, place, and time.   Psychiatric:         Mood and Affect: Mood normal.         Behavior: Behavior normal.         Thought Content: Thought content normal.         Judgment: Judgment normal.       Procedures           ED Course  ED Course as of 08/29/23 1833   Tue Aug 29, 2023   1818 Patient informed of positive COVID results.  [HS]      ED Course User Index  [HS] Skylar Au, APRN           Results for orders placed or performed during the hospital encounter of 08/29/23   COVID-19 and FLU A/B PCR - Swab, Nasopharynx    Specimen: Nasopharynx; Swab   Result Value Ref Range    COVID19 Detected (C) Not Detected - Ref. Range    Influenza A PCR Not Detected Not Detected    Influenza B PCR Not Detected Not Detected   Rapid Strep A Screen - Swab, Throat    Specimen: Throat; Swab   Result Value Ref Range    Strep A Ag Negative Negative                                       Medical Decision Making  Risk  OTC drugs.  Prescription drug management.        Final diagnoses:   COVID-19       ED Disposition  ED Disposition       ED Disposition   Discharge    Condition   Stable    Comment   --               Kaye Davis MD  [address] [place] [address] [phone]    Call   ER follow up for reevaluaton in 3-5  days if no improvement         Medication List        New Prescriptions      guaiFENesin 600 MG 12 hr tablet  Commonly known as: MUCINEX  Take 1 tablet by mouth 2 (Two) Times a Day for 5 days.     ibuprofen 600 MG tablet  Commonly known as: ADVIL,MOTRIN  Take 1 tablet by mouth Every 8 (Eight) Hours As Needed for Mild Pain for up to 5 days.               Where to Get Your Medications        These medications were sent to [facility]/pharmacy #[id number] - [place], [place] - [address] [place] [address] [phone] [facility] [phone] [address] [address]      Phone: [phone]   guaiFENesin 600 MG 12 hr tablet  ibuprofen 600 MG tablet            Skylar Au, APRN  08/29/23 4615

## 2023-08-29 NOTE — DISCHARGE INSTRUCTIONS
Home to rest. Drink plenty of fluids. May take ibuprofen for pain and body aches. May use mucinex for congestion. Continue your home medications. Isolation is recommended for 5 days after symptoms onset or as long as fever persists. Follow up with your primary care provider if no improvement of symptoms after 3-5 days. Return to ER for worsening symptoms such as shortness of breath.

## 2023-08-31 LAB — BACTERIA SPEC AEROBE CULT: NORMAL

## 2023-09-20 NOTE — PROGRESS NOTES
"Chief Complaint  Follow-up - polycythemia     Subjective        Kingsley Littlejohn presents to [facility]EMAT[facility]  History of Present Illness     No new health issues since last visit.   No new medications.   No new hospitalization.   Feels well.       Objective   Vital Signs:  /83   Pulse 92   Resp 18   Wt 123 kg (271 lb)   SpO2 95%   BMI 42.44 kg/m²   Estimated body mass index is 42.44 kg/m² as calculated from the following:    Height as of 12/21/22: 170.2 cm (67\").    Weight as of this encounter: 123 kg (271 lb).             Physical Exam   Result Review :                   Assessment and Plan   Diagnoses and all orders for this visit:    1. Polycythemia (Primary)  -     CBC & Differential; Future    Chronic, stable.   Hg is normal.   Likely secondary.   Low suspicious for hematological disorder.   Recommend continue to monitor.   CBC in a year.      2. Microcytosis  -     CBC & Differential; Future    Chronic, stable.   Iron studies mildly low.   Continue monitoring.          Follow Up   No follow-ups on file.  Patient was given instructions and counseling regarding his condition or for health maintenance advice. Please see specific information pulled into the AVS if appropriate.       " No

## (undated) DEVICE — CANN SMPL SOFTECH BIFLO ETCO2 A/M 7FT

## (undated) DEVICE — Device: Brand: DISPOSABLE ELECTROSURGICAL SNARE

## (undated) DEVICE — SINGLE-USE BIOPSY FORCEPS: Brand: RADIAL JAW 4

## (undated) DEVICE — TRAP SXN POLYP QUICKCATCH LF